# Patient Record
Sex: MALE | Race: BLACK OR AFRICAN AMERICAN | NOT HISPANIC OR LATINO | URBAN - METROPOLITAN AREA
[De-identification: names, ages, dates, MRNs, and addresses within clinical notes are randomized per-mention and may not be internally consistent; named-entity substitution may affect disease eponyms.]

---

## 2017-04-21 ENCOUNTER — INPATIENT (INPATIENT)
Facility: HOSPITAL | Age: 67
LOS: 2 days | Discharge: HOME | DRG: 603 | End: 2017-04-24
Attending: HOSPITALIST | Admitting: HOSPITALIST
Payer: COMMERCIAL

## 2017-04-21 VITALS
DIASTOLIC BLOOD PRESSURE: 74 MMHG | RESPIRATION RATE: 20 BRPM | HEIGHT: 73.75 IN | HEART RATE: 96 BPM | WEIGHT: 270.07 LBS | SYSTOLIC BLOOD PRESSURE: 124 MMHG | OXYGEN SATURATION: 97 % | TEMPERATURE: 98 F

## 2017-04-21 DIAGNOSIS — L97.509 NON-PRESSURE CHRONIC ULCER OF OTHER PART OF UNSPECIFIED FOOT WITH UNSPECIFIED SEVERITY: ICD-10-CM

## 2017-04-21 PROBLEM — Z00.00 ENCOUNTER FOR PREVENTIVE HEALTH EXAMINATION: Status: ACTIVE | Noted: 2017-04-21

## 2017-04-21 LAB
ALBUMIN SERPL ELPH-MCNC: 3.3 G/DL — LOW (ref 3.5–5)
ALP SERPL-CCNC: 87 U/L — SIGNIFICANT CHANGE UP (ref 40–120)
ALT FLD-CCNC: 38 U/L DA — SIGNIFICANT CHANGE UP (ref 10–60)
ANION GAP SERPL CALC-SCNC: 3 MMOL/L — LOW (ref 5–17)
AST SERPL-CCNC: 25 U/L — SIGNIFICANT CHANGE UP (ref 10–40)
BASOPHILS # BLD AUTO: 0.3 K/UL — HIGH (ref 0–0.2)
BASOPHILS NFR BLD AUTO: 2.7 % — HIGH (ref 0–2)
BILIRUB SERPL-MCNC: 0.3 MG/DL — SIGNIFICANT CHANGE UP (ref 0.2–1.2)
BUN SERPL-MCNC: 13 MG/DL — SIGNIFICANT CHANGE UP (ref 7–18)
CALCIUM SERPL-MCNC: 8.8 MG/DL — SIGNIFICANT CHANGE UP (ref 8.4–10.5)
CHLORIDE SERPL-SCNC: 105 MMOL/L — SIGNIFICANT CHANGE UP (ref 96–108)
CO2 SERPL-SCNC: 29 MMOL/L — SIGNIFICANT CHANGE UP (ref 22–31)
CREAT SERPL-MCNC: 1.2 MG/DL — SIGNIFICANT CHANGE UP (ref 0.5–1.3)
EOSINOPHIL # BLD AUTO: 0.1 K/UL — SIGNIFICANT CHANGE UP (ref 0–0.5)
EOSINOPHIL NFR BLD AUTO: 0.7 % — SIGNIFICANT CHANGE UP (ref 0–6)
ERYTHROCYTE [SEDIMENTATION RATE] IN BLOOD: 50 MM/HR — HIGH (ref 0–20)
GLUCOSE SERPL-MCNC: 102 MG/DL — HIGH (ref 70–99)
HCT VFR BLD CALC: 40.3 % — SIGNIFICANT CHANGE UP (ref 39–50)
HGB BLD-MCNC: 13.3 G/DL — SIGNIFICANT CHANGE UP (ref 13–17)
LYMPHOCYTES # BLD AUTO: 2.2 K/UL — SIGNIFICANT CHANGE UP (ref 1–3.3)
LYMPHOCYTES # BLD AUTO: 24.2 % — SIGNIFICANT CHANGE UP (ref 13–44)
MCHC RBC-ENTMCNC: 26.4 PG — LOW (ref 27–34)
MCHC RBC-ENTMCNC: 33 GM/DL — SIGNIFICANT CHANGE UP (ref 32–36)
MCV RBC AUTO: 79.8 FL — LOW (ref 80–100)
MONOCYTES # BLD AUTO: 0.8 K/UL — SIGNIFICANT CHANGE UP (ref 0–0.9)
MONOCYTES NFR BLD AUTO: 8.9 % — SIGNIFICANT CHANGE UP (ref 2–14)
NEUTROPHILS # BLD AUTO: 5.9 K/UL — SIGNIFICANT CHANGE UP (ref 1.8–7.4)
NEUTROPHILS NFR BLD AUTO: 63.5 % — SIGNIFICANT CHANGE UP (ref 43–77)
PLATELET # BLD AUTO: 284 K/UL — SIGNIFICANT CHANGE UP (ref 150–400)
POTASSIUM SERPL-MCNC: 3.8 MMOL/L — SIGNIFICANT CHANGE UP (ref 3.5–5.3)
POTASSIUM SERPL-SCNC: 3.8 MMOL/L — SIGNIFICANT CHANGE UP (ref 3.5–5.3)
PROT SERPL-MCNC: 8 G/DL — SIGNIFICANT CHANGE UP (ref 6–8.3)
RBC # BLD: 5.05 M/UL — SIGNIFICANT CHANGE UP (ref 4.2–5.8)
RBC # FLD: 13.5 % — SIGNIFICANT CHANGE UP (ref 10.3–14.5)
SODIUM SERPL-SCNC: 137 MMOL/L — SIGNIFICANT CHANGE UP (ref 135–145)
WBC # BLD: 9.2 K/UL — SIGNIFICANT CHANGE UP (ref 3.8–10.5)
WBC # FLD AUTO: 9.2 K/UL — SIGNIFICANT CHANGE UP (ref 3.8–10.5)

## 2017-04-21 PROCEDURE — 93925 LOWER EXTREMITY STUDY: CPT | Mod: 26

## 2017-04-21 PROCEDURE — 73630 X-RAY EXAM OF FOOT: CPT | Mod: 26,RT

## 2017-04-21 PROCEDURE — 99285 EMERGENCY DEPT VISIT HI MDM: CPT

## 2017-04-21 RX ORDER — PIPERACILLIN AND TAZOBACTAM 4; .5 G/20ML; G/20ML
3.38 INJECTION, POWDER, LYOPHILIZED, FOR SOLUTION INTRAVENOUS ONCE
Qty: 0 | Refills: 0 | Status: COMPLETED | OUTPATIENT
Start: 2017-04-21 | End: 2017-04-21

## 2017-04-21 RX ADMIN — PIPERACILLIN AND TAZOBACTAM 200 GRAM(S): 4; .5 INJECTION, POWDER, LYOPHILIZED, FOR SOLUTION INTRAVENOUS at 22:18

## 2017-04-21 NOTE — ED PROVIDER NOTE - OBJECTIVE STATEMENT
67 y/o M pt with PMHx of DM and no PSHx presents to ED c/o R foot soreness with associated R leg swelling x1 week and R foot infection noticed today by pt's podiatrist. Pt states his podiatrist examined his R foot at approximately 13:30pm today and notified the pt of the infection at the base of his R foot near the toes; pt was instructed to visit ED. Pt denies fever, chills, cough, weakness, calf pain, or any other complaints. Pt reports his last Hemoglobin A1c level was 7.1. Pt also reports having vascular studies performed in the past (>5 years ago). Allergies: Percocet 5/325 (hallucinations).

## 2017-04-21 NOTE — ED PROVIDER NOTE - MEDICAL DECISION MAKING DETAILS
65 y/o M pt c/o R foot soreness and R leg swelling x1 week, as well as R foot infection discovered by his podiatrist today. Plan for X-Ray of R foot, blood work, IV Abx's, labs, and possible admittance.

## 2017-04-21 NOTE — ED PROVIDER NOTE - CONDUCTED A DETAILED DISCUSSION WITH PATIENT AND/OR GUARDIAN REGARDING, MDM
radiology results/need for outpatient follow-up/lab results/return to ED if symptoms worsen, persist or questions arise/need to admit

## 2017-04-21 NOTE — ED PROVIDER NOTE - LOWER EXTREMITY EXAM, RIGHT
no swelling, no erythema, no purulent discharge, no ulcerations, no vesicles, no masses specific to hernia, no lymphadenopathy; positive cremaster reflex

## 2017-04-21 NOTE — ED PROVIDER NOTE - NS ED MD SCRIBE ATTENDING SCRIBE SECTIONS
VITAL SIGNS( Pullset)/HISTORY OF PRESENT ILLNESS/PAST MEDICAL/SURGICAL/SOCIAL HISTORY/DISPOSITION/REVIEW OF SYSTEMS/PHYSICAL EXAM

## 2017-04-21 NOTE — ED ADULT NURSE NOTE - OBJECTIVE STATEMENT
pt from home c/o of Rt foot diabetic ulcer x1 week needs admission for IV antibiotics per podiatrist pt is alert awake Rt plantar foot diabetic ulcer with small amt of yellow drainage gauze dry and intact

## 2017-04-22 DIAGNOSIS — Z41.8 ENCOUNTER FOR OTHER PROCEDURES FOR PURPOSES OTHER THAN REMEDYING HEALTH STATE: ICD-10-CM

## 2017-04-22 DIAGNOSIS — E11.9 TYPE 2 DIABETES MELLITUS WITHOUT COMPLICATIONS: ICD-10-CM

## 2017-04-22 DIAGNOSIS — L97.509 NON-PRESSURE CHRONIC ULCER OF OTHER PART OF UNSPECIFIED FOOT WITH UNSPECIFIED SEVERITY: ICD-10-CM

## 2017-04-22 LAB
ANION GAP SERPL CALC-SCNC: 4 MMOL/L — LOW (ref 5–17)
BUN SERPL-MCNC: 10 MG/DL — SIGNIFICANT CHANGE UP (ref 7–18)
CALCIUM SERPL-MCNC: 8.2 MG/DL — LOW (ref 8.4–10.5)
CHLORIDE SERPL-SCNC: 107 MMOL/L — SIGNIFICANT CHANGE UP (ref 96–108)
CHOLEST SERPL-MCNC: 149 MG/DL — SIGNIFICANT CHANGE UP (ref 10–199)
CO2 SERPL-SCNC: 29 MMOL/L — SIGNIFICANT CHANGE UP (ref 22–31)
CREAT SERPL-MCNC: 1.16 MG/DL — SIGNIFICANT CHANGE UP (ref 0.5–1.3)
CRP SERPL-MCNC: 3.02 MG/DL — HIGH (ref 0–0.4)
GLUCOSE SERPL-MCNC: 92 MG/DL — SIGNIFICANT CHANGE UP (ref 70–99)
HBA1C BLD-MCNC: 7 % — HIGH (ref 4–5.6)
HCT VFR BLD CALC: 37.8 % — LOW (ref 39–50)
HDLC SERPL-MCNC: 28 MG/DL — LOW (ref 40–125)
HGB BLD-MCNC: 12.3 G/DL — LOW (ref 13–17)
LIPID PNL WITH DIRECT LDL SERPL: 98 MG/DL — SIGNIFICANT CHANGE UP
MAGNESIUM SERPL-MCNC: 2.2 MG/DL — SIGNIFICANT CHANGE UP (ref 1.8–2.4)
MCHC RBC-ENTMCNC: 26.2 PG — LOW (ref 27–34)
MCHC RBC-ENTMCNC: 32.6 GM/DL — SIGNIFICANT CHANGE UP (ref 32–36)
MCV RBC AUTO: 80.5 FL — SIGNIFICANT CHANGE UP (ref 80–100)
PHOSPHATE SERPL-MCNC: 2.8 MG/DL — SIGNIFICANT CHANGE UP (ref 2.5–4.5)
PLATELET # BLD AUTO: 240 K/UL — SIGNIFICANT CHANGE UP (ref 150–400)
POTASSIUM SERPL-MCNC: 4.1 MMOL/L — SIGNIFICANT CHANGE UP (ref 3.5–5.3)
POTASSIUM SERPL-SCNC: 4.1 MMOL/L — SIGNIFICANT CHANGE UP (ref 3.5–5.3)
RBC # BLD: 4.7 M/UL — SIGNIFICANT CHANGE UP (ref 4.2–5.8)
RBC # FLD: 13.4 % — SIGNIFICANT CHANGE UP (ref 10.3–14.5)
SODIUM SERPL-SCNC: 140 MMOL/L — SIGNIFICANT CHANGE UP (ref 135–145)
TOTAL CHOLESTEROL/HDL RATIO MEASUREMENT: 5.3 RATIO — SIGNIFICANT CHANGE UP (ref 3.4–9.6)
TRIGL SERPL-MCNC: 113 MG/DL — SIGNIFICANT CHANGE UP (ref 10–149)
WBC # BLD: 6.9 K/UL — SIGNIFICANT CHANGE UP (ref 3.8–10.5)
WBC # FLD AUTO: 6.9 K/UL — SIGNIFICANT CHANGE UP (ref 3.8–10.5)

## 2017-04-22 RX ORDER — DEXTROSE 50 % IN WATER 50 %
1 SYRINGE (ML) INTRAVENOUS ONCE
Qty: 0 | Refills: 0 | Status: DISCONTINUED | OUTPATIENT
Start: 2017-04-22 | End: 2017-04-24

## 2017-04-22 RX ORDER — VANCOMYCIN HCL 1 G
1250 VIAL (EA) INTRAVENOUS EVERY 12 HOURS
Qty: 0 | Refills: 0 | Status: DISCONTINUED | OUTPATIENT
Start: 2017-04-22 | End: 2017-04-24

## 2017-04-22 RX ORDER — DEXTROSE 50 % IN WATER 50 %
25 SYRINGE (ML) INTRAVENOUS ONCE
Qty: 0 | Refills: 0 | Status: DISCONTINUED | OUTPATIENT
Start: 2017-04-22 | End: 2017-04-24

## 2017-04-22 RX ORDER — VANCOMYCIN HCL 1 G
1000 VIAL (EA) INTRAVENOUS ONCE
Qty: 0 | Refills: 0 | Status: COMPLETED | OUTPATIENT
Start: 2017-04-22 | End: 2017-04-22

## 2017-04-22 RX ORDER — HEPARIN SODIUM 5000 [USP'U]/ML
5000 INJECTION INTRAVENOUS; SUBCUTANEOUS EVERY 8 HOURS
Qty: 0 | Refills: 0 | Status: DISCONTINUED | OUTPATIENT
Start: 2017-04-22 | End: 2017-04-24

## 2017-04-22 RX ORDER — GLUCAGON INJECTION, SOLUTION 0.5 MG/.1ML
1 INJECTION, SOLUTION SUBCUTANEOUS ONCE
Qty: 0 | Refills: 0 | Status: DISCONTINUED | OUTPATIENT
Start: 2017-04-22 | End: 2017-04-24

## 2017-04-22 RX ORDER — ACETAMINOPHEN 500 MG
650 TABLET ORAL EVERY 6 HOURS
Qty: 0 | Refills: 0 | Status: DISCONTINUED | OUTPATIENT
Start: 2017-04-22 | End: 2017-04-24

## 2017-04-22 RX ORDER — DEXTROSE 50 % IN WATER 50 %
12.5 SYRINGE (ML) INTRAVENOUS ONCE
Qty: 0 | Refills: 0 | Status: DISCONTINUED | OUTPATIENT
Start: 2017-04-22 | End: 2017-04-24

## 2017-04-22 RX ORDER — VANCOMYCIN HCL 1 G
1000 VIAL (EA) INTRAVENOUS EVERY 24 HOURS
Qty: 0 | Refills: 0 | Status: DISCONTINUED | OUTPATIENT
Start: 2017-04-22 | End: 2017-04-22

## 2017-04-22 RX ORDER — SODIUM CHLORIDE 9 MG/ML
1000 INJECTION, SOLUTION INTRAVENOUS
Qty: 0 | Refills: 0 | Status: DISCONTINUED | OUTPATIENT
Start: 2017-04-22 | End: 2017-04-24

## 2017-04-22 RX ORDER — INSULIN LISPRO 100/ML
VIAL (ML) SUBCUTANEOUS
Qty: 0 | Refills: 0 | Status: DISCONTINUED | OUTPATIENT
Start: 2017-04-22 | End: 2017-04-24

## 2017-04-22 RX ORDER — GABAPENTIN 400 MG/1
400 CAPSULE ORAL
Qty: 0 | Refills: 0 | Status: DISCONTINUED | OUTPATIENT
Start: 2017-04-22 | End: 2017-04-24

## 2017-04-22 RX ORDER — ENOXAPARIN SODIUM 100 MG/ML
40 INJECTION SUBCUTANEOUS DAILY
Qty: 0 | Refills: 0 | Status: DISCONTINUED | OUTPATIENT
Start: 2017-04-22 | End: 2017-04-22

## 2017-04-22 RX ORDER — SODIUM CHLORIDE 9 MG/ML
1000 INJECTION INTRAMUSCULAR; INTRAVENOUS; SUBCUTANEOUS
Qty: 0 | Refills: 0 | Status: DISCONTINUED | OUTPATIENT
Start: 2017-04-22 | End: 2017-04-24

## 2017-04-22 RX ADMIN — Medication 1: at 11:59

## 2017-04-22 RX ADMIN — Medication 650 MILLIGRAM(S): at 05:41

## 2017-04-22 RX ADMIN — GABAPENTIN 400 MILLIGRAM(S): 400 CAPSULE ORAL at 17:06

## 2017-04-22 RX ADMIN — HEPARIN SODIUM 5000 UNIT(S): 5000 INJECTION INTRAVENOUS; SUBCUTANEOUS at 23:43

## 2017-04-22 RX ADMIN — SODIUM CHLORIDE 75 MILLILITER(S): 9 INJECTION INTRAMUSCULAR; INTRAVENOUS; SUBCUTANEOUS at 23:43

## 2017-04-22 RX ADMIN — Medication 650 MILLIGRAM(S): at 07:10

## 2017-04-22 RX ADMIN — GABAPENTIN 400 MILLIGRAM(S): 400 CAPSULE ORAL at 05:41

## 2017-04-22 RX ADMIN — Medication 166.67 MILLIGRAM(S): at 17:06

## 2017-04-22 RX ADMIN — Medication 250 MILLIGRAM(S): at 01:43

## 2017-04-22 RX ADMIN — HEPARIN SODIUM 5000 UNIT(S): 5000 INJECTION INTRAVENOUS; SUBCUTANEOUS at 13:00

## 2017-04-22 NOTE — H&P ADULT. - HISTORY OF PRESENT ILLNESS
65 y/o M pt with PMHx of DM presents to ER complaining of  right  foot pain, swelling and erythema and blisters. Pt was concern and went to his podiatrist in which bed side debridement was done and pt was instructed to come ER due to concerns of infection and rule out osteomyelitis Pt states that this is the first time this happens to him. Pt denies fever, chills, cough, weakness, calf pain, or any other complaints.

## 2017-04-22 NOTE — H&P ADULT. - PROBLEM SELECTOR PLAN 1
65 yo M PMH DM p/w R plantar foot ulcer with possible infection.  PE: fibroglandular base, hyperkeratotic edges, jasmyne durus, no deep probing, no purulent discharge. sanguinolent drainage.   Admit for IV abx and OM r/o  f/u right foot XR and right LE arterial duplex  c/w IV abx  f/u Bcx and wound culture.   Podiatry following, pt might need OR debridement   ID: 67 yo M PMH DM p/w R plantar foot ulcer with possible infection.  PE: fibroglandular base, hyperkeratotic edges, jasmyne durus, no deep probing, no purulent discharge. sanguinolent drainage.   Admit for OM r/o and IV abx  f/u right foot XR and right LE arterial duplex  c/w IV abx  f/u Bcx and wound culture.   Podiatry following, pt might need OR debridement

## 2017-04-22 NOTE — H&P ADULT. - ASSESSMENT
67 y/o M pt with PMHx of DM presents to ER complaining of  right  foot pain, swelling and erythema and blisters. Pt was concern and went to his podiatrist in which bed side debridement was done and pt was instructed to come ER due to concerns of infection and rule out osteomyelitis

## 2017-04-22 NOTE — H&P ADULT. - ATTENDING COMMENTS
Patient was seen and examined by myself. Case was discussed with house staff in details.  See addendum in chart

## 2017-04-23 LAB
-  AMPICILLIN/SULBACTAM: SIGNIFICANT CHANGE UP
-  CEFAZOLIN: SIGNIFICANT CHANGE UP
-  CIPROFLOXACIN: SIGNIFICANT CHANGE UP
-  CLINDAMYCIN: SIGNIFICANT CHANGE UP
-  ERYTHROMYCIN: SIGNIFICANT CHANGE UP
-  GENTAMICIN: SIGNIFICANT CHANGE UP
-  LEVOFLOXACIN: SIGNIFICANT CHANGE UP
-  MOXIFLOXACIN(AEROBIC): SIGNIFICANT CHANGE UP
-  OXACILLIN: SIGNIFICANT CHANGE UP
-  PENICILLIN: SIGNIFICANT CHANGE UP
-  RIFAMPIN: SIGNIFICANT CHANGE UP
-  TETRACYCLINE: SIGNIFICANT CHANGE UP
-  TRIMETHOPRIM/SULFAMETHOXAZOLE: SIGNIFICANT CHANGE UP
-  VANCOMYCIN: SIGNIFICANT CHANGE UP
ALBUMIN SERPL ELPH-MCNC: 2.6 G/DL — LOW (ref 3.5–5)
ALP SERPL-CCNC: 71 U/L — SIGNIFICANT CHANGE UP (ref 40–120)
ALT FLD-CCNC: 28 U/L DA — SIGNIFICANT CHANGE UP (ref 10–60)
ANION GAP SERPL CALC-SCNC: 5 MMOL/L — SIGNIFICANT CHANGE UP (ref 5–17)
AST SERPL-CCNC: 21 U/L — SIGNIFICANT CHANGE UP (ref 10–40)
BASOPHILS # BLD AUTO: 0.1 K/UL — SIGNIFICANT CHANGE UP (ref 0–0.2)
BASOPHILS NFR BLD AUTO: 1 % — SIGNIFICANT CHANGE UP (ref 0–2)
BILIRUB SERPL-MCNC: 0.4 MG/DL — SIGNIFICANT CHANGE UP (ref 0.2–1.2)
BUN SERPL-MCNC: 9 MG/DL — SIGNIFICANT CHANGE UP (ref 7–18)
CALCIUM SERPL-MCNC: 8.3 MG/DL — LOW (ref 8.4–10.5)
CHLORIDE SERPL-SCNC: 108 MMOL/L — SIGNIFICANT CHANGE UP (ref 96–108)
CO2 SERPL-SCNC: 28 MMOL/L — SIGNIFICANT CHANGE UP (ref 22–31)
CREAT SERPL-MCNC: 1.05 MG/DL — SIGNIFICANT CHANGE UP (ref 0.5–1.3)
EOSINOPHIL # BLD AUTO: 0.1 K/UL — SIGNIFICANT CHANGE UP (ref 0–0.5)
EOSINOPHIL NFR BLD AUTO: 1.7 % — SIGNIFICANT CHANGE UP (ref 0–6)
GLUCOSE SERPL-MCNC: 93 MG/DL — SIGNIFICANT CHANGE UP (ref 70–99)
HCT VFR BLD CALC: 39.8 % — SIGNIFICANT CHANGE UP (ref 39–50)
HGB BLD-MCNC: 12.6 G/DL — LOW (ref 13–17)
LYMPHOCYTES # BLD AUTO: 2.2 K/UL — SIGNIFICANT CHANGE UP (ref 1–3.3)
LYMPHOCYTES # BLD AUTO: 40.5 % — SIGNIFICANT CHANGE UP (ref 13–44)
MAGNESIUM SERPL-MCNC: 2 MG/DL — SIGNIFICANT CHANGE UP (ref 1.8–2.4)
MCHC RBC-ENTMCNC: 26 PG — LOW (ref 27–34)
MCHC RBC-ENTMCNC: 31.8 GM/DL — LOW (ref 32–36)
MCV RBC AUTO: 81.7 FL — SIGNIFICANT CHANGE UP (ref 80–100)
METHOD TYPE: SIGNIFICANT CHANGE UP
MONOCYTES # BLD AUTO: 0.6 K/UL — SIGNIFICANT CHANGE UP (ref 0–0.9)
MONOCYTES NFR BLD AUTO: 10.6 % — SIGNIFICANT CHANGE UP (ref 2–14)
NEUTROPHILS # BLD AUTO: 2.6 K/UL — SIGNIFICANT CHANGE UP (ref 1.8–7.4)
NEUTROPHILS NFR BLD AUTO: 46.2 % — SIGNIFICANT CHANGE UP (ref 43–77)
PHOSPHATE SERPL-MCNC: 2.3 MG/DL — LOW (ref 2.5–4.5)
PLATELET # BLD AUTO: 255 K/UL — SIGNIFICANT CHANGE UP (ref 150–400)
POTASSIUM SERPL-MCNC: 4.2 MMOL/L — SIGNIFICANT CHANGE UP (ref 3.5–5.3)
POTASSIUM SERPL-SCNC: 4.2 MMOL/L — SIGNIFICANT CHANGE UP (ref 3.5–5.3)
PROT SERPL-MCNC: 6.9 G/DL — SIGNIFICANT CHANGE UP (ref 6–8.3)
RBC # BLD: 4.87 M/UL — SIGNIFICANT CHANGE UP (ref 4.2–5.8)
RBC # FLD: 13.4 % — SIGNIFICANT CHANGE UP (ref 10.3–14.5)
SODIUM SERPL-SCNC: 141 MMOL/L — SIGNIFICANT CHANGE UP (ref 135–145)
WBC # BLD: 5.6 K/UL — SIGNIFICANT CHANGE UP (ref 3.8–10.5)
WBC # FLD AUTO: 5.6 K/UL — SIGNIFICANT CHANGE UP (ref 3.8–10.5)

## 2017-04-23 RX ORDER — COLLAGENASE CLOSTRIDIUM HIST. 250 UNIT/G
1 OINTMENT (GRAM) TOPICAL DAILY
Qty: 0 | Refills: 0 | Status: DISCONTINUED | OUTPATIENT
Start: 2017-04-23 | End: 2017-04-24

## 2017-04-23 RX ADMIN — SODIUM CHLORIDE 75 MILLILITER(S): 9 INJECTION INTRAMUSCULAR; INTRAVENOUS; SUBCUTANEOUS at 17:43

## 2017-04-23 RX ADMIN — Medication 166.67 MILLIGRAM(S): at 17:39

## 2017-04-23 RX ADMIN — HEPARIN SODIUM 5000 UNIT(S): 5000 INJECTION INTRAVENOUS; SUBCUTANEOUS at 13:32

## 2017-04-23 RX ADMIN — HEPARIN SODIUM 5000 UNIT(S): 5000 INJECTION INTRAVENOUS; SUBCUTANEOUS at 23:58

## 2017-04-23 RX ADMIN — HEPARIN SODIUM 5000 UNIT(S): 5000 INJECTION INTRAVENOUS; SUBCUTANEOUS at 06:51

## 2017-04-23 RX ADMIN — Medication 1 APPLICATION(S): at 11:47

## 2017-04-23 RX ADMIN — GABAPENTIN 400 MILLIGRAM(S): 400 CAPSULE ORAL at 06:51

## 2017-04-23 RX ADMIN — Medication 166.67 MILLIGRAM(S): at 06:59

## 2017-04-23 RX ADMIN — SODIUM CHLORIDE 75 MILLILITER(S): 9 INJECTION INTRAMUSCULAR; INTRAVENOUS; SUBCUTANEOUS at 06:50

## 2017-04-23 RX ADMIN — Medication 1: at 17:38

## 2017-04-23 RX ADMIN — GABAPENTIN 400 MILLIGRAM(S): 400 CAPSULE ORAL at 17:39

## 2017-04-24 VITALS
HEART RATE: 67 BPM | DIASTOLIC BLOOD PRESSURE: 76 MMHG | SYSTOLIC BLOOD PRESSURE: 135 MMHG | TEMPERATURE: 98 F | RESPIRATION RATE: 17 BRPM | OXYGEN SATURATION: 97 %

## 2017-04-24 LAB
ALBUMIN SERPL ELPH-MCNC: 3.1 G/DL — LOW (ref 3.5–5)
ALP SERPL-CCNC: 76 U/L — SIGNIFICANT CHANGE UP (ref 40–120)
ALT FLD-CCNC: 34 U/L DA — SIGNIFICANT CHANGE UP (ref 10–60)
ANION GAP SERPL CALC-SCNC: 5 MMOL/L — SIGNIFICANT CHANGE UP (ref 5–17)
AST SERPL-CCNC: 25 U/L — SIGNIFICANT CHANGE UP (ref 10–40)
BASOPHILS # BLD AUTO: 0.1 K/UL — SIGNIFICANT CHANGE UP (ref 0–0.2)
BASOPHILS NFR BLD AUTO: 1.9 % — SIGNIFICANT CHANGE UP (ref 0–2)
BILIRUB SERPL-MCNC: 0.3 MG/DL — SIGNIFICANT CHANGE UP (ref 0.2–1.2)
BUN SERPL-MCNC: 11 MG/DL — SIGNIFICANT CHANGE UP (ref 7–18)
CALCIUM SERPL-MCNC: 8.8 MG/DL — SIGNIFICANT CHANGE UP (ref 8.4–10.5)
CHLORIDE SERPL-SCNC: 107 MMOL/L — SIGNIFICANT CHANGE UP (ref 96–108)
CO2 SERPL-SCNC: 27 MMOL/L — SIGNIFICANT CHANGE UP (ref 22–31)
CREAT SERPL-MCNC: 1.08 MG/DL — SIGNIFICANT CHANGE UP (ref 0.5–1.3)
EOSINOPHIL # BLD AUTO: 0.1 K/UL — SIGNIFICANT CHANGE UP (ref 0–0.5)
EOSINOPHIL NFR BLD AUTO: 2.2 % — SIGNIFICANT CHANGE UP (ref 0–6)
GLUCOSE SERPL-MCNC: 113 MG/DL — HIGH (ref 70–99)
HCT VFR BLD CALC: 41.8 % — SIGNIFICANT CHANGE UP (ref 39–50)
HGB BLD-MCNC: 13.5 G/DL — SIGNIFICANT CHANGE UP (ref 13–17)
LYMPHOCYTES # BLD AUTO: 2.8 K/UL — SIGNIFICANT CHANGE UP (ref 1–3.3)
LYMPHOCYTES # BLD AUTO: 46.4 % — HIGH (ref 13–44)
MAGNESIUM SERPL-MCNC: 2 MG/DL — SIGNIFICANT CHANGE UP (ref 1.8–2.4)
MCHC RBC-ENTMCNC: 26.1 PG — LOW (ref 27–34)
MCHC RBC-ENTMCNC: 32.2 GM/DL — SIGNIFICANT CHANGE UP (ref 32–36)
MCV RBC AUTO: 81 FL — SIGNIFICANT CHANGE UP (ref 80–100)
MONOCYTES # BLD AUTO: 0.6 K/UL — SIGNIFICANT CHANGE UP (ref 0–0.9)
MONOCYTES NFR BLD AUTO: 9.1 % — SIGNIFICANT CHANGE UP (ref 2–14)
NEUTROPHILS # BLD AUTO: 2.5 K/UL — SIGNIFICANT CHANGE UP (ref 1.8–7.4)
NEUTROPHILS NFR BLD AUTO: 40.4 % — LOW (ref 43–77)
PHOSPHATE SERPL-MCNC: 3.1 MG/DL — SIGNIFICANT CHANGE UP (ref 2.5–4.5)
PLATELET # BLD AUTO: 286 K/UL — SIGNIFICANT CHANGE UP (ref 150–400)
POTASSIUM SERPL-MCNC: 4.1 MMOL/L — SIGNIFICANT CHANGE UP (ref 3.5–5.3)
POTASSIUM SERPL-SCNC: 4.1 MMOL/L — SIGNIFICANT CHANGE UP (ref 3.5–5.3)
PROT SERPL-MCNC: 7.8 G/DL — SIGNIFICANT CHANGE UP (ref 6–8.3)
RBC # BLD: 5.17 M/UL — SIGNIFICANT CHANGE UP (ref 4.2–5.8)
RBC # FLD: 13.4 % — SIGNIFICANT CHANGE UP (ref 10.3–14.5)
SODIUM SERPL-SCNC: 139 MMOL/L — SIGNIFICANT CHANGE UP (ref 135–145)
VANCOMYCIN TROUGH SERPL-MCNC: 13.7 UG/ML — SIGNIFICANT CHANGE UP (ref 10–20)
WBC # BLD: 6.1 K/UL — SIGNIFICANT CHANGE UP (ref 3.8–10.5)
WBC # FLD AUTO: 6.1 K/UL — SIGNIFICANT CHANGE UP (ref 3.8–10.5)

## 2017-04-24 PROCEDURE — 80061 LIPID PANEL: CPT

## 2017-04-24 PROCEDURE — G0378: CPT

## 2017-04-24 PROCEDURE — 86140 C-REACTIVE PROTEIN: CPT

## 2017-04-24 PROCEDURE — 83735 ASSAY OF MAGNESIUM: CPT

## 2017-04-24 PROCEDURE — 73630 X-RAY EXAM OF FOOT: CPT

## 2017-04-24 PROCEDURE — 83036 HEMOGLOBIN GLYCOSYLATED A1C: CPT

## 2017-04-24 PROCEDURE — 84100 ASSAY OF PHOSPHORUS: CPT

## 2017-04-24 PROCEDURE — 93925 LOWER EXTREMITY STUDY: CPT

## 2017-04-24 PROCEDURE — 80202 ASSAY OF VANCOMYCIN: CPT

## 2017-04-24 PROCEDURE — 85027 COMPLETE CBC AUTOMATED: CPT

## 2017-04-24 PROCEDURE — 80053 COMPREHEN METABOLIC PANEL: CPT

## 2017-04-24 PROCEDURE — 87186 SC STD MICRODIL/AGAR DIL: CPT

## 2017-04-24 PROCEDURE — 99285 EMERGENCY DEPT VISIT HI MDM: CPT | Mod: 25

## 2017-04-24 PROCEDURE — 87070 CULTURE OTHR SPECIMN AEROBIC: CPT

## 2017-04-24 PROCEDURE — 80048 BASIC METABOLIC PNL TOTAL CA: CPT

## 2017-04-24 PROCEDURE — 85652 RBC SED RATE AUTOMATED: CPT

## 2017-04-24 PROCEDURE — 87040 BLOOD CULTURE FOR BACTERIA: CPT

## 2017-04-24 RX ORDER — COLLAGENASE CLOSTRIDIUM HIST. 250 UNIT/G
1 OINTMENT (GRAM) TOPICAL
Qty: 1 | Refills: 0 | OUTPATIENT
Start: 2017-04-24 | End: 2017-05-24

## 2017-04-24 RX ORDER — COLLAGENASE CLOSTRIDIUM HIST. 250 UNIT/G
1 OINTMENT (GRAM) TOPICAL
Qty: 5 | Refills: 0 | OUTPATIENT
Start: 2017-04-24 | End: 2017-05-24

## 2017-04-24 RX ORDER — COLLAGENASE CLOSTRIDIUM HIST. 250 UNIT/G
1 OINTMENT (GRAM) TOPICAL
Qty: 30 | Refills: 0 | OUTPATIENT
Start: 2017-04-24 | End: 2017-05-24

## 2017-04-24 RX ORDER — CEPHALEXIN 500 MG
1 CAPSULE ORAL
Qty: 28 | Refills: 0 | OUTPATIENT
Start: 2017-04-24 | End: 2017-05-01

## 2017-04-24 RX ADMIN — GABAPENTIN 400 MILLIGRAM(S): 400 CAPSULE ORAL at 06:16

## 2017-04-24 RX ADMIN — SODIUM CHLORIDE 75 MILLILITER(S): 9 INJECTION INTRAMUSCULAR; INTRAVENOUS; SUBCUTANEOUS at 06:16

## 2017-04-24 RX ADMIN — HEPARIN SODIUM 5000 UNIT(S): 5000 INJECTION INTRAVENOUS; SUBCUTANEOUS at 06:16

## 2017-04-24 RX ADMIN — Medication 166.67 MILLIGRAM(S): at 07:19

## 2017-04-24 RX ADMIN — SODIUM CHLORIDE 75 MILLILITER(S): 9 INJECTION INTRAMUSCULAR; INTRAVENOUS; SUBCUTANEOUS at 00:01

## 2017-04-24 RX ADMIN — Medication 1 APPLICATION(S): at 10:00

## 2017-04-24 NOTE — DISCHARGE NOTE ADULT - MEDICATION SUMMARY - MEDICATIONS TO TAKE
I will START or STAY ON the medications listed below when I get home from the hospital:    gabapentin 400 mg oral tablet  -- 400 milligram(s) by mouth 2 times a day  -- Indication: For pain    NovoLOG 100 units/mL subcutaneous solution  -- 50 unit(s) subcutaneous once a day at 8 PM  -- Indication: For DM (diabetes mellitus)    NovoLOG 100 units/mL subcutaneous solution  -- 25 unit(s) subcutaneous once a day at 8AM  -- Indication: For DM (diabetes mellitus)    Levemir FlexPen  -- 40 unit(s) subcutaneous once a day at 8 AM  -- Indication: For DM (diabetes mellitus)    Levemir FlexPen  -- 20 unit(s) subcutaneous once a day at 8PM  -- Indication: For DM (diabetes mellitus)    metFORMIN 500 mg oral tablet  -- 1 tab(s) by mouth 2 times a day  -- Indication: For DM (diabetes mellitus)    collagenase 250 units/g topical ointment  -- 1 application on skin once a day  -- Indication: For right foot ulcer    famotidine 40 mg oral tablet  -- 1 tab(s) by mouth once a day (at bedtime)  -- Indication: For GI prophylaxis I will START or STAY ON the medications listed below when I get home from the hospital:    gauze  -- Apply on skin to affected area once a day  -- Indication: For right foot ulcer    ayesha  -- Apply on skin to affected area once a day  -- Indication: For right foot ulcer    ace wrap  -- Apply on skin to affected area once a day  -- Indication: For right foot ulcer    gabapentin 400 mg oral tablet  -- 400 milligram(s) by mouth 2 times a day  -- Indication: For pain    NovoLOG 100 units/mL subcutaneous solution  -- 50 unit(s) subcutaneous once a day at 8 PM  -- Indication: For DM (diabetes mellitus)    NovoLOG 100 units/mL subcutaneous solution  -- 25 unit(s) subcutaneous once a day at 8AM  -- Indication: For DM (diabetes mellitus)    Levemir FlexPen  -- 40 unit(s) subcutaneous once a day at 8 AM  -- Indication: For DM (diabetes mellitus)    Levemir FlexPen  -- 20 unit(s) subcutaneous once a day at 8PM  -- Indication: For DM (diabetes mellitus)    metFORMIN 500 mg oral tablet  -- 1 tab(s) by mouth 2 times a day  -- Indication: For DM (diabetes mellitus)    Keflex 500 mg oral capsule  -- 1 cap(s) by mouth every 6 hours  -- Finish all this medication unless otherwise directed by prescriber.    -- Indication: For right foot ulcer    collagenase 250 units/g topical ointment  -- 1 application on skin once a day  -- Indication: For right foot ulcer    famotidine 40 mg oral tablet  -- 1 tab(s) by mouth once a day (at bedtime)  -- Indication: For GI prophylaxis

## 2017-04-24 NOTE — DISCHARGE NOTE ADULT - PATIENT PORTAL LINK FT
“You can access the [facility] Patient Portal, offered by [facility], by registering with the following website: [email or web address]”

## 2017-04-24 NOTE — DISCHARGE NOTE ADULT - PLAN OF CARE
to control blood sugar levels resolution of infection you were evaluated by podiatry in the hospital  take keflex 500mg every 6 hours for 1 week  apply santyl each day to ulcer wound and change dressings each day  follow-up with your podiatrist Continue to take you home insulin regimen as prescribed. Follow-up with your primary care doctor. you were evaluated by podiatry in the hospital  take keflex 500mg every 6 hours for 1 week  apply santyl each day to ulcer wound and change dressings with gauze, kerlex, and ace wrap each day  follow-up with your podiatrist you were evaluated by podiatry in the hospital  take keflex 500mg every 6 hours for 1 week  apply santyl each day to ulcer wound and change dressings with gauze, kerlex, and ace wrap each day  follow-up with your podiatrist or Dr. Willams in clinic ([address] [place] [address]) [phone]

## 2017-04-24 NOTE — DISCHARGE NOTE ADULT - CARE PLAN
Principal Discharge DX:	Ulcer of foot, unspecified laterality, with unspecified severity  Goal:	resolution of infection  Instructions for follow-up, activity and diet:	you were evaluated by podiatry in the hospital  take keflex 500mg every 6 hours for 1 week  apply santyl each day to ulcer wound and change dressings each day  follow-up with your podiatrist  Secondary Diagnosis:	DM (diabetes mellitus)  Goal:	to control blood sugar levels  Instructions for follow-up, activity and diet:	Continue to take you home insulin regimen as prescribed. Follow-up with your primary care doctor. Principal Discharge DX:	Ulcer of foot, unspecified laterality, with unspecified severity  Goal:	resolution of infection  Instructions for follow-up, activity and diet:	you were evaluated by podiatry in the hospital  take keflex 500mg every 6 hours for 1 week  apply santyl each day to ulcer wound and change dressings with gauze, kerlex, and ace wrap each day  follow-up with your podiatrist  Secondary Diagnosis:	DM (diabetes mellitus)  Goal:	to control blood sugar levels  Instructions for follow-up, activity and diet:	Continue to take you home insulin regimen as prescribed. Follow-up with your primary care doctor. Principal Discharge DX:	Ulcer of foot, unspecified laterality, with unspecified severity  Goal:	resolution of infection  Instructions for follow-up, activity and diet:	you were evaluated by podiatry in the hospital  take keflex 500mg every 6 hours for 1 week  apply santyl each day to ulcer wound and change dressings with gauze, kerlex, and ace wrap each day  follow-up with your podiatrist or Dr. Willams in clinic ([address] [place] [address]) [phone]  Secondary Diagnosis:	DM (diabetes mellitus)  Goal:	to control blood sugar levels  Instructions for follow-up, activity and diet:	Continue to take you home insulin regimen as prescribed. Follow-up with your primary care doctor. Principal Discharge DX:	Ulcer of foot, unspecified laterality, with unspecified severity  Goal:	resolution of infection  Instructions for follow-up, activity and diet:	you were evaluated by podiatry in the hospital  take keflex 500mg every 6 hours for 1 week  apply santyl each day to ulcer wound and change dressings with gauze, kerlex, and ace wrap each day  follow-up with your podiatrist or Dr. Willams in clinic ([address] [place] [address]) [phone]  Secondary Diagnosis:	DM (diabetes mellitus)  Goal:	to control blood sugar levels  Instructions for follow-up, activity and diet:	Continue to take you home insulin regimen as prescribed. Follow-up with your primary care doctor. Principal Discharge DX:	Ulcer of foot, unspecified laterality, with unspecified severity  Goal:	resolution of infection  Instructions for follow-up, activity and diet:	you were evaluated by podiatry in the hospital  take keflex 500mg every 6 hours for 1 week  apply santyl each day to ulcer wound and change dressings with gauze, kerlex, and ace wrap each day  follow-up with your podiatrist or Dr. Willams in clinic ([address] [place] [address]) [phone]  Secondary Diagnosis:	DM (diabetes mellitus)  Goal:	to control blood sugar levels  Instructions for follow-up, activity and diet:	Continue to take you home insulin regimen as prescribed. Follow-up with your primary care doctor. Principal Discharge DX:	Ulcer of foot, unspecified laterality, with unspecified severity  Goal:	resolution of infection  Instructions for follow-up, activity and diet:	you were evaluated by podiatry in the hospital  take keflex 500mg every 6 hours for 1 week  apply santyl each day to ulcer wound and change dressings with gauze, kerlex, and ace wrap each day  follow-up with your podiatrist or Dr. Willams in clinic ([address] [place] [address]) [phone]  Secondary Diagnosis:	DM (diabetes mellitus)  Goal:	to control blood sugar levels  Instructions for follow-up, activity and diet:	Continue to take you home insulin regimen as prescribed. Follow-up with your primary care doctor.

## 2017-04-24 NOTE — DISCHARGE NOTE ADULT - HOSPITAL COURSE
67 y/o M pt with PMHx of DM presents to ER complaining of  right  foot pain, swelling and erythema and blisters. Pt was concern and went to his podiatrist in which bed side debridement was done and pt was instructed to come ER due to concerns of infection and rule out osteomyelitis Pt states that this is the first time this happens to him. Pt denies fever, chills, cough, weakness, calf pain, or any other complaints.     Patient was evaluated by podiatry and Dr. Akua PAIZ was consulted. Patient was started on IV vancomycin and surgical cultures showed MSSA. Patient will need to perform daily dressing changes with santyl and take keflex for 1 week.    Patient is medically stable for discharge. Patient will need to take keflex 500mg q6 for 1 week and will need to follow-up with his podiatrist. 67 y/o M pt with PMHx of DM presents to ER complaining of  right  foot pain, swelling and erythema and blisters. Pt was concern and went to his podiatrist in which bed side debridement was done and pt was instructed to come ER due to concerns of infection and rule out osteomyelitis Pt states that this is the first time this happens to him. Pt denies fever, chills, cough, weakness, calf pain, or any other complaints.     Patient was evaluated by podiatry and Dr. Akua PAIZ was consulted. Patient was started on IV vancomycin and surgical cultures showed MSSA. Blood cultures were negative x 2. Patient will need to perform daily dressing changes with santyl and take keflex for 1 week.    Patient is medically stable for discharge. Patient will need to take keflex 500mg q6 for 1 week and will need to follow-up with his podiatrist. 67 y/o M pt with PMHx of DM presents to ER complaining of  right  foot pain, swelling and erythema and blisters. Pt was concern and went to his podiatrist in which bed side debridement was done and pt was instructed to come ER due to concerns of infection and rule out osteomyelitis Pt states that this is the first time this happens to him. Pt denies fever, chills, cough, weakness, calf pain, or any other complaints.     Patient was evaluated by podiatry and Dr. Akua PAIZ was consulted. Patient was started on IV vancomycin and surgical cultures showed MSSA. Blood cultures were negative x 2. Patient will need to perform daily dressing changes with santyl and take keflex for 1 week.    Patient is medically stable for discharge. Patient will need to take keflex 500mg q6 for 1 week and will need to follow-up with his podiatrist. He can follow-up in [facility] clinic with Dr. Willams as well.

## 2017-04-26 DIAGNOSIS — E11.621 TYPE 2 DIABETES MELLITUS WITH FOOT ULCER: ICD-10-CM

## 2017-04-26 DIAGNOSIS — L03.115 CELLULITIS OF RIGHT LOWER LIMB: ICD-10-CM

## 2017-04-26 DIAGNOSIS — L97.519 NON-PRESSURE CHRONIC ULCER OF OTHER PART OF RIGHT FOOT WITH UNSPECIFIED SEVERITY: ICD-10-CM

## 2017-04-26 DIAGNOSIS — Z88.5 ALLERGY STATUS TO NARCOTIC AGENT: ICD-10-CM

## 2017-04-26 LAB
CULTURE RESULTS: SIGNIFICANT CHANGE UP
ORGANISM # SPEC MICROSCOPIC CNT: SIGNIFICANT CHANGE UP
ORGANISM # SPEC MICROSCOPIC CNT: SIGNIFICANT CHANGE UP
SPECIMEN SOURCE: SIGNIFICANT CHANGE UP

## 2017-08-03 NOTE — H&P ADULT. - BACK
with hearing implant in right ear/Adequate: hears normal conversation without difficulty
detailed exam

## 2017-11-07 PROBLEM — E11.9 TYPE 2 DIABETES MELLITUS WITHOUT COMPLICATIONS: Chronic | Status: ACTIVE | Noted: 2017-04-21

## 2017-11-27 ENCOUNTER — APPOINTMENT (OUTPATIENT)
Dept: VASCULAR SURGERY | Facility: CLINIC | Age: 67
End: 2017-11-27
Payer: COMMERCIAL

## 2017-11-27 VITALS
BODY MASS INDEX: 32.78 KG/M2 | TEMPERATURE: 98 F | WEIGHT: 250 LBS | SYSTOLIC BLOOD PRESSURE: 140 MMHG | DIASTOLIC BLOOD PRESSURE: 95 MMHG | HEART RATE: 106 BPM | HEIGHT: 73.34 IN

## 2017-11-27 DIAGNOSIS — Z86.73 PERSONAL HISTORY OF TRANSIENT ISCHEMIC ATTACK (TIA), AND CEREBRAL INFARCTION W/OUT RESIDUAL DEFICITS: ICD-10-CM

## 2017-11-27 DIAGNOSIS — Z86.19 PERSONAL HISTORY OF OTHER INFECTIOUS AND PARASITIC DISEASES: ICD-10-CM

## 2017-11-27 DIAGNOSIS — Z86.69 PERSONAL HISTORY OF OTHER DISEASES OF THE NERVOUS SYSTEM AND SENSE ORGANS: ICD-10-CM

## 2017-11-27 DIAGNOSIS — Z85.038 PERSONAL HISTORY OF OTHER MALIGNANT NEOPLASM OF LARGE INTESTINE: ICD-10-CM

## 2017-11-27 DIAGNOSIS — L97.519 NON-PRESSURE CHRONIC ULCER OF OTHER PART OF RIGHT FOOT WITH UNSPECIFIED SEVERITY: ICD-10-CM

## 2017-11-27 DIAGNOSIS — E11.9 TYPE 2 DIABETES MELLITUS W/OUT COMPLICATIONS: ICD-10-CM

## 2017-11-27 DIAGNOSIS — Z87.39 PERSONAL HISTORY OF OTHER DISEASES OF THE MUSCULOSKELETAL SYSTEM AND CONNECTIVE TISSUE: ICD-10-CM

## 2017-11-27 PROCEDURE — 99243 OFF/OP CNSLTJ NEW/EST LOW 30: CPT | Mod: 25

## 2017-11-27 PROCEDURE — 93923 UPR/LXTR ART STDY 3+ LVLS: CPT

## 2017-11-27 RX ORDER — FAMOTIDINE 10 MG/1
TABLET, FILM COATED ORAL
Refills: 0 | Status: ACTIVE | COMMUNITY

## 2017-12-14 ENCOUNTER — OUTPATIENT (OUTPATIENT)
Dept: OUTPATIENT SERVICES | Facility: HOSPITAL | Age: 67
LOS: 1 days | Discharge: HOME | End: 2017-12-14

## 2017-12-14 LAB
GLUCOSE BLDC GLUCOMTR-MCNC: 56 MG/DL — LOW (ref 70–99)
GLUCOSE BLDC GLUCOMTR-MCNC: 66 MG/DL — LOW (ref 70–99)

## 2017-12-15 ENCOUNTER — OUTPATIENT (OUTPATIENT)
Dept: OUTPATIENT SERVICES | Facility: HOSPITAL | Age: 67
LOS: 1 days | End: 2017-12-15
Payer: COMMERCIAL

## 2017-12-15 ENCOUNTER — APPOINTMENT (OUTPATIENT)
Dept: MRI IMAGING | Facility: IMAGING CENTER | Age: 67
End: 2017-12-15
Payer: COMMERCIAL

## 2017-12-15 DIAGNOSIS — L97.519 NON-PRESSURE CHRONIC ULCER OF OTHER PART OF RIGHT FOOT WITH UNSPECIFIED SEVERITY: ICD-10-CM

## 2017-12-15 PROCEDURE — 73720 MRI LWR EXTREMITY W/O&W/DYE: CPT

## 2017-12-15 PROCEDURE — 82565 ASSAY OF CREATININE: CPT

## 2017-12-15 PROCEDURE — 73720 MRI LWR EXTREMITY W/O&W/DYE: CPT | Mod: 26,RT

## 2017-12-15 PROCEDURE — A9585: CPT

## 2017-12-18 ENCOUNTER — APPOINTMENT (OUTPATIENT)
Dept: VASCULAR SURGERY | Facility: CLINIC | Age: 67
End: 2017-12-18

## 2017-12-28 ENCOUNTER — OUTPATIENT (OUTPATIENT)
Dept: OUTPATIENT SERVICES | Facility: HOSPITAL | Age: 67
LOS: 1 days | Discharge: HOME | End: 2017-12-28

## 2017-12-28 LAB
GLUCOSE BLDC GLUCOMTR-MCNC: 55 MG/DL — LOW (ref 70–99)
GLUCOSE BLDC GLUCOMTR-MCNC: 73 MG/DL — SIGNIFICANT CHANGE UP (ref 70–99)

## 2018-01-02 ENCOUNTER — APPOINTMENT (OUTPATIENT)
Dept: INFECTIOUS DISEASE | Facility: CLINIC | Age: 68
End: 2018-01-02
Payer: COMMERCIAL

## 2018-01-02 VITALS
TEMPERATURE: 97.7 F | BODY MASS INDEX: 31.44 KG/M2 | WEIGHT: 245 LBS | DIASTOLIC BLOOD PRESSURE: 77 MMHG | HEIGHT: 74 IN | HEART RATE: 111 BPM | OXYGEN SATURATION: 100 % | SYSTOLIC BLOOD PRESSURE: 137 MMHG

## 2018-01-02 DIAGNOSIS — Z87.11 PERSONAL HISTORY OF PEPTIC ULCER DISEASE: ICD-10-CM

## 2018-01-02 PROCEDURE — 99203 OFFICE O/P NEW LOW 30 MIN: CPT

## 2018-01-02 RX ORDER — LEVOFLOXACIN 500 MG/1
500 TABLET, FILM COATED ORAL
Qty: 7 | Refills: 0 | Status: COMPLETED | COMMUNITY
Start: 2017-09-19

## 2018-01-02 RX ORDER — CIPROFLOXACIN 3 MG/ML
0.3 SOLUTION OPHTHALMIC
Qty: 2 | Refills: 0 | Status: ACTIVE | COMMUNITY
Start: 2017-12-29

## 2018-01-02 RX ORDER — PREDNISOLONE ACETATE 10 MG/ML
1 SUSPENSION/ DROPS OPHTHALMIC
Qty: 20 | Refills: 0 | Status: ACTIVE | COMMUNITY
Start: 2017-12-11

## 2018-01-02 RX ORDER — AMOXICILLIN AND CLAVULANATE POTASSIUM 875; 125 MG/1; MG/1
875-125 TABLET, COATED ORAL
Qty: 14 | Refills: 0 | Status: COMPLETED | COMMUNITY
Start: 2017-08-01

## 2018-01-02 RX ORDER — INSULIN ASPART 100 [IU]/ML
(70-30) 100 INJECTION, SUSPENSION SUBCUTANEOUS
Qty: 75 | Refills: 0 | Status: COMPLETED | COMMUNITY
Start: 2017-11-15

## 2018-01-02 RX ORDER — INSULIN DETEMIR 100 [IU]/ML
100 INJECTION, SOLUTION SUBCUTANEOUS
Qty: 60 | Refills: 0 | Status: COMPLETED | COMMUNITY
Start: 2017-06-30

## 2018-01-02 RX ORDER — MOXIFLOXACIN OPHTHALMIC 5 MG/ML
0.5 SOLUTION/ DROPS OPHTHALMIC
Qty: 3 | Refills: 0 | Status: ACTIVE | COMMUNITY
Start: 2017-12-13

## 2018-01-09 LAB
ALBUMIN SERPL ELPH-MCNC: 4.3 G/DL
ALP BLD-CCNC: 87 U/L
ALT SERPL-CCNC: 21 U/L
ANION GAP SERPL CALC-SCNC: 15 MMOL/L
AST SERPL-CCNC: 22 U/L
BACTERIA SPEC CULT: ABNORMAL
BASOPHILS # BLD AUTO: 0.02 K/UL
BASOPHILS NFR BLD AUTO: 0.2 %
BILIRUB SERPL-MCNC: 0.4 MG/DL
BUN SERPL-MCNC: 23 MG/DL
CALCIUM SERPL-MCNC: 10 MG/DL
CHLORIDE SERPL-SCNC: 100 MMOL/L
CO2 SERPL-SCNC: 26 MMOL/L
CREAT SERPL-MCNC: 1.55 MG/DL
CRP SERPL-MCNC: 0.6 MG/DL
EOSINOPHIL # BLD AUTO: 0.04 K/UL
EOSINOPHIL NFR BLD AUTO: 0.5
ERYTHROCYTE [SEDIMENTATION RATE] IN BLOOD BY WESTERGREN METHOD: 10 MM/HR
GLUCOSE SERPL-MCNC: 55 MG/DL
HBA1C MFR BLD HPLC: 6.7 %
HCT VFR BLD CALC: 48 %
HGB BLD-MCNC: 15.2 G/DL
IMM GRANULOCYTES NFR BLD AUTO: 0.2 %
LYMPHOCYTES # BLD AUTO: 2.74 K/UL
LYMPHOCYTES NFR BLD AUTO: 33.7 %
MAN DIFF?: NORMAL
MCHC RBC-ENTMCNC: 25.6 PG
MCHC RBC-ENTMCNC: 31.7 GM/DL
MCV RBC AUTO: 80.8 FL
MONOCYTES # BLD AUTO: 0.86 K/UL
MONOCYTES NFR BLD AUTO: 10.6 %
NEUTROPHILS # BLD AUTO: 4.46 K/UL
NEUTROPHILS NFR BLD AUTO: 54.8 %
PLATELET # BLD AUTO: 296 K/UL
POTASSIUM SERPL-SCNC: 4.4 MMOL/L
PROT SERPL-MCNC: 8.9 G/DL
RBC # BLD: 5.94 M/UL
RBC # FLD: 15.1 %
SODIUM SERPL-SCNC: 141 MMOL/L
WBC # FLD AUTO: 8.14 K/UL

## 2018-03-05 ENCOUNTER — APPOINTMENT (OUTPATIENT)
Dept: INFECTIOUS DISEASE | Facility: CLINIC | Age: 68
End: 2018-03-05
Payer: COMMERCIAL

## 2018-03-05 VITALS
HEIGHT: 74 IN | HEART RATE: 96 BPM | OXYGEN SATURATION: 97 % | BODY MASS INDEX: 32.6 KG/M2 | WEIGHT: 254 LBS | DIASTOLIC BLOOD PRESSURE: 90 MMHG | SYSTOLIC BLOOD PRESSURE: 147 MMHG | TEMPERATURE: 97 F

## 2018-03-05 PROCEDURE — 99213 OFFICE O/P EST LOW 20 MIN: CPT

## 2018-03-28 ENCOUNTER — APPOINTMENT (OUTPATIENT)
Dept: VASCULAR SURGERY | Facility: CLINIC | Age: 68
End: 2018-03-28
Payer: COMMERCIAL

## 2018-03-28 VITALS — DIASTOLIC BLOOD PRESSURE: 113 MMHG | SYSTOLIC BLOOD PRESSURE: 129 MMHG | HEART RATE: 123 BPM

## 2018-03-28 PROCEDURE — 93971 EXTREMITY STUDY: CPT

## 2018-03-28 PROCEDURE — 99212 OFFICE O/P EST SF 10 MIN: CPT

## 2018-04-03 ENCOUNTER — INPATIENT (INPATIENT)
Facility: HOSPITAL | Age: 68
LOS: 2 days | Discharge: HOME | DRG: 464 | End: 2018-04-06
Attending: INTERNAL MEDICINE | Admitting: INTERNAL MEDICINE
Payer: COMMERCIAL

## 2018-04-03 VITALS
SYSTOLIC BLOOD PRESSURE: 150 MMHG | RESPIRATION RATE: 20 BRPM | TEMPERATURE: 99 F | DIASTOLIC BLOOD PRESSURE: 85 MMHG | OXYGEN SATURATION: 100 % | HEART RATE: 107 BPM

## 2018-04-03 DIAGNOSIS — Z29.9 ENCOUNTER FOR PROPHYLACTIC MEASURES, UNSPECIFIED: ICD-10-CM

## 2018-04-03 DIAGNOSIS — Z98.890 OTHER SPECIFIED POSTPROCEDURAL STATES: Chronic | ICD-10-CM

## 2018-04-03 DIAGNOSIS — E11.9 TYPE 2 DIABETES MELLITUS WITHOUT COMPLICATIONS: ICD-10-CM

## 2018-04-03 DIAGNOSIS — K21.9 GASTRO-ESOPHAGEAL REFLUX DISEASE WITHOUT ESOPHAGITIS: ICD-10-CM

## 2018-04-03 DIAGNOSIS — M86.9 OSTEOMYELITIS, UNSPECIFIED: ICD-10-CM

## 2018-04-03 LAB
ACETONE SERPL-MCNC: NEGATIVE — SIGNIFICANT CHANGE UP
ALBUMIN SERPL ELPH-MCNC: 2.9 G/DL — LOW (ref 3.5–5)
ALP SERPL-CCNC: 81 U/L — SIGNIFICANT CHANGE UP (ref 40–120)
ALT FLD-CCNC: 50 U/L DA — SIGNIFICANT CHANGE UP (ref 10–60)
ANION GAP SERPL CALC-SCNC: 7 MMOL/L — SIGNIFICANT CHANGE UP (ref 5–17)
APPEARANCE UR: CLEAR — SIGNIFICANT CHANGE UP
APTT BLD: 30.9 SEC — SIGNIFICANT CHANGE UP (ref 27.5–37.4)
AST SERPL-CCNC: 26 U/L — SIGNIFICANT CHANGE UP (ref 10–40)
BASOPHILS # BLD AUTO: 0.1 K/UL — SIGNIFICANT CHANGE UP (ref 0–0.2)
BASOPHILS NFR BLD AUTO: 1 % — SIGNIFICANT CHANGE UP (ref 0–2)
BILIRUB SERPL-MCNC: 0.1 MG/DL — LOW (ref 0.2–1.2)
BILIRUB UR-MCNC: NEGATIVE — SIGNIFICANT CHANGE UP
BUN SERPL-MCNC: 15 MG/DL — SIGNIFICANT CHANGE UP (ref 7–18)
CALCIUM SERPL-MCNC: 8.7 MG/DL — SIGNIFICANT CHANGE UP (ref 8.4–10.5)
CHLORIDE SERPL-SCNC: 107 MMOL/L — SIGNIFICANT CHANGE UP (ref 96–108)
CO2 SERPL-SCNC: 26 MMOL/L — SIGNIFICANT CHANGE UP (ref 22–31)
COLOR SPEC: YELLOW — SIGNIFICANT CHANGE UP
CREAT SERPL-MCNC: 1.11 MG/DL — SIGNIFICANT CHANGE UP (ref 0.5–1.3)
DIFF PNL FLD: NEGATIVE — SIGNIFICANT CHANGE UP
EOSINOPHIL # BLD AUTO: 0.1 K/UL — SIGNIFICANT CHANGE UP (ref 0–0.5)
EOSINOPHIL NFR BLD AUTO: 1.4 % — SIGNIFICANT CHANGE UP (ref 0–6)
GLUCOSE SERPL-MCNC: 114 MG/DL — HIGH (ref 70–99)
GLUCOSE UR QL: NEGATIVE — SIGNIFICANT CHANGE UP
HCT VFR BLD CALC: 38.9 % — LOW (ref 39–50)
HGB BLD-MCNC: 11.8 G/DL — LOW (ref 13–17)
INR BLD: 1.2 RATIO — HIGH (ref 0.88–1.16)
KETONES UR-MCNC: NEGATIVE — SIGNIFICANT CHANGE UP
LACTATE SERPL-SCNC: 1.1 MMOL/L — SIGNIFICANT CHANGE UP (ref 0.7–2)
LACTATE SERPL-SCNC: 1.1 MMOL/L — SIGNIFICANT CHANGE UP (ref 0.7–2)
LACTATE SERPL-SCNC: 2.6 MMOL/L — HIGH (ref 0.7–2)
LEUKOCYTE ESTERASE UR-ACNC: NEGATIVE — SIGNIFICANT CHANGE UP
LYMPHOCYTES # BLD AUTO: 2.2 K/UL — SIGNIFICANT CHANGE UP (ref 1–3.3)
LYMPHOCYTES # BLD AUTO: 29.6 % — SIGNIFICANT CHANGE UP (ref 13–44)
MAGNESIUM SERPL-MCNC: 2 MG/DL — SIGNIFICANT CHANGE UP (ref 1.6–2.6)
MCHC RBC-ENTMCNC: 24.8 PG — LOW (ref 27–34)
MCHC RBC-ENTMCNC: 30.5 GM/DL — LOW (ref 32–36)
MCV RBC AUTO: 81.4 FL — SIGNIFICANT CHANGE UP (ref 80–100)
MONOCYTES # BLD AUTO: 0.6 K/UL — SIGNIFICANT CHANGE UP (ref 0–0.9)
MONOCYTES NFR BLD AUTO: 7.8 % — SIGNIFICANT CHANGE UP (ref 2–14)
NEUTROPHILS # BLD AUTO: 4.5 K/UL — SIGNIFICANT CHANGE UP (ref 1.8–7.4)
NEUTROPHILS NFR BLD AUTO: 60.2 % — SIGNIFICANT CHANGE UP (ref 43–77)
NITRITE UR-MCNC: NEGATIVE — SIGNIFICANT CHANGE UP
PH UR: 5 — SIGNIFICANT CHANGE UP (ref 5–8)
PLATELET # BLD AUTO: 399 K/UL — SIGNIFICANT CHANGE UP (ref 150–400)
POTASSIUM SERPL-MCNC: 4.3 MMOL/L — SIGNIFICANT CHANGE UP (ref 3.5–5.3)
POTASSIUM SERPL-SCNC: 4.3 MMOL/L — SIGNIFICANT CHANGE UP (ref 3.5–5.3)
PROT SERPL-MCNC: 8.2 G/DL — SIGNIFICANT CHANGE UP (ref 6–8.3)
PROT UR-MCNC: NEGATIVE — SIGNIFICANT CHANGE UP
PROTHROM AB SERPL-ACNC: 13.1 SEC — HIGH (ref 9.8–12.7)
RBC # BLD: 4.78 M/UL — SIGNIFICANT CHANGE UP (ref 4.2–5.8)
RBC # FLD: 13.7 % — SIGNIFICANT CHANGE UP (ref 10.3–14.5)
SODIUM SERPL-SCNC: 140 MMOL/L — SIGNIFICANT CHANGE UP (ref 135–145)
SP GR SPEC: 1.02 — SIGNIFICANT CHANGE UP (ref 1.01–1.02)
UROBILINOGEN FLD QL: NEGATIVE — SIGNIFICANT CHANGE UP
WBC # BLD: 7.6 K/UL — SIGNIFICANT CHANGE UP (ref 3.8–10.5)
WBC # FLD AUTO: 7.6 K/UL — SIGNIFICANT CHANGE UP (ref 3.8–10.5)

## 2018-04-03 PROCEDURE — 73620 X-RAY EXAM OF FOOT: CPT | Mod: 26,RT

## 2018-04-03 PROCEDURE — 99285 EMERGENCY DEPT VISIT HI MDM: CPT

## 2018-04-03 PROCEDURE — 71045 X-RAY EXAM CHEST 1 VIEW: CPT | Mod: 26

## 2018-04-03 RX ORDER — DEXTROSE 50 % IN WATER 50 %
25 SYRINGE (ML) INTRAVENOUS ONCE
Qty: 0 | Refills: 0 | Status: DISCONTINUED | OUTPATIENT
Start: 2018-04-03 | End: 2018-04-06

## 2018-04-03 RX ORDER — INSULIN GLARGINE 100 [IU]/ML
10 INJECTION, SOLUTION SUBCUTANEOUS AT BEDTIME
Qty: 0 | Refills: 0 | Status: DISCONTINUED | OUTPATIENT
Start: 2018-04-03 | End: 2018-04-04

## 2018-04-03 RX ORDER — INSULIN ASPART 100 [IU]/ML
50 INJECTION, SOLUTION SUBCUTANEOUS
Qty: 0 | Refills: 0 | COMMUNITY

## 2018-04-03 RX ORDER — PIPERACILLIN AND TAZOBACTAM 4; .5 G/20ML; G/20ML
3.38 INJECTION, POWDER, LYOPHILIZED, FOR SOLUTION INTRAVENOUS EVERY 8 HOURS
Qty: 0 | Refills: 0 | Status: DISCONTINUED | OUTPATIENT
Start: 2018-04-03 | End: 2018-04-05

## 2018-04-03 RX ORDER — INSULIN LISPRO 100/ML
VIAL (ML) SUBCUTANEOUS
Qty: 0 | Refills: 0 | Status: DISCONTINUED | OUTPATIENT
Start: 2018-04-03 | End: 2018-04-06

## 2018-04-03 RX ORDER — ENOXAPARIN SODIUM 100 MG/ML
40 INJECTION SUBCUTANEOUS DAILY
Qty: 0 | Refills: 0 | Status: DISCONTINUED | OUTPATIENT
Start: 2018-04-03 | End: 2018-04-04

## 2018-04-03 RX ORDER — SODIUM CHLORIDE 9 MG/ML
1000 INJECTION INTRAMUSCULAR; INTRAVENOUS; SUBCUTANEOUS
Qty: 0 | Refills: 0 | Status: DISCONTINUED | OUTPATIENT
Start: 2018-04-03 | End: 2018-04-04

## 2018-04-03 RX ORDER — GABAPENTIN 400 MG/1
400 CAPSULE ORAL
Qty: 0 | Refills: 0 | Status: DISCONTINUED | OUTPATIENT
Start: 2018-04-03 | End: 2018-04-06

## 2018-04-03 RX ORDER — INSULIN ASPART 100 [IU]/ML
25 INJECTION, SOLUTION SUBCUTANEOUS
Qty: 0 | Refills: 0 | COMMUNITY

## 2018-04-03 RX ORDER — DEXTROSE 50 % IN WATER 50 %
1 SYRINGE (ML) INTRAVENOUS ONCE
Qty: 0 | Refills: 0 | Status: DISCONTINUED | OUTPATIENT
Start: 2018-04-03 | End: 2018-04-06

## 2018-04-03 RX ORDER — INSULIN DETEMIR 100/ML (3)
40 INSULIN PEN (ML) SUBCUTANEOUS
Qty: 0 | Refills: 0 | COMMUNITY

## 2018-04-03 RX ORDER — VANCOMYCIN HCL 1 G
1000 VIAL (EA) INTRAVENOUS ONCE
Qty: 0 | Refills: 0 | Status: COMPLETED | OUTPATIENT
Start: 2018-04-03 | End: 2018-04-03

## 2018-04-03 RX ORDER — SODIUM CHLORIDE 9 MG/ML
2000 INJECTION INTRAMUSCULAR; INTRAVENOUS; SUBCUTANEOUS ONCE
Qty: 0 | Refills: 0 | Status: COMPLETED | OUTPATIENT
Start: 2018-04-03 | End: 2018-04-03

## 2018-04-03 RX ORDER — VANCOMYCIN HCL 1 G
1000 VIAL (EA) INTRAVENOUS EVERY 12 HOURS
Qty: 0 | Refills: 0 | Status: DISCONTINUED | OUTPATIENT
Start: 2018-04-03 | End: 2018-04-05

## 2018-04-03 RX ORDER — DEXTROSE 50 % IN WATER 50 %
12.5 SYRINGE (ML) INTRAVENOUS ONCE
Qty: 0 | Refills: 0 | Status: DISCONTINUED | OUTPATIENT
Start: 2018-04-03 | End: 2018-04-06

## 2018-04-03 RX ORDER — INSULIN DETEMIR 100/ML (3)
20 INSULIN PEN (ML) SUBCUTANEOUS
Qty: 0 | Refills: 0 | COMMUNITY

## 2018-04-03 RX ORDER — PIPERACILLIN AND TAZOBACTAM 4; .5 G/20ML; G/20ML
3.38 INJECTION, POWDER, LYOPHILIZED, FOR SOLUTION INTRAVENOUS ONCE
Qty: 0 | Refills: 0 | Status: COMPLETED | OUTPATIENT
Start: 2018-04-03 | End: 2018-04-03

## 2018-04-03 RX ORDER — GLUCAGON INJECTION, SOLUTION 0.5 MG/.1ML
1 INJECTION, SOLUTION SUBCUTANEOUS ONCE
Qty: 0 | Refills: 0 | Status: DISCONTINUED | OUTPATIENT
Start: 2018-04-03 | End: 2018-04-06

## 2018-04-03 RX ORDER — SODIUM CHLORIDE 9 MG/ML
1000 INJECTION, SOLUTION INTRAVENOUS
Qty: 0 | Refills: 0 | Status: DISCONTINUED | OUTPATIENT
Start: 2018-04-03 | End: 2018-04-06

## 2018-04-03 RX ORDER — FAMOTIDINE 10 MG/ML
20 INJECTION INTRAVENOUS DAILY
Qty: 0 | Refills: 0 | Status: DISCONTINUED | OUTPATIENT
Start: 2018-04-03 | End: 2018-04-06

## 2018-04-03 RX ADMIN — Medication 250 MILLIGRAM(S): at 15:25

## 2018-04-03 RX ADMIN — SODIUM CHLORIDE 2000 MILLILITER(S): 9 INJECTION INTRAMUSCULAR; INTRAVENOUS; SUBCUTANEOUS at 17:40

## 2018-04-03 RX ADMIN — PIPERACILLIN AND TAZOBACTAM 25 GRAM(S): 4; .5 INJECTION, POWDER, LYOPHILIZED, FOR SOLUTION INTRAVENOUS at 23:42

## 2018-04-03 RX ADMIN — PIPERACILLIN AND TAZOBACTAM 200 GRAM(S): 4; .5 INJECTION, POWDER, LYOPHILIZED, FOR SOLUTION INTRAVENOUS at 17:23

## 2018-04-03 RX ADMIN — SODIUM CHLORIDE 75 MILLILITER(S): 9 INJECTION INTRAMUSCULAR; INTRAVENOUS; SUBCUTANEOUS at 23:42

## 2018-04-03 NOTE — H&P ADULT - NSHPPHYSICALEXAM_GEN_ALL_CORE
INTERVAL HPI/OVERNIGHT EVENTS:  T(C): 36.9 (04-03-18 @ 15:29), Max: 37 (04-03-18 @ 14:12)  HR: 94 (04-03-18 @ 15:29) (94 - 107)  BP: 128/83 (04-03-18 @ 15:29) (128/83 - 150/85)  RR: 18 (04-03-18 @ 15:29) (18 - 20)  SpO2: 100% (04-03-18 @ 15:29) (100% - 100%)    PHYSICAL EXAM:  GENERAL: NAD, well-groomed, well-developed  HEAD:  Atraumatic, Normocephalic  EYES: EOMI, PERRLA, conjunctiva and sclera clear  ENMT: No tonsillar erythema, exudates, or enlargement; Moist mucous membranes  NECK: Supple, No JVD  NERVOUS SYSTEM:  Alert & Oriented X3, Good concentration; Motor Strength 5/5 B/L upper and lower extremities; DTRs 2+ intact and symmetric  CHEST/LUNG: Clear to percussion bilaterally; No rales, rhonchi, wheezing, or rubs  HEART: Regular rate and rhythm; No murmurs, rubs, or gallops  ABDOMEN: Soft, Nontender, Nondistended; Bowel sounds present  EXTREMITIES:  poor Peripheral Pulses, No clubbing, cyanosis; RLE edema - non pitting- 1+  LYMPH: No lymphadenopathy noted  SKIN: R 2nd toe ulceration without drainage ~1cm

## 2018-04-03 NOTE — ED PROVIDER NOTE - PSH
History of colectomy    History of repair of hiatal hernia    S/P arthroscopic knee surgery    S/P inguinal hernia repair

## 2018-04-03 NOTE — H&P ADULT - PROBLEM SELECTOR PLAN 2
holding home novolog 70/30 and levemir  moderate HSS with FS ac hs  starting 10 lantus at night and adjust  f/u HbA1C

## 2018-04-03 NOTE — ED ADULT NURSE NOTE - OBJECTIVE STATEMENT
Patient presents to Ed with right plantar wound with swelling and discoloration with minimal bleeding. Patient denies any pain at this present time

## 2018-04-03 NOTE — ED PROVIDER NOTE - OBJECTIVE STATEMENT
67 y.o. male with h/o IDDM, last dose this am, pt with Rt plantar sore/ulcer for 1 yr., currently is under wound care, c/o Rt foot swelling for 21/2 weeks, bloody discharge, pt was sent to see Vascular last night, given antibiotics by wound specialist last week, claims feeling better, less numbness.  No fever, pain, pt's able to ambulate. 67 y.o. male with h/o IDDM, last dose this am, pt with Rt plantar sore/ulcer for 1 yr., currently is under wound care, c/o Rt foot swelling for 21/2 weeks, bloody discharge, pt was sent to see Vascular last night, given antibiotics by wound specialist last week, claims feeling better, less numbness.  No fever, pain, pt's able to ambulate.  Pt had venous doppler done on Wed.-no DVT

## 2018-04-03 NOTE — H&P ADULT - ASSESSMENT
Patient is a 67M from home with PMH IDDM, peripheral neuropathy, R plantar foot ulcer x 1 year presenting with 2 week R foot swelling and pain. Patient states that he went to see his wound care doctor Dr. Stuart last Tuesday, was prescribed antibiotics ? keflex (today is last day of medication), was referred to see vascular at which time he went to see Dr. Gandara. Doppler was done on Wednesday (neg for DVT) and assistant referred patient to be admitted to [facility], but patient refused at that time because he has experience with [facility] and so came to be admitted here. Admitted for acute osteomyelitis head of second metatarsal base of the second toe.

## 2018-04-03 NOTE — ED PROVIDER NOTE - NEUROLOGICAL, MLM
Alert and oriented, no focal deficits, no motor or sensory deficits. RLE- venous stasis, no calf tenderness, edema

## 2018-04-03 NOTE — H&P ADULT - NSHPLABSRESULTS_GEN_ALL_CORE
LABS:                        11.8   7.6   )-----------( 399      ( 2018 15:11 )             38.9     04-03    140  |  107  |  15  ----------------------------<  114<H>  4.3   |  26  |  1.11    Ca    8.7      2018 15:11  Mg     2.0     -03    TPro  8.2  /  Alb  2.9<L>  /  TBili  0.1<L>  /  DBili  x   /  AST  26  /  ALT  50  /  AlkPhos  81  04-03    PT/INR - ( 2018 15:11 )   PT: 13.1 sec;   INR: 1.20 ratio         PTT - ( 2018 15:11 )  PTT:30.9 sec  Urinalysis Basic - ( 2018 18:43 )    Color: Yellow / Appearance: Clear / S.020 / pH: x  Gluc: x / Ketone: Negative  / Bili: Negative / Urobili: Negative   Blood: x / Protein: Negative / Nitrite: Negative   Leuk Esterase: Negative / RBC: x / WBC x   Sq Epi: x / Non Sq Epi: x / Bacteria: x    < from: Xray Foot AP + Lateral, Right (18 @ 15:18) >    Impression: Acute osteomyelitis head of second metatarsal base of the   second toe as described.    < end of copied text >    < from: Xray Chest 1 View AP/PA (18 @ 15:19) >    Frontal chest. Normal heart size. No consolidation or effusion. Left   basal retrocardiac scar/atelectasis.Suspect hiatal hernia.    Impression: No active infiltrates.    < end of copied text >

## 2018-04-03 NOTE — H&P ADULT - PROBLEM SELECTOR PLAN 1
acute osteomyelitis head of second metatarsal base of the second toe  recent neg doppler neg for DVT  f/u RIVER  podiatry consult- Dr. Imer yao and zosyn  IV fluids  f/u wound culture  afebrile, no leukocytosis  primary team to consult Dr. Gandara in AM acute osteomyelitis head of second metatarsal base of the second toe  recent neg doppler neg for DVT  f/u RIVER  podiatry consult- Dr. Imer yao and zosyn  IV fluids  f/u wound culture  afebrile, no leukocytosis  primary team to consult Dr. Gandara in AM  Dr. Fatima- ID

## 2018-04-03 NOTE — ED PROVIDER NOTE - MEDICAL DECISION MAKING DETAILS
pt with chronic ulcer, sent in by vascular for evaluation, will get x-ray, labs, give antibiotics, admission

## 2018-04-03 NOTE — H&P ADULT - ATTENDING COMMENTS
Patient seen/evaluated at bedside 4/3/2018. I agree with the resident H&P/outlined plan of care. My independent findings and conclusions are documented.    Briefly, this is a 66 y/o male w/ a pmhx of DM T II, peripheral neuropathy, R plantar foot ulcer x 1 year presenting with 2 week R foot swelling and pain referred to the ED by vascular surgery, found to have osteomyelitis of the right 2nd toe.    pmhx/meds/all/ros/famhx/sochx as per details of the H&P    1.osteomyelitis of right 2nd toe  2. infected right foot ulcer/diabetic ulcer  3. DM T II with polyneuropathy, on long term insulin  4. GERD  + Osteomyelitis on plain film imaging. Patient is s/p bedside debridement by podiatry--> management and input is appreciated. Likely  will require further intervention  -zosyn/vancomycin  -ID consult: Dr. Fatima  -RIVER  -f/u vascular surgery, Dr. Gandara's service  -c/w insulin regimen, f/u HgbA1c  -dvt ppx

## 2018-04-03 NOTE — H&P ADULT - PROBLEM SELECTOR PLAN 4
IMPROVE VTE Individual Risk Assessment          RISK                                                          Points  [  ] Previous VTE                                                3  [  ] Thrombophilia                                             2  [  ] Lower limb paralysis                                   2        (unable to hold up >15 seconds)    [  ] Current Cancer                                             2         (within 6 months)  [  x] Immobilization > 24 hrs                              1  [  ] ICU/CCU stay > 24 hours                             1  [ x ] Age > 60                                                         1    IMPROVE VTE Score: 2, lovenox for dvt ppx

## 2018-04-03 NOTE — CONSULT NOTE ADULT - SUBJECTIVE AND OBJECTIVE BOX
S: This patient is a 67 year old AA male with h/o IDDM, pt with Rt plantar foot ulcer for 1 yr., currently is under wound care with Dr. Spain and Dr. Stuart, c/o Rt foot swelling for 2 1/2 weeks, bloody discharge, pt was sent to see Vascular Dr. Gandara last night, given antibiotics by wound specialist last week, claims feeling better, less numbness. Patient states that he has been putting collagen on his ulcer and that he has noticed a considerable decrease in size. No complaints of fever or pain. Pt had venous doppler done on Wednesday which showed no DVT    Patient admits to  (-) Fevers, (-) Chills, (-) Nausea, (-) Vomiting, (-) Shortness of Breath    PMH: DM (diabetes mellitus)    PSH: History of repair of hiatal hernia  S/P arthroscopic knee surgery  History of colectomy  S/P inguinal hernia repair    Allergies: Percocet 5/325 (Other)    Labs:                          11.8   7.6   )-----------( 399      ( 03 Apr 2018 15:11 )             38.9     WBC Trend  7.6 Date (04-03 @ 15:11)    Chem  04-03    140  |  107  |  15  ----------------------------<  114<H>  4.3   |  26  |  1.11    Ca    8.7      03 Apr 2018 15:11  Mg     2.0     04-03    TPro  8.2  /  Alb  2.9<L>  /  TBili  0.1<L>  /  DBili  x   /  AST  26  /  ALT  50  /  AlkPhos  81  04-03    T(F): 98.5 (04-03-18 @ 15:29), Max: 98.6 (04-03-18 @ 14:12)  HR: 94 (04-03-18 @ 15:29) (94 - 107)  BP: 128/83 (04-03-18 @ 15:29) (128/83 - 150/85)  RR: 18 (04-03-18 @ 15:29) (18 - 20)  SpO2: 100% (04-03-18 @ 15:29) (100% - 100%)    O:   General: Pleasant  male NAD & AOX3.    Integument:  Skin warm, dry and supple bilateral.    Right sub 2nd metatarsal ulceration, + hyperkeratotic border with macerated periwound and a fibrogranular base 75% fibrotic and 25% granular, wound size measuring 0.7 cm x 0.6 cm x 0.2 cm, - jonathan-wound erythema, - purulence, - fluctuance, - tracking, - probe to bone  Vascular: Dorsalis Pedis and Posterior Tibial pulses 0/4.  Capillary re-fill time less then 3 seconds digits 1-5 bilateral.    Neuro: Protective sensation diminished to the level of the digits bilateral.  MSK: Muscle strength 5/5 all plantarflexors, dorsiflexors, everters, and inverters muscle groups bilateral.  Deformity:    < from: Xray Foot AP + Lateral, Right (04.03.18 @ 15:18) >  NTERPRETATION:  History: Right foot infection.  3 views right foot. Prior 4/21/2017. There is new focal bone destruction   second metatarsal base of proximal phalanx of the second toe consistent   with osteomyelitis. MRI may be useful in further characterizing the   extent of disease. Remainder of the foot is unchanged. No acute fracture   or dislocation.    Impression: Acute osteomyelitis head of second metatarsal base of the   second toe as described.    < end of copied text >    A: Right foot ulceration sub 2nd metatarsal head, with acute OM to the head of the 2nd metatarsal    P:   Chart reviewed and Patient evaluated  Discussed diagnosis and treatment with patient  Wound flush with normal saline  Obtained wound culture to be sent to Pathology  Excisional debridement with the use of a #15 blade of nonviable tissue at the ulcer base down to and including the subcutaneous tissue Right foot ulceration  Applied dry sterile dressing  Ordered Santyl  Ordered RIVER  X-rays reviewed : Shows acute osteomyelitis head of second metatarsal and base of the second toe  Continue with IV antibiotics As Per ED recommendations. ID recommendations pending.   Weight bearing as tolerated to the heel of the right foot  Offloading to bilateral Heels in bed.   Podiatry will follow while in house.  Discussed with Attending Dr. Magana

## 2018-04-04 ENCOUNTER — TRANSCRIPTION ENCOUNTER (OUTPATIENT)
Age: 68
End: 2018-04-04

## 2018-04-04 LAB
24R-OH-CALCIDIOL SERPL-MCNC: 26.1 NG/ML — LOW (ref 30–80)
ALBUMIN SERPL ELPH-MCNC: 2.5 G/DL — LOW (ref 3.5–5)
ALP SERPL-CCNC: 67 U/L — SIGNIFICANT CHANGE UP (ref 40–120)
ALT FLD-CCNC: 36 U/L DA — SIGNIFICANT CHANGE UP (ref 10–60)
ANION GAP SERPL CALC-SCNC: 9 MMOL/L — SIGNIFICANT CHANGE UP (ref 5–17)
AST SERPL-CCNC: 22 U/L — SIGNIFICANT CHANGE UP (ref 10–40)
BASOPHILS # BLD AUTO: 0.1 K/UL — SIGNIFICANT CHANGE UP (ref 0–0.2)
BASOPHILS NFR BLD AUTO: 1.2 % — SIGNIFICANT CHANGE UP (ref 0–2)
BILIRUB SERPL-MCNC: 0.3 MG/DL — SIGNIFICANT CHANGE UP (ref 0.2–1.2)
BUN SERPL-MCNC: 10 MG/DL — SIGNIFICANT CHANGE UP (ref 7–18)
CALCIUM SERPL-MCNC: 8.2 MG/DL — LOW (ref 8.4–10.5)
CHLORIDE SERPL-SCNC: 109 MMOL/L — HIGH (ref 96–108)
CHOLEST SERPL-MCNC: 123 MG/DL — SIGNIFICANT CHANGE UP (ref 10–199)
CO2 SERPL-SCNC: 23 MMOL/L — SIGNIFICANT CHANGE UP (ref 22–31)
CREAT SERPL-MCNC: 0.99 MG/DL — SIGNIFICANT CHANGE UP (ref 0.5–1.3)
CRP SERPL-MCNC: 0.7 MG/DL — HIGH (ref 0–0.4)
CULTURE RESULTS: NO GROWTH — SIGNIFICANT CHANGE UP
EOSINOPHIL # BLD AUTO: 0.1 K/UL — SIGNIFICANT CHANGE UP (ref 0–0.5)
EOSINOPHIL NFR BLD AUTO: 1.9 % — SIGNIFICANT CHANGE UP (ref 0–6)
ERYTHROCYTE [SEDIMENTATION RATE] IN BLOOD: 62 MM/HR — HIGH (ref 0–20)
FOLATE SERPL-MCNC: 17.8 NG/ML — SIGNIFICANT CHANGE UP (ref 4.8–24.2)
GLUCOSE SERPL-MCNC: 92 MG/DL — SIGNIFICANT CHANGE UP (ref 70–99)
HBA1C BLD-MCNC: 7.1 % — HIGH (ref 4–5.6)
HCT VFR BLD CALC: 35.9 % — LOW (ref 39–50)
HDLC SERPL-MCNC: 27 MG/DL — LOW (ref 40–125)
HGB BLD-MCNC: 10.8 G/DL — LOW (ref 13–17)
INR BLD: 1.26 RATIO — HIGH (ref 0.88–1.16)
LIPID PNL WITH DIRECT LDL SERPL: 65 MG/DL — SIGNIFICANT CHANGE UP
LYMPHOCYTES # BLD AUTO: 2.1 K/UL — SIGNIFICANT CHANGE UP (ref 1–3.3)
LYMPHOCYTES # BLD AUTO: 34.7 % — SIGNIFICANT CHANGE UP (ref 13–44)
MAGNESIUM SERPL-MCNC: 2.2 MG/DL — SIGNIFICANT CHANGE UP (ref 1.6–2.6)
MCHC RBC-ENTMCNC: 24.6 PG — LOW (ref 27–34)
MCHC RBC-ENTMCNC: 30.2 GM/DL — LOW (ref 32–36)
MCV RBC AUTO: 81.6 FL — SIGNIFICANT CHANGE UP (ref 80–100)
MONOCYTES # BLD AUTO: 0.5 K/UL — SIGNIFICANT CHANGE UP (ref 0–0.9)
MONOCYTES NFR BLD AUTO: 8.8 % — SIGNIFICANT CHANGE UP (ref 2–14)
NEUTROPHILS # BLD AUTO: 3.2 K/UL — SIGNIFICANT CHANGE UP (ref 1.8–7.4)
NEUTROPHILS NFR BLD AUTO: 53.5 % — SIGNIFICANT CHANGE UP (ref 43–77)
PHOSPHATE SERPL-MCNC: 3.2 MG/DL — SIGNIFICANT CHANGE UP (ref 2.5–4.5)
PLATELET # BLD AUTO: 338 K/UL — SIGNIFICANT CHANGE UP (ref 150–400)
POTASSIUM SERPL-MCNC: 3.9 MMOL/L — SIGNIFICANT CHANGE UP (ref 3.5–5.3)
POTASSIUM SERPL-SCNC: 3.9 MMOL/L — SIGNIFICANT CHANGE UP (ref 3.5–5.3)
PROT SERPL-MCNC: 7.2 G/DL — SIGNIFICANT CHANGE UP (ref 6–8.3)
PROTHROM AB SERPL-ACNC: 13.8 SEC — HIGH (ref 9.8–12.7)
RBC # BLD: 4.4 M/UL — SIGNIFICANT CHANGE UP (ref 4.2–5.8)
RBC # FLD: 13.7 % — SIGNIFICANT CHANGE UP (ref 10.3–14.5)
SODIUM SERPL-SCNC: 141 MMOL/L — SIGNIFICANT CHANGE UP (ref 135–145)
SPECIMEN SOURCE: SIGNIFICANT CHANGE UP
TOTAL CHOLESTEROL/HDL RATIO MEASUREMENT: 4.6 RATIO — SIGNIFICANT CHANGE UP (ref 3.4–9.6)
TRIGL SERPL-MCNC: 157 MG/DL — HIGH (ref 10–149)
TSH SERPL-MCNC: 3.1 UU/ML — SIGNIFICANT CHANGE UP (ref 0.34–4.82)
VIT B12 SERPL-MCNC: 595 PG/ML — SIGNIFICANT CHANGE UP (ref 232–1245)
WBC # BLD: 6 K/UL — SIGNIFICANT CHANGE UP (ref 3.8–10.5)
WBC # FLD AUTO: 6 K/UL — SIGNIFICANT CHANGE UP (ref 3.8–10.5)

## 2018-04-04 PROCEDURE — 73719 MRI LOWER EXTREMITY W/DYE: CPT | Mod: 26,RT

## 2018-04-04 PROCEDURE — 99233 SBSQ HOSP IP/OBS HIGH 50: CPT | Mod: GC

## 2018-04-04 PROCEDURE — 93925 LOWER EXTREMITY STUDY: CPT | Mod: 26

## 2018-04-04 PROCEDURE — 93970 EXTREMITY STUDY: CPT | Mod: 26

## 2018-04-04 RX ORDER — INSULIN GLARGINE 100 [IU]/ML
5 INJECTION, SOLUTION SUBCUTANEOUS AT BEDTIME
Qty: 0 | Refills: 0 | Status: DISCONTINUED | OUTPATIENT
Start: 2018-04-04 | End: 2018-04-06

## 2018-04-04 RX ORDER — INFLUENZA VIRUS VACCINE 15; 15; 15; 15 UG/.5ML; UG/.5ML; UG/.5ML; UG/.5ML
0.5 SUSPENSION INTRAMUSCULAR ONCE
Qty: 0 | Refills: 0 | Status: DISCONTINUED | OUTPATIENT
Start: 2018-04-04 | End: 2018-04-06

## 2018-04-04 RX ADMIN — PIPERACILLIN AND TAZOBACTAM 25 GRAM(S): 4; .5 INJECTION, POWDER, LYOPHILIZED, FOR SOLUTION INTRAVENOUS at 22:18

## 2018-04-04 RX ADMIN — FAMOTIDINE 20 MILLIGRAM(S): 10 INJECTION INTRAVENOUS at 12:29

## 2018-04-04 RX ADMIN — SODIUM CHLORIDE 75 MILLILITER(S): 9 INJECTION INTRAMUSCULAR; INTRAVENOUS; SUBCUTANEOUS at 05:41

## 2018-04-04 RX ADMIN — Medication 250 MILLIGRAM(S): at 05:40

## 2018-04-04 RX ADMIN — GABAPENTIN 400 MILLIGRAM(S): 400 CAPSULE ORAL at 05:40

## 2018-04-04 RX ADMIN — PIPERACILLIN AND TAZOBACTAM 25 GRAM(S): 4; .5 INJECTION, POWDER, LYOPHILIZED, FOR SOLUTION INTRAVENOUS at 05:40

## 2018-04-04 RX ADMIN — INSULIN GLARGINE 5 UNIT(S): 100 INJECTION, SOLUTION SUBCUTANEOUS at 22:19

## 2018-04-04 RX ADMIN — Medication 250 MILLIGRAM(S): at 17:42

## 2018-04-04 RX ADMIN — PIPERACILLIN AND TAZOBACTAM 25 GRAM(S): 4; .5 INJECTION, POWDER, LYOPHILIZED, FOR SOLUTION INTRAVENOUS at 13:34

## 2018-04-04 RX ADMIN — GABAPENTIN 400 MILLIGRAM(S): 400 CAPSULE ORAL at 17:42

## 2018-04-04 NOTE — PROGRESS NOTE ADULT - ASSESSMENT
67 M from home with PMH IDDM, Peripheral Neuropathy, R plantar foot ulcer x 1 year p/w R foot swelling and pain x 2 weeks - ?completed outpatient Keflex, negative outpatient US - admitted for acute osteomyelitis head of second metatarsal base of the second toe.

## 2018-04-04 NOTE — PROGRESS NOTE ADULT - SUBJECTIVE AND OBJECTIVE BOX
PGY 1 Note discussed with supervising resident and primary attending    Patient is a 67y old  Male who presents with a chief complaint of R foot pain (2018 20:06)      INTERVAL HPI/OVERNIGHT EVENTS: Patient seen and examined at bedside with no new complaints    MEDICATIONS  (STANDING):  dextrose 5%. 1000 milliLiter(s) (50 mL/Hr) IV Continuous <Continuous>  dextrose 50% Injectable 12.5 Gram(s) IV Push once  dextrose 50% Injectable 25 Gram(s) IV Push once  dextrose 50% Injectable 25 Gram(s) IV Push once  enoxaparin Injectable 40 milliGRAM(s) SubCutaneous daily  famotidine    Tablet 20 milliGRAM(s) Oral daily  gabapentin 400 milliGRAM(s) Oral two times a day  insulin glargine Injectable (LANTUS) 10 Unit(s) SubCutaneous at bedtime  insulin lispro (HumaLOG) corrective regimen sliding scale   SubCutaneous Before meals and at bedtime  piperacillin/tazobactam IVPB. 3.375 Gram(s) IV Intermittent every 8 hours  sodium chloride 0.9%. 1000 milliLiter(s) (75 mL/Hr) IV Continuous <Continuous>  vancomycin  IVPB 1000 milliGRAM(s) IV Intermittent every 12 hours    MEDICATIONS  (PRN):  dextrose Gel 1 Dose(s) Oral once PRN Blood Glucose LESS THAN 70 milliGRAM(s)/deciliter  glucagon  Injectable 1 milliGRAM(s) IntraMuscular once PRN Glucose LESS THAN 70 milligrams/deciliter      __________________________________________________  REVIEW OF SYSTEMS:  RESPIRATORY: No cough; No shortness of breath  CARDIOVASCULAR: No chest pain, no palpitations  GASTROINTESTINAL: No pain. No nausea or vomiting; No diarrhea       Vital Signs Last 24 Hrs  T(C): 36.7 (2018 05:23), Max: 37 (2018 14:12)  T(F): 98 (2018 05:23), Max: 98.6 (2018 14:12)  HR: 59 (2018 05:23) (59 - 107)  BP: 139/80 (2018 05:23) (128/83 - 169/81)  BP(mean): --  RR: 18 (2018 05:23) (18 - 20)  SpO2: 98% (2018 05:23) (97% - 100%)    ________________________________________________  PHYSICAL EXAM:  GENERAL: NAD  HEENT: Normocephalic;  conjunctivae and sclerae clear; moist mucous membranes;   NECK : supple  CHEST/LUNG: Clear to auscultation bilaterally with good air entry   HEART: S1 S2  regular; no murmurs, gallops or rubs  ABDOMEN: Soft, Nontender, Nondistended; Bowel sounds present  EXTREMITIES: No cyanosis; no edema; no calf tenderness, R foot bandage intact w/ tenderness and swelling  NERVOUS SYSTEM:  Awake and alert; Oriented  to place, person and time ; no new deficits    _________________________________________________  LABS:                        11.8   7.6   )-----------( 399      ( 2018 15:11 )             38.9     04-03    140  |  107  |  15  ----------------------------<  114<H>  4.3   |  26  |  1.11    Ca    8.7      2018 15:11  Mg     2.0     04-03    TPro  8.2  /  Alb  2.9<L>  /  TBili  0.1<L>  /  DBili  x   /  AST  26  /  ALT  50  /  AlkPhos  81  04-03    PT/INR - ( 2018 15:11 )   PT: 13.1 sec;   INR: 1.20 ratio         PTT - ( 2018 15:11 )  PTT:30.9 sec  Urinalysis Basic - ( 2018 18:43 )    Color: Yellow / Appearance: Clear / S.020 / pH: x  Gluc: x / Ketone: Negative  / Bili: Negative / Urobili: Negative   Blood: x / Protein: Negative / Nitrite: Negative   Leuk Esterase: Negative / RBC: x / WBC x   Sq Epi: x / Non Sq Epi: x / Bacteria: x      CAPILLARY BLOOD GLUCOSE      POCT Blood Glucose.: 79 mg/dL (2018 23:34)        RADIOLOGY & ADDITIONAL TESTS:    Imaging Personally Reviewed:  YES    Consultant(s) Notes Reviewed:   YES    Care Discussed with Consultants : YES    Plan of care was discussed with patient and /or primary care giver; all questions and concerns were addressed and care was aligned with patient's wishes.    \

## 2018-04-04 NOTE — CONSULT NOTE ADULT - ASSESSMENT
right second toe osteomyelitis of proximal phalanx and metatarsal head with significant bone destruction and some osteopenia of other metatarsal heads    plan - cont vanco 1 gm iv q12 hrs for now  cont zosyn 3.375gms iv q8hrs  get MRI of right foot to look for osteo other than right 2nd toe  he needs amputation of right 2nd toe and partial ray

## 2018-04-04 NOTE — DISCHARGE NOTE ADULT - PATIENT PORTAL LINK FT
You can access the [facility] Portal, offered by [facility], by registering with the following website: [email or web address]

## 2018-04-04 NOTE — PROGRESS NOTE ADULT - SUBJECTIVE AND OBJECTIVE BOX
S: This patient is a 67 year old AA male with h/o IDDM, pt with Rt plantar foot ulcer for 1 yr. Patient is NAD and AAO x3. Patient complains of no overnight events.     Patient admits to  (-) Fevers, (-) Chills, (-) Nausea, (-) Vomiting, (-) Shortness of Breath    PMH: DM (diabetes mellitus)    PSH: History of repair of hiatal hernia  S/P arthroscopic knee surgery  History of colectomy  S/P inguinal hernia repair    Allergies: Percocet 5/325 (Other)    Labs:  ICU Vital Signs Last 24 Hrs  T(C): 36.7 (04 Apr 2018 05:23), Max: 37 (03 Apr 2018 14:12)  T(F): 98 (04 Apr 2018 05:23), Max: 98.6 (03 Apr 2018 14:12)  HR: 59 (04 Apr 2018 05:23) (59 - 107)  BP: 139/80 (04 Apr 2018 05:23) (128/83 - 169/81)  BP(mean): --  ABP: --  ABP(mean): --  RR: 18 (04 Apr 2018 05:23) (18 - 20)  SpO2: 98% (04 Apr 2018 05:23) (97% - 100%)                          10.8   6.0   )-----------( 338      ( 04 Apr 2018 06:57 )             35.9   04-04    141  |  109<H>  |  10  ----------------------------<  92  3.9   |  23  |  0.99    Ca    8.2<L>      04 Apr 2018 06:57  Phos  3.2     04-04  Mg     2.2     04-04    TPro  7.2  /  Alb  2.5<L>  /  TBili  0.3  /  DBili  x   /  AST  22  /  ALT  36  /  AlkPhos  67  04-04          O:   General: Pleasant  male NAD & AOX3.    Integument:  Skin warm, dry and supple bilateral.    Right sub 2nd metatarsal ulceration, + hyperkeratotic border with macerated periwound and a fibrogranular base 75% fibrotic and 25% granular, wound size measuring 0.7 cm x 0.6 cm x 0.2 cm, - jonathan-wound erythema, - purulence, - fluctuance, - tracking, - probe to bone  Vascular: Dorsalis Pedis and Posterior Tibial pulses 0/4.  Capillary re-fill time less then 3 seconds digits 1-5 bilateral.    Neuro: Protective sensation diminished to the level of the digits bilateral.  MSK: Muscle strength 5/5 all plantarflexors, dorsiflexors, everters, and inverters muscle groups bilateral.  Deformity:    < from: Xray Foot AP + Lateral, Right (04.03.18 @ 15:18) >  NTERPRETATION:  History: Right foot infection.  3 views right foot. Prior 4/21/2017. There is new focal bone destruction   second metatarsal base of proximal phalanx of the second toe consistent   with osteomyelitis. MRI may be useful in further characterizing the   extent of disease. Remainder of the foot is unchanged. No acute fracture   or dislocation.    Impression: Acute osteomyelitis head of second metatarsal base of the   second toe as described.    < end of copied text >    A: Right foot ulceration sub 2nd metatarsal head, with acute OM to the head of the 2nd metatarsal    P:   Chart reviewed and Patient evaluated  Discussed diagnosis and treatment with patient  Wound flush with normal saline  Obtained wound culture to be sent to Pathology  Excisional debridement with the use of a #15 blade of nonviable tissue at the ulcer base down to and including the subcutaneous tissue Right foot ulceration  Applied dry sterile dressing  Ordered Santyl  Ordered RIVER  Possible bone biopsy to be done, awaiting ID consult   X-rays reviewed : Shows acute osteomyelitis head of second metatarsal and base of the second toe  Continue with IV antibiotics As Per ED recommendations. ID recommendations pending.   Weight bearing as tolerated to the heel of the right foot  Offloading to bilateral Heels in bed.   Podiatry will follow while in house.  Discussed with Attending Dr. Magana

## 2018-04-04 NOTE — CONSULT NOTE ADULT - NEUROLOGICAL DETAILS
alert and oriented x 3/responds to verbal commands alert and oriented x 3/responds to verbal commands/normal strength

## 2018-04-04 NOTE — PROGRESS NOTE ADULT - PROBLEM SELECTOR PLAN 2
Holding home Novolog 70/30 and Levemir  - C/w moderate HSS with FS  - C/w 10 Lantus at night and adjust PRN  ***F/u HbA1C

## 2018-04-04 NOTE — PROGRESS NOTE ADULT - PROBLEM SELECTOR PLAN 1
XR showing acute osteomyelitis head of second metatarsal base of the second toe  - Afebrile, no WBC elevation  ***F/u RIVER, wound culture, consult Dr Gandara  ID Dr. Fatima; C/w Vancomycin and Zosyn  Podiatry Dr Willams

## 2018-04-04 NOTE — CONSULT NOTE ADULT - SUBJECTIVE AND OBJECTIVE BOX
Patient is a 67M seen bedside today with PMH IDDM, peripheral neuropathy, R plantar foot ulcer x 1 year presenting with 2 week R foot swelling and pain. Patient states that he went to see his wound care doctor Dr. Stuart last Tuesday, was prescribed antibiotics and was referred to see vascular Dr. Gandara. Doppler was done on Wednesday (neg for DVT) and assistant referred patient to be admitted to [facility], but patient came to [facility] instead. This AM patient denies any fever, chills, SOB, palpitations, chest pain, nausea, vomiting, diarrhea and constipation. Able to ambulate, Right foot X-ray showed Acute osteomyelitis head of second metatarsal base of the second toe.    PMH: as per HPI  Surgical Hx: bunionectomy, 2 hernia repairs, b/l arthroscopy, hemicolectomy / hx colon CA, hiatal hernia repair  Family HX: heart disease  Social hx: neg for smoking or EtOH  Allergies: percocet- dizziness    PAST MEDICAL & SURGICAL HISTORY:  Colon cancer  DM (diabetes mellitus)  History of repair of hiatal hernia  S/P arthroscopic knee surgery  History of colectomy  S/P inguinal hernia repair    Percocet 5/325 (Other)    Meds:  enoxaparin Injectable 40 milliGRAM(s) SubCutaneous daily  famotidine    Tablet 20 milliGRAM(s) Oral daily  gabapentin 400 milliGRAM(s) Oral two times a day  glucagon  Injectable 1 milliGRAM(s) IntraMuscular once PRN  influenza   Vaccine 0.5 milliLiter(s) IntraMuscular once  insulin glargine Injectable (LANTUS) 10 Unit(s) SubCutaneous at bedtime  insulin lispro (HumaLOG) corrective regimen sliding scale   SubCutaneous Before meals and at bedtime  piperacillin/tazobactam IVPB. 3.375 Gram(s) IV Intermittent every 8 hours  sodium chloride 0.9%. 1000 milliLiter(s) IV Continuous <Continuous>  vancomycin  IVPB 1000 milliGRAM(s) IV Intermittent every 12 hours      SOCIAL HISTORY:  Smoker:  YES / NO   ETOH use:  YES / NO  Ilicit Drug use:  YES / NO    FAMILY HISTORY:  No pertinent family history in first degree relatives    VITALS:  Vital Signs Last 24 Hrs  T(C): 36.7 (2018 05:23), Max: 37 (2018 14:12)  T(F): 98 (2018 05:23), Max: 98.6 (2018 14:12)  HR: 59 (2018 05:23) (59 - 107)  BP: 139/80 (2018 05:23) (128/83 - 169/81)  BP(mean): --  RR: 18 (2018 05:23) (18 - 20)  SpO2: 98% (2018 05:23) (97% - 100%)    LABS/DIAGNOSTIC TESTS:                          10.8   6.0   )-----------( 338      ( 2018 06:57 )             35.9     WBC Count: 6.0 K/uL ( @ 06:57)  WBC Count: 7.6 K/uL ( @ 15:11)          141  |  109<H>  |  10  ----------------------------<  92  3.9   |  23  |  0.99    Ca    8.2<L>      2018 06:57  Phos  3.2       Mg     2.2         TPro  7.2  /  Alb  2.5<L>  /  TBili  0.3  /  DBili  x   /  AST  22  /  ALT  36  /  AlkPhos  67  04-04      Urinalysis Basic - ( 2018 18:43 )    Color: Yellow / Appearance: Clear / S.020 / pH: x  Gluc: x / Ketone: Negative  / Bili: Negative / Urobili: Negative   Blood: x / Protein: Negative / Nitrite: Negative   Leuk Esterase: Negative / RBC: x / WBC x   Sq Epi: x / Non Sq Epi: x / Bacteria: x    LIVER FUNCTIONS - ( 2018 06:57 )  Alb: 2.5 g/dL / Pro: 7.2 g/dL / ALK PHOS: 67 U/L / ALT: 36 U/L DA / AST: 22 U/L / GGT: x           PT/INR - ( 2018 06:57 )   PT: 13.8 sec;   INR: 1.26 ratio      PTT - ( 2018 15:11 )  PTT:30.9 sec    LACTATE: Lactate, Blood: 1.1 mmol/L ( @ 21:15)  Lactate, Blood: 1.1 mmol/L ( @ 18:40)  Lactate, Blood: 2.6 mmol/L (- @ 15:11)    CULTURES:       RADIOLOGY:  < from: Xray Foot AP + Lateral, Right (18 @ 15:18) >  INTERPRETATION:  History: Right foot infection.  3 views right foot. Prior 2017. There is new focal bone destruction   second metatarsal base of proximal phalanx of the second toe consistent   with osteomyelitis. MRI may be useful in further characterizing the   extent of disease. Remainder of the foot is unchanged. No acute fracture   or dislocation.    Impression: Acute osteomyelitis head of second metatarsal base of the   second toe as described.    < end of copied text > Patient is a 67M seen bedside today with PMH IDDM, peripheral neuropathy, R plantar foot ulcer x 1 year presenting with 2 week R foot swelling and pain. Patient states that he went to see his wound care doctor Dr. Stuart last Tuesday, was prescribed antibiotics and was referred to see vascular Dr. Gandara. Doppler was done on Wednesday (neg for DVT) and assistant referred patient to be admitted to [facility], [facility] This AM patient denies any fever, chills, SOB, palpitations, chest pain, nausea, vomiting, diarrhea and constipation. Able to ambulate, Right foot X-ray showed Acute osteomyelitis head of second metatarsal base of the second toe.    PMH: as per HPI  Surgical Hx: bunionectomy, 2 hernia repairs, b/l arthroscopy, hemicolectomy / hx colon CA, hiatal hernia repair  Family HX: heart disease  Social hx: neg for smoking or EtOH  Allergies: percocet- dizziness    PAST MEDICAL & SURGICAL HISTORY:  Colon cancer  DM (diabetes mellitus)  History of repair of hiatal hernia  S/P arthroscopic knee surgery  History of colectomy  S/P inguinal hernia repair    Percocet 5/325 (Other)    Meds:  enoxaparin Injectable 40 milliGRAM(s) SubCutaneous daily  famotidine    Tablet 20 milliGRAM(s) Oral daily  gabapentin 400 milliGRAM(s) Oral two times a day  glucagon  Injectable 1 milliGRAM(s) IntraMuscular once PRN  influenza   Vaccine 0.5 milliLiter(s) IntraMuscular once  insulin glargine Injectable (LANTUS) 10 Unit(s) SubCutaneous at bedtime  insulin lispro (HumaLOG) corrective regimen sliding scale   SubCutaneous Before meals and at bedtime  piperacillin/tazobactam IVPB. 3.375 Gram(s) IV Intermittent every 8 hours  sodium chloride 0.9%. 1000 milliLiter(s) IV Continuous <Continuous>  vancomycin  IVPB 1000 milliGRAM(s) IV Intermittent every 12 hours      SOCIAL HISTORY:  Smoker:   NO   ETOH use:  NO  Ilicit Drug use:   NO    FAMILY HISTORY:  No pertinent family history in first degree relatives    VITALS:  Vital Signs Last 24 Hrs  T(C): 36.7 (2018 05:23), Max: 37 (2018 14:12)  T(F): 98 (2018 05:23), Max: 98.6 (2018 14:12)  HR: 59 (2018 05:23) (59 - 107)  BP: 139/80 (2018 05:23) (128/83 - 169/81)  BP(mean): --  RR: 18 (2018 05:23) (18 - 20)  SpO2: 98% (2018 05:23) (97% - 100%)    LABS/DIAGNOSTIC TESTS:                          10.8   6.0   )-----------( 338      ( 2018 06:57 )             35.9     WBC Count: 6.0 K/uL ( @ 06:57)  WBC Count: 7.6 K/uL ( @ 15:11)          141  |  109<H>  |  10  ----------------------------<  92  3.9   |  23  |  0.99    Ca    8.2<L>      2018 06:57  Phos  3.2       Mg     2.2         TPro  7.2  /  Alb  2.5<L>  /  TBili  0.3  /  DBili  x   /  AST  22  /  ALT  36  /  AlkPhos  67  04-04      Urinalysis Basic - ( 2018 18:43 )    Color: Yellow / Appearance: Clear / S.020 / pH: x  Gluc: x / Ketone: Negative  / Bili: Negative / Urobili: Negative   Blood: x / Protein: Negative / Nitrite: Negative   Leuk Esterase: Negative / RBC: x / WBC x   Sq Epi: x / Non Sq Epi: x / Bacteria: x    LIVER FUNCTIONS - ( 2018 06:57 )  Alb: 2.5 g/dL / Pro: 7.2 g/dL / ALK PHOS: 67 U/L / ALT: 36 U/L DA / AST: 22 U/L / GGT: x           PT/INR - ( 2018 06:57 )   PT: 13.8 sec;   INR: 1.26 ratio      PTT - ( 2018 15:11 )  PTT:30.9 sec    LACTATE: Lactate, Blood: 1.1 mmol/L ( @ 21:15)  Lactate, Blood: 1.1 mmol/L ( @ 18:40)  Lactate, Blood: 2.6 mmol/L (- @ 15:11)    CULTURES:       RADIOLOGY:  < from: Xray Foot AP + Lateral, Right (18 @ 15:18) >  INTERPRETATION:  History: Right foot infection.  3 views right foot. Prior 2017. There is new focal bone destruction   second metatarsal base of proximal phalanx of the second toe consistent   with osteomyelitis. MRI may be useful in further characterizing the   extent of disease. Remainder of the foot is unchanged. No acute fracture   or dislocation.    Impression: Acute osteomyelitis head of second metatarsal base of the   second toe as described.    < end of copied text >

## 2018-04-04 NOTE — DISCHARGE NOTE ADULT - MEDICATION SUMMARY - MEDICATIONS TO STOP TAKING
I will STOP taking the medications listed below when I get home from the hospital:    Keflex 500 mg oral capsule  -- 1 cap(s) by mouth every 6 hours  -- Finish all this medication unless otherwise directed by prescriber.

## 2018-04-04 NOTE — DISCHARGE NOTE ADULT - ADDITIONAL INSTRUCTIONS
Follow up with PCP in one week  Needs CBC, BMP amd Vanco trough on Monday and once a week thereafter  Please follow up with ID Dr. Denia Fatima whose information has been provided to Home care Agency. Follow up with PCP in one week  Needs CBC, BMP and Vancomycin trough on Monday and once a week there after  Please follow up with ID Dr. Denia Fatima whose information has been provided to Home care Agency.

## 2018-04-04 NOTE — DISCHARGE NOTE ADULT - PLAN OF CARE
Complete your antibiotics X-ray and MRI of the foot confirmed osteomyelitis of the second metatarsal phalangeal and the third phalanx along with a fracture of the second metatarsal head. Podiatry and infectious disease were consulted and recommended _____. Follow up with your primary care physician within a week from discharge You have a history of diabetes and the hemoglobin A1c was 7.1 indicating fair control - you should continue to take your home medication regimen as prescribed. Please follow up with your primary care provider and continue to monitor your blood sugar levels. X-ray and MRI of the foot confirmed osteomyelitis of the second metatarsal phalangeal and the third phalanx along with a fracture of the second metatarsal head. Podiatry and infectious disease were consulted and recommended surgical intervention and you _____. Follow up with your primary care physician within a week from discharge Complete your antibiotics until May 17, 2018 X-ray and MRI of the foot confirmed osteomyelitis of the second metatarsal phalangeal and the third phalanx along with a fracture of the second metatarsal head. Podiatry and infectious disease were consulted and recommended surgical intervention and you refused at this time and opted for intravenous antibiotic for 6 total weeks - Vancomycin and Ertapenem. Follow up with your primary care physician within a week from discharge for repeat complete blood count, basic metabolic panel, and vancomycin trough. You presented with right foot pain. X-ray and MRI of the foot confirmed osteomyelitis of the second metatarsal phalangeal and the third phalanx along with a fracture of the second metatarsal head. Podiatry and infectious disease were consulted and recommended surgical intervention and you refused at this time and opted for intravenous antibiotic for 6 total weeks with Vancomycin and Ertapenem for which a PICC line was placed. Follow up with your primary care physician within a week from discharge for repeat complete blood count, basic metabolic panel, and vancomycin trough.  Home Infusion of Antibiotics via PICC line arranged by .   You was advised to Avoid prolonged standing on the right foot. Target HgbA1c less than 7.0; You presented with right foot pain. X-ray and MRI of the foot confirmed osteomyelitis of the second metatarsal phalangeal and the third phalanx along with a fracture of the second metatarsal head. Podiatry and infectious disease were consulted and recommended surgical intervention and you refused at this time and opted for intravenous antibiotic for 6 total weeks with Vancomycin and Ertapenem for which a PICC line was placed. Follow up with your primary care physician within a week from discharge for repeat complete blood count, basic metabolic panel, and vancomycin trough.  Home Infusion of Antibiotics via PICC line arranged by .   You are advised to avoid prolonged standing on the right foot. Target HgbA1c less than 7.0

## 2018-04-04 NOTE — DISCHARGE NOTE ADULT - HOSPITAL COURSE
67 M from home with PMH IDDM, Peripheral Neuropathy, R plantar foot ulcer x 1 year p/w R foot swelling and pain x 2 weeks - ?completed outpatient Keflex, negative outpatient US - admitted for acute osteomyelitis head of second metatarsal base of the second toe. XR showing acute osteomyelitis head of second metatarsal base of the second toe and MRI showed osteomyelitis second MTP joint, including proximal phalanx of second toe with osteolysis and fracture at the second metatarsal head neck along w/ bone marrow edema involving the proximal third phalanx. Pt was afebrile,  w/ no WBC elevation. W/u included negative DVT and arterial duplex _____. Vascular surgery Dr. Gandara was consulted and recommended _____. Podiatry Dr. Willams took the patient to the OR for _____. Dr Fatima recommended _____. Otherwise other medical conditions managed w/out complication. Patient seen and examined at bedside with no acute complaints. The medical plan and results were discussed with the patient throughout the hospital course. Patient is medically clear for discharge and is advised to follow up with his primary care physician within a week for continued medical care. Please see above and the medical records for additional information. 67 M from home with PMH IDDM, Peripheral Neuropathy, R plantar foot ulcer x 1 year p/w R foot swelling and pain x 2 weeks - ?completed outpatient Keflex, negative outpatient US - admitted for acute osteomyelitis head of second metatarsal base of the second toe. XR showing acute osteomyelitis head of second metatarsal base of the second toe and MRI showed osteomyelitis second MTP joint, including proximal phalanx of second toe with osteolysis and fracture at the second metatarsal head neck along w/ bone marrow edema involving the proximal third phalanx. Pt was afebrile,  w/ no WBC elevation. W/u included negative DVT and arterial duplex unremarkable. Vascular surgery Dr. Gandara was consulted  Podiatry Dr. Willams recommended OR however patient refused at this time. Dr Fatima agreed w/ amputation however considering patient's choice recommended _Vancomycin and Ertapenem x 6 total weeks. Otherwise other medical conditions managed w/out complication. Patient seen and examined at bedside with no acute complaints. The medical plan and results were discussed with the patient throughout the hospital course. Patient is medically clear for discharge and is advised to follow up with his primary care physician within a week for continued medical care. Please see above and the medical records for additional information.

## 2018-04-04 NOTE — DISCHARGE NOTE ADULT - CARE PLAN
Principal Discharge DX:	Osteomyelitis  Goal:	Complete your antibiotics  Assessment and plan of treatment:	X-ray and MRI of the foot confirmed osteomyelitis of the second metatarsal phalangeal and the third phalanx along with a fracture of the second metatarsal head. Podiatry and infectious disease were consulted and recommended _____. Follow up with your primary care physician within a week from discharge  Secondary Diagnosis:	DM (diabetes mellitus)  Assessment and plan of treatment:	You have a history of diabetes and the hemoglobin A1c was 7.1 indicating fair control - you should continue to take your home medication regimen as prescribed. Please follow up with your primary care provider and continue to monitor your blood sugar levels. Principal Discharge DX:	Osteomyelitis  Goal:	Complete your antibiotics  Assessment and plan of treatment:	X-ray and MRI of the foot confirmed osteomyelitis of the second metatarsal phalangeal and the third phalanx along with a fracture of the second metatarsal head. Podiatry and infectious disease were consulted and recommended surgical intervention and you _____. Follow up with your primary care physician within a week from discharge  Secondary Diagnosis:	DM (diabetes mellitus)  Assessment and plan of treatment:	You have a history of diabetes and the hemoglobin A1c was 7.1 indicating fair control - you should continue to take your home medication regimen as prescribed. Please follow up with your primary care provider and continue to monitor your blood sugar levels. Principal Discharge DX:	Osteomyelitis  Goal:	Complete your antibiotics until May 17, 2018  Assessment and plan of treatment:	X-ray and MRI of the foot confirmed osteomyelitis of the second metatarsal phalangeal and the third phalanx along with a fracture of the second metatarsal head. Podiatry and infectious disease were consulted and recommended surgical intervention and you refused at this time and opted for intravenous antibiotic for 6 total weeks - Vancomycin and Ertapenem. Follow up with your primary care physician within a week from discharge for repeat complete blood count, basic metabolic panel, and vancomycin trough.  Secondary Diagnosis:	DM (diabetes mellitus)  Assessment and plan of treatment:	You have a history of diabetes and the hemoglobin A1c was 7.1 indicating fair control - you should continue to take your home medication regimen as prescribed. Please follow up with your primary care provider and continue to monitor your blood sugar levels. Principal Discharge DX:	Osteomyelitis  Goal:	Complete your antibiotics until May 17, 2018  Assessment and plan of treatment:	You presented with right foot pain. X-ray and MRI of the foot confirmed osteomyelitis of the second metatarsal phalangeal and the third phalanx along with a fracture of the second metatarsal head. Podiatry and infectious disease were consulted and recommended surgical intervention and you refused at this time and opted for intravenous antibiotic for 6 total weeks with Vancomycin and Ertapenem for which a PICC line was placed. Follow up with your primary care physician within a week from discharge for repeat complete blood count, basic metabolic panel, and vancomycin trough.  Home Infusion of Antibiotics via PICC line arranged by .   You was advised to Avoid prolonged standing on the right foot.  Secondary Diagnosis:	DM (diabetes mellitus)  Goal:	Target HgbA1c less than 7.0;  Assessment and plan of treatment:	You have a history of diabetes and the hemoglobin A1c was 7.1 indicating fair control - you should continue to take your home medication regimen as prescribed. Please follow up with your primary care provider and continue to monitor your blood sugar levels. Principal Discharge DX:	Osteomyelitis  Goal:	Complete your antibiotics until May 17, 2018  Assessment and plan of treatment:	You presented with right foot pain. X-ray and MRI of the foot confirmed osteomyelitis of the second metatarsal phalangeal and the third phalanx along with a fracture of the second metatarsal head. Podiatry and infectious disease were consulted and recommended surgical intervention and you refused at this time and opted for intravenous antibiotic for 6 total weeks with Vancomycin and Ertapenem for which a PICC line was placed. Follow up with your primary care physician within a week from discharge for repeat complete blood count, basic metabolic panel, and vancomycin trough.  Home Infusion of Antibiotics via PICC line arranged by .   You are advised to avoid prolonged standing on the right foot.  Secondary Diagnosis:	DM (diabetes mellitus)  Goal:	Target HgbA1c less than 7.0  Assessment and plan of treatment:	You have a history of diabetes and the hemoglobin A1c was 7.1 indicating fair control - you should continue to take your home medication regimen as prescribed. Please follow up with your primary care provider and continue to monitor your blood sugar levels.

## 2018-04-04 NOTE — DISCHARGE NOTE ADULT - MEDICATION SUMMARY - MEDICATIONS TO TAKE
I will START or STAY ON the medications listed below when I get home from the hospital:    Please remove PICC line once intravenous antibiotics completed after May 17, 2018.  -- Indication: For Osteomyelitis    Weekly CBC, BMP, and Vancomycin Level Trough to be drawn prior to next dose  -- Indication: For Osteomyelitis    gabapentin 400 mg oral tablet  -- 400 milligram(s) by mouth 2 times a day  -- Indication: For Neuropathic Pain    NovoLOG Mix 70/30 subcutaneous suspension  -- 50 unit(s) subcutaneous once a day  -- Indication: For DM (diabetes mellitus)    NovoLOG Mix 70/30 subcutaneous suspension  -- 25 unit(s) subcutaneous once a day (at bedtime)  -- Indication: For DM (diabetes mellitus)    Levemir 100 units/mL subcutaneous solution  -- 40 unit(s) subcutaneous once a day  -- Indication: For DM (diabetes mellitus)    Levemir 100 units/mL subcutaneous solution  -- 20 unit(s) subcutaneous once a day (at bedtime)  -- Indication: For DM (diabetes mellitus)    metFORMIN 500 mg oral tablet  -- 1 tab(s) by mouth 2 times a day  -- Indication: For DM (diabetes mellitus)    ertapenem 1 g injection  -- 1 gram(s) intravenously once a day until May 17, 2018  -- Indication: For Osteomyelitis    vancomycin 1 g intravenous injection  -- 1.25 gram(s) intravenously 2 times a day  until May 17, 2018   -- Indication: For Osteomyelitis    famotidine 40 mg oral tablet  -- 1 tab(s) by mouth once a day (at bedtime)  -- Indication: For GERD (gastroesophageal reflux disease)

## 2018-04-05 LAB
ANION GAP SERPL CALC-SCNC: 5 MMOL/L — SIGNIFICANT CHANGE UP (ref 5–17)
APTT BLD: 29.6 SEC — SIGNIFICANT CHANGE UP (ref 27.5–37.4)
BASOPHILS # BLD AUTO: 0.1 K/UL — SIGNIFICANT CHANGE UP (ref 0–0.2)
BASOPHILS NFR BLD AUTO: 1.3 % — SIGNIFICANT CHANGE UP (ref 0–2)
BUN SERPL-MCNC: 9 MG/DL — SIGNIFICANT CHANGE UP (ref 7–18)
CALCIUM SERPL-MCNC: 8.5 MG/DL — SIGNIFICANT CHANGE UP (ref 8.4–10.5)
CHLORIDE SERPL-SCNC: 107 MMOL/L — SIGNIFICANT CHANGE UP (ref 96–108)
CO2 SERPL-SCNC: 29 MMOL/L — SIGNIFICANT CHANGE UP (ref 22–31)
CREAT SERPL-MCNC: 1.05 MG/DL — SIGNIFICANT CHANGE UP (ref 0.5–1.3)
EOSINOPHIL # BLD AUTO: 0.1 K/UL — SIGNIFICANT CHANGE UP (ref 0–0.5)
EOSINOPHIL NFR BLD AUTO: 2.2 % — SIGNIFICANT CHANGE UP (ref 0–6)
GLUCOSE SERPL-MCNC: 114 MG/DL — HIGH (ref 70–99)
HCT VFR BLD CALC: 37.9 % — LOW (ref 39–50)
HGB BLD-MCNC: 11.5 G/DL — LOW (ref 13–17)
INR BLD: 1.26 RATIO — HIGH (ref 0.88–1.16)
LYMPHOCYTES # BLD AUTO: 2.1 K/UL — SIGNIFICANT CHANGE UP (ref 1–3.3)
LYMPHOCYTES # BLD AUTO: 37.4 % — SIGNIFICANT CHANGE UP (ref 13–44)
MCHC RBC-ENTMCNC: 24.5 PG — LOW (ref 27–34)
MCHC RBC-ENTMCNC: 30.3 GM/DL — LOW (ref 32–36)
MCV RBC AUTO: 81 FL — SIGNIFICANT CHANGE UP (ref 80–100)
MONOCYTES # BLD AUTO: 0.5 K/UL — SIGNIFICANT CHANGE UP (ref 0–0.9)
MONOCYTES NFR BLD AUTO: 9.1 % — SIGNIFICANT CHANGE UP (ref 2–14)
NEUTROPHILS # BLD AUTO: 2.8 K/UL — SIGNIFICANT CHANGE UP (ref 1.8–7.4)
NEUTROPHILS NFR BLD AUTO: 50 % — SIGNIFICANT CHANGE UP (ref 43–77)
PLATELET # BLD AUTO: 350 K/UL — SIGNIFICANT CHANGE UP (ref 150–400)
POTASSIUM SERPL-MCNC: 4.3 MMOL/L — SIGNIFICANT CHANGE UP (ref 3.5–5.3)
POTASSIUM SERPL-SCNC: 4.3 MMOL/L — SIGNIFICANT CHANGE UP (ref 3.5–5.3)
PROTHROM AB SERPL-ACNC: 13.8 SEC — HIGH (ref 9.8–12.7)
RBC # BLD: 4.68 M/UL — SIGNIFICANT CHANGE UP (ref 4.2–5.8)
RBC # FLD: 13.8 % — SIGNIFICANT CHANGE UP (ref 10.3–14.5)
SODIUM SERPL-SCNC: 141 MMOL/L — SIGNIFICANT CHANGE UP (ref 135–145)
VANCOMYCIN TROUGH SERPL-MCNC: 12.7 UG/ML — SIGNIFICANT CHANGE UP (ref 10–20)
WBC # BLD: 5.7 K/UL — SIGNIFICANT CHANGE UP (ref 3.8–10.5)
WBC # FLD AUTO: 5.7 K/UL — SIGNIFICANT CHANGE UP (ref 3.8–10.5)

## 2018-04-05 PROCEDURE — 99233 SBSQ HOSP IP/OBS HIGH 50: CPT | Mod: GC

## 2018-04-05 PROCEDURE — 36569 INSJ PICC 5 YR+ W/O IMAGING: CPT

## 2018-04-05 PROCEDURE — 76937 US GUIDE VASCULAR ACCESS: CPT | Mod: 26

## 2018-04-05 PROCEDURE — 77001 FLUOROGUIDE FOR VEIN DEVICE: CPT | Mod: 26

## 2018-04-05 RX ORDER — SODIUM CHLORIDE 9 MG/ML
20 INJECTION INTRAMUSCULAR; INTRAVENOUS; SUBCUTANEOUS ONCE
Qty: 0 | Refills: 0 | Status: DISCONTINUED | OUTPATIENT
Start: 2018-04-05 | End: 2018-04-06

## 2018-04-05 RX ORDER — VANCOMYCIN HCL 1 G
1.25 VIAL (EA) INTRAVENOUS
Qty: 1 | Refills: 0 | OUTPATIENT
Start: 2018-04-05 | End: 2018-05-04

## 2018-04-05 RX ORDER — VANCOMYCIN HCL 1 G
1.5 VIAL (EA) INTRAVENOUS
Qty: 1 | Refills: 0 | OUTPATIENT
Start: 2018-04-05 | End: 2018-05-04

## 2018-04-05 RX ORDER — ERTAPENEM SODIUM 1 G/1
1000 INJECTION, POWDER, LYOPHILIZED, FOR SOLUTION INTRAMUSCULAR; INTRAVENOUS EVERY 24 HOURS
Qty: 0 | Refills: 0 | Status: DISCONTINUED | OUTPATIENT
Start: 2018-04-05 | End: 2018-04-06

## 2018-04-05 RX ORDER — ENOXAPARIN SODIUM 100 MG/ML
40 INJECTION SUBCUTANEOUS ONCE
Qty: 0 | Refills: 0 | Status: DISCONTINUED | OUTPATIENT
Start: 2018-04-05 | End: 2018-04-06

## 2018-04-05 RX ORDER — VANCOMYCIN HCL 1 G
1.25 VIAL (EA) INTRAVENOUS
Qty: 0 | Refills: 0 | COMMUNITY

## 2018-04-05 RX ORDER — SODIUM CHLORIDE 9 MG/ML
10 INJECTION INTRAMUSCULAR; INTRAVENOUS; SUBCUTANEOUS
Qty: 0 | Refills: 0 | Status: DISCONTINUED | OUTPATIENT
Start: 2018-04-05 | End: 2018-04-06

## 2018-04-05 RX ORDER — SODIUM CHLORIDE 9 MG/ML
10 INJECTION INTRAMUSCULAR; INTRAVENOUS; SUBCUTANEOUS EVERY 12 HOURS
Qty: 0 | Refills: 0 | Status: DISCONTINUED | OUTPATIENT
Start: 2018-04-05 | End: 2018-04-06

## 2018-04-05 RX ORDER — VANCOMYCIN HCL 1 G
1250 VIAL (EA) INTRAVENOUS EVERY 12 HOURS
Qty: 0 | Refills: 0 | Status: DISCONTINUED | OUTPATIENT
Start: 2018-04-05 | End: 2018-04-06

## 2018-04-05 RX ORDER — ERTAPENEM SODIUM 1 G/1
1 INJECTION, POWDER, LYOPHILIZED, FOR SOLUTION INTRAMUSCULAR; INTRAVENOUS
Qty: 1 | Refills: 0
Start: 2018-04-05 | End: 2018-05-04

## 2018-04-05 RX ADMIN — FAMOTIDINE 20 MILLIGRAM(S): 10 INJECTION INTRAVENOUS at 12:16

## 2018-04-05 RX ADMIN — Medication 2: at 22:51

## 2018-04-05 RX ADMIN — Medication 2: at 12:15

## 2018-04-05 RX ADMIN — GABAPENTIN 400 MILLIGRAM(S): 400 CAPSULE ORAL at 17:26

## 2018-04-05 RX ADMIN — ERTAPENEM SODIUM 120 MILLIGRAM(S): 1 INJECTION, POWDER, LYOPHILIZED, FOR SOLUTION INTRAMUSCULAR; INTRAVENOUS at 22:45

## 2018-04-05 RX ADMIN — Medication 250 MILLIGRAM(S): at 19:33

## 2018-04-05 RX ADMIN — GABAPENTIN 400 MILLIGRAM(S): 400 CAPSULE ORAL at 05:18

## 2018-04-05 RX ADMIN — INSULIN GLARGINE 5 UNIT(S): 100 INJECTION, SOLUTION SUBCUTANEOUS at 22:45

## 2018-04-05 RX ADMIN — Medication 250 MILLIGRAM(S): at 05:19

## 2018-04-05 RX ADMIN — PIPERACILLIN AND TAZOBACTAM 25 GRAM(S): 4; .5 INJECTION, POWDER, LYOPHILIZED, FOR SOLUTION INTRAVENOUS at 05:19

## 2018-04-05 NOTE — PROGRESS NOTE ADULT - ASSESSMENT
osteomyelitis of right 2nd toe and metatarsal with septic arthritis and bone destruction and a pathological fracture  osteomyelitis of 3rd toe without destructive changes    plan - he needs amputation of his right 2nd toe but pt adamantly refuses any amputation and is aware that this may move up toe and require a much more extensive surgery in the future  will give vanco  likely will need 1.5 gms iv q12 hrs depending on trough today and Invanz 1 gm iv q24hrs both for 6 weeks for his 3rd toe( 2nd toe not salvageable)

## 2018-04-05 NOTE — PROGRESS NOTE ADULT - SUBJECTIVE AND OBJECTIVE BOX
PGY 1 Note discussed with supervising resident and primary attending    Patient is a 67y old  Male who presents with a chief complaint of R foot pain (2018 17:28)      INTERVAL HPI/OVERNIGHT EVENTS: Patient seen and examined at bedside with no new complaints - possible OR intervention    MEDICATIONS  (STANDING):  dextrose 5%. 1000 milliLiter(s) (50 mL/Hr) IV Continuous <Continuous>  dextrose 50% Injectable 12.5 Gram(s) IV Push once  dextrose 50% Injectable 25 Gram(s) IV Push once  dextrose 50% Injectable 25 Gram(s) IV Push once  famotidine    Tablet 20 milliGRAM(s) Oral daily  gabapentin 400 milliGRAM(s) Oral two times a day  influenza   Vaccine 0.5 milliLiter(s) IntraMuscular once  insulin glargine Injectable (LANTUS) 5 Unit(s) SubCutaneous at bedtime  insulin lispro (HumaLOG) corrective regimen sliding scale   SubCutaneous Before meals and at bedtime  piperacillin/tazobactam IVPB. 3.375 Gram(s) IV Intermittent every 8 hours  vancomycin  IVPB 1000 milliGRAM(s) IV Intermittent every 12 hours    MEDICATIONS  (PRN):  dextrose Gel 1 Dose(s) Oral once PRN Blood Glucose LESS THAN 70 milliGRAM(s)/deciliter  glucagon  Injectable 1 milliGRAM(s) IntraMuscular once PRN Glucose LESS THAN 70 milligrams/deciliter      __________________________________________________  REVIEW OF SYSTEMS:    CONSTITUTIONAL: No fever,   EYES: No acute visual disturbances  NECK: No pain or stiffness  RESPIRATORY: No cough; No shortness of breath  CARDIOVASCULAR: No chest pain, no palpitations  GASTROINTESTINAL: No pain. No nausea or vomiting; No diarrhea   NEUROLOGICAL: No headache or numbness, no tremors  MUSCULOSKELETAL: No joint pain, no muscle pain  GENITOURINARY: No dysuria, no frequency, no hesitancy  PSYCHIATRY: No depression , no anxiety  ALL OTHER  ROS negative        Vital Signs Last 24 Hrs  T(C): 36.6 (2018 06:04), Max: 36.6 (2018 21:28)  T(F): 97.8 (2018 06:04), Max: 97.8 (2018 21:28)  HR: 50 (2018 06:04) (50 - 79)  BP: 164/79 (2018 06:04) (149/82 - 164/79)  BP(mean): --  RR: 18 (2018 06:04) (16 - 18)  SpO2: 100% (2018 06:04) (100% - 100%)    ________________________________________________  PHYSICAL EXAM:  GENERAL: NAD  HEENT: Normocephalic;  conjunctivae and sclerae clear; moist mucous membranes;   NECK : supple  CHEST/LUNG: Clear to auscultation bilaterally with good air entry   HEART: S1 S2  regular; no murmurs, gallops or rubs  ABDOMEN: Soft, Nontender, Nondistended; Bowel sounds present  EXTREMITIES: RLE increased pigmentation, bandages dry and intact, wound on plantar surface  NERVOUS SYSTEM:  Awake and alert; Oriented  to place, person and time ; no new deficits    _________________________________________________  LABS:                        11.5   5.7   )-----------( 350      ( 2018 06:17 )             37.9     04-05    141  |  107  |  9   ----------------------------<  114<H>  4.3   |  29  |  1.05    Ca    8.5      2018 06:17  Phos  3.2     04-04  Mg     2.2     04-04    TPro  7.2  /  Alb  2.5<L>  /  TBili  0.3  /  DBili  x   /  AST  22  /  ALT  36  /  AlkPhos  67  04-04    PT/INR - ( 2018 06:17 )   PT: 13.8 sec;   INR: 1.26 ratio         PTT - ( 2018 06:17 )  PTT:29.6 sec  Urinalysis Basic - ( 2018 18:43 )    Color: Yellow / Appearance: Clear / S.020 / pH: x  Gluc: x / Ketone: Negative  / Bili: Negative / Urobili: Negative   Blood: x / Protein: Negative / Nitrite: Negative   Leuk Esterase: Negative / RBC: x / WBC x   Sq Epi: x / Non Sq Epi: x / Bacteria: x      CAPILLARY BLOOD GLUCOSE      POCT Blood Glucose.: 132 mg/dL (2018 21:42)  POCT Blood Glucose.: 86 mg/dL (2018 17:40)  POCT Blood Glucose.: 134 mg/dL (2018 11:57)  POCT Blood Glucose.: 98 mg/dL (2018 07:58)        RADIOLOGY & ADDITIONAL TESTS:    Imaging Personally Reviewed:  YES    Consultant(s) Notes Reviewed:   YES    Care Discussed with Consultants : YES    Plan of care was discussed with patient and /or primary care giver; all questions and concerns were addressed and care was aligned with patient's wishes. PGY 1 Note discussed with supervising resident and primary attending    Patient is a 67y old  Male who presents with a chief complaint of R foot pain (2018 17:28)      INTERVAL HPI/OVERNIGHT EVENTS: Patient seen and examined at bedside with no new complaints - possible OR intervention    MEDICATIONS  (STANDING):  dextrose 5%. 1000 milliLiter(s) (50 mL/Hr) IV Continuous <Continuous>  dextrose 50% Injectable 12.5 Gram(s) IV Push once  dextrose 50% Injectable 25 Gram(s) IV Push once  dextrose 50% Injectable 25 Gram(s) IV Push once  famotidine    Tablet 20 milliGRAM(s) Oral daily  gabapentin 400 milliGRAM(s) Oral two times a day  influenza   Vaccine 0.5 milliLiter(s) IntraMuscular once  insulin glargine Injectable (LANTUS) 5 Unit(s) SubCutaneous at bedtime  insulin lispro (HumaLOG) corrective regimen sliding scale   SubCutaneous Before meals and at bedtime  piperacillin/tazobactam IVPB. 3.375 Gram(s) IV Intermittent every 8 hours  vancomycin  IVPB 1000 milliGRAM(s) IV Intermittent every 12 hours    MEDICATIONS  (PRN):  dextrose Gel 1 Dose(s) Oral once PRN Blood Glucose LESS THAN 70 milliGRAM(s)/deciliter  glucagon  Injectable 1 milliGRAM(s) IntraMuscular once PRN Glucose LESS THAN 70 milligrams/deciliter      __________________________________________________  REVIEW OF SYSTEMS:    CONSTITUTIONAL: No fever,   EYES: No acute visual disturbances  NECK: No pain or stiffness  RESPIRATORY: No cough; No shortness of breath  CARDIOVASCULAR: No chest pain, no palpitations  GASTROINTESTINAL: No pain. No nausea or vomiting; No diarrhea   NEUROLOGICAL: No headache or numbness, no tremors  MUSCULOSKELETAL: No joint pain, no muscle pain  GENITOURINARY: No dysuria, no frequency, no hesitancy  PSYCHIATRY: No depression , no anxiety  ALL OTHER  ROS negative        Vital Signs Last 24 Hrs  T(C): 36.6 (2018 06:04), Max: 36.6 (2018 21:28)  T(F): 97.8 (2018 06:04), Max: 97.8 (2018 21:28)  HR: 50 (2018 06:04) (50 - 79)  BP: 164/79 (2018 06:04) (149/82 - 164/79)  RR: 18 (2018 06:04) (16 - 18)  SpO2: 100% (2018 06:04) (100% - 100%)    ________________________________________________  PHYSICAL EXAM:  GENERAL: NAD  HEENT: Normocephalic;  conjunctivae and sclerae clear; moist mucous membranes;   NECK : supple  CHEST/LUNG: Clear to auscultation bilaterally with good air entry   HEART: S1 S2  regular; no murmurs, gallops or rubs  ABDOMEN: Soft, Nontender, Nondistended; Bowel sounds present  EXTREMITIES: RLE increased pigmentation, bandages dry and intact, wound on plantar surface  NERVOUS SYSTEM:  Awake and alert; Oriented  to place, person and time ; no new deficits    _________________________________________________  LABS:                        11.5   5.7   )-----------( 350      ( 2018 06:17 )             37.9     04-05    141  |  107  |  9   ----------------------------<  114<H>  4.3   |  29  |  1.05    Ca    8.5      2018 06:17  Phos  3.2     04-04  Mg     2.2     04-04    TPro  7.2  /  Alb  2.5<L>  /  TBili  0.3  /  DBili  x   /  AST  22  /  ALT  36  /  AlkPhos  67  04-04    PT/INR - ( 2018 06:17 )   PT: 13.8 sec;   INR: 1.26 ratio         PTT - ( 2018 06:17 )  PTT:29.6 sec  Urinalysis Basic - ( 2018 18:43 )    Color: Yellow / Appearance: Clear / S.020 / pH: x  Gluc: x / Ketone: Negative  / Bili: Negative / Urobili: Negative   Blood: x / Protein: Negative / Nitrite: Negative   Leuk Esterase: Negative / RBC: x / WBC x   Sq Epi: x / Non Sq Epi: x / Bacteria: x      CAPILLARY BLOOD GLUCOSE      POCT Blood Glucose.: 132 mg/dL (2018 21:42)  POCT Blood Glucose.: 86 mg/dL (2018 17:40)  POCT Blood Glucose.: 134 mg/dL (2018 11:57)  POCT Blood Glucose.: 98 mg/dL (2018 07:58)        RADIOLOGY & ADDITIONAL TESTS:    Imaging Personally Reviewed:  YES    Consultant(s) Notes Reviewed:   YES    Care Discussed with Consultants : YES    Plan of care was discussed with patient and /or primary care giver; all questions and concerns were addressed and care was aligned with patient's wishes.

## 2018-04-05 NOTE — PROGRESS NOTE ADULT - PROBLEM SELECTOR PLAN 2
Holding home Novolog 70/30 and Levemir  - C/w moderate HSS with FS  - C/w 10 Lantus at night and adjust PRN  ***F/u HbA1C Holding home Novolog 70/30 and Levemir  - C/w moderate HSS with FS  - C/w 10 Lantus at night and adjust PRN  ***HbA1C 7.1

## 2018-04-05 NOTE — PROGRESS NOTE ADULT - ASSESSMENT
67 M from home with PMH IDDM, Peripheral Neuropathy, R plantar foot ulcer x 1 year p/w R foot swelling and pain x 2 weeks - ?completed outpatient Keflex, negative outpatient US - admitted for acute osteomyelitis head of second metatarsal base of the second toe. 67 M from home with PMH IDDM, Peripheral Neuropathy, R plantar foot ulcer x 1 year p/w R foot swelling and pain x 2 weeks - ?completed outpatient Keflex which resolved pain and swelling - admitted for acute osteomyelitis head of second metatarsal base of the second toe.

## 2018-04-05 NOTE — PROGRESS NOTE ADULT - PROBLEM SELECTOR PLAN 1
XR showing acute osteomyelitis head of second metatarsal base of the second toe  - Afebrile, no WBC elevation  ***F/u [facility], wound culture, consult Dr Gandara  ID Dr. Fatima; C/w Vancomycin and Zosyn  Podiatry Dr Willams XR showing acute osteomyelitis head of second metatarsal base of the second toe  - Afebrile, no WBC elevation, ESR 62, CRP 0.70  - MRI confirms OM of 2nd and 3rd digit  - Negative LE US for DVT  ***F/u [facility] and Vascular Dr Gandara  ID Dr. Fatima; C/w Vancomycin and Zosyn Day 2  Podiatry Dr Willams  ***Possible OR today; bone Cx vs resection

## 2018-04-05 NOTE — PROGRESS NOTE ADULT - SUBJECTIVE AND OBJECTIVE BOX
67y Male seen bedside today s/p PICC line placement. Patient states that he received his MRI yesterday. Patient has decided on no surgical management for right foot osteomyelitis and will attempt 6 weeks of IV antibiotics at this time. Patient denies any overnight constitutional symptoms of nausea, vomiting, fever, SOB, and CP.    Meds:  piperacillin/tazobactam IVPB. 3.375 Gram(s) IV Intermittent every 8 hours  vancomycin  IVPB 1000 milliGRAM(s) IV Intermittent every 12 hours    Allergies    Percocet 5/325 (Other)    Intolerances        VITALS:  Vital Signs Last 24 Hrs  T(C): 36.6 (05 Apr 2018 06:04), Max: 36.6 (04 Apr 2018 21:28)  T(F): 97.8 (05 Apr 2018 06:04), Max: 97.8 (04 Apr 2018 21:28)  HR: 50 (05 Apr 2018 06:04) (50 - 79)  BP: 164/79 (05 Apr 2018 06:04) (149/82 - 164/79)  RR: 18 (05 Apr 2018 06:04) (16 - 18)  SpO2: 100% (05 Apr 2018 06:04) (100% - 100%)    LABS/DIAGNOSTIC TESTS:                          11.5   5.7   )-----------( 350      ( 05 Apr 2018 06:17 )             37.9     04-05    141  |  107  |  9   ----------------------------<  114<H>  4.3   |  29  |  1.05    Ca    8.5      05 Apr 2018 06:17  Phos  3.2     04-04  Mg     2.2     04-04    TPro  7.2  /  Alb  2.5<L>  /  TBili  0.3  /  DBili  x   /  AST  22  /  ALT  36  /  AlkPhos  67  04-04      LIVER FUNCTIONS - ( 04 Apr 2018 06:57 )  Alb: 2.5 g/dL / Pro: 7.2 g/dL / ALK PHOS: 67 U/L / ALT: 36 U/L DA / AST: 22 U/L / GGT: x           CULTURES: .Surgical Swab right foot  04-04 @ 00:32   Few Pseudomonas aeruginosa  Few Coag Negative Staphylococcus      .Urine Clean Catch (Midstream)  04-04 @ 00:19   No growth      .Blood Blood-Peripheral  04-04 @ 00:16   No growth to date.         RADIOLOGY:  < from: US Duplex Arterial Lower Ext Compl, Bilateral (04.04.18 @ 17:22) >  INTERPRETATION:  Clinical Information: Right lower extremity nonhealing   wound.    Technique: Bilateral lower extremity ABIs/PVR    Comparison: 04/21/2017    Findings:  Right lower extremity: The ankle brachial index is 1.07. The pulse   waveforms are intact throughout the extremity.    Left lower extremity: The ankle brachial index is 0.97. The pulse   waveforms are mildly diminished at the level of the metatarsals and   digits.    < end of copied text >    < from: MR Foot w/ IV Cont, Right (04.04.18 @ 16:03) >  Comparison: MRI 12/15/2017 and right foot radiograph 4/3/2018.    There is diffuse dorsal subcutaneous soft tissue edema.    There is a plantar skin ulceration with tract at the level of the second   metatarsal phalangeal joint. There is osteolysis with fracture of the   second metatarsal head neck junction with surrounding fluid collection   communicating with the metatarsal phalangeal joint. There is diffuse bone   marrow edema with loss of T1 signal intensity, which extends towards the   metatarsal base.  Bone marrow edema is noted involving the entire proximal phalanx of the   second toe.  Findings are compatible with septic arthritis/osteomyelitis.    There is bone marrow edema involving the proximal phalanx of the third   toe, with loss of T1 signal intensity, findings suggestive of   infection/osteomyelitis.      Postsurgical changes with magnetic susceptibility artifact is again noted   at the head of the first metatarsal.    Patchy bone marrow edema is noted throughout the cuneiforms and   visualized portion of the tarsal navicular bone; pattern is similar to   prior MRI exam.  The alignment at the tarsometatarsal joints remains within normal limits.    Impression:    Septic arthritis/osteomyelitis second MTP joint, including proximal   phalanx of second toe with osteolysis and fracture at the second   metatarsal head neck junction as discussed.    Bone marrow edema involving the proximal third phalanx, suggestive of   osteomyelitis.    Stable appearance of the patchy bone marrow edema of the cuneiforms and   tarsal navicular bone, which may be secondary to ischemia or stress   reaction.    < end of copied text >

## 2018-04-06 VITALS
RESPIRATION RATE: 18 BRPM | DIASTOLIC BLOOD PRESSURE: 71 MMHG | TEMPERATURE: 98 F | SYSTOLIC BLOOD PRESSURE: 118 MMHG | HEART RATE: 80 BPM | OXYGEN SATURATION: 100 %

## 2018-04-06 LAB
ANION GAP SERPL CALC-SCNC: 7 MMOL/L — SIGNIFICANT CHANGE UP (ref 5–17)
BASOPHILS # BLD AUTO: 0.1 K/UL — SIGNIFICANT CHANGE UP (ref 0–0.2)
BASOPHILS NFR BLD AUTO: 1.1 % — SIGNIFICANT CHANGE UP (ref 0–2)
BUN SERPL-MCNC: 8 MG/DL — SIGNIFICANT CHANGE UP (ref 7–18)
CALCIUM SERPL-MCNC: 9.1 MG/DL — SIGNIFICANT CHANGE UP (ref 8.4–10.5)
CHLORIDE SERPL-SCNC: 106 MMOL/L — SIGNIFICANT CHANGE UP (ref 96–108)
CO2 SERPL-SCNC: 26 MMOL/L — SIGNIFICANT CHANGE UP (ref 22–31)
CREAT SERPL-MCNC: 1.05 MG/DL — SIGNIFICANT CHANGE UP (ref 0.5–1.3)
EOSINOPHIL # BLD AUTO: 0.1 K/UL — SIGNIFICANT CHANGE UP (ref 0–0.5)
EOSINOPHIL NFR BLD AUTO: 1.4 % — SIGNIFICANT CHANGE UP (ref 0–6)
GLUCOSE SERPL-MCNC: 122 MG/DL — HIGH (ref 70–99)
HCT VFR BLD CALC: 41.8 % — SIGNIFICANT CHANGE UP (ref 39–50)
HGB BLD-MCNC: 13.2 G/DL — SIGNIFICANT CHANGE UP (ref 13–17)
INR BLD: 1.19 RATIO — HIGH (ref 0.88–1.16)
LYMPHOCYTES # BLD AUTO: 2 K/UL — SIGNIFICANT CHANGE UP (ref 1–3.3)
LYMPHOCYTES # BLD AUTO: 31.2 % — SIGNIFICANT CHANGE UP (ref 13–44)
MCHC RBC-ENTMCNC: 25.6 PG — LOW (ref 27–34)
MCHC RBC-ENTMCNC: 31.7 GM/DL — LOW (ref 32–36)
MCV RBC AUTO: 80.7 FL — SIGNIFICANT CHANGE UP (ref 80–100)
MONOCYTES # BLD AUTO: 0.5 K/UL — SIGNIFICANT CHANGE UP (ref 0–0.9)
MONOCYTES NFR BLD AUTO: 8.2 % — SIGNIFICANT CHANGE UP (ref 2–14)
NEUTROPHILS # BLD AUTO: 3.7 K/UL — SIGNIFICANT CHANGE UP (ref 1.8–7.4)
NEUTROPHILS NFR BLD AUTO: 58.1 % — SIGNIFICANT CHANGE UP (ref 43–77)
PLATELET # BLD AUTO: 361 K/UL — SIGNIFICANT CHANGE UP (ref 150–400)
POTASSIUM SERPL-MCNC: 3.9 MMOL/L — SIGNIFICANT CHANGE UP (ref 3.5–5.3)
POTASSIUM SERPL-SCNC: 3.9 MMOL/L — SIGNIFICANT CHANGE UP (ref 3.5–5.3)
PROTHROM AB SERPL-ACNC: 13 SEC — HIGH (ref 9.8–12.7)
RBC # BLD: 5.18 M/UL — SIGNIFICANT CHANGE UP (ref 4.2–5.8)
RBC # FLD: 13.7 % — SIGNIFICANT CHANGE UP (ref 10.3–14.5)
SODIUM SERPL-SCNC: 139 MMOL/L — SIGNIFICANT CHANGE UP (ref 135–145)
WBC # BLD: 6.4 K/UL — SIGNIFICANT CHANGE UP (ref 3.8–10.5)
WBC # FLD AUTO: 6.4 K/UL — SIGNIFICANT CHANGE UP (ref 3.8–10.5)

## 2018-04-06 PROCEDURE — 83735 ASSAY OF MAGNESIUM: CPT

## 2018-04-06 PROCEDURE — 85027 COMPLETE CBC AUTOMATED: CPT

## 2018-04-06 PROCEDURE — 82962 GLUCOSE BLOOD TEST: CPT

## 2018-04-06 PROCEDURE — C1751: CPT

## 2018-04-06 PROCEDURE — 93005 ELECTROCARDIOGRAM TRACING: CPT

## 2018-04-06 PROCEDURE — 84100 ASSAY OF PHOSPHORUS: CPT

## 2018-04-06 PROCEDURE — 82306 VITAMIN D 25 HYDROXY: CPT

## 2018-04-06 PROCEDURE — 99285 EMERGENCY DEPT VISIT HI MDM: CPT | Mod: 25

## 2018-04-06 PROCEDURE — 85730 THROMBOPLASTIN TIME PARTIAL: CPT

## 2018-04-06 PROCEDURE — 93925 LOWER EXTREMITY STUDY: CPT

## 2018-04-06 PROCEDURE — 76937 US GUIDE VASCULAR ACCESS: CPT

## 2018-04-06 PROCEDURE — 77001 FLUOROGUIDE FOR VEIN DEVICE: CPT

## 2018-04-06 PROCEDURE — 80048 BASIC METABOLIC PNL TOTAL CA: CPT

## 2018-04-06 PROCEDURE — 82009 KETONE BODYS QUAL: CPT

## 2018-04-06 PROCEDURE — 87070 CULTURE OTHR SPECIMN AEROBIC: CPT

## 2018-04-06 PROCEDURE — 71045 X-RAY EXAM CHEST 1 VIEW: CPT

## 2018-04-06 PROCEDURE — 87075 CULTR BACTERIA EXCEPT BLOOD: CPT

## 2018-04-06 PROCEDURE — 83036 HEMOGLOBIN GLYCOSYLATED A1C: CPT

## 2018-04-06 PROCEDURE — 87086 URINE CULTURE/COLONY COUNT: CPT

## 2018-04-06 PROCEDURE — 87186 SC STD MICRODIL/AGAR DIL: CPT

## 2018-04-06 PROCEDURE — 96374 THER/PROPH/DIAG INJ IV PUSH: CPT

## 2018-04-06 PROCEDURE — 99239 HOSP IP/OBS DSCHRG MGMT >30: CPT

## 2018-04-06 PROCEDURE — 96375 TX/PRO/DX INJ NEW DRUG ADDON: CPT

## 2018-04-06 PROCEDURE — 85652 RBC SED RATE AUTOMATED: CPT

## 2018-04-06 PROCEDURE — 73719 MRI LOWER EXTREMITY W/DYE: CPT

## 2018-04-06 PROCEDURE — 80053 COMPREHEN METABOLIC PANEL: CPT

## 2018-04-06 PROCEDURE — 36569 INSJ PICC 5 YR+ W/O IMAGING: CPT

## 2018-04-06 PROCEDURE — 86140 C-REACTIVE PROTEIN: CPT

## 2018-04-06 PROCEDURE — 87040 BLOOD CULTURE FOR BACTERIA: CPT

## 2018-04-06 PROCEDURE — 81003 URINALYSIS AUTO W/O SCOPE: CPT

## 2018-04-06 PROCEDURE — 83605 ASSAY OF LACTIC ACID: CPT

## 2018-04-06 PROCEDURE — 80202 ASSAY OF VANCOMYCIN: CPT

## 2018-04-06 PROCEDURE — 93970 EXTREMITY STUDY: CPT

## 2018-04-06 PROCEDURE — 82607 VITAMIN B-12: CPT

## 2018-04-06 PROCEDURE — 82746 ASSAY OF FOLIC ACID SERUM: CPT

## 2018-04-06 PROCEDURE — 73620 X-RAY EXAM OF FOOT: CPT

## 2018-04-06 PROCEDURE — 80061 LIPID PANEL: CPT

## 2018-04-06 PROCEDURE — 85610 PROTHROMBIN TIME: CPT

## 2018-04-06 PROCEDURE — 84443 ASSAY THYROID STIM HORMONE: CPT

## 2018-04-06 RX ORDER — VANCOMYCIN HCL 1 G
1.25 VIAL (EA) INTRAVENOUS
Qty: 1 | Refills: 0
Start: 2018-04-06 | End: 2018-05-05

## 2018-04-06 RX ORDER — VANCOMYCIN HCL 1 G
1.5 VIAL (EA) INTRAVENOUS
Qty: 1 | Refills: 0 | OUTPATIENT
Start: 2018-04-06 | End: 2018-05-05

## 2018-04-06 RX ADMIN — Medication 2: at 17:21

## 2018-04-06 RX ADMIN — Medication 166.67 MILLIGRAM(S): at 05:23

## 2018-04-06 RX ADMIN — GABAPENTIN 400 MILLIGRAM(S): 400 CAPSULE ORAL at 17:21

## 2018-04-06 RX ADMIN — GABAPENTIN 400 MILLIGRAM(S): 400 CAPSULE ORAL at 05:24

## 2018-04-06 RX ADMIN — Medication 166.67 MILLIGRAM(S): at 17:21

## 2018-04-06 RX ADMIN — FAMOTIDINE 20 MILLIGRAM(S): 10 INJECTION INTRAVENOUS at 13:48

## 2018-04-06 NOTE — PROGRESS NOTE ADULT - PROVIDER SPECIALTY LIST ADULT
Internal Medicine
Podiatry
Podiatry
Infectious Disease
Infectious Disease

## 2018-04-06 NOTE — PROGRESS NOTE ADULT - PROBLEM SELECTOR PLAN 2
Holding home Novolog 70/30 and Levemir  - C/w moderate HSS with FS  - C/w 10 Lantus at night and adjust PRN  ***HbA1C 7.1

## 2018-04-06 NOTE — PROGRESS NOTE ADULT - SUBJECTIVE AND OBJECTIVE BOX
67y Male seen bedside today sitting in a chair, patient relates that he has had one episode of diarrhea early this AM but states that it was loose stool and not watery stool. Patient denies any overnight constitutional symptoms of nausea, vomiting, fever, SOB, CP, or pain to the extremities.    Meds:  ertapenem  IVPB 1000 milliGRAM(s) IV Intermittent every 24 hours  vancomycin  IVPB 1250 milliGRAM(s) IV Intermittent every 12 hours    Allergies:  Percocet 5/325 (Other)    VITALS:  Vital Signs Last 24 Hrs  T(C): 37.1 (06 Apr 2018 05:49), Max: 37.1 (06 Apr 2018 05:49)  T(F): 98.7 (06 Apr 2018 05:49), Max: 98.7 (06 Apr 2018 05:49)  HR: 68 (06 Apr 2018 05:49) (68 - 88)  BP: 143/84 (06 Apr 2018 05:49) (112/65 - 143/84)  BP(mean): --  RR: 18 (06 Apr 2018 05:49) (17 - 19)  SpO2: 100% (06 Apr 2018 05:49) (100% - 100%)    LABS/DIAGNOSTIC TESTS:             13.2   6.4   )-----------( 361      ( 06 Apr 2018 07:57 )             41.8   04-06    139  |  106  |  8   ----------------------------<  122<H>  3.9   |  26  |  1.05    Ca    9.1      06 Apr 2018 07:57    CULTURES: .Surgical Swab right foot  04-04 @ 00:32   Few Pseudomonas aeruginosa  Few Coag Negative Staphylococcus  Few Enterococcus faecalis  --  Pseudomonas aeruginosa  Coag Negative Staphylococcus    .Urine Clean Catch (Midstream)  04-04 @ 00:19   No growth    .Blood Blood-Peripheral  04-04 @ 00:16   No growth to date.    RADIOLOGY:  < from: MR Foot w/ IV Cont, Right (04.04.18 @ 16:03) >  Impression:    Septic arthritis/osteomyelitis second MTP joint, including proximal   phalanx of second toe with osteolysis and fracture at the second   metatarsal head neck junction as discussed.    Bone marrow edema involving the proximal third phalanx, suggestive of   osteomyelitis.    Stable appearance of the patchy bone marrow edema of the cuneiforms and   tarsal navicular bone, which may be secondary to ischemia or stress   reaction.    < end of copied text >

## 2018-04-06 NOTE — PROGRESS NOTE ADULT - SUBJECTIVE AND OBJECTIVE BOX
S: This patient is a 67 year old AA male with h/o IDDM, pt with Rt plantar foot ulcer for 1 yr. Patient is NAD and AAO x3. Patient complains of no overnight events.     Patient admits to  (-) Fevers, (-) Chills, (-) Nausea, (-) Vomiting, (-) Shortness of Breath    PMH: DM (diabetes mellitus)    PSH: History of repair of hiatal hernia  S/P arthroscopic knee surgery  History of colectomy  S/P inguinal hernia repair    Allergies: Percocet 5/325 (Other)    Labs:  Vital Signs Last 24 Hrs  T(C): 37.1 (06 Apr 2018 05:49), Max: 37.1 (06 Apr 2018 05:49)  T(F): 98.7 (06 Apr 2018 05:49), Max: 98.7 (06 Apr 2018 05:49)  HR: 68 (06 Apr 2018 05:49) (68 - 88)  BP: 143/84 (06 Apr 2018 05:49) (112/65 - 143/84)  BP(mean): --  RR: 18 (06 Apr 2018 05:49) (17 - 19)  SpO2: 100% (06 Apr 2018 05:49) (100% - 100%)                          13.2   6.4   )-----------( 361      ( 06 Apr 2018 07:57 )             41.8       04-06    139  |  106  |  8   ----------------------------<  122<H>  3.9   |  26  |  1.05    Ca    9.1      06 Apr 2018 07:57    WBC Count: 6.4 K/uL (04-06 @ 07:57)  WBC Count: 5.7 K/uL (04-05 @ 06:17)  WBC Count: 6.0 K/uL (04-04 @ 06:57)  WBC Count: 7.6 K/uL (04-03 @ 15:11)          O:   General: Pleasant  male NAD & AOX3.    Integument:  Skin warm, dry and supple bilateral.    Right sub 2nd metatarsal ulceration, + hyperkeratotic border with macerated periwound and a fibrogranular base 75% fibrotic and 25% granular, wound size measuring 0.7 cm x 0.6 cm x 0.2 cm, - jonathan-wound erythema, - purulence, - fluctuance, - tracking, - probe to bone  Vascular: Dorsalis Pedis and Posterior Tibial pulses 0/4.  Capillary re-fill time less then 3 seconds digits 1-5 bilateral.    Neuro: Protective sensation diminished to the level of the digits bilateral.  MSK: Muscle strength 5/5 all plantarflexors, dorsiflexors, everters, and inverters muscle groups bilateral.  Deformity:      < from: Xray Foot AP + Lateral, Right (04.03.18 @ 15:18) >  NTERPRETATION:  History: Right foot infection.  3 views right foot. Prior 4/21/2017. There is new focal bone destruction   second metatarsal base of proximal phalanx of the second toe consistent   with osteomyelitis. MRI may be useful in further characterizing the   extent of disease. Remainder of the foot is unchanged. No acute fracture   or dislocation.    Impression: Acute osteomyelitis head of second metatarsal base of the   second toe as described.    < end of copied text >    A: Right foot ulceration sub 2nd metatarsal head, with acute OM to the head of the 2nd metatarsal    P:   Chart reviewed and Patient evaluated  Discussed diagnosis and treatment with patient  Wound culture reviewed  Applied dry sterile dressing  Ordered Santyl  RIVER reviewed: 1.07, 0.96  X-rays reviewed : Shows acute osteomyelitis head of second metatarsal and base of the second toe  Continue with IV antibiotics As Per ID  ID agrees patient would benefit from 2nd digit/ray amputation, however patient refuses and states he does not want to lose a part of his body.  Pt would like to have PICC line and IV abx   Weight bearing as tolerated to the heel of the right foot  Pt is podiatrically stable for discharge and may be f/u at [facility] at [address] [place] [address], on Tuesdays or Thursdays   Podiatry will follow while in house.  Discussed with Attending Dr. Magana

## 2018-04-06 NOTE — PROGRESS NOTE ADULT - NEGATIVE CARDIOVASCULAR SYMPTOMS
no dyspnea on exertion/no palpitations/no chest pain
no dyspnea on exertion/no palpitations/no chest pain

## 2018-04-06 NOTE — PROGRESS NOTE ADULT - ATTENDING COMMENTS
Patient was examined at the bedside and discussed with Dr. Cook.     We will request MRI, RIVER, and Doppler.   Vascular surgery consultation.   Empirical antibiotics, podiatry f/u.
Attending Medicine Covering Dr. Atkins    Patient seen and examined earlier this afternoon; Agree with PGY1 A/P above with editing as needed. d/w PGY1 Dr. Cook  No new complaints.     Vitals: stable as above    P/E: As per PGY1 above    D/D;  Osteomyelitis of toes  DM/GERD    Plan:  Continue IV Antibiotics  Discussed with patient with ID Dr. Fatima at length; Surgical option recommended by Podiatry was encouraged; Alternative of 6 week course of ABX although not preferred was discussed.   Patient is not willing to proceed with Sx intervention at this time; Wants trial of ABX  As d/w ID will continue Vanco and switch Zosyn to invanz once daily.   Follow up Vanco trough; Likely need higher dose 1.5 gm q 12 hrs  Discussed with  Monica, advised above dose and requested Home Care/ Home infusion services arranged for D/C Home tomorrow evening  Will need Weekly CBC, BMP and Vanco trough as outpatient while on ABX once vanco trough is therapeutic prior to discharge.    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 Cook/  and RN
pt seen and  examined with ID resident
pt seen and  examined with ID resident

## 2018-04-06 NOTE — PROGRESS NOTE ADULT - ASSESSMENT
osteomyelitis of right 2nd toe and metatarsal with septic arthritis and bone destruction and a pathological fracture  osteomyelitis of 3rd toe without destructive changes    plan - he needs amputation of his right 2nd toe but pt still refuses any amputation and is aware that this may move up toe and require a much more extensive surgery in the future  will give vanco 1.5 gms iv q12 hrs  and Invanz 1 gm iv q24hrs both for 6 weeks for his 3rd toe( 2nd toe not salvageable)  dc home today  check cbc, bmp and vanco trough q weekly starting monday

## 2018-04-06 NOTE — PROGRESS NOTE ADULT - SUBJECTIVE AND OBJECTIVE BOX
PGY 1 Note discussed with supervising resident and primary attending    Patient is a 67y old  Male who presents with a chief complaint of R foot pain (04 Apr 2018 17:28)      INTERVAL HPI/OVERNIGHT EVENTS: Patient seen and examined at bedside with no new complaints    MEDICATIONS  (STANDING):  dextrose 5%. 1000 milliLiter(s) (50 mL/Hr) IV Continuous <Continuous>  dextrose 50% Injectable 12.5 Gram(s) IV Push once  dextrose 50% Injectable 25 Gram(s) IV Push once  dextrose 50% Injectable 25 Gram(s) IV Push once  enoxaparin Injectable 40 milliGRAM(s) SubCutaneous once  ertapenem  IVPB 1000 milliGRAM(s) IV Intermittent every 24 hours  famotidine    Tablet 20 milliGRAM(s) Oral daily  gabapentin 400 milliGRAM(s) Oral two times a day  influenza   Vaccine 0.5 milliLiter(s) IntraMuscular once  insulin glargine Injectable (LANTUS) 5 Unit(s) SubCutaneous at bedtime  insulin lispro (HumaLOG) corrective regimen sliding scale   SubCutaneous Before meals and at bedtime  sodium chloride 0.9% lock flush 20 milliLiter(s) IV Push once  vancomycin  IVPB 1250 milliGRAM(s) IV Intermittent every 12 hours    MEDICATIONS  (PRN):  dextrose Gel 1 Dose(s) Oral once PRN Blood Glucose LESS THAN 70 milliGRAM(s)/deciliter  glucagon  Injectable 1 milliGRAM(s) IntraMuscular once PRN Glucose LESS THAN 70 milligrams/deciliter  sodium chloride 0.9% lock flush 10 milliLiter(s) IV Push every 1 hour PRN After each medication administration  sodium chloride 0.9% lock flush 10 milliLiter(s) IV Push every 12 hours PRN Lumen of catheter NOT used      __________________________________________________  REVIEW OF SYSTEMS:    CONSTITUTIONAL: No fever,   EYES: No acute visual disturbances  NECK: No pain or stiffness  RESPIRATORY: No cough; No shortness of breath  CARDIOVASCULAR: No chest pain, no palpitations  GASTROINTESTINAL: No pain. No nausea or vomiting; No diarrhea   NEUROLOGICAL: No headache or numbness, no tremors  MUSCULOSKELETAL: No joint pain, no muscle pain  GENITOURINARY: No dysuria, no frequency, no hesitancy  PSYCHIATRY: No depression , no anxiety  ALL OTHER  ROS negative        Vital Signs Last 24 Hrs  T(C): 37.1 (06 Apr 2018 05:49), Max: 37.1 (06 Apr 2018 05:49)  T(F): 98.7 (06 Apr 2018 05:49), Max: 98.7 (06 Apr 2018 05:49)  HR: 68 (06 Apr 2018 05:49) (68 - 88)  BP: 143/84 (06 Apr 2018 05:49) (112/65 - 143/84)  BP(mean): --  RR: 18 (06 Apr 2018 05:49) (17 - 19)  SpO2: 100% (06 Apr 2018 05:49) (100% - 100%)    ________________________________________________  PHYSICAL EXAM:  GENERAL: NAD  HEENT: Normocephalic;  conjunctivae and sclerae clear; moist mucous membranes;   NECK : supple  CHEST/LUNG: Clear to auscultation bilaterally with good air entry   HEART: S1 S2  regular; no murmurs, gallops or rubs  ABDOMEN: Soft, Nontender, Nondistended; Bowel sounds present  EXTREMITIES: RLE swelling and dressings dry and intact  NERVOUS SYSTEM:  Awake and alert; Oriented  to place, person and time ; no new deficits    _________________________________________________  LABS:                        11.5   5.7   )-----------( 350      ( 05 Apr 2018 06:17 )             37.9     04-05    141  |  107  |  9   ----------------------------<  114<H>  4.3   |  29  |  1.05    Ca    8.5      05 Apr 2018 06:17      PT/INR - ( 06 Apr 2018 07:57 )   PT: 13.0 sec;   INR: 1.19 ratio         PTT - ( 05 Apr 2018 06:17 )  PTT:29.6 sec    CAPILLARY BLOOD GLUCOSE      POCT Blood Glucose.: 130 mg/dL (06 Apr 2018 08:07)  POCT Blood Glucose.: 169 mg/dL (05 Apr 2018 21:42)  POCT Blood Glucose.: 116 mg/dL (05 Apr 2018 16:48)  POCT Blood Glucose.: 188 mg/dL (05 Apr 2018 11:35)        RADIOLOGY & ADDITIONAL TESTS:    Imaging Personally Reviewed:  YES    Consultant(s) Notes Reviewed:   YES    Care Discussed with Consultants : YES    Plan of care was discussed with patient and /or primary care giver; all questions and concerns were addressed and care was aligned with patient's wishes.

## 2018-04-06 NOTE — PROGRESS NOTE ADULT - ASSESSMENT
67 M from home with PMH IDDM, Peripheral Neuropathy, R plantar foot ulcer x 1 year p/w R foot swelling and pain x 2 weeks - ?completed outpatient Keflex which resolved pain and swelling - admitted for acute osteomyelitis head of second metatarsal base of the second toe.

## 2018-04-06 NOTE — PROGRESS NOTE ADULT - RS GEN PE MLT RESP DETAILS PC
no wheezes/no rhonchi/normal/good air movement/no rales/breath sounds equal
no rales/breath sounds equal/no rhonchi/no wheezes/normal/good air movement

## 2018-04-06 NOTE — PROGRESS NOTE ADULT - GASTROINTESTINAL DETAILS
normal/no guarding/soft/nontender/no organomegaly
normal/no guarding/no organomegaly/nontender/no rigidity/soft

## 2018-04-06 NOTE — PROGRESS NOTE ADULT - PROBLEM SELECTOR PLAN 1
XR showing acute osteomyelitis head of second metatarsal base of the second toe  - Afebrile, no WBC elevation, ESR 62, CRP 0.70  - MRI confirms OM of 2nd and 3rd digit  - Negative LE US for DVT  - RIVER noted wnls  ID Dr. Fatima; C/w Vancomycin (trough noted, increased to 1250 BID) and Ertapenem w/ plan for 6 weeks total  - PICC line in place  Podiatry Dr Willams  ***No surgical intervention at this time as per patient's wishes

## 2018-04-09 LAB
CULTURE RESULTS: SIGNIFICANT CHANGE UP
CULTURE RESULTS: SIGNIFICANT CHANGE UP
SPECIMEN SOURCE: SIGNIFICANT CHANGE UP
SPECIMEN SOURCE: SIGNIFICANT CHANGE UP

## 2018-07-26 PROBLEM — Z86.73 HISTORY OF STROKE: Status: RESOLVED | Noted: 2017-11-27 | Resolved: 2018-07-26

## 2018-08-08 PROBLEM — C18.9 MALIGNANT NEOPLASM OF COLON, UNSPECIFIED: Chronic | Status: ACTIVE | Noted: 2018-04-03

## 2018-08-16 ENCOUNTER — OUTPATIENT (OUTPATIENT)
Dept: OUTPATIENT SERVICES | Facility: HOSPITAL | Age: 68
LOS: 1 days | Discharge: HOME | End: 2018-08-16

## 2018-08-16 DIAGNOSIS — Z98.890 OTHER SPECIFIED POSTPROCEDURAL STATES: Chronic | ICD-10-CM

## 2018-08-16 LAB
GLUCOSE BLDC GLUCOMTR-MCNC: 50 MG/DL — LOW (ref 70–99)
GLUCOSE BLDC GLUCOMTR-MCNC: 69 MG/DL — LOW (ref 70–99)
GLUCOSE BLDC GLUCOMTR-MCNC: 74 MG/DL — SIGNIFICANT CHANGE UP (ref 70–99)
GLUCOSE BLDC GLUCOMTR-MCNC: 80 MG/DL — SIGNIFICANT CHANGE UP (ref 70–99)

## 2021-11-08 ENCOUNTER — EMERGENCY (EMERGENCY)
Facility: HOSPITAL | Age: 71
LOS: 1 days | Discharge: HOME | End: 2021-11-08
Attending: EMERGENCY MEDICINE
Payer: COMMERCIAL

## 2021-11-08 VITALS
RESPIRATION RATE: 18 BRPM | SYSTOLIC BLOOD PRESSURE: 123 MMHG | DIASTOLIC BLOOD PRESSURE: 72 MMHG | TEMPERATURE: 98 F | HEIGHT: 73.75 IN | OXYGEN SATURATION: 96 % | WEIGHT: 268.96 LBS | HEART RATE: 94 BPM

## 2021-11-08 DIAGNOSIS — Z98.890 OTHER SPECIFIED POSTPROCEDURAL STATES: Chronic | ICD-10-CM

## 2021-11-08 PROCEDURE — 99283 EMERGENCY DEPT VISIT LOW MDM: CPT

## 2021-11-08 RX ORDER — CEPHALEXIN 500 MG
1 CAPSULE ORAL
Qty: 21 | Refills: 0
Start: 2021-11-08 | End: 2021-11-14

## 2021-11-08 RX ORDER — CEPHALEXIN 500 MG
500 CAPSULE ORAL
Refills: 0 | Status: DISCONTINUED | OUTPATIENT
Start: 2021-11-08 | End: 2021-11-11

## 2021-11-08 RX ADMIN — Medication 500 MILLIGRAM(S): at 12:30

## 2021-11-08 NOTE — ED PROVIDER NOTE - ATTENDING CONTRIBUTION TO CARE
71 y.o male presents with right leg wound/bleeding x3 days. Bleeding likely from varicose vein rupture. No signs of infection. Given advanced diabetic neuropathy and leg wound, will cover with Keflex prophylaxis and advised him to follow up with his wound specialist.

## 2021-11-08 NOTE — ED PROVIDER NOTE - PATIENT PORTAL LINK FT
You can access the FollowMyHealth Patient Portal offered by [facility] by registering at the following website: [email or web address]. By joining [employer]’s FollowMyHealth portal, you will also be able to view your health information using other applications (apps) compatible with our system.

## 2021-11-08 NOTE — ED PROVIDER NOTE - OBJECTIVE STATEMENT
71 y.o male with PMHx of diabetes (diabetic neuropathy) presents with bleeding from a wound on his right lower leg 3 days ago. Patient has a wound that was bleeding 3 days ago, but is not currently bleeding in the ED. Patient has varicose veins surrounding the wound. He used to take cream for the wounded area and needs a refill, but he isn't sure what the exact name of the cream is called. No pain, discharge, or any other complaints. 71 y.o male with PMHx of diabetes (diabetic neuropathy) presents with bleeding from a wound on his right lower leg 3 days ago. Patient has a wound that was bleeding 3 days ago, but is not currently bleeding in the ED. Patient has varicose veins surrounding the wound. He used to take Triamcinolone acetonide, which has worked, but needs a new prescription. No pain, discharge, or any other complaints.

## 2021-11-08 NOTE — ED ADULT NURSE NOTE - OBJECTIVE STATEMENT
pt  is a 70 y/o male with c/o rt lower leg bleeding , no active bleeding at this time. pt alert oriented x3  w/ small wound noted.

## 2021-11-08 NOTE — ED PROVIDER NOTE - PHYSICAL EXAMINATION
right lower leg wound 3mm in diameter, no discharge, no fluctuants, no erythema, no induration, dppt intact 2+, skin is not hot or red right lower leg wound 3mm in diameter, no discharge, no fluctuants, no erythema, no induration, dp/pt intact 2+, skin is not hot or red

## 2021-11-08 NOTE — ED PROVIDER NOTE - NSFOLLOWUPINSTRUCTIONS_ED_ALL_ED_FT
Follow up with your wound care doctor this week.  You did not need a Doppler test during today's visit.  If you experience any new or worsening symptoms or if you are concerned you can always come back to the emergency for a re-evaluation.  If there were any prescriptions given to you during the visit today take them as prescribed. If you have any questions you can ask the pharmacist.

## 2021-11-08 NOTE — ED PROVIDER NOTE - NSICDXPASTSURGICALHX_GEN_ALL_CORE_FT
PAST SURGICAL HISTORY:  History of colectomy     History of repair of hiatal hernia     S/P arthroscopic knee surgery     S/P inguinal hernia repair

## 2021-12-17 ENCOUNTER — APPOINTMENT (OUTPATIENT)
Dept: VASCULAR SURGERY | Facility: CLINIC | Age: 71
End: 2021-12-17

## 2022-01-03 ENCOUNTER — APPOINTMENT (OUTPATIENT)
Dept: VASCULAR SURGERY | Facility: CLINIC | Age: 72
End: 2022-01-03
Payer: COMMERCIAL

## 2022-01-03 VITALS
HEART RATE: 74 BPM | WEIGHT: 254 LBS | SYSTOLIC BLOOD PRESSURE: 158 MMHG | HEIGHT: 74 IN | BODY MASS INDEX: 32.6 KG/M2 | DIASTOLIC BLOOD PRESSURE: 88 MMHG

## 2022-01-03 DIAGNOSIS — I10 ESSENTIAL (PRIMARY) HYPERTENSION: ICD-10-CM

## 2022-01-03 DIAGNOSIS — Z82.49 FAMILY HISTORY OF ISCHEMIC HEART DISEASE AND OTHER DISEASES OF THE CIRCULATORY SYSTEM: ICD-10-CM

## 2022-01-03 DIAGNOSIS — Z78.9 OTHER SPECIFIED HEALTH STATUS: ICD-10-CM

## 2022-01-03 DIAGNOSIS — I83.891 VARICOSE VEINS OF RIGHT LOWER EXTREMITY WITH OTHER COMPLICATIONS: ICD-10-CM

## 2022-01-03 PROCEDURE — 99203 OFFICE O/P NEW LOW 30 MIN: CPT

## 2022-01-03 PROCEDURE — 93970 EXTREMITY STUDY: CPT

## 2022-01-03 RX ORDER — METOPROLOL SUCCINATE 50 MG/1
50 TABLET, EXTENDED RELEASE ORAL
Qty: 90 | Refills: 0 | Status: ACTIVE | COMMUNITY
Start: 2021-11-22

## 2022-01-03 RX ORDER — INSULIN GLARGINE 100 [IU]/ML
100 INJECTION, SOLUTION SUBCUTANEOUS
Qty: 45 | Refills: 0 | Status: ACTIVE | COMMUNITY
Start: 2021-08-23

## 2022-01-03 RX ORDER — BRIMONIDINE TARTRATE 1 MG/ML
0.1 SOLUTION/ DROPS OPHTHALMIC
Qty: 15 | Refills: 0 | Status: ACTIVE | COMMUNITY
Start: 2021-06-14

## 2022-01-03 RX ORDER — FLASH GLUCOSE SCANNING READER
EACH MISCELLANEOUS
Qty: 1 | Refills: 0 | Status: ACTIVE | COMMUNITY
Start: 2021-08-23

## 2022-01-03 RX ORDER — LOSARTAN POTASSIUM 100 MG/1
100 TABLET, FILM COATED ORAL
Qty: 90 | Refills: 0 | Status: ACTIVE | COMMUNITY
Start: 2021-11-22

## 2022-01-03 RX ORDER — FLASH GLUCOSE SENSOR
KIT MISCELLANEOUS
Qty: 6 | Refills: 0 | Status: ACTIVE | COMMUNITY
Start: 2021-08-23

## 2022-01-03 RX ORDER — SIMVASTATIN 40 MG/1
40 TABLET, FILM COATED ORAL
Qty: 90 | Refills: 0 | Status: ACTIVE | COMMUNITY
Start: 2021-08-23

## 2022-01-03 RX ORDER — TRIAMCINOLONE ACETONIDE 0.25 MG/G
0.03 CREAM TOPICAL
Qty: 454 | Refills: 0 | Status: ACTIVE | COMMUNITY
Start: 2021-11-12

## 2022-01-03 RX ORDER — INSULIN LISPRO 100 [IU]/ML
100 INJECTION, SOLUTION INTRAVENOUS; SUBCUTANEOUS
Qty: 60 | Refills: 0 | Status: ACTIVE | COMMUNITY
Start: 2021-04-23

## 2022-01-03 RX ORDER — DORZOLAMIDE HYDROCHLORIDE TIMOLOL MALEATE 20; 5 MG/ML; MG/ML
22.3-6.8 SOLUTION/ DROPS OPHTHALMIC
Qty: 30 | Refills: 0 | Status: ACTIVE | COMMUNITY
Start: 2021-06-16

## 2022-01-03 NOTE — ASSESSMENT
[FreeTextEntry1] : 72 yo male with history of dm, cataract, stroke presents for evaluation of right lower extremity bleeding varicose vein.\par \par venous duplex shows severe venous insufficiency of bilateral gsv \par \par at this time given recent bleed with severe venous insufficiency on duplex would recommend ablation or the right gsv \par pt also reports now cramping pain when climbing or descending stairs will schedule for latrice/pvr with exercise prior to ablation\par \par SYDNI REBOLLEDO is a 71 year M with persistent and worsening right Lower extremity venous insufficiency, which is unresponsive to at least 3 months of compression stockings 20-30 mm, leg elevation, exercise and over-the-counter pain medication. Patient has complaints of worsening leg discomfort with swelling, fatigue, heaviness, achiness, and painful varicosities. The patient's symptoms interfere with their activity of daily life such as walking, shopping, housework and their ability to walk and exercise. Patient has intact pulses in both legs without evidence of arterial insufficiency.\par \par Treatment is indicated now for non-cosmetic reasons for symptomatic venous reflux disease with symptoms which is refractory to conservative therapy. Venous duplex study demonstrates a lower extremity venous insufficiency. The need for definite elective treatment is based on severe interfering and worsening reflux symptoms with evidence of venous insufficiency on venous ultrasound.\par \par The risks and benefits of endovenous ablation of saphenous vein versus conservative treatment was discussed with the patient. Patient chooses to proceed with the procedure. Treatment plan to be scheduled.\par

## 2022-01-03 NOTE — PHYSICAL EXAM
[Varicose Veins Of Lower Extremities] : bilaterally [] : of the right leg [Ankle Swelling Bilaterally] : severe [No Rash or Lesion] : No rash or lesion [Alert] : alert [Calm] : calm [JVD] : no jugular venous distention  [Normal Breath Sounds] : Normal breath sounds [Normal Heart Sounds] : normal heart sounds [Ankle Swelling (On Exam)] : not present [Abdomen Masses] : No abdominal masses [Skin Ulcer] : no ulcer [de-identified] : appears well  [de-identified] : no calf tenderness, no palpable cords [de-identified] : severe brawny discoloration of the right medial shin with shinny appearing skin

## 2022-01-03 NOTE — HISTORY OF PRESENT ILLNESS
[FreeTextEntry1] : 70 yo male with history of dm, cataract, stroke presents for evaluation of right lower extremity bleeding varicose vein.  pt was initially seen many years ago for slow healing wound that eventually healed with local wound care at that time latrice/pvr showed no significant arterial disease and venous duplex showed evidence of severe venous insufficiency.  \par pt reports episode of bleeding varicose vein of the right lower extremity about 1-2 months ago.  pt denies any recurrent bleeding.  pt denies any pain or ulcers of the lower extremities \par pt states that he has been wearing compression stockings daily

## 2022-03-02 ENCOUNTER — LABORATORY RESULT (OUTPATIENT)
Age: 72
End: 2022-03-02

## 2022-03-02 ENCOUNTER — APPOINTMENT (OUTPATIENT)
Dept: ENDOVASCULAR SURGERY | Facility: CLINIC | Age: 72
End: 2022-03-02
Payer: COMMERCIAL

## 2022-03-02 ENCOUNTER — NON-APPOINTMENT (OUTPATIENT)
Age: 72
End: 2022-03-02

## 2022-03-02 VITALS
OXYGEN SATURATION: 98 % | RESPIRATION RATE: 20 BRPM | BODY MASS INDEX: 34.52 KG/M2 | DIASTOLIC BLOOD PRESSURE: 84 MMHG | TEMPERATURE: 98 F | HEIGHT: 74 IN | HEART RATE: 107 BPM | SYSTOLIC BLOOD PRESSURE: 111 MMHG | WEIGHT: 269 LBS

## 2022-03-02 PROCEDURE — 36478 ENDOVENOUS LASER 1ST VEIN: CPT | Mod: RT

## 2022-03-02 PROCEDURE — 37765 STAB PHLEB VEINS XTR 10-20: CPT | Mod: RT

## 2022-03-02 NOTE — HISTORY OF PRESENT ILLNESS
[FreeTextEntry1] : alert and orientated x 3\par accompanied by wife britney Hinson [phone]\par covid test: not detected 2-\par fully covid vaccinated \par medications taken today  simvastatin, metropolol losartan\par BS @ 11:00 = 198 [FreeTextEntry5] : yesterday 10:15 [FreeTextEntry6] : Dr. Patel

## 2022-03-02 NOTE — PAST MEDICAL HISTORY
[Increasing age ( >40 years old)] : Increasing age ( >40 years old) [No therapy indicated for cases scheduled for less than one hour] : No therapy indicated for cases scheduled for less than one hour. [FreeTextEntry1] : Malignant Hyperthermia Screening Tool and Risk of Bleeding Assessment\par \par Mr. SYDNI REBOLLEDO denies family history of unexpected death following Anesthesia or Exercise.\par Denies Family history of Malignant Hyperthermia, Muscle or Neuromuscular disorder and High Temperature following exercise.\par \par Mr. SYDNI REBOLLEDO denies history of Muscle Spasm, Dark or Chocolate - Colored urine and Unanticipated fever immediately following anesthesia or serious exercise. \par Mr. REBOLLEDO also denies bleeding tendencies/ Risks of Bleeding.

## 2022-03-02 NOTE — PROCEDURE
[D/C IV on discharge] : D/C IV on discharge [Resume diet] : resume diet [Dressing checked for bleeding] : Dressing checked for bleeding [Vital signs on admission the q 15 mins x2] : Vital signs on admission the q 15 mins x2 [FreeTextEntry1] : Right lower extremity endovenous laser ablation and stab phlebectomy

## 2022-03-02 NOTE — ASSESSMENT
[FreeTextEntry1] : 72 yo male with history of dm, cataract, stroke, presents for right lower extremity bleeding varicose vein. Venous duplex shows severe venous insufficiency of bilateral gsv. Pt reports episode of bleeding varicose vein in right lower extremity about 1-2 months ago. Plan for right lower extremity endovenous laser ablation and stab phlebectomy.

## 2022-03-07 ENCOUNTER — APPOINTMENT (OUTPATIENT)
Dept: VASCULAR SURGERY | Facility: CLINIC | Age: 72
End: 2022-03-07
Payer: COMMERCIAL

## 2022-03-07 PROCEDURE — 93971 EXTREMITY STUDY: CPT

## 2022-03-07 PROCEDURE — 99024 POSTOP FOLLOW-UP VISIT: CPT

## 2022-03-07 NOTE — REASON FOR VISIT
[de-identified] : Patient with severe venous insufficiency status post ablation of right saphenous vein.  Extensive phlebectomy was done.  Duplex shows thrombosis of the saphenous vein flushed with saphenofemoral junction.  No significant extension into the common femoral vein.  Recommend close follow-up with weekly duplex.  We will hold off anticoagulation at this time.

## 2022-03-14 ENCOUNTER — APPOINTMENT (OUTPATIENT)
Dept: VASCULAR SURGERY | Facility: CLINIC | Age: 72
End: 2022-03-14
Payer: COMMERCIAL

## 2022-03-14 PROCEDURE — 99024 POSTOP FOLLOW-UP VISIT: CPT

## 2022-03-14 PROCEDURE — 93971 EXTREMITY STUDY: CPT

## 2022-03-14 NOTE — PHYSICAL EXAM
[Varicose Veins Of Lower Extremities] : present [Varicose Veins Of The Left Leg] : of the left leg [Ankle Swelling On The Left] : moderate [Alert] : alert [Calm] : calm [JVD] : no jugular venous distention  [Ankle Swelling (On Exam)] : not present [] : not present [de-identified] : appears well  [de-identified] : incisions healing well, fading ecchymosis surrounding the left thigh and calf, no active discharge noted

## 2022-03-14 NOTE — ASSESSMENT
[FreeTextEntry1] : 70 yo male with history of dm, cataract, stroke presents for follow up s/p right gsv ablation with slight extension of thrombus to the femoral vein.\par \par duplex shows now retracted thrombus \par \par no intervention at this time pt to follow up in 1 month \par pt denies any discomfort of the left lower extremity \par

## 2022-03-14 NOTE — HISTORY OF PRESENT ILLNESS
[FreeTextEntry1] : 72 yo male with history of dm, cataract, stroke presents for follow up s/p right gsv ablation with slight extension of thrombus to the femoral vein.  pt without any complaints other then tenderness along the right inner thigh.  pt denies any history of chest pain or changes in his chronic respiratory status \par pt states that the cramping pain that he used to experience in the right lower extremity has resolved since the procedure.

## 2022-04-18 ENCOUNTER — APPOINTMENT (OUTPATIENT)
Dept: VASCULAR SURGERY | Facility: CLINIC | Age: 72
End: 2022-04-18
Payer: COMMERCIAL

## 2022-04-18 VITALS
SYSTOLIC BLOOD PRESSURE: 159 MMHG | WEIGHT: 269 LBS | HEART RATE: 110 BPM | BODY MASS INDEX: 34.52 KG/M2 | DIASTOLIC BLOOD PRESSURE: 87 MMHG | HEIGHT: 74 IN

## 2022-04-18 PROCEDURE — 99212 OFFICE O/P EST SF 10 MIN: CPT

## 2022-04-18 NOTE — HISTORY OF PRESENT ILLNESS
[FreeTextEntry1] : 70 yo male with a history of lower extremity varicose veins and venous insufficiency presents for follow up status post ablation and stab phlebectomy\par pt reports significant improvement in the pain that he used to experience and some lightening of the skin around the medial malleolus.  pt states that there is some indurated areas under the incision sites otherwise the incision sites have healed.

## 2022-04-18 NOTE — PHYSICAL EXAM
[JVD] : no jugular venous distention  [Ankle Swelling (On Exam)] : not present [Varicose Veins Of Lower Extremities] : present [Varicose Veins Of The Left Leg] : of the left leg [Ankle Swelling On The Left] : moderate [] : of the right leg [Ankle Swelling Bilaterally] : severe [Alert] : alert [Calm] : calm [de-identified] : appears well  [de-identified] : no calf tenderness [de-identified] : incisions healed small areas of induration of the skin over the medial thigh and proximal calf no tenderness with palpation

## 2022-04-18 NOTE — ASSESSMENT
[FreeTextEntry1] : 72 yo male with a history of lower extremity varicose veins and venous insufficiency presents for follow up status post ablation and stab phlebectomy\par \par pt doing well with significant improvement in his pain s/p ablation and stab phlebectomy \par at this time pt to follow up as needed

## 2022-06-04 ENCOUNTER — EMERGENCY (EMERGENCY)
Facility: HOSPITAL | Age: 72
LOS: 1 days | Discharge: HOME | End: 2022-06-04
Attending: EMERGENCY MEDICINE
Payer: COMMERCIAL

## 2022-06-04 VITALS
WEIGHT: 269.18 LBS | HEART RATE: 81 BPM | DIASTOLIC BLOOD PRESSURE: 90 MMHG | RESPIRATION RATE: 20 BRPM | OXYGEN SATURATION: 99 % | HEIGHT: 73.75 IN | SYSTOLIC BLOOD PRESSURE: 175 MMHG | TEMPERATURE: 98 F

## 2022-06-04 DIAGNOSIS — Z98.890 OTHER SPECIFIED POSTPROCEDURAL STATES: Chronic | ICD-10-CM

## 2022-06-04 LAB
ALBUMIN SERPL ELPH-MCNC: 3.2 G/DL — LOW (ref 3.5–5)
ALP SERPL-CCNC: 78 U/L — SIGNIFICANT CHANGE UP (ref 40–120)
ALT FLD-CCNC: 29 U/L DA — SIGNIFICANT CHANGE UP (ref 10–60)
ANION GAP SERPL CALC-SCNC: 4 MMOL/L — LOW (ref 5–17)
AST SERPL-CCNC: 19 U/L — SIGNIFICANT CHANGE UP (ref 10–40)
BASOPHILS # BLD AUTO: 0.03 K/UL — SIGNIFICANT CHANGE UP (ref 0–0.2)
BASOPHILS NFR BLD AUTO: 0.6 % — SIGNIFICANT CHANGE UP (ref 0–2)
BILIRUB SERPL-MCNC: 0.3 MG/DL — SIGNIFICANT CHANGE UP (ref 0.2–1.2)
BUN SERPL-MCNC: 12 MG/DL — SIGNIFICANT CHANGE UP (ref 7–18)
CALCIUM SERPL-MCNC: 8.9 MG/DL — SIGNIFICANT CHANGE UP (ref 8.4–10.5)
CHLORIDE SERPL-SCNC: 109 MMOL/L — HIGH (ref 96–108)
CO2 SERPL-SCNC: 26 MMOL/L — SIGNIFICANT CHANGE UP (ref 22–31)
CREAT SERPL-MCNC: 1.05 MG/DL — SIGNIFICANT CHANGE UP (ref 0.5–1.3)
EGFR: 76 ML/MIN/1.73M2 — SIGNIFICANT CHANGE UP
EOSINOPHIL # BLD AUTO: 0.1 K/UL — SIGNIFICANT CHANGE UP (ref 0–0.5)
EOSINOPHIL NFR BLD AUTO: 2 % — SIGNIFICANT CHANGE UP (ref 0–6)
GLUCOSE SERPL-MCNC: 130 MG/DL — HIGH (ref 70–99)
HCT VFR BLD CALC: 39.8 % — SIGNIFICANT CHANGE UP (ref 39–50)
HGB BLD-MCNC: 13.2 G/DL — SIGNIFICANT CHANGE UP (ref 13–17)
IMM GRANULOCYTES NFR BLD AUTO: 0.2 % — SIGNIFICANT CHANGE UP (ref 0–1.5)
LACTATE SERPL-SCNC: 1.2 MMOL/L — SIGNIFICANT CHANGE UP (ref 0.7–2)
LIDOCAIN IGE QN: 162 U/L — SIGNIFICANT CHANGE UP (ref 73–393)
LYMPHOCYTES # BLD AUTO: 1.87 K/UL — SIGNIFICANT CHANGE UP (ref 1–3.3)
LYMPHOCYTES # BLD AUTO: 37.2 % — SIGNIFICANT CHANGE UP (ref 13–44)
MCHC RBC-ENTMCNC: 26.6 PG — LOW (ref 27–34)
MCHC RBC-ENTMCNC: 33.2 GM/DL — SIGNIFICANT CHANGE UP (ref 32–36)
MCV RBC AUTO: 80.1 FL — SIGNIFICANT CHANGE UP (ref 80–100)
MONOCYTES # BLD AUTO: 0.52 K/UL — SIGNIFICANT CHANGE UP (ref 0–0.9)
MONOCYTES NFR BLD AUTO: 10.3 % — SIGNIFICANT CHANGE UP (ref 2–14)
NEUTROPHILS # BLD AUTO: 2.5 K/UL — SIGNIFICANT CHANGE UP (ref 1.8–7.4)
NEUTROPHILS NFR BLD AUTO: 49.7 % — SIGNIFICANT CHANGE UP (ref 43–77)
NRBC # BLD: 0 /100 WBCS — SIGNIFICANT CHANGE UP (ref 0–0)
PLATELET # BLD AUTO: 210 K/UL — SIGNIFICANT CHANGE UP (ref 150–400)
POTASSIUM SERPL-MCNC: 4 MMOL/L — SIGNIFICANT CHANGE UP (ref 3.5–5.3)
POTASSIUM SERPL-SCNC: 4 MMOL/L — SIGNIFICANT CHANGE UP (ref 3.5–5.3)
PROT SERPL-MCNC: 7.2 G/DL — SIGNIFICANT CHANGE UP (ref 6–8.3)
RBC # BLD: 4.97 M/UL — SIGNIFICANT CHANGE UP (ref 4.2–5.8)
RBC # FLD: 13.2 % — SIGNIFICANT CHANGE UP (ref 10.3–14.5)
SARS-COV-2 RNA SPEC QL NAA+PROBE: SIGNIFICANT CHANGE UP
SODIUM SERPL-SCNC: 139 MMOL/L — SIGNIFICANT CHANGE UP (ref 135–145)
TROPONIN I, HIGH SENSITIVITY RESULT: 77.2 NG/L — SIGNIFICANT CHANGE UP
WBC # BLD: 5.03 K/UL — SIGNIFICANT CHANGE UP (ref 3.8–10.5)
WBC # FLD AUTO: 5.03 K/UL — SIGNIFICANT CHANGE UP (ref 3.8–10.5)

## 2022-06-04 PROCEDURE — 86901 BLOOD TYPING SEROLOGIC RH(D): CPT

## 2022-06-04 PROCEDURE — 84484 ASSAY OF TROPONIN QUANT: CPT

## 2022-06-04 PROCEDURE — 99285 EMERGENCY DEPT VISIT HI MDM: CPT | Mod: 25

## 2022-06-04 PROCEDURE — 82962 GLUCOSE BLOOD TEST: CPT

## 2022-06-04 PROCEDURE — 87635 SARS-COV-2 COVID-19 AMP PRB: CPT

## 2022-06-04 PROCEDURE — 83690 ASSAY OF LIPASE: CPT

## 2022-06-04 PROCEDURE — 86850 RBC ANTIBODY SCREEN: CPT

## 2022-06-04 PROCEDURE — 96361 HYDRATE IV INFUSION ADD-ON: CPT

## 2022-06-04 PROCEDURE — 93010 ELECTROCARDIOGRAM REPORT: CPT

## 2022-06-04 PROCEDURE — 96374 THER/PROPH/DIAG INJ IV PUSH: CPT

## 2022-06-04 PROCEDURE — 93005 ELECTROCARDIOGRAM TRACING: CPT

## 2022-06-04 PROCEDURE — 83605 ASSAY OF LACTIC ACID: CPT

## 2022-06-04 PROCEDURE — 70450 CT HEAD/BRAIN W/O DYE: CPT | Mod: MA

## 2022-06-04 PROCEDURE — 85025 COMPLETE CBC W/AUTO DIFF WBC: CPT

## 2022-06-04 PROCEDURE — 36415 COLL VENOUS BLD VENIPUNCTURE: CPT

## 2022-06-04 PROCEDURE — 86900 BLOOD TYPING SEROLOGIC ABO: CPT

## 2022-06-04 PROCEDURE — 99284 EMERGENCY DEPT VISIT MOD MDM: CPT

## 2022-06-04 PROCEDURE — 80053 COMPREHEN METABOLIC PANEL: CPT

## 2022-06-04 RX ORDER — ONDANSETRON 8 MG/1
4 TABLET, FILM COATED ORAL ONCE
Refills: 0 | Status: COMPLETED | OUTPATIENT
Start: 2022-06-04 | End: 2022-06-04

## 2022-06-04 RX ORDER — SODIUM CHLORIDE 9 MG/ML
1000 INJECTION INTRAMUSCULAR; INTRAVENOUS; SUBCUTANEOUS ONCE
Refills: 0 | Status: COMPLETED | OUTPATIENT
Start: 2022-06-04 | End: 2022-06-04

## 2022-06-04 RX ADMIN — SODIUM CHLORIDE 1000 MILLILITER(S): 9 INJECTION INTRAMUSCULAR; INTRAVENOUS; SUBCUTANEOUS at 23:07

## 2022-06-04 RX ADMIN — ONDANSETRON 4 MILLIGRAM(S): 8 TABLET, FILM COATED ORAL at 23:07

## 2022-06-04 NOTE — ED PROVIDER NOTE - OBJECTIVE STATEMENT
70 yo M pmh of DM, HTN, remote hx of peptic ulcer and colon cancer s/p resection presents with episode of severe dizziness upon waking a few hours ago. Forced himself to vomit which was dark in color and pt is concerned about GI bleeding. Currently asymptomatic. Denies other acute complaints.

## 2022-06-04 NOTE — ED PROVIDER NOTE - PATIENT PORTAL LINK FT
You can access the FollowMyHealth Patient Portal offered by [facility] by registering at the following website: [email or web address]. By joining [employer]’s FollowMyHealth portal, you will also be able to view your health information using other applications (apps) compatible with our system.

## 2022-06-04 NOTE — ED PROVIDER NOTE - PHYSICAL EXAMINATION
GENERAL: well appearing, no acute distress   HEAD: atraumatic   EYES: EOMI   ENT: moist oral mucosa   CARDIAC: regular rate  RESPIRATORY: no increased work of breathing   ABDO: soft NTND; mild epigastric scar well healed   MUSCULOSKELETAL: no deformity   NEUROLOGICAL: AAOx3, CN's II-XII intact, strength 5/5 bilateral UE and LE, sensation intact to light touch, finger to nose intact, steady gait  SKIN: no visible rash  PSYCHIATRIC: cooperative

## 2022-06-04 NOTE — ED ADULT NURSE NOTE - NSIMPLEMENTINTERV_GEN_ALL_ED
Implemented All Universal Safety Interventions:  [place] to call system. Call bell, personal items and telephone within reach. Instruct patient to call for assistance. Room bathroom lighting operational. Non-slip footwear when patient is off stretcher. Physically safe environment: no spills, clutter or unnecessary equipment. Stretcher in lowest position, wheels locked, appropriate side rails in place.

## 2022-06-04 NOTE — ED PROVIDER NOTE - NS ED ROS FT
CONSTITUTIONAL: no fever  EYES: no eye pain   ENMT: no throat pain  CARDIOVASCULAR: no chest pain   RESPIRATORY: no shortness of breath   GASTROINTESTINAL: no abdominal pain   GENITOURINARY: no dysuria   SKIN: no rashes  NEUROLOGICAL: no headache, +dizzy

## 2022-06-05 PROCEDURE — 70450 CT HEAD/BRAIN W/O DYE: CPT | Mod: 26,MA

## 2023-04-29 ENCOUNTER — INPATIENT (INPATIENT)
Facility: HOSPITAL | Age: 73
LOS: 11 days | Discharge: HOME | DRG: 853 | End: 2023-05-11
Attending: INTERNAL MEDICINE | Admitting: INTERNAL MEDICINE
Payer: MEDICARE

## 2023-04-29 VITALS
DIASTOLIC BLOOD PRESSURE: 81 MMHG | HEART RATE: 90 BPM | WEIGHT: 164.02 LBS | RESPIRATION RATE: 18 BRPM | TEMPERATURE: 97 F | SYSTOLIC BLOOD PRESSURE: 136 MMHG | OXYGEN SATURATION: 98 % | HEIGHT: 74 IN

## 2023-04-29 DIAGNOSIS — I10 ESSENTIAL (PRIMARY) HYPERTENSION: ICD-10-CM
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
## 2023-04-29 DIAGNOSIS — G93.89 OTHER SPECIFIED DISORDERS OF BRAIN: ICD-10-CM

## 2023-04-29 DIAGNOSIS — E11.65 TYPE 2 DIABETES MELLITUS WITH HYPERGLYCEMIA: ICD-10-CM

## 2023-04-29 DIAGNOSIS — Z01.818 ENCOUNTER FOR OTHER PREPROCEDURAL EXAMINATION: ICD-10-CM

## 2023-04-29 DIAGNOSIS — Z98.890 OTHER SPECIFIED POSTPROCEDURAL STATES: Chronic | ICD-10-CM

## 2023-04-29 DIAGNOSIS — Z29.9 ENCOUNTER FOR PROPHYLACTIC MEASURES, UNSPECIFIED: ICD-10-CM

## 2023-04-29 DIAGNOSIS — A48.0 GAS GANGRENE: ICD-10-CM

## 2023-04-29 DIAGNOSIS — R06.02 SHORTNESS OF BREATH: ICD-10-CM

## 2023-04-29 DIAGNOSIS — E78.5 HYPERLIPIDEMIA, UNSPECIFIED: ICD-10-CM

## 2023-04-29 LAB
ALBUMIN SERPL ELPH-MCNC: 2.6 G/DL — LOW (ref 3.5–5)
ALP SERPL-CCNC: 101 U/L — SIGNIFICANT CHANGE UP (ref 40–120)
ALT FLD-CCNC: 46 U/L DA — SIGNIFICANT CHANGE UP (ref 10–60)
ANION GAP SERPL CALC-SCNC: 7 MMOL/L — SIGNIFICANT CHANGE UP (ref 5–17)
APPEARANCE UR: CLEAR — SIGNIFICANT CHANGE UP
APTT BLD: 25.7 SEC — LOW (ref 27.5–35.5)
AST SERPL-CCNC: 38 U/L — SIGNIFICANT CHANGE UP (ref 10–40)
BACTERIA # UR AUTO: ABNORMAL /HPF
BASOPHILS # BLD AUTO: 0.04 K/UL — SIGNIFICANT CHANGE UP (ref 0–0.2)
BASOPHILS NFR BLD AUTO: 0.2 % — SIGNIFICANT CHANGE UP (ref 0–2)
BILIRUB SERPL-MCNC: 0.7 MG/DL — SIGNIFICANT CHANGE UP (ref 0.2–1.2)
BILIRUB UR-MCNC: NEGATIVE — SIGNIFICANT CHANGE UP
BUN SERPL-MCNC: 18 MG/DL — SIGNIFICANT CHANGE UP (ref 7–18)
CALCIUM SERPL-MCNC: 9.2 MG/DL — SIGNIFICANT CHANGE UP (ref 8.4–10.5)
CHLORIDE SERPL-SCNC: 97 MMOL/L — SIGNIFICANT CHANGE UP (ref 96–108)
CK SERPL-CCNC: 473 U/L — HIGH (ref 35–232)
CO2 SERPL-SCNC: 26 MMOL/L — SIGNIFICANT CHANGE UP (ref 22–31)
COLOR SPEC: YELLOW — SIGNIFICANT CHANGE UP
COMMENT - URINE: SIGNIFICANT CHANGE UP
CREAT SERPL-MCNC: 1.39 MG/DL — HIGH (ref 0.5–1.3)
DIFF PNL FLD: ABNORMAL
EGFR: 54 ML/MIN/1.73M2 — LOW
EOSINOPHIL # BLD AUTO: 0 K/UL — SIGNIFICANT CHANGE UP (ref 0–0.5)
EOSINOPHIL NFR BLD AUTO: 0 % — SIGNIFICANT CHANGE UP (ref 0–6)
ERYTHROCYTE [SEDIMENTATION RATE] IN BLOOD: 84 MM/HR — HIGH (ref 0–20)
GLUCOSE BLDC GLUCOMTR-MCNC: 296 MG/DL — HIGH (ref 70–99)
GLUCOSE SERPL-MCNC: 389 MG/DL — HIGH (ref 70–99)
GLUCOSE UR QL: 1000 MG/DL
HCT VFR BLD CALC: 40.9 % — SIGNIFICANT CHANGE UP (ref 39–50)
HGB BLD-MCNC: 13.3 G/DL — SIGNIFICANT CHANGE UP (ref 13–17)
HIV 1 & 2 AB SERPL IA.RAPID: SIGNIFICANT CHANGE UP
HYALINE CASTS # UR AUTO: ABNORMAL /LPF
IMM GRANULOCYTES NFR BLD AUTO: 0.6 % — SIGNIFICANT CHANGE UP (ref 0–0.9)
INR BLD: 1.27 RATIO — HIGH (ref 0.88–1.16)
KETONES UR-MCNC: ABNORMAL
LACTATE SERPL-SCNC: 1.7 MMOL/L — SIGNIFICANT CHANGE UP (ref 0.7–2)
LACTATE SERPL-SCNC: 2.2 MMOL/L — HIGH (ref 0.7–2)
LEUKOCYTE ESTERASE UR-ACNC: NEGATIVE — SIGNIFICANT CHANGE UP
LYMPHOCYTES # BLD AUTO: 0.85 K/UL — LOW (ref 1–3.3)
LYMPHOCYTES # BLD AUTO: 5 % — LOW (ref 13–44)
MAGNESIUM SERPL-MCNC: 2.1 MG/DL — SIGNIFICANT CHANGE UP (ref 1.6–2.6)
MCHC RBC-ENTMCNC: 26.1 PG — LOW (ref 27–34)
MCHC RBC-ENTMCNC: 32.5 GM/DL — SIGNIFICANT CHANGE UP (ref 32–36)
MCV RBC AUTO: 80.2 FL — SIGNIFICANT CHANGE UP (ref 80–100)
MONOCYTES # BLD AUTO: 1.5 K/UL — HIGH (ref 0–0.9)
MONOCYTES NFR BLD AUTO: 8.8 % — SIGNIFICANT CHANGE UP (ref 2–14)
NEUTROPHILS # BLD AUTO: 14.48 K/UL — HIGH (ref 1.8–7.4)
NEUTROPHILS NFR BLD AUTO: 85.4 % — HIGH (ref 43–77)
NITRITE UR-MCNC: NEGATIVE — SIGNIFICANT CHANGE UP
NRBC # BLD: 0 /100 WBCS — SIGNIFICANT CHANGE UP (ref 0–0)
NT-PROBNP SERPL-SCNC: 66 PG/ML — SIGNIFICANT CHANGE UP (ref 0–125)
PH UR: 6 — SIGNIFICANT CHANGE UP (ref 5–8)
PLATELET # BLD AUTO: 271 K/UL — SIGNIFICANT CHANGE UP (ref 150–400)
POTASSIUM SERPL-MCNC: 4.2 MMOL/L — SIGNIFICANT CHANGE UP (ref 3.5–5.3)
POTASSIUM SERPL-SCNC: 4.2 MMOL/L — SIGNIFICANT CHANGE UP (ref 3.5–5.3)
PROT SERPL-MCNC: 8.4 G/DL — HIGH (ref 6–8.3)
PROT UR-MCNC: 30 MG/DL
PROTHROM AB SERPL-ACNC: 15.2 SEC — HIGH (ref 10.5–13.4)
RBC # BLD: 5.1 M/UL — SIGNIFICANT CHANGE UP (ref 4.2–5.8)
RBC # FLD: 13.6 % — SIGNIFICANT CHANGE UP (ref 10.3–14.5)
RBC CASTS # UR COMP ASSIST: ABNORMAL /HPF (ref 0–2)
SARS-COV-2 RNA SPEC QL NAA+PROBE: SIGNIFICANT CHANGE UP
SODIUM SERPL-SCNC: 130 MMOL/L — LOW (ref 135–145)
SP GR SPEC: 1.02 — SIGNIFICANT CHANGE UP (ref 1.01–1.02)
UROBILINOGEN FLD QL: NEGATIVE — SIGNIFICANT CHANGE UP
WBC # BLD: 16.95 K/UL — HIGH (ref 3.8–10.5)
WBC # FLD AUTO: 16.95 K/UL — HIGH (ref 3.8–10.5)
WBC UR QL: SIGNIFICANT CHANGE UP /HPF (ref 0–5)

## 2023-04-29 PROCEDURE — 71045 X-RAY EXAM CHEST 1 VIEW: CPT | Mod: 26

## 2023-04-29 PROCEDURE — 73630 X-RAY EXAM OF FOOT: CPT | Mod: 26,LT

## 2023-04-29 PROCEDURE — 99291 CRITICAL CARE FIRST HOUR: CPT

## 2023-04-29 PROCEDURE — 73610 X-RAY EXAM OF ANKLE: CPT | Mod: 26,LT

## 2023-04-29 PROCEDURE — 99223 1ST HOSP IP/OBS HIGH 75: CPT | Mod: GC

## 2023-04-29 PROCEDURE — 99223 1ST HOSP IP/OBS HIGH 75: CPT

## 2023-04-29 PROCEDURE — 70450 CT HEAD/BRAIN W/O DYE: CPT | Mod: 26,MA

## 2023-04-29 RX ORDER — PIPERACILLIN AND TAZOBACTAM 4; .5 G/20ML; G/20ML
3.38 INJECTION, POWDER, LYOPHILIZED, FOR SOLUTION INTRAVENOUS EVERY 8 HOURS
Refills: 0 | Status: DISCONTINUED | OUTPATIENT
Start: 2023-04-29 | End: 2023-05-01

## 2023-04-29 RX ORDER — DEXTROSE 50 % IN WATER 50 %
12.5 SYRINGE (ML) INTRAVENOUS ONCE
Refills: 0 | Status: DISCONTINUED | OUTPATIENT
Start: 2023-04-29 | End: 2023-04-30

## 2023-04-29 RX ORDER — GLUCAGON INJECTION, SOLUTION 0.5 MG/.1ML
1 INJECTION, SOLUTION SUBCUTANEOUS ONCE
Refills: 0 | Status: DISCONTINUED | OUTPATIENT
Start: 2023-04-29 | End: 2023-04-30

## 2023-04-29 RX ORDER — INSULIN GLARGINE 100 [IU]/ML
26 INJECTION, SOLUTION SUBCUTANEOUS AT BEDTIME
Refills: 0 | Status: DISCONTINUED | OUTPATIENT
Start: 2023-04-30 | End: 2023-04-30

## 2023-04-29 RX ORDER — PIPERACILLIN AND TAZOBACTAM 4; .5 G/20ML; G/20ML
3.38 INJECTION, POWDER, LYOPHILIZED, FOR SOLUTION INTRAVENOUS ONCE
Refills: 0 | Status: COMPLETED | OUTPATIENT
Start: 2023-04-29 | End: 2023-04-29

## 2023-04-29 RX ORDER — DEXTROSE 50 % IN WATER 50 %
25 SYRINGE (ML) INTRAVENOUS ONCE
Refills: 0 | Status: DISCONTINUED | OUTPATIENT
Start: 2023-04-29 | End: 2023-04-30

## 2023-04-29 RX ORDER — SODIUM CHLORIDE 9 MG/ML
1000 INJECTION, SOLUTION INTRAVENOUS
Refills: 0 | Status: DISCONTINUED | OUTPATIENT
Start: 2023-04-29 | End: 2023-04-30

## 2023-04-29 RX ORDER — ACETAMINOPHEN 500 MG
650 TABLET ORAL EVERY 6 HOURS
Refills: 0 | Status: DISCONTINUED | OUTPATIENT
Start: 2023-04-29 | End: 2023-05-11

## 2023-04-29 RX ORDER — SODIUM CHLORIDE 9 MG/ML
2300 INJECTION INTRAMUSCULAR; INTRAVENOUS; SUBCUTANEOUS ONCE
Refills: 0 | Status: COMPLETED | OUTPATIENT
Start: 2023-04-29 | End: 2023-04-29

## 2023-04-29 RX ORDER — ONDANSETRON 8 MG/1
4 TABLET, FILM COATED ORAL EVERY 8 HOURS
Refills: 0 | Status: DISCONTINUED | OUTPATIENT
Start: 2023-04-29 | End: 2023-05-11

## 2023-04-29 RX ORDER — DEXTROSE 50 % IN WATER 50 %
15 SYRINGE (ML) INTRAVENOUS ONCE
Refills: 0 | Status: DISCONTINUED | OUTPATIENT
Start: 2023-04-29 | End: 2023-04-30

## 2023-04-29 RX ORDER — SODIUM CHLORIDE 9 MG/ML
1000 INJECTION, SOLUTION INTRAVENOUS
Refills: 0 | Status: DISCONTINUED | OUTPATIENT
Start: 2023-04-29 | End: 2023-05-11

## 2023-04-29 RX ORDER — INSULIN LISPRO 100/ML
VIAL (ML) SUBCUTANEOUS EVERY 6 HOURS
Refills: 0 | Status: DISCONTINUED | OUTPATIENT
Start: 2023-04-29 | End: 2023-04-30

## 2023-04-29 RX ORDER — HEPARIN SODIUM 5000 [USP'U]/ML
5000 INJECTION INTRAVENOUS; SUBCUTANEOUS ONCE
Refills: 0 | Status: COMPLETED | OUTPATIENT
Start: 2023-04-29 | End: 2023-04-29

## 2023-04-29 RX ORDER — INSULIN LISPRO 100/ML
VIAL (ML) SUBCUTANEOUS
Refills: 0 | Status: DISCONTINUED | OUTPATIENT
Start: 2023-04-29 | End: 2023-04-29

## 2023-04-29 RX ORDER — ACETAMINOPHEN 500 MG
650 TABLET ORAL ONCE
Refills: 0 | Status: COMPLETED | OUTPATIENT
Start: 2023-04-29 | End: 2023-04-29

## 2023-04-29 RX ORDER — TETANUS TOXOID, REDUCED DIPHTHERIA TOXOID AND ACELLULAR PERTUSSIS VACCINE, ADSORBED 5; 2.5; 8; 8; 2.5 [IU]/.5ML; [IU]/.5ML; UG/.5ML; UG/.5ML; UG/.5ML
0.5 SUSPENSION INTRAMUSCULAR ONCE
Refills: 0 | Status: COMPLETED | OUTPATIENT
Start: 2023-04-29 | End: 2023-04-29

## 2023-04-29 RX ORDER — VANCOMYCIN HCL 1 G
1000 VIAL (EA) INTRAVENOUS EVERY 12 HOURS
Refills: 0 | Status: DISCONTINUED | OUTPATIENT
Start: 2023-04-29 | End: 2023-05-01

## 2023-04-29 RX ORDER — LIDOCAINE HCL 20 MG/ML
10 VIAL (ML) INJECTION ONCE
Refills: 0 | Status: COMPLETED | OUTPATIENT
Start: 2023-04-29 | End: 2023-04-29

## 2023-04-29 RX ORDER — INSULIN LISPRO 100/ML
VIAL (ML) SUBCUTANEOUS AT BEDTIME
Refills: 0 | Status: DISCONTINUED | OUTPATIENT
Start: 2023-04-29 | End: 2023-04-29

## 2023-04-29 RX ORDER — VANCOMYCIN HCL 1 G
1000 VIAL (EA) INTRAVENOUS ONCE
Refills: 0 | Status: COMPLETED | OUTPATIENT
Start: 2023-04-29 | End: 2023-04-29

## 2023-04-29 RX ORDER — INSULIN GLARGINE 100 [IU]/ML
26 INJECTION, SOLUTION SUBCUTANEOUS ONCE
Refills: 0 | Status: COMPLETED | OUTPATIENT
Start: 2023-04-29 | End: 2023-04-29

## 2023-04-29 RX ADMIN — PIPERACILLIN AND TAZOBACTAM 200 GRAM(S): 4; .5 INJECTION, POWDER, LYOPHILIZED, FOR SOLUTION INTRAVENOUS at 15:38

## 2023-04-29 RX ADMIN — SODIUM CHLORIDE 2300 MILLILITER(S): 9 INJECTION INTRAMUSCULAR; INTRAVENOUS; SUBCUTANEOUS at 15:21

## 2023-04-29 RX ADMIN — INSULIN GLARGINE 26 UNIT(S): 100 INJECTION, SOLUTION SUBCUTANEOUS at 21:08

## 2023-04-29 RX ADMIN — Medication 650 MILLIGRAM(S): at 23:23

## 2023-04-29 RX ADMIN — SODIUM CHLORIDE 100 MILLILITER(S): 9 INJECTION, SOLUTION INTRAVENOUS at 22:43

## 2023-04-29 RX ADMIN — Medication 250 MILLIGRAM(S): at 16:31

## 2023-04-29 RX ADMIN — Medication 10 MILLILITER(S): at 18:17

## 2023-04-29 RX ADMIN — Medication 650 MILLIGRAM(S): at 15:39

## 2023-04-29 RX ADMIN — Medication 650 MILLIGRAM(S): at 16:09

## 2023-04-29 RX ADMIN — TETANUS TOXOID, REDUCED DIPHTHERIA TOXOID AND ACELLULAR PERTUSSIS VACCINE, ADSORBED 0.5 MILLILITER(S): 5; 2.5; 8; 8; 2.5 SUSPENSION INTRAMUSCULAR at 15:39

## 2023-04-29 NOTE — ED PROVIDER NOTE - PHYSICAL EXAMINATION
Lt foot-dorsal aspect-erythematous, swelling, tenderness to palp., no crepitus             Lat aspect-callulus with dry blood, open wound, no fluctuant, sl tenderness to palp.,              Lt 5th digit-hematoma             sensory, pulse intact, Ortiz-neg Lt foot-dorsal aspect-erythematous, swelling, tenderness to palp., no crepitus             Lat aspect-callulus with dry blood, open wound, mild fluctuant, sl tenderness to palp.,              Lt 5th digit-hematoma             sensory, pulse intact, Ortiz-neg

## 2023-04-29 NOTE — CONSULT NOTE ADULT - ASSESSMENT
Possible mass in the area of the pituitary.      No exam findings corresponding to the suspected mass.      Impaired proprioception, likely due to large fiber diabetic sensory polyneuropathy.      Fall likely related to impaired proprioception (greater risk at night).      R/O other causes of prioceptive impairment/neuropathy.        RECOMMENDATIONS    ESR, CRP, SPEP, B12, folate, methylmalonic acid, homocysteine, Hgb A1c, SPEP, TSH, FSH, FT4, prolactin, AM cortisol, RPR.  .      MR brain, with sella/pituitary cuts, wo/w contrast.    MR orbits wo/w contrast.      Formal visual field testing (out-Pt).                                                              IMPORTANT  -  PLEASE NOTE:                              I am a neurohospitalist. I do not see patients outside of the hospital.        Patients requiring neurological follow-up after discharge may contact one of the following offices.     [facility] [address] [place] [address] [phone]    [facility]  [address]  [place], [place]  [phone]      Elicia Choi M.D.   - Department of Neurology  [employer] [facility]

## 2023-04-29 NOTE — H&P ADULT - PROBLEM SELECTOR PLAN 6
h/o HTN but admits to medication noncompliance, unable to recall BP meds supposed to be on    currently normotensive   primary team to confirm meds in the AM   NPO after midnight for OR tomorrow   monitor BP reports chronic SOB with h/o 9/11 exposure   does not follow pulm and not on home inhalers  not in respiratory distress, saturating well on RA     upon DC refer to o/p pulmonologist

## 2023-04-29 NOTE — ED ADULT NURSE NOTE - NSIMPLEMENTINTERV_GEN_ALL_ED
Implemented All Fall Risk Interventions:  [place] to call system. Call bell, personal items and telephone within reach. Instruct patient to call for assistance. Room bathroom lighting operational. Non-slip footwear when patient is off stretcher. Physically safe environment: no spills, clutter or unnecessary equipment. Stretcher in lowest position, wheels locked, appropriate side rails in place. Provide visual cue, wrist band, yellow gown, etc. Monitor gait and stability. Monitor for mental status changes and reorient to person, place, and time. Review medications for side effects contributing to fall risk. Reinforce activity limits and safety measures with patient and family.

## 2023-04-29 NOTE — ED PROVIDER NOTE - CPE EDP SKIN NORM
Patient has been sick with gastroenteritis. She called because any time she gives insulin for what she does eat, she has a low sugar after breakfast and after lunch. Her sugars are normal after supper. Changes to I:C ratio are as follows:    Breakfast: 1.3--2    Lunch: 1.8--2    Supper 2 keep    Discussed self-titration in 0.5 or 1.0 increments. Recommended she call PCP as she had questions about antiemetics.     Mela Smith MS, RD, LD, CDE     - - -

## 2023-04-29 NOTE — H&P ADULT - NSHPPHYSICALEXAM_GEN_ALL_CORE
Vital Signs Last 24 Hrs  T(C): 38 (29 Apr 2023 22:36), Max: 38.7 (29 Apr 2023 20:48)  T(F): 100.4 (29 Apr 2023 22:36), Max: 101.7 (29 Apr 2023 20:48)  HR: 106 (29 Apr 2023 22:36) (89 - 106)  BP: 150/71 (29 Apr 2023 22:36) (133/76 - 161/91)  BP(mean): --  RR: 18 (29 Apr 2023 22:36) (18 - 18)  SpO2: 97% (29 Apr 2023 22:36) (97% - 98%)    Parameters below as of 29 Apr 2023 22:36  Patient On (Oxygen Delivery Method): room air    GENERAL: NAD, lying in bed comfortably  HEAD:  Atraumatic, Normocephalic  EYES: EOMI, PERRLA, conjunctiva and sclera clear  ENT: Moist mucous membranes  NECK: Supple, No JVD  CHEST/LUNG: Clear to auscultation bilaterally; No rales, rhonchi, wheezing, or rubs. Unlabored respirations  HEART: Regular rate and rhythm; No murmurs, rubs, or gallops  ABDOMEN: Bowel sounds present; Soft, Nontender, Nondistended. No hepatomegally  EXTREMITIES:  (+) diminished pulses b/l but faintly palpable (+) Diffuse edema noted distal to Ankle joint LLE with 2x2 fibronectrotic wound with scant purulence s/p bedside ID, cleaned and dressed.  NERVOUS SYSTEM:  Alert & Oriented X3, speech clear. No deficits   MSK: FROM all 4 extremities, full and equal strength  SKIN: (+) Erythema noted tracking proximally to ankle joint and medially to 1st met lateral border demarcated with skin marker by podiatry

## 2023-04-29 NOTE — H&P ADULT - PROBLEM SELECTOR PLAN 5
CTH: No acute intracranial hemorrhage, hydrocephalus or extra-axial fluid collection. No acute transcortical infarction. Mild parenchymal volume loss and mild chronic microvascular ischemic changes. Abnormal enlargement of the sella with s/o masslike lesion perhaps pituitary in origin with apparent mass effect on the optic chiasm.     Radiology:  -Further evaluation of this finding with dedicated contrast-enhanced MR imaging of the brain, sella/pituitary protocol may be obtained, as clinically warranted.    Neurology consulted: Dr. Restrepo  - mass-like lesion less likely cause for fall, more likely d/t diabetic neuropathy  - f/u TM

## 2023-04-29 NOTE — H&P ADULT - PROBLEM SELECTOR PLAN 3
h/o DM on Metformin 1000mg BID, Lantus 35 qhs and Humolog 14 TID before meals (per sure scripts Lantus 45u and Humalog 20u), also reports sugars are not controlled at home with prior A1C 7.9  will hold oral dm meds while inpatient   f/u A1c  c/w lantus 25 qhs for now to begin + ISS q6 while NPO for pre-op   Adjust insulin as indicated  FS q6 while NPO for pre-op

## 2023-04-29 NOTE — H&P ADULT - PROBLEM SELECTOR PLAN 7
h/o HLD but admits to medication non-compliance, unable to recall statin therapy at home   no statin therapy on sure scripts   primary team to confirm meds in the AM   NPO after midnight h/o HTN but admits to medication noncompliance, unable to recall BP meds supposed to be on    currently normotensive   primary team to confirm meds in the AM   NPO after midnight for OR tomorrow   monitor BP

## 2023-04-29 NOTE — ED ADULT NURSE NOTE - OBJECTIVE STATEMENT
Patient presented to the ED with c/o pain Left leg s/p slipped / fell. Swelling, discoloration and open wound noted to the sole of  left foot. Left 5th toe noted to be discolored. Pt with h/o DM, HTN

## 2023-04-29 NOTE — H&P ADULT - PROBLEM SELECTOR PLAN 8
Given heparin SQ for DVT ppx, one time dose in the ED but will hold further DVT ppx while preop for tomorrow   NPO after midnight for OR tomorrow h/o HLD but admits to medication non-compliance, unable to recall statin therapy at home   no statin therapy on sure scripts   primary team to confirm meds in the AM   NPO after midnight

## 2023-04-29 NOTE — CONSULT NOTE ADULT - ASSESSMENT
Podiatry HPI  72-year-old male with history of NIDDM,  Patient claims he slipped and fell on Tuesday night in the bedroom and hit his head and had LOC for a few minutes. Patient denies any injuries at the time, but complaining of feeling lightheaded after the fall, confused on Wednesday.  Patient works as a  and on Thursday he noticed with left foot pain, swelling, difficulty ambulating after excessively ambulating by his work in tight work shoes.  Patient took nothing for his pain. Presents to ED with progressive pain to Left foot and increased warmth with concern for infection. Follow OP Podiatrist Dr. Spain who he last saw two weeks ago. States in 2017 had a bone infection in his Right foot which was resected. Cannot recall exact procedure,  Denies fever, chills.      PMH:DM (diabetes mellitus)    Colon cancer    HTN (hypertension)    Hyperlipidemia      Allergies: Percocet 5/325 (Other)    Medications:   FH:No pertinent family history in first degree relatives      PSX: No significant past surgical history    S/P inguinal hernia repair    History of colectomy    S/P arthroscopic knee surgery    History of repair of hiatal hernia      SH:     Vital Signs Last 24 Hrs  T(C): 36.8 (29 Apr 2023 17:58), Max: 37 (29 Apr 2023 16:58)  T(F): 98.3 (29 Apr 2023 17:58), Max: 98.6 (29 Apr 2023 16:58)  HR: 100 (29 Apr 2023 17:58) (90 - 104)  BP: 153/76 (29 Apr 2023 17:58) (133/76 - 153/76)  BP(mean): --  RR: 18 (29 Apr 2023 17:58) (18 - 18)  SpO2: 97% (29 Apr 2023 17:58) (97% - 98%)    Parameters below as of 29 Apr 2023 17:58  Patient On (Oxygen Delivery Method): room air        LABS                        13.3   16.95 )-----------( 271      ( 29 Apr 2023 15:00 )             40.9               04-29    130<L>  |  97  |  18  ----------------------------<  389<H>  4.2   |  26  |  1.39<H>    Ca    9.2      29 Apr 2023 15:00  Mg     2.1     04-29    TPro  8.4<H>  /  Alb  2.6<L>  /  TBili  0.7  /  DBili  x   /  AST  38  /  ALT  46  /  AlkPhos  101  04-29    ROS  REVIEW OF SYSTEMS:    CONSTITUTIONAL: No fever, weight loss, or fatigue  EYES: No eye pain, visual disturbances, or discharge  ENMT:  No difficulty hearing, tinnitus, vertigo; No sinus or throat pain  NECK: No pain or stiffness  BREASTS: No pain, masses, or nipple discharge  RESPIRATORY: No cough, wheezing, chills or hemoptysis; No shortness of breath  CARDIOVASCULAR: No chest pain, palpitations, dizziness, or leg swelling  GASTROINTESTINAL: No abdominal or epigastric pain. No nausea, vomiting, or hematemesis; No diarrhea or constipation. No melena or hematochezia.  GENITOURINARY: No dysuria, frequency, hematuria, or incontinence  NEUROLOGICAL: No headaches, memory loss, loss of strength, numbness, or tremors  SKIN: No itching, burning, rashes, or lesions   LYMPH NODES: No enlarged glands  ENDOCRINE: No heat or cold intolerance; No hair loss  MUSCULOSKELETAL: No joint pain or swelling; No muscle, back, or extremity pain  PSYCHIATRIC: No depression, anxiety, mood swings, or difficulty sleeping  HEME/LYMPH: No easy bruising, or bleeding gums  ALLERY AND IMMUNOLOGIC: No hives or eczema      PHYSICAL EXAM  LE Focused:    Vasc:  DP and PT faintly palpable b/l. No pedal hair growth noted. Diffuse edema noted distal to Ankle joint LLE  Derm: Submet 5 2X2 fibronecrotic wound with malodor and scant purulence. Flucutance noted to submet 5 left foot wound plantar proximally and distally to 5th digit. Erythema noted tracking proximally to ankle joint and medially to 1st met lateral border. Wound probes to bone and tracks in all planes including in 4th interspace. Upon i/d in 4th interspace 3 cc of pus was expressed with further tracking into dorsolateral midfoot.  Neuro: Protective sensation grossly diminished  MSK: able to ambulate with pain, TTP to dorsolateral fifth met base left foot      IMAGING: ?xray  Noted gas foci on CXR foot to 4th interspace and lateral 5th left foot    CULTURES:   pending deep wound cx    A:  Left foot gas gangrene    P:   Patient evaluated and Chart reviewed   Discussed diagnosis and treatment with patient  X-rays noted to left foot with suspicion of gas foci to 4th interspace and lateral 5th toe  Obtained consent from patient to perform bedside I/D with 15 blade and curved hemostat-expressed 3 cc of purulent drainage from 4th interspace incision and 1 cc of pus from left submet 5 ulceration  Wound flushed with normal saline and betadine in copious manner (50 cc used)  Obtained wound culture to be sent to Lab  Applied betadine with dry sterile dressing  Recc IV antibiotics As Per ID  Ordered RIVER/PVR  NWB to LLE  Discussed urgency for surgical mgmt of left foot gas gangrene  patient and wife amenable to left foot partial 5th ray amputation and all necessary procedures  Answered all questions fully. Patient acknowledged understanding  add on tomorrow 4/30 at 8 am-confirmed with nursing supervisor and anesthesia on call team  Pt NPO midnight  Request medical optimization   Ordered T/S  Offloading to bilateral Heels.   Discussed importance of daily foot examinations and proper shoe gear and to importance of lower Fasting Blood Glucose levels.   Podiatry to follow while in house.  Discussed with Attending Dr. Grant YAN   Podiatry HPI  72-year-old male with history of NIDDM,  Patient claims he slipped and fell on Tuesday night in the bedroom and hit his head and had LOC for a few minutes. Patient denies any injuries at the time, but complaining of feeling lightheaded after the fall, confused on Wednesday.  Patient works as a  and on Thursday he noticed with left foot pain, swelling, difficulty ambulating after excessively ambulating by his work in tight work shoes.  Patient took nothing for his pain. Presents to ED with progressive pain to Left foot and increased warmth with concern for infection. Follow OP Podiatrist Dr. Spain who he last saw two weeks ago. States in 2017 had a bone infection in his Right foot which was resected. Cannot recall exact procedure,  Denies fever, chills.      PMH:DM (diabetes mellitus)    Colon cancer    HTN (hypertension)    Hyperlipidemia      Allergies: Percocet 5/325 (Other)    Medications:   FH:No pertinent family history in first degree relatives      PSX: No significant past surgical history    S/P inguinal hernia repair    History of colectomy    S/P arthroscopic knee surgery    History of repair of hiatal hernia      SH:     Vital Signs Last 24 Hrs  T(C): 36.8 (29 Apr 2023 17:58), Max: 37 (29 Apr 2023 16:58)  T(F): 98.3 (29 Apr 2023 17:58), Max: 98.6 (29 Apr 2023 16:58)  HR: 100 (29 Apr 2023 17:58) (90 - 104)  BP: 153/76 (29 Apr 2023 17:58) (133/76 - 153/76)  BP(mean): --  RR: 18 (29 Apr 2023 17:58) (18 - 18)  SpO2: 97% (29 Apr 2023 17:58) (97% - 98%)    Parameters below as of 29 Apr 2023 17:58  Patient On (Oxygen Delivery Method): room air        LABS                        13.3   16.95 )-----------( 271      ( 29 Apr 2023 15:00 )             40.9               04-29    130<L>  |  97  |  18  ----------------------------<  389<H>  4.2   |  26  |  1.39<H>    Ca    9.2      29 Apr 2023 15:00  Mg     2.1     04-29    TPro  8.4<H>  /  Alb  2.6<L>  /  TBili  0.7  /  DBili  x   /  AST  38  /  ALT  46  /  AlkPhos  101  04-29    ROS  REVIEW OF SYSTEMS:    CONSTITUTIONAL: No fever, weight loss, or fatigue  EYES: No eye pain, visual disturbances, or discharge  ENMT:  No difficulty hearing, tinnitus, vertigo; No sinus or throat pain  NECK: No pain or stiffness  BREASTS: No pain, masses, or nipple discharge  RESPIRATORY: No cough, wheezing, chills or hemoptysis; No shortness of breath  CARDIOVASCULAR: No chest pain, palpitations, dizziness, or leg swelling  GASTROINTESTINAL: No abdominal or epigastric pain. No nausea, vomiting, or hematemesis; No diarrhea or constipation. No melena or hematochezia.  GENITOURINARY: No dysuria, frequency, hematuria, or incontinence  NEUROLOGICAL: No headaches, memory loss, loss of strength, numbness, or tremors  SKIN: No itching, burning, rashes, or lesions   LYMPH NODES: No enlarged glands  ENDOCRINE: No heat or cold intolerance; No hair loss  MUSCULOSKELETAL: No joint pain or swelling; No muscle, back, or extremity pain  PSYCHIATRIC: No depression, anxiety, mood swings, or difficulty sleeping  HEME/LYMPH: No easy bruising, or bleeding gums  ALLERY AND IMMUNOLOGIC: No hives or eczema      PHYSICAL EXAM  LE Focused:    Vasc:  DP and PT faintly palpable b/l. No pedal hair growth noted. Diffuse edema noted distal to Ankle joint LLE  Derm: Submet 5 2X2 fibronecrotic wound with malodor and scant purulence. Flucutance noted to submet 5 left foot wound plantar proximally and distally to 5th digit. Erythema noted tracking proximally to ankle joint and medially to 1st met lateral border. Wound probes to bone and tracks in all planes including in 4th interspace. Upon i/d in 4th interspace 3 cc of pus was expressed with further tracking into dorsolateral midfoot.  Neuro: Protective sensation grossly diminished  MSK: able to ambulate with pain, TTP to dorsolateral fifth met base left foot      IMAGING: ?xray  Noted gas foci on CXR foot to 4th interspace and lateral 5th left foot    CULTURES:   pending deep wound cx    A:  Left foot gas gangrene    P:   Patient evaluated and Chart reviewed   Discussed diagnosis and treatment with patient  X-rays noted to left foot with suspicion of gas foci to 4th interspace and lateral 5th toe  Obtained consent from patient to perform bedside I/D with 15 blade and curved hemostat-expressed 3 cc of purulent drainage from 4th interspace incision and 1 cc of pus from left submet 5 ulceration  Wound flushed with normal saline and betadine in copious manner (50 cc used)  Obtained wound culture to be sent to Lab  Applied betadine with dry sterile dressing  Recc IV antibiotics As Per ID  Ordered RIVER/PVR  NWB to LLE  Discussed urgency for surgical mgmt of left foot gas gangrene  patient and partner amenable to left foot partial 5th ray amputation and all necessary procedures  Answered all questions fully. Patient acknowledged understanding  add on tomorrow 4/30 at 8 am-confirmed with nursing supervisor and anesthesia on call team  Pt NPO midnight  Request medical optimization   Ordered T/S  Offloading to bilateral Heels.   Discussed importance of daily foot examinations and proper shoe gear and to importance of lower Fasting Blood Glucose levels.   Podiatry to follow while in house.  Discussed with Attending Dr. Grant YAN

## 2023-04-29 NOTE — H&P ADULT - PROBLEM SELECTOR PLAN 2
h/o DM on Metformin 1000mg BID, Lantus 35 qhs and Humolog 14 TID before meals (per sure scripts Lantus 45u and Humalog 20u), also reports sugars are not controlled at home with prior A1C 7.9  will hold oral dm meds while inpatient   f/u A1c  c/w lantus 25 qhs for now to begin + ISS q6 while NPO for pre-op   Adjust insulin as indicated  FS q6 while NPO for pre-op no reported cardiac history  aseptic appearing, VSS but on abx for gas gangrene   EKG: NSR with LVH   no TTE on file  f/u AM labs     RCRI: 1 point, Class 2 with 6% 30-day risk of death, MI, or cardiac arrest   [place]: 0.0% MAUREEN    patient is at low risk for low risk procedure

## 2023-04-29 NOTE — ED PROVIDER NOTE - PROGRESS NOTE DETAILS
Labs, xray explained to wife & pt  Pt with gas gangrene, being seen by Podiatry now.  Pt with elevated lactate, will admit pt  Case d/w Dr. Atkins

## 2023-04-29 NOTE — H&P ADULT - PROBLEM/PLAN-6
07/29/2020  1   07/01/2020  Dextroamp-Amphet Er 20 MG Cap  30 00 30 Da O'n   [id number]   Pen ([id number])   [place]   07/02/2020  1   07/02/2020  Dextroamp-Amphet Er 20 MG Cap  30 00 30 Da O'n   [id number]   Pen ([id number])   [place]   07/01/2020  1   07/01/2020  Alprazolam 0 5 MG Tablet  60 00 20 Da O'n   [id number]   [place] ([id number])   [place]
DISPLAY PLAN FREE TEXT

## 2023-04-29 NOTE — H&P ADULT - PROBLEM SELECTOR PLAN 4
CTH: No acute intracranial hemorrhage, hydrocephalus or extra-axial fluid collection. No acute transcortical infarction. Mild parenchymal volume loss and mild chronic microvascular ischemic changes. Abnormal enlargement of the sella with s/o masslike lesion perhaps pituitary in origin with apparent mass effect on the optic chiasm.     Radiology:  -Further evaluation of this finding with dedicated contrast-enhanced MR imaging of the brain, sella/pituitary protocol may be obtained, as clinically warranted.    Neurology consulted: Dr. Restrepo  - mass-like lesion less likely cause for fall, more likely d/t diabetic neuropathy  - f/u TM s/p mechanical fall with head injury and LOC with downtime on the floor for 4h  CTH: shows sellar lesion with apparent mas effect on the optic chiasm. no acute pathology  reports visual impairment due to left eye injury 1970s and retinal detachment, with change from baseline   CK mildly elevated 473 with FINA 1.79, low suspicion for rhabdo  c/w IVF   f/u repeat CK in the AM  PT to be consulted after OR  Neurology consulted: Dr. Restrepo  - mass-like lesion less likely cause for fall, more likely d/t diabetic neuropathy

## 2023-04-29 NOTE — H&P ADULT - PROBLEM SELECTOR PLAN 9
Given heparin SQ for DVT ppx, one time dose in the ED but will hold further DVT ppx while preop for tomorrow   NPO after midnight for OR tomorrow
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
## 2023-04-29 NOTE — H&P ADULT - PROBLEM SELECTOR PLAN 1
c/o left foot pain, swelling, and erythema   XR for left foot/ ankle: suspicion of gas foci to 4th interspace and lateral 5th toe  (+) diminished pulses b/l but faintly palpable (+) Diffuse edema noted distal to Ankle joint LLE with 2x2 fibronectrotic wound with scant purulence s/p bedside ID, cleaned and dressed. (+) Erythema noted tracking proximally to ankle joint and medially to 1st met lateral border demarcated with skin marker by podiatry  Podiatry consulted:  -s/p bedside ID, wound culture sent   -foot care and dressing per podiatry   -c/w IV Abx  -Ordered RIVER/PVR  planned for OR tomorrow c/o left foot pain, swelling, and erythema   XR for left foot/ ankle: suspicion of gas foci to 4th interspace and lateral 5th toe  (+) diminished pulses b/l but faintly palpable (+) Diffuse edema noted distal to Ankle joint LLE with 2x2 fibronectrotic wound with scant purulence s/p bedside ID, cleaned and dressed. (+) Erythema noted tracking proximally to ankle joint and medially to 1st met lateral border demarcated with skin marker by podiatry  aseptic appearing with leukocytosis WBC 16.95 and elevated lactate 2.2  s/p vanc and zosyn in the ED  c/w with vanc and zosyn while pre-op, consider changing to vanc and cefepime after OR  f/u cultures, sent   Podiatry consulted:  -s/p bedside ID, wound culture sent   -foot care and dressing per podiatry   -Ordered RIVER/PVR  planned for OR tomorrow

## 2023-04-29 NOTE — H&P ADULT - ATTENDING COMMENTS
73 yo man presented with c/o pain and swelling of his left foot after tripping and falling three days ago.  He also fell backward, hit his head, had an episode of confusion the following day.   PMH:  DM, colon cancer  PSH:  hernia repair, colon resection for carcinoma    Alert, cooperative man with proptosis  Vital Signs Last 24 Hrs  T(C): 36.8 (29 Apr 2023 17:58), Max: 37 (29 Apr 2023 16:58)  T(F): 98.3 (29 Apr 2023 17:58), Max: 98.6 (29 Apr 2023 16:58)  HR: 100 (29 Apr 2023 17:58) (90 - 104)  BP: 153/76 (29 Apr 2023 17:58) (133/76 - 153/76)  BP(mean): --  RR: 18 (29 Apr 2023 17:58) (18 - 18)  SpO2: 97% (29 Apr 2023 17:58) (97% - 98%)    Parameters below as of 29 Apr 2023 17:58  Patient On (Oxygen Delivery Method): room air    EOMI  Lungs, clear  Cor, RRR  Abdomen, soft  Neurological, decreased proprioception  Ext. erythema, left dorsal foot, s/p I & D                        13.3   16.95 )-----------( 271      ( 29 Apr 2023 15:00 )             40.9   04-29    130<L>  |  97  |  18  ----------------------------<  389<H>  4.2   |  26  |  1.39<H>    Ca    9.2      29 Apr 2023 15:00  Mg     2.1     04-29    TPro  8.4<H>  /  Alb  2.6<L>  /  TBili  0.7  /  DBili  x   /  AST  38  /  ALT  46  /  AlkPhos  101  04-29  INR: 1.27: Recommended targets/ranges for therapeutic INR:     < from: CT Head No Cont (06.05.22 @ 01:36) >    IMPRESSION:  1. Unremarkable, unenhanced CT head for age    < end of copied text >    < from: CT Head No Cont (04.29.23 @ 14:56) >    IMPRESSION:    No acute intracranial hemorrhage, hydrocephalus or extra-axial fluid   collection.  Mild parenchymal volume loss and mild chronic microvascular ischemic   changes.  Abnormal enlargement of the sella with suggestion of masslike lesion   perhaps pituitary in origin with apparent mass effect on the optic   chiasm. Further evaluation of this finding with dedicated   contrast-enhanced MR imaging of the brain, sella/pituitary protocol may   be obtained, as clinically warranted  No CT evidence for acute transcortical infarction.    If clinicians have any questions/need for clarification regarding this   report, Dr. Jozef Martino can be best reached via the patient secure   hospital email system< from: MR Foot w/ IV Cont, Right (04.04.18 @ 16:03) >      Impression:    Septic arthritis/osteomyelitis second MTP joint, including proximal   phalanx of second toe with osteolysis and fracture at the second   metatarsal head neck junction as discussed.    Bone marrow edema involving the proximal third phalanx, suggestive of   osteomyelitis.    Stable appearance of the patchy bone marrow edema of the cuneiforms and   tarsal navicular bone, which may be secondary to ischemia or stress   reaction.    < end of copied text >    X-ray foot, not yet read    EKG:  sinus tachycardia, LVH    IMP:  Infected left foot with gas seen on x-ray            s/p trip and fall with decrease in proprioception            incidental finding of pituitary tumor, possibly compression on optic chiasm.  Exam shows no field deficits, at present  Plan: NPO after midnight for Podiatric surgery in AM.             Insulin coverage now with glargine to cover next 24 hours             A1C             continue empirical antibiotics             MRI of brain/pituitary protocol and orbits as recommended by Radiology and Neurology.             Endocrine w/u for secreting pituitary tumor:  FSH, LH, prolactin, TSH, Free T4, ACTH, AM cortisol, IgF1, Growth Hormone.             Patient wishes his partner Peri Pérez to make decisions, if he can't.  SW consultation for proxy.

## 2023-04-29 NOTE — ED PROVIDER NOTE - OBJECTIVE STATEMENT
[place] Border Text: The closure involved the vermilion border. 72-year-old male with history of NIDDM, last dose yesterday.  Patient claims he slipped and fell on Tuesday night in the bedroom.  Patient hit the back of his head against the wall with L LOC.  He found himself lying on his back when he woke up, no urinary incontinence.  Patient finally pulled himself up.  Patient denies any injuries at the time, but complaining of feeling lightheaded after the fall, confused on Wednesday.  Thursday he noticed with left foot pain, swelling, difficulty ambulating.  Patient took nothing for his pain.  Denies fever, chills.  Last tetanus unknown 72-year-old male with history of NIDDM, last dose yesterday.  Patient claims he slipped and fell on Tuesday night in the bedroom.  Patient hit the back of his head against the wall with LOC.  He found himself lying on his back when he woke up, no urinary incontinence.  Patient finally pulled himself up.  Patient denies any injuries at the time, but complaining of feeling lightheaded after the fall, confused on Wednesday.  Thursday he noticed with left foot pain, swelling, difficulty ambulating.  Patient took nothing for his pain.  Denies fever, chills.  Last tetanus unknown

## 2023-04-29 NOTE — H&P ADULT - HISTORY OF PRESENT ILLNESS
72M from home, ambulates independently, PMHx DM on insulin (last dose was yesterday), HLD, HTN, and medication non-compliance, PSHx hemicolon resection 2011 for colon cancer (currently in remission), p/w complaints of lightheadedness today after fall with LOC and head injury. He reports he slipped and fell on Tuesday morning in the bedroom while trying to turn on the light, he remembers falling unable to hold on to anything to prevent fall and hitting back of his head against the wall with LOC. Reports being being on the floor for about 4- hours as he was unable to get up on his own at first, until he was able to reach the side of the bed and then eventually pulled himself up. Denies preceding HA, SOB, chest pain, and no dizziness or lightheadedness. Denies urinary incontinence. Patient denies any injuries at the time, but complaining of feeling lightheaded after the fall, confused on Wednesday night. He works as a  on the night shifts, reporting that directly after the fall he felt well enough to work but on Wednesday night the symptoms started preventing him to drive to work. Reports on Thursday he noticed with left foot pain, swelling, difficulty ambulating.  Patient took nothing for his pain.  Denies fever, chills.  No reported falls or syncope in the past. Denies prior foot injury or trauma. Patient denies HA, chest pain, SOB, abdominal pain, n/v/d, and no changes in urination or bm.

## 2023-04-29 NOTE — ED ADULT NURSE NOTE - NSICDXPASTMEDICALHX_GEN_ALL_CORE_FT
PAST MEDICAL HISTORY:  Colon cancer     DM (diabetes mellitus)     HTN (hypertension)     Hyperlipidemia

## 2023-04-29 NOTE — ED PROVIDER NOTE - CLINICAL SUMMARY MEDICAL DECISION MAKING FREE TEXT BOX
Status post fall, hit back of his head with LOC on Tuesday night, but now with left sided foot pain swelling redness concern for cellulitis, fracture.  Given history of diabetes, will get labs, give antibiotics and admission

## 2023-04-30 ENCOUNTER — TRANSCRIPTION ENCOUNTER (OUTPATIENT)
Age: 73
End: 2023-04-30

## 2023-04-30 DIAGNOSIS — D49.7 NEOPLASM OF UNSPECIFIED BEHAVIOR OF ENDOCRINE GLANDS AND OTHER PARTS OF NERVOUS SYSTEM: ICD-10-CM

## 2023-04-30 LAB
ACTH SER-ACNC: 11.9 PG/ML — SIGNIFICANT CHANGE UP (ref 7.2–63.3)
ALBUMIN SERPL ELPH-MCNC: 2.1 G/DL — LOW (ref 3.5–5)
ALP SERPL-CCNC: 93 U/L — SIGNIFICANT CHANGE UP (ref 40–120)
ALT FLD-CCNC: 41 U/L DA — SIGNIFICANT CHANGE UP (ref 10–60)
ANION GAP SERPL CALC-SCNC: 8 MMOL/L — SIGNIFICANT CHANGE UP (ref 5–17)
AST SERPL-CCNC: 31 U/L — SIGNIFICANT CHANGE UP (ref 10–40)
BASOPHILS # BLD AUTO: 0.04 K/UL — SIGNIFICANT CHANGE UP (ref 0–0.2)
BASOPHILS NFR BLD AUTO: 0.3 % — SIGNIFICANT CHANGE UP (ref 0–2)
BILIRUB SERPL-MCNC: 0.5 MG/DL — SIGNIFICANT CHANGE UP (ref 0.2–1.2)
BLD GP AB SCN SERPL QL: SIGNIFICANT CHANGE UP
BUN SERPL-MCNC: 12 MG/DL — SIGNIFICANT CHANGE UP (ref 7–18)
CALCIUM SERPL-MCNC: 8.5 MG/DL — SIGNIFICANT CHANGE UP (ref 8.4–10.5)
CHLORIDE SERPL-SCNC: 103 MMOL/L — SIGNIFICANT CHANGE UP (ref 96–108)
CHOLEST SERPL-MCNC: 147 MG/DL — SIGNIFICANT CHANGE UP
CK SERPL-CCNC: 346 U/L — HIGH (ref 35–232)
CO2 SERPL-SCNC: 24 MMOL/L — SIGNIFICANT CHANGE UP (ref 22–31)
CORTIS AM PEAK SERPL-MCNC: 16.8 UG/DL — SIGNIFICANT CHANGE UP (ref 6–18.4)
CREAT SERPL-MCNC: 1.07 MG/DL — SIGNIFICANT CHANGE UP (ref 0.5–1.3)
CULTURE RESULTS: SIGNIFICANT CHANGE UP
EGFR: 74 ML/MIN/1.73M2 — SIGNIFICANT CHANGE UP
EOSINOPHIL # BLD AUTO: 0.01 K/UL — SIGNIFICANT CHANGE UP (ref 0–0.5)
EOSINOPHIL NFR BLD AUTO: 0.1 % — SIGNIFICANT CHANGE UP (ref 0–6)
FSH SERPL-MCNC: 2.6 IU/L — SIGNIFICANT CHANGE UP (ref 1.5–12.4)
GLUCOSE BLDC GLUCOMTR-MCNC: 229 MG/DL — HIGH (ref 70–99)
GLUCOSE BLDC GLUCOMTR-MCNC: 241 MG/DL — HIGH (ref 70–99)
GLUCOSE BLDC GLUCOMTR-MCNC: 250 MG/DL — HIGH (ref 70–99)
GLUCOSE BLDC GLUCOMTR-MCNC: 263 MG/DL — HIGH (ref 70–99)
GLUCOSE BLDC GLUCOMTR-MCNC: 270 MG/DL — HIGH (ref 70–99)
GLUCOSE BLDC GLUCOMTR-MCNC: 272 MG/DL — HIGH (ref 70–99)
GLUCOSE BLDC GLUCOMTR-MCNC: 276 MG/DL — HIGH (ref 70–99)
GLUCOSE BLDC GLUCOMTR-MCNC: 343 MG/DL — HIGH (ref 70–99)
GLUCOSE SERPL-MCNC: 257 MG/DL — HIGH (ref 70–99)
GRAM STN FLD: SIGNIFICANT CHANGE UP
GRAM STN FLD: SIGNIFICANT CHANGE UP
HCT VFR BLD CALC: 35.7 % — LOW (ref 39–50)
HCV AB S/CO SERPL IA: 0.47 S/CO — SIGNIFICANT CHANGE UP (ref 0–0.99)
HCV AB SERPL-IMP: SIGNIFICANT CHANGE UP
HDLC SERPL-MCNC: 33 MG/DL — LOW
HGB BLD-MCNC: 11.9 G/DL — LOW (ref 13–17)
IMM GRANULOCYTES NFR BLD AUTO: 0.6 % — SIGNIFICANT CHANGE UP (ref 0–0.9)
LH SERPL-ACNC: 3.3 IU/L — SIGNIFICANT CHANGE UP
LIPID PNL WITH DIRECT LDL SERPL: 93 MG/DL — SIGNIFICANT CHANGE UP
LYMPHOCYTES # BLD AUTO: 1.29 K/UL — SIGNIFICANT CHANGE UP (ref 1–3.3)
LYMPHOCYTES # BLD AUTO: 9.3 % — LOW (ref 13–44)
MAGNESIUM SERPL-MCNC: 2.1 MG/DL — SIGNIFICANT CHANGE UP (ref 1.6–2.6)
MCHC RBC-ENTMCNC: 26.4 PG — LOW (ref 27–34)
MCHC RBC-ENTMCNC: 33.3 GM/DL — SIGNIFICANT CHANGE UP (ref 32–36)
MCV RBC AUTO: 79.3 FL — LOW (ref 80–100)
METHOD TYPE: SIGNIFICANT CHANGE UP
MONOCYTES # BLD AUTO: 1.25 K/UL — HIGH (ref 0–0.9)
MONOCYTES NFR BLD AUTO: 9 % — SIGNIFICANT CHANGE UP (ref 2–14)
MSSA DNA SPEC QL NAA+PROBE: SIGNIFICANT CHANGE UP
NEUTROPHILS # BLD AUTO: 11.26 K/UL — HIGH (ref 1.8–7.4)
NEUTROPHILS NFR BLD AUTO: 80.7 % — HIGH (ref 43–77)
NON HDL CHOLESTEROL: 114 MG/DL — SIGNIFICANT CHANGE UP
NRBC # BLD: 0 /100 WBCS — SIGNIFICANT CHANGE UP (ref 0–0)
PHOSPHATE SERPL-MCNC: 1.5 MG/DL — LOW (ref 2.5–4.5)
PLATELET # BLD AUTO: 247 K/UL — SIGNIFICANT CHANGE UP (ref 150–400)
POTASSIUM SERPL-MCNC: 3.5 MMOL/L — SIGNIFICANT CHANGE UP (ref 3.5–5.3)
POTASSIUM SERPL-SCNC: 3.5 MMOL/L — SIGNIFICANT CHANGE UP (ref 3.5–5.3)
PROT SERPL-MCNC: 7.2 G/DL — SIGNIFICANT CHANGE UP (ref 6–8.3)
RBC # BLD: 4.5 M/UL — SIGNIFICANT CHANGE UP (ref 4.2–5.8)
RBC # FLD: 13.6 % — SIGNIFICANT CHANGE UP (ref 10.3–14.5)
SODIUM SERPL-SCNC: 135 MMOL/L — SIGNIFICANT CHANGE UP (ref 135–145)
SPECIMEN SOURCE: SIGNIFICANT CHANGE UP
T3FREE SERPL-MCNC: 1.46 PG/ML — LOW (ref 2–4.4)
TRIGL SERPL-MCNC: 107 MG/DL — SIGNIFICANT CHANGE UP
TSH SERPL-MCNC: 1.13 UU/ML — SIGNIFICANT CHANGE UP (ref 0.34–4.82)
WBC # BLD: 13.94 K/UL — HIGH (ref 3.8–10.5)
WBC # FLD AUTO: 13.94 K/UL — HIGH (ref 3.8–10.5)

## 2023-04-30 PROCEDURE — 99233 SBSQ HOSP IP/OBS HIGH 50: CPT

## 2023-04-30 PROCEDURE — 88305 TISSUE EXAM BY PATHOLOGIST: CPT | Mod: 26

## 2023-04-30 RX ORDER — SODIUM CHLORIDE 9 MG/ML
1000 INJECTION, SOLUTION INTRAVENOUS
Refills: 0 | Status: DISCONTINUED | OUTPATIENT
Start: 2023-04-30 | End: 2023-04-30

## 2023-04-30 RX ORDER — METFORMIN HYDROCHLORIDE 850 MG/1
1 TABLET ORAL
Qty: 0 | Refills: 0 | DISCHARGE

## 2023-04-30 RX ORDER — ACETAMINOPHEN 500 MG
1000 TABLET ORAL ONCE
Refills: 0 | Status: COMPLETED | OUTPATIENT
Start: 2023-04-30 | End: 2023-04-30

## 2023-04-30 RX ORDER — METFORMIN HYDROCHLORIDE 850 MG/1
0 TABLET ORAL
Qty: 0 | Refills: 3 | DISCHARGE

## 2023-04-30 RX ORDER — BRIMONIDINE TARTRATE 2 MG/MG
1 SOLUTION/ DROPS OPHTHALMIC EVERY 12 HOURS
Refills: 0 | Status: DISCONTINUED | OUTPATIENT
Start: 2023-04-30 | End: 2023-05-11

## 2023-04-30 RX ORDER — FAMOTIDINE 10 MG/ML
1 INJECTION INTRAVENOUS
Qty: 0 | Refills: 0 | DISCHARGE

## 2023-04-30 RX ORDER — AMLODIPINE BESYLATE 2.5 MG/1
5 TABLET ORAL ONCE
Refills: 0 | Status: COMPLETED | OUTPATIENT
Start: 2023-04-30 | End: 2023-04-30

## 2023-04-30 RX ORDER — INSULIN ASPART 100 [IU]/ML
50 INJECTION, SUSPENSION SUBCUTANEOUS
Qty: 0 | Refills: 0 | DISCHARGE

## 2023-04-30 RX ORDER — INSULIN DETEMIR 100/ML (3)
20 INSULIN PEN (ML) SUBCUTANEOUS
Qty: 0 | Refills: 0 | DISCHARGE

## 2023-04-30 RX ORDER — FAMOTIDINE 10 MG/ML
40 INJECTION INTRAVENOUS EVERY 12 HOURS
Refills: 0 | Status: DISCONTINUED | OUTPATIENT
Start: 2023-04-30 | End: 2023-05-11

## 2023-04-30 RX ORDER — ONDANSETRON 8 MG/1
4 TABLET, FILM COATED ORAL ONCE
Refills: 0 | Status: DISCONTINUED | OUTPATIENT
Start: 2023-04-30 | End: 2023-04-30

## 2023-04-30 RX ORDER — GABAPENTIN 400 MG/1
300 CAPSULE ORAL EVERY 8 HOURS
Refills: 0 | Status: DISCONTINUED | OUTPATIENT
Start: 2023-04-30 | End: 2023-05-11

## 2023-04-30 RX ORDER — INSULIN LISPRO 100/ML
8 VIAL (ML) SUBCUTANEOUS
Refills: 0 | Status: DISCONTINUED | OUTPATIENT
Start: 2023-04-30 | End: 2023-05-01

## 2023-04-30 RX ORDER — INSULIN HUMAN 100 [IU]/ML
8 INJECTION, SOLUTION SUBCUTANEOUS ONCE
Refills: 0 | Status: COMPLETED | OUTPATIENT
Start: 2023-04-30 | End: 2023-04-30

## 2023-04-30 RX ORDER — INSULIN LISPRO 100/ML
VIAL (ML) SUBCUTANEOUS
Refills: 0 | Status: DISCONTINUED | OUTPATIENT
Start: 2023-04-30 | End: 2023-05-02

## 2023-04-30 RX ORDER — INSULIN GLARGINE 100 [IU]/ML
30 INJECTION, SOLUTION SUBCUTANEOUS AT BEDTIME
Refills: 0 | Status: DISCONTINUED | OUTPATIENT
Start: 2023-04-30 | End: 2023-05-02

## 2023-04-30 RX ORDER — INSULIN ASPART 100 [IU]/ML
25 INJECTION, SUSPENSION SUBCUTANEOUS
Qty: 0 | Refills: 0 | DISCHARGE

## 2023-04-30 RX ORDER — GABAPENTIN 400 MG/1
400 CAPSULE ORAL
Qty: 0 | Refills: 0 | DISCHARGE

## 2023-04-30 RX ORDER — INSULIN DETEMIR 100/ML (3)
40 INSULIN PEN (ML) SUBCUTANEOUS
Qty: 0 | Refills: 0 | DISCHARGE

## 2023-04-30 RX ADMIN — INSULIN HUMAN 8 UNIT(S): 100 INJECTION, SOLUTION SUBCUTANEOUS at 18:32

## 2023-04-30 RX ADMIN — GABAPENTIN 300 MILLIGRAM(S): 400 CAPSULE ORAL at 21:55

## 2023-04-30 RX ADMIN — Medication 400 MILLIGRAM(S): at 21:51

## 2023-04-30 RX ADMIN — PIPERACILLIN AND TAZOBACTAM 25 GRAM(S): 4; .5 INJECTION, POWDER, LYOPHILIZED, FOR SOLUTION INTRAVENOUS at 14:10

## 2023-04-30 RX ADMIN — Medication 1: at 00:28

## 2023-04-30 RX ADMIN — INSULIN GLARGINE 30 UNIT(S): 100 INJECTION, SOLUTION SUBCUTANEOUS at 22:48

## 2023-04-30 RX ADMIN — SODIUM CHLORIDE 100 MILLILITER(S): 9 INJECTION, SOLUTION INTRAVENOUS at 10:57

## 2023-04-30 RX ADMIN — Medication 650 MILLIGRAM(S): at 00:10

## 2023-04-30 RX ADMIN — PIPERACILLIN AND TAZOBACTAM 25 GRAM(S): 4; .5 INJECTION, POWDER, LYOPHILIZED, FOR SOLUTION INTRAVENOUS at 05:04

## 2023-04-30 RX ADMIN — Medication 250 MILLIGRAM(S): at 18:02

## 2023-04-30 RX ADMIN — Medication 1: at 06:38

## 2023-04-30 RX ADMIN — GABAPENTIN 300 MILLIGRAM(S): 400 CAPSULE ORAL at 14:12

## 2023-04-30 RX ADMIN — AMLODIPINE BESYLATE 5 MILLIGRAM(S): 2.5 TABLET ORAL at 05:41

## 2023-04-30 RX ADMIN — BRIMONIDINE TARTRATE 1 DROP(S): 2 SOLUTION/ DROPS OPHTHALMIC at 18:02

## 2023-04-30 RX ADMIN — Medication 250 MILLIGRAM(S): at 05:04

## 2023-04-30 RX ADMIN — PIPERACILLIN AND TAZOBACTAM 25 GRAM(S): 4; .5 INJECTION, POWDER, LYOPHILIZED, FOR SOLUTION INTRAVENOUS at 21:51

## 2023-04-30 RX ADMIN — Medication 1000 MILLIGRAM(S): at 22:52

## 2023-04-30 RX ADMIN — SODIUM CHLORIDE 75 MILLILITER(S): 9 INJECTION, SOLUTION INTRAVENOUS at 10:51

## 2023-04-30 RX ADMIN — Medication 2: at 18:02

## 2023-04-30 RX ADMIN — FAMOTIDINE 40 MILLIGRAM(S): 10 INJECTION INTRAVENOUS at 18:02

## 2023-04-30 RX ADMIN — Medication 8 UNIT(S): at 22:49

## 2023-04-30 RX ADMIN — Medication 1: at 11:44

## 2023-04-30 NOTE — PATIENT PROFILE ADULT - FALL HARM RISK - HARM RISK INTERVENTIONS

## 2023-04-30 NOTE — PROGRESS NOTE ADULT - ATTENDING COMMENTS
71 yo man presented with c/o pain and swelling of his left foot after tripping and falling three days ago.    He also fell backward, hit his head, had an episode of confusion the following day.   PMH:  DM, colon cancer  PSH:  hernia repair, colon resection for carcinoma    b· Operative Findings  Left foot partial 5th ray amputation with the 5th metatarsal resected midshaft  Further purulence found on incision plantarly and dorsally  Wound irrigated with 1L pulse lavage   Clean and dirty culture sent to Lab  Incision left open due to severe infection. Potential grafting procedure to be scheduled week of 5/1    vs, as above, eating dinner  EOMI  Lungs, clear  Cor, RRR  Abdomen, soft  Neurological, decreased proprioception  Ext. resection of 5th metatarsal at midshaft                             11.9   13.94 )-----------( 247      ( 30 Apr 2023 08:00 )             35.7   04-30    135  |  103  |  12  ----------------------------<  257<H>  3.5   |  24  |  1.07    Ca    8.5      30 Apr 2023 08:00  Phos  1.5     04-30  Mg     2.1     04-30    TPro  7.2  /  Alb  2.1<L>  /  TBili  0.5  /  DBili  x   /  AST  31  /  ALT  41  /  AlkPhos  93  04-30    INR: 1.27: Recommended targets/ranges for therapeutic INR:     < from: CT Head No Cont (06.05.22 @ 01:36) >    IMPRESSION:  1. Unremarkable, unenhanced CT head for age    < end of copied text >    < from: CT Head No Cont (04.29.23 @ 14:56) >    IMPRESSION:    No acute intracranial hemorrhage, hydrocephalus or extra-axial fluid   collection.  Mild parenchymal volume loss and mild chronic microvascular ischemic   changes.  Abnormal enlargement of the sella with suggestion of masslike lesion   perhaps pituitary in origin with apparent mass effect on the optic   chiasm. Further evaluation of this finding with dedicated   contrast-enhanced MR imaging of the brain, sella/pituitary protocol may   be obtained, as clinically warranted  No CT evidence for acute transcortical infarction.    If clinicians have any questions/need for clarification regarding this   report, Dr. Jozef Martino can be best reached via the patient secure   hospital email system< from: MR Foot w/ IV Cont, Right (04.04.18 @ 16:03) >    Impression:    Septic arthritis/osteomyelitis second MTP joint, including proximal   phalanx of second toe with osteolysis and fracture at the second   metatarsal head neck junction as discussed.    Bone marrow edema involving the proximal third phalanx, suggestive of   osteomyelitis.    Stable appearance of the patchy bone marrow edema of the cuneiforms and   tarsal navicular bone, which may be secondary to ischemia or stress   reaction.    < end of copied text >    X-ray foot, not yet read  EKG:  sinus tachycardia, LVH  Hemoglobin A1C, Whole Blood: 7.1: Method: Immunoassay   Gram Stain:   Growth in aerobic bottle: Gram Positive Cocci in Clusters (04.29.23 @ 15:10)   IMP:  Infected left foot with gas seen on x-ray, s/p resection, with wound left open because of purulence; blood cultures + GPC's            s/p trip and fall with decrease in proprioception            incidental finding of pituitary tumor, possibly compression on optic chiasm.  Exam shows no field deficits, at present  Plan:    continue empirical antibiotics             MRI of brain/pituitary protocol and orbits as recommended by Radiology and Neurology.             Endocrine w/u for secreting pituitary tumor:  FSH, LH, prolactin, TSH, Free T4, ACTH, AM cortisol, IgF1, Growth Hormone.             Patient wishes his partner Peri Pérez to make decisions, if he can't.   consultation for proxy.             Increase insulin             Endocrine consultation, Dr. Arreola

## 2023-04-30 NOTE — CHART NOTE - NSCHARTNOTEFT_GEN_A_CORE
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 Was transferred to PACU post-op and once stable will be transferred to floors.  Amputation site remains open, purulence findings intra-op. Site irrigated with pulse lavage and is planned for graft placement week of 5/1/23

## 2023-04-30 NOTE — PROGRESS NOTE ADULT - PROBLEM SELECTOR PLAN 8
h/o HLD but admits to medication non-compliance, unable to recall statin therapy at home   no statin therapy on sure scripts   primary team to confirm meds in the AM   NPO after midnight

## 2023-04-30 NOTE — PROGRESS NOTE ADULT - PROBLEM SELECTOR PLAN 2
no reported cardiac history  aseptic appearing, VSS but on abx for gas gangrene   EKG: NSR with LVH   no TTE on file  f/u AM labs     RCRI: 1 point, Class 2 with 6% 30-day risk of death, MI, or cardiac arrest   [place]: 0.0% MAUREEN    patient is at low risk for low risk procedure

## 2023-04-30 NOTE — PROGRESS NOTE ADULT - PROBLEM SELECTOR PLAN 6
reports chronic SOB with h/o 9/11 exposure   does not follow pulm and not on home inhalers  not in respiratory distress, saturating well on RA     upon DC refer to o/p pulmonologist

## 2023-04-30 NOTE — PROGRESS NOTE ADULT - PROBLEM SELECTOR PLAN 1
c/o left foot pain, swelling, and erythema   XR for left foot/ ankle: suspicion of gas foci to 4th interspace and lateral 5th toe  (+) diminished pulses b/l but faintly palpable (+) Diffuse edema noted distal to Ankle joint LLE with 2x2 fibronectrotic wound with scant purulence s/p bedside ID, cleaned and dressed. (+) Erythema noted tracking proximally to ankle joint and medially to 1st met lateral border demarcated with skin marker by podiatry  aseptic appearing with leukocytosis WBC 16.95 and elevated lactate 2.2  s/p vanc and zosyn in the ED  c/w with vanc and zosyn while pre-op, consider changing to vanc and cefepime after OR  f/u cultures, sent   Podiatry consulted:  -s/p bedside ID, wound culture sent   -foot care and dressing per podiatry   -Ordered RIVER/PVR  planned for OR tomorrow

## 2023-04-30 NOTE — PROGRESS NOTE ADULT - PROBLEM SELECTOR PLAN 5
CTH: No acute intracranial hemorrhage, hydrocephalus or extra-axial fluid collection. No acute transcortical infarction. Mild parenchymal volume loss and mild chronic microvascular ischemic changes. Abnormal enlargement of the sella with s/o masslike lesion perhaps pituitary in origin with apparent mass effect on the optic chiasm.     Radiology:  -Further evaluation of this finding with dedicated contrast-enhanced MR imaging of the brain, sella/pituitary protocol may be obtained, as clinically warranted.    Neurology consulted: Dr. Restrepo  - mass-like lesion less likely cause for fall, more likely d/t diabetic neuropathy  - f/u TM  Will w/u for functioning pituitary tumor.  Endocrinology consultation, Dr. Arreola, will see tomorrow.

## 2023-04-30 NOTE — PROGRESS NOTE ADULT - SUBJECTIVE AND OBJECTIVE BOX
Podiatry HPI  72-year-old male with history of NIDDM,  Patient claims he slipped and fell on Tuesday night in the bedroom and hit his head and had LOC for a few minutes. Patient denies any injuries at the time, but complaining of feeling lightheaded after the fall, confused on Wednesday.  Patient works as a  and on Thursday he noticed with left foot pain, swelling, difficulty ambulating after excessively ambulating by his work in tight work shoes.  Patient took nothing for his pain. Presents to ED with progressive pain to Left foot and increased warmth with concern for infection. Follow OP Podiatrist Dr. Spain who he last saw two weeks ago. States in 2017 had a bone infection in his Right foot which was resected. Cannot recall exact procedure,  Denies fever, chills.    Podiatry Interval: patient is seen in bed resting. Aware of his upcoming surgery for removal of left foot 5th digit due to infection. Aware of risks and benefits associated. Admits he maintains NPO status since midnight. No other pedal complaints at this time.    Medications acetaminophen     Tablet .. 650 milliGRAM(s) Oral every 6 hours PRN  brimonidine 0.2% Ophthalmic Solution 1 Drop(s) Left EYE every 12 hours  dextrose 5%. 1000 milliLiter(s) IV Continuous <Continuous>  dextrose 5%. 1000 milliLiter(s) IV Continuous <Continuous>  dextrose 50% Injectable 12.5 Gram(s) IV Push once  dextrose 50% Injectable 25 Gram(s) IV Push once  dextrose 50% Injectable 25 Gram(s) IV Push once  dextrose Oral Gel 15 Gram(s) Oral once PRN  famotidine    Tablet 40 milliGRAM(s) Oral every 12 hours  gabapentin 300 milliGRAM(s) Oral every 8 hours  glucagon  Injectable 1 milliGRAM(s) IntraMuscular once  insulin glargine Injectable (LANTUS) 26 Unit(s) SubCutaneous at bedtime  insulin lispro (ADMELOG) corrective regimen sliding scale   SubCutaneous every 6 hours  lactated ringers. 1000 milliLiter(s) IV Continuous <Continuous>  ondansetron Injectable 4 milliGRAM(s) IV Push every 8 hours PRN  piperacillin/tazobactam IVPB.. 3.375 Gram(s) IV Intermittent every 8 hours  vancomycin  IVPB 1000 milliGRAM(s) IV Intermittent every 12 hours    FHNo pertinent family history in first degree relatives    ,   PMHDM (diabetes mellitus)    Colon cancer    HTN (hypertension)    Hyperlipidemia       PSHNo significant past surgical history    S/P inguinal hernia repair    History of colectomy    S/P arthroscopic knee surgery    History of repair of hiatal hernia        Labs                          13.3   16.95 )-----------( 271      ( 29 Apr 2023 15:00 )             40.9      04-29    130<L>  |  97  |  18  ----------------------------<  389<H>  4.2   |  26  |  1.39<H>    Ca    9.2      29 Apr 2023 15:00  Mg     2.1     04-29    TPro  8.4<H>  /  Alb  2.6<L>  /  TBili  0.7  /  DBili  x   /  AST  38  /  ALT  46  /  AlkPhos  101  04-29     Vital Signs Last 24 Hrs  T(C): 37.6 (30 Apr 2023 05:31), Max: 38.7 (29 Apr 2023 20:48)  T(F): 99.7 (30 Apr 2023 05:31), Max: 101.7 (29 Apr 2023 20:48)  HR: 95 (30 Apr 2023 06:20) (89 - 106)  BP: 165/86 (30 Apr 2023 06:20) (133/76 - 175/78)  BP(mean): --  RR: 18 (30 Apr 2023 05:31) (18 - 18)  SpO2: 98% (30 Apr 2023 05:31) (97% - 98%)    Parameters below as of 30 Apr 2023 05:31  Patient On (Oxygen Delivery Method): room air      Sedimentation Rate, Erythrocyte: 84 mm/Hr (04-29-23 @ 18:45)          WBC Count: 16.95 K/uL *H* (04-29-23 @ 15:00)    PHYSICAL EXAM  LE Focused:    Vasc:  DP and PT faintly palpable b/l. No pedal hair growth noted. Diffuse edema noted distal to Ankle joint LLE  Derm: Submet 5 2X2 fibronecrotic wound with malodor, no purulence noted on today's dressing change pre-op. Wound probes to bone and tracks in all planes including in 4th interspace  Neuro: Protective sensation grossly diminished  MSK: able to ambulate with pain, TTP to dorsolateral fifth met base left foot      IMAGING: ?xray  Noted gas foci on CXR foot to 4th interspace and lateral 5th left foot    CULTURES:   pending deep wound cx    A:  Left foot gas gangrene    P:   Patient evaluated and Chart reviewed   Discussed diagnosis and treatment with patient  X-rays noted to left foot with suspicion of gas foci to 4th interspace and lateral 5th toe  Scheduled for left partial 5th ray amputation today 4/30/23  NPO still in place since    Medical clearance appreciated   Written consent signed and witness present this morning   Applied betadine with dry sterile dressing  Recc IV antibiotics As Per ID  Ordered RIVER/PVR  NWB to LLE  Discussed urgency for surgical mgmt of left foot gas gangrene  patient and partner amenable to left foot partial 5th ray amputation and all necessary procedures  Answered all questions fully. Patient acknowledged understanding  Pt NPO midnight  Offloading to bilateral Heels.   Discussed importance of daily foot examinations and proper shoe gear and to importance of lower Fasting Blood Glucose levels.   Podiatry to follow while in house.  Discussed with Attending Dr. Grant YAN

## 2023-04-30 NOTE — PROGRESS NOTE ADULT - PROBLEM SELECTOR PLAN 3
h/o DM on Metformin 1000mg BID, Lantus 35 qhs and Humolog 14 TID before meals (per sure scripts Lantus 45u and Humalog 20u), also reports sugars are not controlled at home with prior A1C 7.9  will hold oral dm meds while inpatient   f/u A1c  increase lantus 30 qhs for now to begin + ISS q6 while NPO for pre-op.  Will add pre-meal insulin  Adjust insulin as indicated  FS q6 while NPO for pre-op

## 2023-04-30 NOTE — PROGRESS NOTE ADULT - PROBLEM SELECTOR PLAN 4
s/p mechanical fall with head injury and LOC with downtime on the floor for 4h  CTH: shows sellar lesion with apparent mas effect on the optic chiasm. no acute pathology  reports visual impairment due to left eye injury 1970s and retinal detachment, with change from baseline   CK mildly elevated 473 with FINA 1.79, low suspicion for rhabdo  c/w IVF   f/u repeat CK in the AM  PT to be consulted after OR  Neurology consulted: Dr. Restrepo  - mass-like lesion less likely cause for fall, more likely d/t diabetic neuropathy

## 2023-04-30 NOTE — PROGRESS NOTE ADULT - SUBJECTIVE AND OBJECTIVE BOX
MEDICAL ATTENDING NOTE  Patient is a 72y old  Male who presents with a chief complaint of Fall, Gangrenous foot (2023 07:46)      HPI:  72M from home, ambulates independently, PMHx DM on insulin (last dose was yesterday), HLD, HTN, and medication non-compliance, PSHx hemicolon resection  for colon cancer (currently in remission), p/w complaints of lightheadedness today after fall with LOC and head injury. He reports he slipped and fell on Tuesday morning in the bedroom while trying to turn on the light, he remembers falling unable to hold on to anything to prevent fall and hitting back of his head against the wall with LOC. Reports being being on the floor for about 4- hours as he was unable to get up on his own at first, until he was able to reach the side of the bed and then eventually pulled himself up. Denies preceding HA, SOB, chest pain, and no dizziness or lightheadedness. Denies urinary incontinence. Patient denies any injuries at the time, but complaining of feeling lightheaded after the fall, confused on Wednesday night. He works as a  on the night shifts, reporting that directly after the fall he felt well enough to work but on Wednesday night the symptoms started preventing him to drive to work. Reports on Thursday he noticed with left foot pain, swelling, difficulty ambulating.  Patient took nothing for his pain.  Denies fever, chills.  No reported falls or syncope in the past. Denies prior foot injury or trauma. Patient denies HA, chest pain, SOB, abdominal pain, n/v/d, and no changes in urination or bm.  (2023 20:19)      INTERVAL HPI/OVERNIGHT EVENTS: Gangrene of left foot 2023 09:43:15 Left 5th digit and metatarsal gas ganrene infection with gas foci visible on x-ray Veronica Lobo.  ·  PROCEDURES:  Partial amputation of fifth ray of left foot by open approach 2023 09:39:    MEDICATIONS  (STANDING):  brimonidine 0.2% Ophthalmic Solution 1 Drop(s) Left EYE every 12 hours  dextrose 5%. 1000 milliLiter(s) (100 mL/Hr) IV Continuous <Continuous>  dextrose 5%. 1000 milliLiter(s) (50 mL/Hr) IV Continuous <Continuous>  dextrose 50% Injectable 12.5 Gram(s) IV Push once  dextrose 50% Injectable 25 Gram(s) IV Push once  dextrose 50% Injectable 25 Gram(s) IV Push once  famotidine    Tablet 40 milliGRAM(s) Oral every 12 hours  gabapentin 300 milliGRAM(s) Oral every 8 hours  glucagon  Injectable 1 milliGRAM(s) IntraMuscular once  insulin glargine Injectable (LANTUS) 26 Unit(s) SubCutaneous at bedtime  insulin lispro (ADMELOG) corrective regimen sliding scale   SubCutaneous every 6 hours  lactated ringers. 1000 milliLiter(s) (100 mL/Hr) IV Continuous <Continuous>  piperacillin/tazobactam IVPB.. 3.375 Gram(s) IV Intermittent every 8 hours  vancomycin  IVPB 1000 milliGRAM(s) IV Intermittent every 12 hours    MEDICATIONS  (PRN):  acetaminophen     Tablet .. 650 milliGRAM(s) Oral every 6 hours PRN Temp greater or equal to 38C (100.4F), Mild Pain (1 - 3)  dextrose Oral Gel 15 Gram(s) Oral once PRN Blood Glucose LESS THAN 70 milliGRAM(s)/deciliter  ondansetron Injectable 4 milliGRAM(s) IV Push every 8 hours PRN Nausea and/or Vomiting      __________________________________________________  REVIEW OF SYSTEMS:    CONSTITUTIONAL: No fever,   EYES: no acute visual disturbances  NECK: No pain or stiffness  RESPIRATORY: No cough; No shortness of breath  CARDIOVASCULAR: No chest pain, no palpitations  GASTROINTESTINAL: No pain. No nausea or vomiting; No diarrhea   NEUROLOGICAL: No headache or numbness, no tremors  MUSCULOSKELETAL: No joint pain, no muscle pain  GENITOURINARY: no dysuria, no frequency, no hesitancy  PSYCHIATRY: no depression , no anxiety  ALL OTHER  ROS negative        Vital Signs Last 24 Hrs  T(C): 37.1 (2023 13:52), Max: 38.7 (2023 20:48)  T(F): 98.8 (2023 13:52), Max: 101.7 (2023 20:48)  HR: 93 (2023 13:52) (89 - 106)  BP: 126/75 (2023 13:52) (121/67 - 175/78)  BP(mean): 82 (2023 10:32) (78 - 87)  RR: 18 (2023 13:52) (13 - 19)  SpO2: 97% (2023 13:52) (95% - 99%)    Parameters below as of 2023 13:52  Patient On (Oxygen Delivery Method): room air    ________________________________________________  PHYSICAL EXAM:  GENERAL: NAD, alert, cooperative, eating a meal  HEENT: Normocephalic;  conjunctivae and sclerae clear; moist mucous membranes;   NECK : supple  CHEST/LUNG: Clear to auscultation bilaterally with good air entry   HEART: S1 S2  regular; no murmurs, gallops or rubs  ABDOMEN: Soft, Nontender, Nondistended; Bowel sounds present  EXTREMITIES: no cyanosis; no edema; no calf tenderness  SKIN: warm and dry; no rash  NERVOUS SYSTEM:  Awake and alert; Oriented  to place, person and time ; no new deficits    _________________________________________________  LABS:                        11.9   13.94 )-----------( 247      ( 2023 08:00 )             35.7     04-30    135  |  103  |  12  ----------------------------<  257<H>  3.5   |  24  |  1.07    Ca    8.5      2023 08:00  Phos  1.5     04-30  Mg     2.1     04-30    TPro  7.2  /  Alb  2.1<L>  /  TBili  0.5  /  DBili  x   /  AST  31  /  ALT  41  /  AlkPhos  93  04-30    PT/INR - ( 2023 15:00 )   PT: 15.2 sec;   INR: 1.27 ratio         PTT - ( 2023 15:00 )  PTT:25.7 sec  Urinalysis Basic - ( 2023 14:50 )    Color: Yellow / Appearance: Clear / S.020 / pH: x  Gluc: x / Ketone: Large  / Bili: Negative / Urobili: Negative   Blood: x / Protein: 30 mg/dL / Nitrite: Negative   Leuk Esterase: Negative / RBC: 2-5 /HPF / WBC 0-2 /HPF   Sq Epi: x / Non Sq Epi: x / Bacteria: Few /HPF      CAPILLARY BLOOD GLUCOSE      POCT Blood Glucose.: 343 mg/dL (2023 17:55)  POCT Blood Glucose.: 270 mg/dL (2023 11:26)  POCT Blood Glucose.: 241 mg/dL (2023 09:36)  POCT Blood Glucose.: 250 mg/dL (2023 08:38)  POCT Blood Glucose.: 263 mg/dL (2023 07:44)  POCT Blood Glucose.: 272 mg/dL (2023 06:33)  POCT Blood Glucose.: 276 mg/dL (2023 00:13)  POCT Blood Glucose.: 296 mg/dL (2023 20:37)

## 2023-05-01 DIAGNOSIS — R78.81 BACTEREMIA: ICD-10-CM

## 2023-05-01 DIAGNOSIS — D35.2 BENIGN NEOPLASM OF PITUITARY GLAND: ICD-10-CM

## 2023-05-01 LAB
-  AMIKACIN: SIGNIFICANT CHANGE UP
-  AMIKACIN: SIGNIFICANT CHANGE UP
-  AMOXICILLIN/CLAVULANIC ACID: SIGNIFICANT CHANGE UP
-  AMOXICILLIN/CLAVULANIC ACID: SIGNIFICANT CHANGE UP
-  AMPICILLIN/SULBACTAM: SIGNIFICANT CHANGE UP
-  AMPICILLIN: SIGNIFICANT CHANGE UP
-  AMPICILLIN: SIGNIFICANT CHANGE UP
-  AZTREONAM: SIGNIFICANT CHANGE UP
-  AZTREONAM: SIGNIFICANT CHANGE UP
-  CEFAZOLIN: SIGNIFICANT CHANGE UP
-  CEFEPIME: SIGNIFICANT CHANGE UP
-  CEFEPIME: SIGNIFICANT CHANGE UP
-  CEFOXITIN: SIGNIFICANT CHANGE UP
-  CEFOXITIN: SIGNIFICANT CHANGE UP
-  CEFTRIAXONE: SIGNIFICANT CHANGE UP
-  CEFTRIAXONE: SIGNIFICANT CHANGE UP
-  CIPROFLOXACIN: SIGNIFICANT CHANGE UP
-  CIPROFLOXACIN: SIGNIFICANT CHANGE UP
-  CLINDAMYCIN: SIGNIFICANT CHANGE UP
-  ERTAPENEM: SIGNIFICANT CHANGE UP
-  ERTAPENEM: SIGNIFICANT CHANGE UP
-  ERYTHROMYCIN: SIGNIFICANT CHANGE UP
-  GENTAMICIN: SIGNIFICANT CHANGE UP
-  LEVOFLOXACIN: SIGNIFICANT CHANGE UP
-  LEVOFLOXACIN: SIGNIFICANT CHANGE UP
-  MEROPENEM: SIGNIFICANT CHANGE UP
-  MEROPENEM: SIGNIFICANT CHANGE UP
-  OXACILLIN: SIGNIFICANT CHANGE UP
-  PENICILLIN: SIGNIFICANT CHANGE UP
-  PIPERACILLIN/TAZOBACTAM: SIGNIFICANT CHANGE UP
-  PIPERACILLIN/TAZOBACTAM: SIGNIFICANT CHANGE UP
-  RIFAMPIN: SIGNIFICANT CHANGE UP
-  TETRACYCLINE: SIGNIFICANT CHANGE UP
-  TOBRAMYCIN: SIGNIFICANT CHANGE UP
-  TOBRAMYCIN: SIGNIFICANT CHANGE UP
-  TRIMETHOPRIM/SULFAMETHOXAZOLE: SIGNIFICANT CHANGE UP
-  VANCOMYCIN: SIGNIFICANT CHANGE UP
A1C WITH ESTIMATED AVERAGE GLUCOSE RESULT: 12.1 % — HIGH (ref 4–5.6)
A1C WITH ESTIMATED AVERAGE GLUCOSE RESULT: 12.2 % — HIGH (ref 4–5.6)
ALBUMIN SERPL ELPH-MCNC: 2 G/DL — LOW (ref 3.5–5)
ALP SERPL-CCNC: 92 U/L — SIGNIFICANT CHANGE UP (ref 40–120)
ALT FLD-CCNC: 43 U/L DA — SIGNIFICANT CHANGE UP (ref 10–60)
ANION GAP SERPL CALC-SCNC: 8 MMOL/L — SIGNIFICANT CHANGE UP (ref 5–17)
AST SERPL-CCNC: 31 U/L — SIGNIFICANT CHANGE UP (ref 10–40)
BASOPHILS # BLD AUTO: 0.04 K/UL — SIGNIFICANT CHANGE UP (ref 0–0.2)
BASOPHILS NFR BLD AUTO: 0.3 % — SIGNIFICANT CHANGE UP (ref 0–2)
BILIRUB SERPL-MCNC: 0.4 MG/DL — SIGNIFICANT CHANGE UP (ref 0.2–1.2)
BUN SERPL-MCNC: 11 MG/DL — SIGNIFICANT CHANGE UP (ref 7–18)
CALCIUM SERPL-MCNC: 8.7 MG/DL — SIGNIFICANT CHANGE UP (ref 8.4–10.5)
CHLORIDE SERPL-SCNC: 103 MMOL/L — SIGNIFICANT CHANGE UP (ref 96–108)
CO2 SERPL-SCNC: 27 MMOL/L — SIGNIFICANT CHANGE UP (ref 22–31)
CREAT SERPL-MCNC: 1.07 MG/DL — SIGNIFICANT CHANGE UP (ref 0.5–1.3)
CRP SERPL-MCNC: 230 MG/L — HIGH
EGFR: 74 ML/MIN/1.73M2 — SIGNIFICANT CHANGE UP
EOSINOPHIL # BLD AUTO: 0.07 K/UL — SIGNIFICANT CHANGE UP (ref 0–0.5)
EOSINOPHIL NFR BLD AUTO: 0.6 % — SIGNIFICANT CHANGE UP (ref 0–6)
ERYTHROCYTE [SEDIMENTATION RATE] IN BLOOD: 86 MM/HR — HIGH (ref 0–20)
ESTIMATED AVERAGE GLUCOSE: 301 MG/DL — HIGH (ref 68–114)
ESTIMATED AVERAGE GLUCOSE: 303 MG/DL — HIGH (ref 68–114)
GLUCOSE BLDC GLUCOMTR-MCNC: 122 MG/DL — HIGH (ref 70–99)
GLUCOSE BLDC GLUCOMTR-MCNC: 165 MG/DL — HIGH (ref 70–99)
GLUCOSE BLDC GLUCOMTR-MCNC: 173 MG/DL — HIGH (ref 70–99)
GLUCOSE BLDC GLUCOMTR-MCNC: 198 MG/DL — HIGH (ref 70–99)
GLUCOSE SERPL-MCNC: 131 MG/DL — HIGH (ref 70–99)
GRAM STN FLD: SIGNIFICANT CHANGE UP
HCT VFR BLD CALC: 38 % — LOW (ref 39–50)
HGB BLD-MCNC: 12.1 G/DL — LOW (ref 13–17)
IMM GRANULOCYTES NFR BLD AUTO: 0.7 % — SIGNIFICANT CHANGE UP (ref 0–0.9)
LYMPHOCYTES # BLD AUTO: 1.54 K/UL — SIGNIFICANT CHANGE UP (ref 1–3.3)
LYMPHOCYTES # BLD AUTO: 13.5 % — SIGNIFICANT CHANGE UP (ref 13–44)
MAGNESIUM SERPL-MCNC: 2.2 MG/DL — SIGNIFICANT CHANGE UP (ref 1.6–2.6)
MCHC RBC-ENTMCNC: 26.1 PG — LOW (ref 27–34)
MCHC RBC-ENTMCNC: 31.8 GM/DL — LOW (ref 32–36)
MCV RBC AUTO: 81.9 FL — SIGNIFICANT CHANGE UP (ref 80–100)
METHOD TYPE: SIGNIFICANT CHANGE UP
MONOCYTES # BLD AUTO: 1.38 K/UL — HIGH (ref 0–0.9)
MONOCYTES NFR BLD AUTO: 12.1 % — SIGNIFICANT CHANGE UP (ref 2–14)
NEUTROPHILS # BLD AUTO: 8.32 K/UL — HIGH (ref 1.8–7.4)
NEUTROPHILS NFR BLD AUTO: 72.8 % — SIGNIFICANT CHANGE UP (ref 43–77)
NRBC # BLD: 0 /100 WBCS — SIGNIFICANT CHANGE UP (ref 0–0)
PHOSPHATE SERPL-MCNC: 3.6 MG/DL — SIGNIFICANT CHANGE UP (ref 2.5–4.5)
PLATELET # BLD AUTO: 272 K/UL — SIGNIFICANT CHANGE UP (ref 150–400)
POTASSIUM SERPL-MCNC: 3.2 MMOL/L — LOW (ref 3.5–5.3)
POTASSIUM SERPL-SCNC: 3.2 MMOL/L — LOW (ref 3.5–5.3)
PROT SERPL-MCNC: 7.4 G/DL — SIGNIFICANT CHANGE UP (ref 6–8.3)
RBC # BLD: 4.64 M/UL — SIGNIFICANT CHANGE UP (ref 4.2–5.8)
RBC # FLD: 13.9 % — SIGNIFICANT CHANGE UP (ref 10.3–14.5)
SODIUM SERPL-SCNC: 138 MMOL/L — SIGNIFICANT CHANGE UP (ref 135–145)
VANCOMYCIN TROUGH SERPL-MCNC: 9.2 UG/ML — LOW (ref 10–20)
WBC # BLD: 11.43 K/UL — HIGH (ref 3.8–10.5)
WBC # FLD AUTO: 11.43 K/UL — HIGH (ref 3.8–10.5)

## 2023-05-01 PROCEDURE — 99233 SBSQ HOSP IP/OBS HIGH 50: CPT | Mod: GC

## 2023-05-01 PROCEDURE — 99223 1ST HOSP IP/OBS HIGH 75: CPT

## 2023-05-01 PROCEDURE — 70553 MRI BRAIN STEM W/O & W/DYE: CPT | Mod: 26

## 2023-05-01 PROCEDURE — 70543 MRI ORBT/FAC/NCK W/O &W/DYE: CPT | Mod: 26

## 2023-05-01 PROCEDURE — 93923 UPR/LXTR ART STDY 3+ LVLS: CPT | Mod: 26

## 2023-05-01 PROCEDURE — 73630 X-RAY EXAM OF FOOT: CPT | Mod: 26,LT

## 2023-05-01 RX ORDER — INSULIN LISPRO 100/ML
10 VIAL (ML) SUBCUTANEOUS
Refills: 0 | Status: DISCONTINUED | OUTPATIENT
Start: 2023-05-01 | End: 2023-05-04

## 2023-05-01 RX ORDER — CHLORHEXIDINE GLUCONATE 213 G/1000ML
1 SOLUTION TOPICAL
Refills: 0 | Status: DISCONTINUED | OUTPATIENT
Start: 2023-05-01 | End: 2023-05-11

## 2023-05-01 RX ORDER — NAFCILLIN 10 G/100ML
2 INJECTION, POWDER, FOR SOLUTION INTRAVENOUS EVERY 4 HOURS
Refills: 0 | Status: DISCONTINUED | OUTPATIENT
Start: 2023-05-01 | End: 2023-05-03

## 2023-05-01 RX ORDER — POTASSIUM CHLORIDE 20 MEQ
40 PACKET (EA) ORAL EVERY 4 HOURS
Refills: 0 | Status: COMPLETED | OUTPATIENT
Start: 2023-05-01 | End: 2023-05-01

## 2023-05-01 RX ORDER — ENOXAPARIN SODIUM 100 MG/ML
40 INJECTION SUBCUTANEOUS EVERY 24 HOURS
Refills: 0 | Status: DISCONTINUED | OUTPATIENT
Start: 2023-05-01 | End: 2023-05-04

## 2023-05-01 RX ORDER — CEFEPIME 1 G/1
2000 INJECTION, POWDER, FOR SOLUTION INTRAMUSCULAR; INTRAVENOUS EVERY 12 HOURS
Refills: 0 | Status: DISCONTINUED | OUTPATIENT
Start: 2023-05-01 | End: 2023-05-03

## 2023-05-01 RX ADMIN — Medication 8 UNIT(S): at 08:14

## 2023-05-01 RX ADMIN — BRIMONIDINE TARTRATE 1 DROP(S): 2 SOLUTION/ DROPS OPHTHALMIC at 05:52

## 2023-05-01 RX ADMIN — Medication 8 UNIT(S): at 11:56

## 2023-05-01 RX ADMIN — Medication 40 MILLIEQUIVALENT(S): at 17:02

## 2023-05-01 RX ADMIN — Medication 250 MILLIGRAM(S): at 18:43

## 2023-05-01 RX ADMIN — CHLORHEXIDINE GLUCONATE 1 APPLICATION(S): 213 SOLUTION TOPICAL at 22:07

## 2023-05-01 RX ADMIN — CEFEPIME 100 MILLIGRAM(S): 1 INJECTION, POWDER, FOR SOLUTION INTRAMUSCULAR; INTRAVENOUS at 23:14

## 2023-05-01 RX ADMIN — GABAPENTIN 300 MILLIGRAM(S): 400 CAPSULE ORAL at 06:05

## 2023-05-01 RX ADMIN — PIPERACILLIN AND TAZOBACTAM 25 GRAM(S): 4; .5 INJECTION, POWDER, LYOPHILIZED, FOR SOLUTION INTRAVENOUS at 17:03

## 2023-05-01 RX ADMIN — BRIMONIDINE TARTRATE 1 DROP(S): 2 SOLUTION/ DROPS OPHTHALMIC at 18:44

## 2023-05-01 RX ADMIN — Medication 250 MILLIGRAM(S): at 06:05

## 2023-05-01 RX ADMIN — Medication 40 MILLIEQUIVALENT(S): at 08:55

## 2023-05-01 RX ADMIN — FAMOTIDINE 40 MILLIGRAM(S): 10 INJECTION INTRAVENOUS at 18:44

## 2023-05-01 RX ADMIN — INSULIN GLARGINE 30 UNIT(S): 100 INJECTION, SOLUTION SUBCUTANEOUS at 23:14

## 2023-05-01 RX ADMIN — NAFCILLIN 200 GRAM(S): 10 INJECTION, POWDER, FOR SOLUTION INTRAVENOUS at 23:15

## 2023-05-01 RX ADMIN — GABAPENTIN 300 MILLIGRAM(S): 400 CAPSULE ORAL at 17:02

## 2023-05-01 RX ADMIN — PIPERACILLIN AND TAZOBACTAM 25 GRAM(S): 4; .5 INJECTION, POWDER, LYOPHILIZED, FOR SOLUTION INTRAVENOUS at 06:05

## 2023-05-01 RX ADMIN — FAMOTIDINE 40 MILLIGRAM(S): 10 INJECTION INTRAVENOUS at 06:04

## 2023-05-01 RX ADMIN — GABAPENTIN 300 MILLIGRAM(S): 400 CAPSULE ORAL at 23:16

## 2023-05-01 NOTE — CONSULT NOTE ADULT - ASSESSMENT
72M from home, ambulates independently, PMHx DM on insulin, HLD, HTN, and medication non-compliance, PSHx hemicolon resection 2011 for colon cancer (currently in remission), p/w complaints of lightheadedness admitted with severe severe sepsis due to gas gangrene of his left foot. Pt Rx with vancomycin and piperacillin/tazobactam starting 4/29. Underwent partial amputation of 5th ray of L 5th digit 4/30. BC + for methicillin- susceptbile S. aureus (MSSA); surgical Cx growing S. aureus and Morganella morganii. Pt clinically improving. WBC coming down. 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     # Severe sepsis due to gangrene of LLE: s/p partial amputation of 5th digit. Of concern is ??viability of surrounding tissue/bone post-op. BCs growing MSSA. S. aureus in surgical specimen will likely have the same susceptibilities as the S. aureus in the blood. Recommend:  --d/c vancomycin  --start nafcillin 2g q4h  --cont. cefepime pending susceptibilities of M. 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 Will be followed by Endocrinology.    Above discussed in detail with Podiatry service and Dr. Atkins.

## 2023-05-01 NOTE — CONSULT NOTE ADULT - SUBJECTIVE AND OBJECTIVE BOX
HPI:  72M from home, ambulates independently, PMHx DM on insulin, HLD, HTN, and medication non-compliance, PSHx hemicolon resection  for colon cancer (currently in remission), p/w complaints of lightheadedness today after fall with LOC and head injury. He reports he slipped and fell  in the bedroom while trying to turn on the light. He remembers falling unable to hold on to anything to prevent fall and hitting back of his head against the wall with LOC. Reports being being on the floor for about 4- hours as he was unable to get up initially. Denies preceding HA, SOB, chest pain, and no dizziness or lightheadedness. Denies urinary incontinence. Patient denies any injuries at the time, but complained of feeling lightheaded after the fall with confusion . He works as a  on the night shifts, reporting that directly after the fall, he felt well enough to work until . On , On , he noticed with left foot pain, swelling, difficulty ambulating.  Patient took nothing for his pain.  Denies fever, chills.  No reported falls or syncope in the past. Denies prior foot injury or trauma. Patient denies HA, chest pain, SOB, abdominal pain, n/v/d, and no changes in urination or bm.  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 Underwent partial amputation of 5th ray of L 5th digit . BC + for MSSA; surgical Cx growing MSSA and Morganella morganii.       PAST MEDICAL & SURGICAL HISTORY:  DM (diabetes mellitus)      Colon cancer      HTN (hypertension)      Hyperlipidemia      S/P inguinal hernia repair      History of colectomy      S/P arthroscopic knee surgery      History of repair of hiatal hernia          MEDS:  acetaminophen     Tablet .. 650 milliGRAM(s) Oral every 6 hours PRN  brimonidine 0.2% Ophthalmic Solution 1 Drop(s) Left EYE every 12 hours  chlorhexidine 2% Cloths 1 Application(s) Topical <User Schedule>  famotidine    Tablet 40 milliGRAM(s) Oral every 12 hours  gabapentin 300 milliGRAM(s) Oral every 8 hours  insulin glargine Injectable (LANTUS) 30 Unit(s) SubCutaneous at bedtime  insulin lispro (ADMELOG) corrective regimen sliding scale   SubCutaneous Before meals and at bedtime  insulin lispro Injectable (ADMELOG) 8 Unit(s) SubCutaneous three times a day before meals  lactated ringers. 1000 milliLiter(s) IV Continuous <Continuous>  ondansetron Injectable 4 milliGRAM(s) IV Push every 8 hours PRN  piperacillin/tazobactam IVPB.. 3.375 Gram(s) IV Intermittent every 8 hours  potassium chloride    Tablet ER 40 milliEquivalent(s) Oral every 4 hours  vancomycin  IVPB 1000 milliGRAM(s) IV Intermittent every 12 hours      ALLERGIES  Percocet 5/325 (Other)      SOCIAL HISTORY:    FAMILY HISTORY:      ROS:    General:	    Skin:  	  HEENT:    Respiratory and Thorax:  	  Cardiovascular:	    Abdomen:	    Genitourinary:	    Musculoskeletal:	    Neurological:	    Psychiatric:	    Hematology/Lymphatics:	    Endocrine:	    PHYSICAL EXAM:    Vital Signs Last 24 Hrs  T(C): 37.4 (01 May 2023 13:23), Max: 39.2 (2023 21:22)  T(F): 99.3 (01 May 2023 13:23), Max: 102.6 (2023 21:22)  HR: 103 (01 May 2023 13:23) (81 - 103)  BP: 124/73 (01 May 2023 13:23) (124/73 - 170/89)  BP(mean): --  RR: 18 (01 May 2023 13:23) (18 - 19)  SpO2: 97% (01 May 2023 13:23) (95% - 98%)    Parameters below as of 01 May 2023 13:23  Patient On (Oxygen Delivery Method): room air          Gen:    HEENT:    Neck:    Lymph Nodes:    Breasts:    Back:    Chest/Thorax:    Cardiovascular:    Gastrointestinal:    Genitourinary:    Rectal:    Extremities:    Vascular:    Neurological:    Skin:    Psychiatric:    LABS/DIAGNOSTIC TESTS:                          12.1   11.43 )-----------( 272      ( 01 May 2023 05:29 )             38.0     WBC Count: 11.43 K/uL ( @ 05:29)  WBC Count: 13.94 K/uL ( @ 08:00)  WBC Count: 16.95 K/uL ( @ 15:00)          138  |  103  |  11  ----------------------------<  131<H>  3.2<L>   |  27  |  1.07    Ca    8.7      01 May 2023 05:29  Phos  3.6       Mg     2.2         TPro  7.4  /  Alb  2.0<L>  /  TBili  0.4  /  DBili  x   /  AST  31  /  ALT  43  /  AlkPhos  92        Urinalysis Basic - ( 2023 14:50 )    Color: Yellow / Appearance: Clear / S.020 / pH: x  Gluc: x / Ketone: Large  / Bili: Negative / Urobili: Negative   Blood: x / Protein: 30 mg/dL / Nitrite: Negative   Leuk Esterase: Negative / RBC: 2-5 /HPF / WBC 0-2 /HPF   Sq Epi: x / Non Sq Epi: x / Bacteria: Few /HPF        LIVER FUNCTIONS - ( 01 May 2023 05:29 )  Alb: 2.0 g/dL / Pro: 7.4 g/dL / ALK PHOS: 92 U/L / ALT: 43 U/L DA / AST: 31 U/L / GGT: x             PT/INR - ( 2023 15:00 )   PT: 15.2 sec;   INR: 1.27 ratio         PTT - ( 2023 15:00 )  PTT:25.7 sec    LACTATE: Lactate, Blood (04.29.23 @ 18:45)   Lactate, Blood: 1.7 mmol/L      Historical Values  Lactate, Blood (.29.23 @ 18:45)   Lactate, Blood: 1.7 mmol/L  Lactate, Blood (.29.23 @ 15:00)   Lactate, Blood: 2.2       CULTURES:     Culture - Surgical Swab (collected 23 @ 10:13)  Source: .Surgical Swab Left 5th ray clean margin  Preliminary Report (23 @ 10:41):    Rare Morganella morganii    Rare Staphylococcus aureus    Culture - Surgical Swab (collected 23 @ 10:13)  Source: .Surgical Swab Left foot dirty culture  Preliminary Report (23 @ 10:37):    Few Morganella morganii    Few Staphylococcus aureus    Culture - Surgical Swab (collected 23 @ 18:10)  Source: .Surgical Swab left foot wound  Preliminary Report (23 @ 23:29):    Few Staphylococcus aureus    Few Morganella morganii    Culture - Urine (collected 23 @ 15:10)  Source: Clean Catch Clean Catch (Midstream)  Final Report (23 @ 17:29):    <10,000 CFU/mL Normal Urogenital Sia    Culture - Blood (collected 23 @ 15:10)  Source: .Blood Blood-Peripheral  Gram Stain (23 @ 10:06):    Growth in aerobic bottle: Gram Positive Cocci in Clusters  Preliminary Report (23 @ 10:07):    Growth in aerobic bottle: Gram Positive Cocci in Clusters    Culture - Blood (collected 23 @ 15:00)  Source: .Blood Blood-Peripheral  Gram Stain (23 @ 10:06):    Growth in aerobic bottle: Gram Positive Cocci in Clusters  Preliminary Report (23 @ 10:06):    Growth in aerobic bottle: Gram Positive Cocci in Clusters    ***Blood Panel PCR results on this specimen are available    approximately 3 hours after the Gram stain result.***    Gram stain, PCR, and/or culture results may not always    correspond due to difference in methodologies.    ************************************************************    This PCR assay was performed by multiplex PCR. This    Assay tests for 66 bacterial and resistance gene targets.    Please refer to the [facility] Labs test directory    at [email or web address] for details.  Organism: Blood Culture PCR (23 @ 11:21)  Organism: Blood Culture PCR (23 @ 11:21)      -  Methicillin SENSITIVE Staphylococcus aureus (MSSA): Detec Any isolate of Staphylococcus aureus from a blood culture is NOT considered a contaminant.      Method Type: PCR        RADIOLOGY  < from: Xray Foot AP + Lateral + Oblique, Left (23 @ 15:15) >  ACC: [id number] EXAM:  XR FOOT COMP MIN 3 VIEWS LT   ORDERED BY: JED MARLOW     ACC: [id number] EXAM:  XR ANKLE COMP MIN 3 VIEWS LT   ORDERED BY: JED MARLOW     PROCEDURE DATE:  2023          INTERPRETATION:  Left ankle 3 views and leftfoot 3 views    CLINICAL HISTORY: Pain status post fall        IMPRESSION: No evidence of fracture or suspicious lesion. Ankle mortise   is maintained. Calcaneal plantar spurs are noted. Hallux valgus deformity   is seen. The periarticular erosive change of the medial margin of the   first metatarsal head may be consistent with history of gout. Erosive   deformity is seen at the head of the first proximal phalanx at the   articular margin. There is minimal deformity of the medial margin of the   head of the second proximal phalanx and a nondisplaced fracture at this   level cannot be excluded.    Soft tissue infection with swelling and soft tissue air is seen at the   level of the fifth digit. Vascular calcification is present.     If clinically warranted further assessment may include three-phase bone   scan or MRI imaging to assess for early underlying osteomyelitis not yet   apparent on plain film.                         HPI/Course in Hospital:  72M from home, ambulates independently, PMHx DM on insulin, HLD, HTN, and medication non-compliance, PSHx hemicolon resection  for colon cancer (currently in remission), p/w complaints of lightheadedness today after fall with LOC and head injury. He reports he slipped and fell  in the bedroom while trying to turn on the light. He remembers falling unable to hold on to anything to prevent fall and hitting back of his head against the wall with LOC. Reports being on the floor for about 4- hours as he was unable to get up initially. Denies preceding HA, SOB, chest pain, and no dizziness or lightheadedness. Denies urinary incontinence. Patient denies any injuries at the time, but complained of feeling lightheaded after the fall with confusion . He works as a  on the night shifts, reporting that directly after the fall, he felt well enough to work until . On , On , he noticed with left foot pain, swelling, difficulty ambulating.  Patient took nothing for his pain.  Denied fever, chills.  No reported falls or syncope in the past. Denied prior foot injury or trauma. Patient denies HA, chest pain, SOB, abdominal pain, n/v/d, and no changes in urination or bm.  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 Underwent partial amputation of 5th ray of L foot . BC + for MSSA; surgical Cx growing MSSA and Morganella morganii. Pt without complaints at the time of my ID consult.      PAST MEDICAL & SURGICAL HISTORY:  DM (diabetes mellitus)      Colon cancer      HTN (hypertension)      Hyperlipidemia      S/P inguinal hernia repair      History of colectomy      S/P arthroscopic knee surgery      History of repair of hiatal hernia          MEDS:  acetaminophen     Tablet .. 650 milliGRAM(s) Oral every 6 hours PRN  brimonidine 0.2% Ophthalmic Solution 1 Drop(s) Left EYE every 12 hours  chlorhexidine 2% Cloths 1 Application(s) Topical <User Schedule>  famotidine    Tablet 40 milliGRAM(s) Oral every 12 hours  gabapentin 300 milliGRAM(s) Oral every 8 hours  insulin glargine Injectable (LANTUS) 30 Unit(s) SubCutaneous at bedtime  insulin lispro (ADMELOG) corrective regimen sliding scale   SubCutaneous Before meals and at bedtime  insulin lispro Injectable (ADMELOG) 8 Unit(s) SubCutaneous three times a day before meals  lactated ringers. 1000 milliLiter(s) IV Continuous <Continuous>  ondansetron Injectable 4 milliGRAM(s) IV Push every 8 hours PRN  piperacillin/tazobactam IVPB.. 3.375 Gram(s) IV Intermittent every 8 hours  potassium chloride    Tablet ER 40 milliEquivalent(s) Oral every 4 hours  vancomycin  IVPB 1000 milliGRAM(s) IV Intermittent every 12 hours      ALLERGIES  Percocet 5/325 (Other)      SOCIAL HISTORY: born in [place]; no cigs, no ETOH; lives with wife; works in security    FAMILY HISTORY: not pertinent to patient's clinical presentation      ROS:    General: no fever or chills	    Skin: see HPI  	  HEENT: has dentures but doesn't wear them    Respiratory and Thorax: no SOB or cough  	  Cardiovascular:	no CP    GI: no N,V, diarrhea	    Genitourinary:	no urinary tract complaints    Musculoskeletal:	 see HPI    Neurological:	see HPI    Endocrine:	DM      PHYSICAL EXAM:    Vital Signs Last 24 Hrs  T(C): 37.4 (01 May 2023 13:23), Max: 39.2 (2023 21:22)  T(F): 99.3 (01 May 2023 13:23), Max: 102.6 (2023 21:22)  HR: 103 (01 May 2023 13:23) (81 - 103)  BP: 124/73 (01 May 2023 13:23) (124/73 - 170/89)  BP(mean): --  RR: 18 (01 May 2023 13:23) (18 - 19)  SpO2: 97% (01 May 2023 13:23) (95% - 98%)    Parameters below as of 01 May 2023 13:23  Patient On (Oxygen Delivery Method): room air          Gen: WD B male in NAD    HEENT: NC/AT; conj. 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; ?devitalized soft tissue/bone; no tenderness to palpation; receding of dorsal erythema with 1+ swelling of foot/remaining toes    Neurological: A+O x3; Cr n grossly intact    Skin: see above    Psychiatric: affect appropriate        LABS/DIAGNOSTIC TESTS:                        12.1   11.43 )-----------( 272      ( 01 May 2023 05:29 )             38.0     WBC Count: 11.43 K/uL ( @ 05:29)  WBC Count: 13.94 K/uL ( @ 08:00)  WBC Count: 16.95 K/uL ( @ 15:00)          138  |  103  |  11  ----------------------------<  131<H>  3.2<L>   |  27  |  1.07    Ca    8.7      01 May 2023 05:29  Phos  3.6     05-  Mg     2.2         TPro  7.4  /  Alb  2.0<L>  /  TBili  0.4  /  DBili  x   /  AST  31  /  ALT  43  /  AlkPhos  92  -      Urinalysis Basic - ( 2023 14:50 )    Color: Yellow / Appearance: Clear / S.020 / pH: x  Gluc: x / Ketone: Large  / Bili: Negative / Urobili: Negative   Blood: x / Protein: 30 mg/dL / Nitrite: Negative   Leuk Esterase: Negative / RBC: 2-5 /HPF / WBC 0-2 /HPF   Sq Epi: x / Non Sq Epi: x / Bacteria: Few /HPF        LIVER FUNCTIONS - ( 01 May 2023 05:29 )  Alb: 2.0 g/dL / Pro: 7.4 g/dL / ALK PHOS: 92 U/L / ALT: 43 U/L DA / AST: 31 U/L / GGT: x             PT/INR - ( 2023 15:00 )   PT: 15.2 sec;   INR: 1.27 ratio         PTT - ( 2023 15:00 )  PTT:25.7 sec    LACTATE: Lactate, Blood (23 @ 18:45)   Lactate, Blood: 1.7 mmol/L      Historical Values  Lactate, Blood (23 @ 18:45)   Lactate, Blood: 1.7 mmol/L  Lactate, Blood (23 @ 15:00)   Lactate, Blood: 2.2       CULTURES:     Culture - Surgical Swab (collected 23 @ 10:13)  Source: .Surgical Swab Left 5th ray clean margin  Preliminary Report (23 @ 10:41):    Rare Morganella morganii    Rare Staphylococcus aureus    Culture - Surgical Swab (collected 23 @ 10:13)  Source: .Surgical Swab Left foot dirty culture  Preliminary Report (23 @ 10:37):    Few Morganella morganii    Few Staphylococcus aureus    Culture - Surgical Swab (collected 23 @ 18:10)  Source: .Surgical Swab left foot wound  Preliminary Report (23 @ 23:29):    Few Staphylococcus aureus    Few Morganella morganii    Culture - Urine (collected 23 @ 15:10)  Source: Clean Catch Clean Catch (Midstream)  Final Report (23 @ 17:29):    <10,000 CFU/mL Normal Urogenital Sia    Culture - Blood (collected 23 @ 15:10)  Source: .Blood Blood-Peripheral  Gram Stain (23 @ 10:06):    Growth in aerobic bottle: Gram Positive Cocci in Clusters  Preliminary Report (23 @ 10:07):    Growth in aerobic bottle: Gram Positive Cocci in Clusters    Culture - Blood (collected 23 @ 15:00)  Source: .Blood Blood-Peripheral  Gram Stain (23 @ 10:06):    Growth in aerobic bottle: Gram Positive Cocci in Clusters  Preliminary Report (23 @ 10:06):    Growth in aerobic bottle: Gram Positive Cocci in Clusters    ***Blood Panel PCR results on this specimen are available    approximately 3 hours after the Gram stain result.***    Gram stain, PCR, and/or culture results may not always    correspond due to difference in methodologies.    ************************************************************    This PCR assay was performed by multiplex PCR. This    Assay tests for 66 bacterial and resistance gene targets.    Please refer to the [facility] test directory    at [email or web address] for details.  Organism: Blood Culture PCR (23 @ 11:21)  Organism: Blood Culture PCR (23 @ 11:21)      -  Methicillin SENSITIVE Staphylococcus aureus (MSSA): Detec Any isolate of Staphylococcus aureus from a blood culture is NOT considered a contaminant.      Method Type: PCR        RADIOLOGY  < from: Xray Foot AP + Lateral + Oblique, Left (23 @ 15:15) >  ACC: [id number] EXAM:  XR FOOT COMP MIN 3 VIEWS LT   ORDERED BY: JED MARLOW     ACC: [id number] EXAM:  XR ANKLE COMP MIN 3 VIEWS LT   ORDERED BY: JED MARLOW     PROCEDURE DATE:  2023          INTERPRETATION:  Left ankle 3 views and leftfoot 3 views    CLINICAL HISTORY: Pain status post fall        IMPRESSION: No evidence of fracture or suspicious lesion. Ankle mortise   is maintained. Calcaneal plantar spurs are noted. Hallux valgus deformity   is seen. The periarticular erosive change of the medial margin of the   first metatarsal head may be consistent with history of gout. Erosive   deformity is seen at the head of the first proximal phalanx at the   articular margin. There is minimal deformity of the medial margin of the   head of the second proximal phalanx and a nondisplaced fracture at this   level cannot be excluded.    Soft tissue infection with swelling and soft tissue air is seen at the   level of the fifth digit. Vascular calcification is present.     If clinically warranted further assessment may include three-phase bone   scan or MRI imaging to assess for early underlying osteomyelitis not yet   apparent on plain film.    _____________________________________________________________________________    < from: CT Head No Cont (23 @ 14:56) >  ACC: [id number] EXAM:  CT BRAIN   ORDERED BY: JED MARLOW     PROCEDURE DATE:  2023          INTERPRETATION:  EXAM: CT head without contrast    INDICATION: Head trauma    TECHNIQUE: CT examination of the head performed without contrast.   Multiple axial images obtained from skull base to vertex.   Sagittal/coronal reformatted images obtained from axial data set. Images   reviewed in soft tissue and bone windows.      COMPARISON: 2022 CT head.    IMPRESSION:    No acute intracranial hemorrhage, hydrocephalus or extra-axial fluid   collection.  Mild parenchymal volume loss and mild chronic microvascular ischemic   changes.  Abnormal enlargement of the sella with suggestion of masslike lesion   perhaps pituitary in origin with apparent mass effect on the optic   chiasm. Further evaluation of this finding with dedicated   contrast-enhanced MR imaging of the brain, sella/pituitary protocol may   be obtained, as clinically warranted  No CT evidence for acute transcortical infarction.    _____________________________________________________________    < from: MR Head w/wo IV Cont (23 @ 15:37) >  ACC: [id number] EXAM:  MR ORBITS ONLY WAWIC   ORDERED BY: TEODORO EPPERSON     ACC: [id number] EXAM:  MR BRAIN WAW IC   ORDERED BY: TEODORO EPPERSON     PROCEDURE DATE:  2023          INTERPRETATION:  Two examinations were performed on this patient:    1. MR of the orbits with and without gadolinium  2. MR of the brain with and without gadolinium    CLINICAL INFORMATION (both examinations):    Pituitary tumor      1. MR orbits with and without gadolinium    TECHNIQUE:   Coronal T1-weighted, coronal fat-saturated T2-weighted and   axial and coronal postgadolinium fat-saturated T1-weighted images of the   orbits were obtained.    FINDINGS:   No prior similar studies are available for review.    The optic nerves are intact without abnormal enhancement, abnormal signal   intensity or mass lesion.  The orbital apices are intact.  The cisternal   segments of the optic nerves, the optic chiasm and the optic tracks   appear intact.    The orbits appear intact. The lenses are surgically small. The globes are   intact.  No preseptal abnormalities found.  Intraconal and extraconal fat   is preserved.  The extraocular muscles demonstrate symmetric physiologic   enhancement.  The superior ophthalmic veins are patent and symmetric.    The sella is enlarged with a 1.8 cm AP by 1.6 cm CC by 2 cm TRV pituitary   mass consistent with an adenoma. There is mild extension into the LEFT   cavernous sinus. The RIGHT cavernous sinuses and para cavernous regions   appear intact.    The cavernous segments of theinternal carotid arteries   demonstrate expected flow-void indicating patency. There is mild mass   effect on the optic chiasm, LEFT greater than RIGHT. The infundibulum is   slightly deviated to the LEFT. The central skull base is intact.  The   temporal bones appear intact. Moderate mucosal thickening is seen within   the LEFT mastoid air cells.    The paranasal sinuses are clear.  No significant mucosal disease or   abnormal paranasal sinus fluid collection is found.  The nasal cavity   appears intact.  The deep facial spaces are intact.          2.  MR brain with and without gadolinium    TECHNIQUE:   Sagittal and axial T1-weighted images, sagittal axial FLAIR   images, axial  T2-weighted images and axial diffusion weighted images of   the brain were obtained. Following gadolinium administration isotropic   volumetric T1-weighted images were obtained; this data set was   reformatted using imaging post processing software into multiple imaging   planes.    FINDINGS:   No prior similar studies are available for review.    The brain demonstrates minimal white matter ischemia at the bifrontal and   LEFT parietal subcortical white matter.  No acute cerebral cortical   infarct is found.   No intracranial hemorrhage is recognized.  No mass   effect is found in the brain.    The ventricles, sulci and basal cisterns appear unremarkable.    The vertebral and internal carotid arteries demonstrate expected flow   voids indicating their patency.    The central skull base and temporal bonesare intact.  The calvarium   appears unremarkable.    IMPRESSION:    MR orbits/sella:   1.8 cm AP by 1.6 cm CC by 2 cm TRV pituitary mass   consistent with an adenoma. There is mild extension into the LEFT   cavernous sinus. The RIGHT cavernous sinuses and para cavernous regions   appear intact.    The cavernous segments of the internal carotid arteries   demonstrate expected flow-void indicating patency. There is mild mass   effect on the optic chiasm, LEFT greater than RIGHT. The infundibulum is   slightly deviated to the LEFT.      MR brain: Minimal white matter ischemia at the bifrontal and LEFT   parietal subcortical white matter.    Moderate mucosal thickening/fluid   in the LEFT mastoid air cells.                         HPI/Course in Hospital:  72M from home, ambulates independently, PMHx DM on insulin, HLD, HTN, and medication non-compliance, PSHx hemicolon resection  for colon cancer (currently in remission), p/w complaints of lightheadedness today after fall with LOC and head injury. He reports he slipped and fell  in the bedroom while trying to turn on the light. He remembers falling unable to hold on to anything to prevent fall and hitting back of his head against the wall with LOC. Reports being on the floor for about 4- hours as he was unable to get up initially. Denies preceding HA, SOB, chest pain, and no dizziness or lightheadedness. Denies urinary incontinence. Patient denies any injuries at the time, but complained of feeling lightheaded after the fall with confusion . He works as a  on the night shifts, reporting that directly after the fall, he felt well enough to work until . On , On , he noticed with left foot pain, swelling, difficulty ambulating.  Patient took nothing for his pain.  Denied fever, chills.  No reported falls or syncope in the past. Denied prior foot injury or trauma. Patient denies HA, chest pain, SOB, abdominal pain, n/v/d, and no changes in urination or bm.  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 Elevated temp noted . Underwent partial amputation of 5th ray of L foot . BC + for methicillin-susceptible S. aurues (MSSA); surgical Cx growing S. aureus and Morganella morganii. Pt without complaints at the time of my ID consult.      PAST MEDICAL & SURGICAL HISTORY:  DM (diabetes mellitus)      Colon cancer      HTN (hypertension)      Hyperlipidemia      S/P inguinal hernia repair      History of colectomy      S/P arthroscopic knee surgery      History of repair of hiatal hernia          MEDS:  acetaminophen     Tablet .. 650 milliGRAM(s) Oral every 6 hours PRN  brimonidine 0.2% Ophthalmic Solution 1 Drop(s) Left EYE every 12 hours  chlorhexidine 2% Cloths 1 Application(s) Topical <User Schedule>  famotidine    Tablet 40 milliGRAM(s) Oral every 12 hours  gabapentin 300 milliGRAM(s) Oral every 8 hours  insulin glargine Injectable (LANTUS) 30 Unit(s) SubCutaneous at bedtime  insulin lispro (ADMELOG) corrective regimen sliding scale   SubCutaneous Before meals and at bedtime  insulin lispro Injectable (ADMELOG) 8 Unit(s) SubCutaneous three times a day before meals  lactated ringers. 1000 milliLiter(s) IV Continuous <Continuous>  ondansetron Injectable 4 milliGRAM(s) IV Push every 8 hours PRN  piperacillin/tazobactam IVPB.. 3.375 Gram(s) IV Intermittent every 8 hours  potassium chloride    Tablet ER 40 milliEquivalent(s) Oral every 4 hours  vancomycin  IVPB 1000 milliGRAM(s) IV Intermittent every 12 hours      ALLERGIES  Percocet 5/325 (Other)      SOCIAL HISTORY: born in [place]; no cigs, no ETOH; lives with wife; works in security    FAMILY HISTORY: not pertinent to patient's clinical presentation      ROS:    General: no fever or chills	    Skin: see HPI  	  HEENT: has dentures but doesn't wear them    Respiratory and Thorax: no SOB or cough  	  Cardiovascular:	no CP    GI: no N,V, diarrhea	    Genitourinary:	no urinary tract complaints    Musculoskeletal:	 see HPI    Neurological:	see HPI    Endocrine:	DM      PHYSICAL EXAM:    Vital Signs Last 24 Hrs  T(C): 37.4 (01 May 2023 13:23), Max: 39.2 (2023 21:22)  T(F): 99.3 (01 May 2023 13:23), Max: 102.6 (2023 21:22)  HR: 103 (01 May 2023 13:23) (81 - 103)  BP: 124/73 (01 May 2023 13:23) (124/73 - 170/89)  BP(mean): --  RR: 18 (01 May 2023 13:23) (18 - 19)  SpO2: 97% (01 May 2023 13:23) (95% - 98%)    Parameters below as of 01 May 2023 13:23  Patient On (Oxygen Delivery Method): room air          Gen: WD B male in NAD    HEENT: NC/AT; conj. 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; ?devitalized soft tissue/bone; no tenderness to palpation; receding of dorsal erythema with 1+ swelling of foot/remaining toes    Neurological: A+O x3; Cr n grossly intact    Skin: see above    Psychiatric: affect appropriate        LABS/DIAGNOSTIC TESTS:                        12.1   11.43 )-----------( 272      ( 01 May 2023 05:29 )             38.0     WBC Count: 11.43 K/uL ( @ 05:29)  WBC Count: 13.94 K/uL ( @ 08:00)  WBC Count: 16.95 K/uL ( @ 15:00)          138  |  103  |  11  ----------------------------<  131<H>  3.2<L>   |  27  |  1.07    Ca    8.7      01 May 2023 05:29  Phos  3.6       Mg     2.2         TPro  7.4  /  Alb  2.0<L>  /  TBili  0.4  /  DBili  x   /  AST  31  /  ALT  43  /  AlkPhos  92        Urinalysis Basic - ( 2023 14:50 )    Color: Yellow / Appearance: Clear / S.020 / pH: x  Gluc: x / Ketone: Large  / Bili: Negative / Urobili: Negative   Blood: x / Protein: 30 mg/dL / Nitrite: Negative   Leuk Esterase: Negative / RBC: 2-5 /HPF / WBC 0-2 /HPF   Sq Epi: x / Non Sq Epi: x / Bacteria: Few /HPF        LIVER FUNCTIONS - ( 01 May 2023 05:29 )  Alb: 2.0 g/dL / Pro: 7.4 g/dL / ALK PHOS: 92 U/L / ALT: 43 U/L DA / AST: 31 U/L / GGT: x             PT/INR - ( 2023 15:00 )   PT: 15.2 sec;   INR: 1.27 ratio         PTT - ( 2023 15:00 )  PTT:25.7 sec    LACTATE: Lactate, Blood (23 @ 18:45)   Lactate, Blood: 1.7 mmol/L      Historical Values  Lactate, Blood (23 @ 18:45)   Lactate, Blood: 1.7 mmol/L  Lactate, Blood (23 @ 15:00)   Lactate, Blood: 2.2       CULTURES:     Culture - Surgical Swab (collected 23 @ 10:13)  Source: .Surgical Swab Left 5th ray clean margin  Preliminary Report (23 @ 10:41):    Rare Morganella morganii    Rare Staphylococcus aureus    Culture - Surgical Swab (collected 23 @ 10:13)  Source: .Surgical Swab Left foot dirty culture  Preliminary Report (23 @ 10:37):    Few Morganella morganii    Few Staphylococcus aureus    Culture - Surgical Swab (collected 23 @ 18:10)  Source: .Surgical Swab left foot wound  Preliminary Report (23 @ 23:29):    Few Staphylococcus aureus    Few Morganella morganii    Culture - Urine (collected 23 @ 15:10)  Source: Clean Catch Clean Catch (Midstream)  Final Report (23 @ 17:29):    <10,000 CFU/mL Normal Urogenital Sia    Culture - Blood (collected 23 @ 15:10)  Source: .Blood Blood-Peripheral  Gram Stain (23 @ 10:06):    Growth in aerobic bottle: Gram Positive Cocci in Clusters  Preliminary Report (23 @ 10:07):    Growth in aerobic bottle: Gram Positive Cocci in Clusters    Culture - Blood (collected 23 @ 15:00)  Source: .Blood Blood-Peripheral  Gram Stain (23 @ 10:06):    Growth in aerobic bottle: Gram Positive Cocci in Clusters  Preliminary Report (23 @ 10:06):    Growth in aerobic bottle: Gram Positive Cocci in Clusters    ***Blood Panel PCR results on this specimen are available    approximately 3 hours after the Gram stain result.***    Gram stain, PCR, and/or culture results may not always    correspond due to difference in methodologies.    ************************************************************    This PCR assay was performed by multiplex PCR. This    Assay tests for 66 bacterial and resistance gene targets.    Please refer to the [facility] test directory    at [email or web address] for details.  Organism: Blood Culture PCR (23 @ 11:21)  Organism: Blood Culture PCR (23 @ 11:21)      -  Methicillin SENSITIVE Staphylococcus aureus (MSSA): Detec Any isolate of Staphylococcus aureus from a blood culture is NOT considered a contaminant.      Method Type: PCR        RADIOLOGY  < from: Xray Foot AP + Lateral + Oblique, Left (23 @ 15:15) >  ACC: [id number] EXAM:  XR FOOT COMP MIN 3 VIEWS LT   ORDERED BY: JED MARLOW     ACC: [id number] EXAM:  XR ANKLE COMP MIN 3 VIEWS LT   ORDERED BY: JED MARLOW     PROCEDURE DATE:  2023          INTERPRETATION:  Left ankle 3 views and leftfoot 3 views    CLINICAL HISTORY: Pain status post fall        IMPRESSION: No evidence of fracture or suspicious lesion. Ankle mortise   is maintained. Calcaneal plantar spurs are noted. Hallux valgus deformity   is seen. The periarticular erosive change of the medial margin of the   first metatarsal head may be consistent with history of gout. Erosive   deformity is seen at the head of the first proximal phalanx at the   articular margin. There is minimal deformity of the medial margin of the   head of the second proximal phalanx and a nondisplaced fracture at this   level cannot be excluded.    Soft tissue infection with swelling and soft tissue air is seen at the   level of the fifth digit. Vascular calcification is present.     If clinically warranted further assessment may include three-phase bone   scan or MRI imaging to assess for early underlying osteomyelitis not yet   apparent on plain film.    _____________________________________________________________________________    < from: CT Head No Cont (23 @ 14:56) >  ACC: [id number] EXAM:  CT BRAIN   ORDERED BY: JED MARLOW     PROCEDURE DATE:  2023          INTERPRETATION:  EXAM: CT head without contrast    INDICATION: Head trauma    TECHNIQUE: CT examination of the head performed without contrast.   Multiple axial images obtained from skull base to vertex.   Sagittal/coronal reformatted images obtained from axial data set. Images   reviewed in soft tissue and bone windows.      COMPARISON: 2022 CT head.    IMPRESSION:    No acute intracranial hemorrhage, hydrocephalus or extra-axial fluid   collection.  Mild parenchymal volume loss and mild chronic microvascular ischemic   changes.  Abnormal enlargement of the sella with suggestion of masslike lesion   perhaps pituitary in origin with apparent mass effect on the optic   chiasm. Further evaluation of this finding with dedicated   contrast-enhanced MR imaging of the brain, sella/pituitary protocol may   be obtained, as clinically warranted  No CT evidence for acute transcortical infarction.    _____________________________________________________________    < from: MR Head w/wo IV Cont (23 @ 15:37) >  ACC: [id number] EXAM:  MR ORBITS ONLY WAWIC   ORDERED BY: TEODORO EPPERSON     ACC: [id number] EXAM:  MR BRAIN WAW    ORDERED BY: TEODORO EPPERSON     PROCEDURE DATE:  2023          INTERPRETATION:  Two examinations were performed on this patient:    1. MR of the orbits with and without gadolinium  2. MR of the brain with and without gadolinium    CLINICAL INFORMATION (both examinations):    Pituitary tumor      1. MR orbits with and without gadolinium    TECHNIQUE:   Coronal T1-weighted, coronal fat-saturated T2-weighted and   axial and coronal postgadolinium fat-saturated T1-weighted images of the   orbits were obtained.    FINDINGS:   No prior similar studies are available for review.    The optic nerves are intact without abnormal enhancement, abnormal signal   intensity or mass lesion.  The orbital apices are intact.  The cisternal   segments of the optic nerves, the optic chiasm and the optic tracks   appear intact.    The orbits appear intact. The lenses are surgically small. The globes are   intact.  No preseptal abnormalities found.  Intraconal and extraconal fat   is preserved.  The extraocular muscles demonstrate symmetric physiologic   enhancement.  The superior ophthalmic veins are patent and symmetric.    The sella is enlarged with a 1.8 cm AP by 1.6 cm CC by 2 cm TRV pituitary   mass consistent with an adenoma. There is mild extension into the LEFT   cavernous sinus. The RIGHT cavernous sinuses and para cavernous regions   appear intact.    The cavernous segments of theinternal carotid arteries   demonstrate expected flow-void indicating patency. There is mild mass   effect on the optic chiasm, LEFT greater than RIGHT. The infundibulum is   slightly deviated to the LEFT. The central skull base is intact.  The   temporal bones appear intact. Moderate mucosal thickening is seen within   the LEFT mastoid air cells.    The paranasal sinuses are clear.  No significant mucosal disease or   abnormal paranasal sinus fluid collection is found.  The nasal cavity   appears intact.  The deep facial spaces are intact.          2.  MR brain with and without gadolinium    TECHNIQUE:   Sagittal and axial T1-weighted images, sagittal axial FLAIR   images, axial  T2-weighted images and axial diffusion weighted images of   the brain were obtained. Following gadolinium administration isotropic   volumetric T1-weighted images were obtained; this data set was   reformatted using imaging post processing software into multiple imaging   planes.    FINDINGS:   No prior similar studies are available for review.    The brain demonstrates minimal white matter ischemia at the bifrontal and   LEFT parietal subcortical white matter.  No acute cerebral cortical   infarct is found.   No intracranial hemorrhage is recognized.  No mass   effect is found in the brain.    The ventricles, sulci and basal cisterns appear unremarkable.    The vertebral and internal carotid arteries demonstrate expected flow   voids indicating their patency.    The central skull base and temporal bonesare intact.  The calvarium   appears unremarkable.    IMPRESSION:    MR orbits/sella:   1.8 cm AP by 1.6 cm CC by 2 cm TRV pituitary mass   consistent with an adenoma. There is mild extension into the LEFT   cavernous sinus. The RIGHT cavernous sinuses and para cavernous regions   appear intact.    The cavernous segments of the internal carotid arteries   demonstrate expected flow-void indicating patency. There is mild mass   effect on the optic chiasm, LEFT greater than RIGHT. The infundibulum is   slightly deviated to the LEFT.      MR brain: Minimal white matter ischemia at the bifrontal and LEFT   parietal subcortical white matter.    Moderate mucosal thickening/fluid   in the LEFT mastoid air cells.

## 2023-05-01 NOTE — PROGRESS NOTE ADULT - PROBLEM SELECTOR PLAN 9
Given heparin SQ for DVT ppx, one time dose in the ED but will hold further DVT ppx while preop for tomorrow   NPO after midnight for OR tomorrow Given heparin SQ for DVT ppx, one time dose in the ED then held for operation   DVT prophylaxis: Lovenox 40 QD

## 2023-05-01 NOTE — PROGRESS NOTE ADULT - PROBLEM SELECTOR PLAN 3
h/o DM on Metformin 1000mg BID, Lantus 35 qhs and Humolog 14 TID before meals (per sure scripts Lantus 45u and Humalog 20u), also reports sugars are not controlled at home with prior A1C 7.9  will hold oral dm meds while inpatient   f/u A1c  increase lantus 30 qhs for now to begin + ISS q6 while NPO for pre-op.  Will add pre-meal insulin  Adjust insulin as indicated  FS q6 while NPO for pre-op h/o DM on Metformin 1000mg BID, Lantus 35 qhs and Humolog 14 TID before meals (per sure scripts Lantus 45u and Humalog 20u), also reports sugars are not controlled at home with prior A1C 7.9  will hold oral dm meds while inpatient   HbA1c: 12.1%  c/w Lantus 30 , Lispro 10 units  Mod ISS  Endocrine Dr. Arreola consulted

## 2023-05-01 NOTE — CONSULT NOTE ADULT - SUBJECTIVE AND OBJECTIVE BOX
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 defects    Labs:                  12.1   11.43 )-----------( 272      ( 01 May 2023 05:29 )             38.0     05-01  138  |  103  |  11  ----------------------------<  131<H>  3.2<L>   |  27  |  1.07  Ca    8.7      01 May 2023 05:29  Phos  3.6     05-01  Mg     2.2     05-01    CARDIAC MARKERS ( 30 Apr 2023 08:00 )  x     / x     / 346 U/L / x     / x        Capillary Blood Glucose:  POCT Blood Glucose.: 198 mg/dL (01 May 2023 11:45)  POCT Blood Glucose.: 173 mg/dL (01 May 2023 07:50)  POCT Blood Glucose.: 229 mg/dL (30 Apr 2023 22:31)  POCT Blood Glucose.: 343 mg/dL (30 Apr 2023 17:55)    Adrenocorticotropic Hormone, Serum: 11.9 pg/mL (04.30.23 @ 08:00)  Cortisol AM, Serum: 16.8 ug/dL (04.30.23 @ 08:00)    Thyroid Stimulating Hormone, Serum: 1.13 uU/mL (04.30.23 @ 08:00)  Free Triiodothyronine, Serum: 1.46 pg/mL (04.30.23 @ 08:00)  A1C with Estimated Average Glucose Result: 12.2 % (05.01.23 @ 05:29)    Radiology and diagnostic studies:    CT SCAN HEAD: 4/29/23    FINDINGS:  Ventricles and sulci are mildly proportionately prominent likely related   to mild parenchymal volume loss. No hydrocephalus. No extra-axial fluid   collection identified.  No acute intracranial hemorrhage identified. There is mild subcortical   and periventricular white matter attenuation nonspecific but favored to   reflect sequela of mild chronic microvascular ischemia. Gray-white   differentiation is preserved without CT evidence for acute transcortical   infarction. There is no significant midline shift, mass effect or   herniation. Bilateral lens replacement. Prior left scleral banding    There is abnormal enlargement of the sella with suggestion of masslike   lesion displace the optic chiasm.    Visualized paranasal sinuses and mastoid air cells are well aerated. No   significant cerebellar tonsillar herniation.    No displaced calvarial fractures are identified. No suspicious expansile   or destructive calvarial lesion identified. No significant scalp soft   tissue swelling identified.    IMPRESSION:  No acute intracranial hemorrhage, hydrocephalus or extra-axial fluid   collection.  Mild parenchymal volume loss and mild chronic microvascular ischemic   changes.  Abnormal enlargement of the sella with suggestion of masslike lesion   perhaps pituitary in origin with apparent mass effect on the optic   chiasm. Further evaluation of this finding with dedicated   contrast-enhanced MR imaging of the brain, sella/pituitary protocol may   be obtained, as clinically warranted  No CT evidence for acute transcortical infarction.      Assessment and Plan:   72 year old Male with PMHx T2DM, HLD, HTN, colon cancer s/p hemicolon resection in 2011 ( currently in remission) p/w complaints of lightheadedness after fall with LOC and head injury and left foot ulcer. Admitted for left toe gas gangrene. Hospital course complicated by MSSA bacteremia. On admission, patient was found to have serum BS of 389 and elevated A1C.  CT scan head shows pituitary mass. Endocrinology is consulted for glycemic management and evaluation of pituitary mass.     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  Patient is not taking Humalog before the meals regularly.  Inpatient, patient's are better controlled today, yesterday blood sugars were in 200s and 300s.  Fasting blood sugars are at goal therefore we will continue the same dose of Lantus.  Patient is eating well therefore will increase lispro     Inpatient Recommendations:  Basal Insulin:   Continue Glargine ( Lantus) 30 units once daily    Nutritional Insulin:   Increase Lispro (Admelog) to 10 units with meals, Hold if NPO or eating <50% of meals    Correctional Insulin:  Continue low Lispro ( Admelog) correctional scale with meals and bedtime, Please order separate correctional scale for bedtime    Oral Diabetes Medications:  None in the hospital    Check the POC glucose with meals and bedtime  Please consult nutritionist    Diabetes Education:  Extensive education about diabetes, hyperglycemia, hypoglycemia, glucose self-monitoring with glucometer, glucose target ranges, adherence to diabetes medications, diet, physical activity, importance of following up with outpatient endocrinology/ opthalmology and podiatry appointments discussed.   Explained the definition of HBA1C and target range.   Explained the complications of diabetes including diabetic foot infection, bacteremia, stroke, renal failure and blindness  Reviewed hypoglycemic sign/symptoms and necessary precautions.   Discussed the goal fasting and postprandial BS at home  Counseled patient at length that he should take Humalog 15 minutes before the meals otherwise he will develop postprandial hyperglycemia. Ideally he should take Humalog 3 times before meals rather 4 times a day.   Patient verbalized understanding and agrees with the plan     4) Pituitary/Sellar Incidentaloma:  Patient found to have an incidental pituitary/sellar mass compressing the optic chiasm on CT scan .  It seems that pituitary mass is nonfunctional as ACTH, cortisol, TSH, electrolytes and vitals are stable however we still need to test all pituitary hormones once patient is a stable and is discharged from the hospital.   Recommend to do free T4, prolactin, IGF 1, urine osmolarity/urine specific gravity inpatient.  Recommend to do ophthalmology consult inpatient or outpatient to evaluate for peripheral visual fields given patient has optic chiasm compression.  Agree with MRI of brain with pituitary protocol.    Explained at length regarding pituitary tumor and its optic chiasm compression.  Counseled to follow-up with endocrine as outpatient to evaluate whether the tumor is functional or not and then with neurosurgery.  Discussed with patient that as pituitary tumor is compressing the optic chiasm, the gold standard treatment is to do transsphenoidal transnasal pituitary tumor resection to prevent complications such as vision loss, pituitary hemorrhage or pituitary apoplexy.  Patient is agreeable to do the surgical intervention eventually.  Discussed with patient that after the pituitary surgery, there may be a chance that he may develop panhypopituitarism and therefore will be requiring hormone replacement.  Patient verbalized understanding.    Discussed the endocrine plan of care with patient and primary team    Ewa Arreola MD   Endocrinology, Diabetes and Metabolism   Available on MS. teams

## 2023-05-01 NOTE — PROGRESS NOTE ADULT - ATTENDING COMMENTS
71 yo man presented with c/o pain and swelling of his left foot after a fall.    He also fell backward, hit his head, had an episode of confusion the following day.   PMH:  DM, colon cancer  PSH:  hernia repair, colon resection for carcinoma  Today he is alert, less tenderness in LE.    Alert man in NAD    EOMI  Lungs, clear  Cor, RRR  Abdomen, soft  Neurological, non-focal  Ext. resection of 5th metatarsal at midshaft                        12.1   11.43 )-----------( 272      ( 01 May 2023 05:29 )             38.0   05-01    138  |  103  |  11  ----------------------------<  131<H>  3.2<L>   |  27  |  1.07    Ca    8.7      01 May 2023 05:29  Phos  3.6     05-01  Mg     2.2     05-01    TPro  7.4  /  Alb  2.0<L>  /  TBili  0.4  /  DBili  x   /  AST  31  /  ALT  43  /  AlkPhos  92  05-01      IMPRESSION:  < from: MR Orbits w/wo IV Cont (05.01.23 @ 15:37) >    MR orbits/sella:   1.8 cm AP by 1.6 cm CC by 2 cm TRV pituitary mass   consistent with an adenoma. There is mild extension into the LEFT   cavernous sinus. The RIGHT cavernous sinuses and para cavernous regions   appear intact.    The cavernous segments of the internal carotid arteries   demonstrate expected flow-void indicating patency. There is mild mass   effect on the optic chiasm, LEFT greater than RIGHT. The infundibulum is   slightly deviated to the LEFT.  MR brain: Minimal white matter ischemia at the bifrontal and LEFT   parietal subcortical white matter.    Moderate mucosal thickening/fluid   in the LEFT mastoid air cells.    < end of copied text >                   Bone marrow edema involving the proximal third phalanx, suggestive of   osteomyelitis.    Stable appearance of the patchy bone marrow edema of the cuneiforms and   tarsal navicular bone, which may be secondary to ischemia or stress   reaction.  EKG:  sinus tachycardia, LVH  Hemoglobin A1C, Whole Blood: 7.1: Method: Immunoassay   Gram Stain:   Growth in aerobic bottle: Gram Positive Cocci in Clusters (04.29.23 @ 15:10) - Methicillin SENSITIVE Staphylococcus aureus (MSSA): Detec Any isolate of Staphylococcus aureus from a blood culture is NOT considered a contaminant. (04.29.23 @ 15:00)Few Staphylococcus aureus   Few Morganella morganii (04.29.23 @ 18:10)   Discussed with Endocrinology, Dr. Arreola, who recommends increased insulin.  She will see as outpatient for pituitary adenoma and refer to Neurosurgery.   A1C with Estimated Average Glucose Result: 12.2: Method: Immunoassay   Follicle Stimulating Hormone, Serum: 2.6: FSH (IU/L)   ACTH 11.9  IMP:   infected left foot with gas seen on x-ray, s/p resection, with wound left open because of purulence            MSSA bacteremiablood cultures + GPC's            Pituitary adenoma, likely non-secreting, possibly compression on optic chiasm.  MRI, reviewed.  Plan:    Discontinue vancomycin.  Rx nafcillin and cefepime.  Podiatry f/u, may need repeat debridement before closure.              Endocrine w/u for secreting pituitary tumor:   FSH, LH, prolactin, TSH, Free, AM cortisol, IgF1, Growth Hormone, sent             Refer to Neurosurgery for possible transphinoidal resection after completion of six weeks of antibiotics for osteomyelitis with bacteremia.              Patient wishes his partner Peri Pérez to make decisions, if he can't. 73 yo man presented with c/o pain and swelling of his left foot after a fall.    He also fell backward, hit his head, had an episode of confusion the following day.   PMH:  DM, colon cancer  PSH:  hernia repair, colon resection for carcinoma  Today he is alert, less tenderness in LE.    Alert man in NAD    EOMI  Lungs, clear  Cor, RRR  Abdomen, soft  Neurological, non-focal  Ext. resection of 5th metatarsal at midshaft                        12.1   11.43 )-----------( 272      ( 01 May 2023 05:29 )             38.0   05-01    138  |  103  |  11  ----------------------------<  131<H>  3.2<L>   |  27  |  1.07    Ca    8.7      01 May 2023 05:29  Phos  3.6     05-01  Mg     2.2     05-01    TPro  7.4  /  Alb  2.0<L>  /  TBili  0.4  /  DBili  x   /  AST  31  /  ALT  43  /  AlkPhos  92  05-01      IMPRESSION:  < from: MR Orbits w/wo IV Cont (05.01.23 @ 15:37) >    MR orbits/sella:   1.8 cm AP by 1.6 cm CC by 2 cm TRV pituitary mass   consistent with an adenoma. There is mild extension into the LEFT   cavernous sinus. The RIGHT cavernous sinuses and para cavernous regions   appear intact.    The cavernous segments of the internal carotid arteries   demonstrate expected flow-void indicating patency. There is mild mass   effect on the optic chiasm, LEFT greater than RIGHT. The infundibulum is   slightly deviated to the LEFT.  MR brain: Minimal white matter ischemia at the bifrontal and LEFT   parietal subcortical white matter.    Moderate mucosal thickening/fluid   in the LEFT mastoid air cells.    < end of copied text >                   Bone marrow edema involving the proximal third phalanx, suggestive of   osteomyelitis.    Stable appearance of the patchy bone marrow edema of the cuneiforms and   tarsal navicular bone, which may be secondary to ischemia or stress   reaction.  EKG:  sinus tachycardia, LVH  Hemoglobin A1C, Whole Blood: 7.1: Method: Immunoassay   Gram Stain:   Growth in aerobic bottle: Gram Positive Cocci in Clusters (04.29.23 @ 15:10) - Methicillin SENSITIVE Staphylococcus aureus (MSSA): Detec Any isolate of Staphylococcus aureus from a blood culture is NOT considered a contaminant. (04.29.23 @ 15:00)Few Staphylococcus aureus   Few Morganella morganii (04.29.23 @ 18:10)     A1C with Estimated Average Glucose Result: 12.2: Method: Immunoassay   Follicle Stimulating Hormone, Serum: 2.6: FSH (IU/L)   ACTH 11.9  IMP:   infected left foot with gas seen on x-ray, s/p resection, with wound left open because of purulence            MSSA bacteremiablood cultures + GPC's, discussed with ID, Dr. Cabrales.             Pituitary adenoma, likely non-secreting, possibly compression on optic chiasm.  MRI, reviewed.            Poorly controlled DM, better on long-acting and pre-meal insulin.  Discussed with Endocrinology, Dr. Arreola.  Plan:    Discontinue vancomycin.  Rx nafcillin and cefepime.  Podiatry f/u, may need repeat debridement before closure.              Endocrine w/u for secreting pituitary tumor:   FSH, LH, prolactin, TSH, Free, AM cortisol, IgF1, Growth Hormone, sent             Discussed with Endocrinology, Dr. Arreola, who recommends increased insulin.  She will see as outpatient for pituitary adenoma and refer to            Neurosurgery.              Refer to Neurosurgery for possible transphinoidal resection after completion of six weeks of antibiotics for osteomyelitis with bacteremia.              Patient wishes his partner Peri Pérez to make decisions, if he can't.

## 2023-05-01 NOTE — PROGRESS NOTE ADULT - PROBLEM SELECTOR PLAN 8
h/o HLD but admits to medication non-compliance, unable to recall statin therapy at home   no statin therapy on sure scripts   primary team to confirm meds in the AM   NPO after midnight h/o HLD but admits to medication non-compliance, unable to recall statin therapy at home   lipid panel : LDL 93, HDL 33, total cholesterol 147, TAGs 107

## 2023-05-01 NOTE — PROGRESS NOTE ADULT - PROBLEM SELECTOR PLAN 6
reports chronic SOB with h/o 9/11 exposure   does not follow pulm and not on home inhalers  not in respiratory distress, saturating well on RA     upon DC refer to o/p pulmonologist RESOLVED   reports chronic SOB with h/o 9/11 exposure   does not follow pulm and not on home inhalers  not in respiratory distress, saturating well on RA     upon DC refer to o/p pulmonologist

## 2023-05-01 NOTE — PROGRESS NOTE ADULT - PROBLEM SELECTOR PLAN 2
no reported cardiac history  aseptic appearing, VSS but on abx for gas gangrene   EKG: NSR with LVH   no TTE on file  f/u AM labs     RCRI: 1 point, Class 2 with 6% 30-day risk of death, MI, or cardiac arrest   [place]: 0.0% MAUREEN    patient is at low risk for low risk procedure Blood cultures: +ve MSSA  c/w Zosyn and Vanc for now  f/u repeat blood cultures  f/u TTE  Dr. Cabrales ID consulted

## 2023-05-01 NOTE — CONSULT NOTE ADULT - TIME BILLING
80  minutes spent the time noted on the day of this patient encounter preparing for, reviewing records/charts/labs, interview and physical examination, coordination of care with patient and primary team, providing and documenting the above E/M service and 50% of time spent face to face counseling and education provided to patient on disease course, and treatment/management. All questions and concerns were answered and addressed in detail.

## 2023-05-01 NOTE — PROGRESS NOTE ADULT - PROBLEM SELECTOR PLAN 1
c/o left foot pain, swelling, and erythema   XR for left foot/ ankle: suspicion of gas foci to 4th interspace and lateral 5th toe  (+) diminished pulses b/l but faintly palpable (+) Diffuse edema noted distal to Ankle joint LLE with 2x2 fibronectrotic wound with scant purulence s/p bedside ID, cleaned and dressed. (+) Erythema noted tracking proximally to ankle joint and medially to 1st met lateral border demarcated with skin marker by podiatry  aseptic appearing with leukocytosis WBC 16.95 and elevated lactate 2.2  s/p vanc and zosyn in the ED  c/w with vanc and zosyn while pre-op, consider changing to vanc and cefepime after OR  f/u cultures, sent   Podiatry consulted:  -s/p bedside ID, wound culture sent   -foot care and dressing per podiatry   -Ordered RIVER/PVR  planned for OR tomorrow c/o left foot pain, swelling, and erythema   XR for left foot/ ankle: suspicion of gas foci to 4th interspace and lateral 5th toe  aseptic appearing with leukocytosis WBC 16.95 and elevated lactate 2.2  Podiatry consulted-  s/p left partial 5th ray amputation due to gas gangrene.   surgical path prelim result +ve for Morganella morgani, and Staph  Blood cultures: +ve MSSA   c/w Zosyn and Vanc for now  Dr. Cabrales ID consulted

## 2023-05-01 NOTE — PROGRESS NOTE ADULT - SUBJECTIVE AND OBJECTIVE BOX
PGY-1 Progress Note discussed with attending    PAGER #: [[phone]] TILL 5:00 PM  PLEASE CONTACT ON CALL TEAM:  - On Call Team (Please refer to Marlena) FROM 5:00 PM - 8:30PM  - Nightfloat Team FROM 8:30 -7:30 AM    OVERNIGHT EVENTS:   -     REVIEW OF SYSTEMS:  CONSTITUTIONAL: No fever, weight loss, or fatigue  RESPIRATORY: No cough, wheezing, chills or hemoptysis; No shortness of breath  CARDIOVASCULAR: No chest pain, palpitations, dizziness, or leg swelling  GASTROINTESTINAL: No abdominal pain. No nausea, vomiting, or hematemesis; No diarrhea or constipation. No melena or hematochezia.  GENITOURINARY: No dysuria or hematuria, urinary frequency  NEUROLOGICAL: No headaches, memory loss, loss of strength, numbness, or tremors  SKIN: No itching, burning, rashes, or lesions     MEDICATIONS  (STANDING):  brimonidine 0.2% Ophthalmic Solution 1 Drop(s) Left EYE every 12 hours  chlorhexidine 2% Cloths 1 Application(s) Topical <User Schedule>  famotidine    Tablet 40 milliGRAM(s) Oral every 12 hours  gabapentin 300 milliGRAM(s) Oral every 8 hours  insulin glargine Injectable (LANTUS) 30 Unit(s) SubCutaneous at bedtime  insulin lispro (ADMELOG) corrective regimen sliding scale   SubCutaneous Before meals and at bedtime  insulin lispro Injectable (ADMELOG) 8 Unit(s) SubCutaneous three times a day before meals  lactated ringers. 1000 milliLiter(s) (100 mL/Hr) IV Continuous <Continuous>  piperacillin/tazobactam IVPB.. 3.375 Gram(s) IV Intermittent every 8 hours  potassium chloride    Tablet ER 40 milliEquivalent(s) Oral every 4 hours  vancomycin  IVPB 1000 milliGRAM(s) IV Intermittent every 12 hours    MEDICATIONS  (PRN):  acetaminophen     Tablet .. 650 milliGRAM(s) Oral every 6 hours PRN Temp greater or equal to 38C (100.4F), Mild Pain (1 - 3)  ondansetron Injectable 4 milliGRAM(s) IV Push every 8 hours PRN Nausea and/or Vomiting      Vital Signs Last 24 Hrs  T(C): 36.5 (01 May 2023 05:52), Max: 39.2 (30 Apr 2023 21:22)  T(F): 97.7 (01 May 2023 05:52), Max: 102.6 (30 Apr 2023 21:22)  HR: 81 (01 May 2023 05:52) (81 - 95)  BP: 170/89 (01 May 2023 05:52) (126/75 - 170/89)  BP(mean): --  RR: 18 (01 May 2023 05:52) (18 - 19)  SpO2: 98% (01 May 2023 05:52) (95% - 98%)    Parameters below as of 01 May 2023 05:52  Patient On (Oxygen Delivery Method): room air        PHYSICAL EXAMINATION:  GENERAL: NAD, AAOx  HEAD: AT/NC  EYES: conjunctiva and sclera clear  NECK: supple, No JVD noted, Normal thyroid  CHEST/LUNG: CTABL; no rales, rhonchi, wheezing, or rubs  HEART: regular rate and rhythm; no murmurs, rubs, or gallops  ABDOMEN: soft, nontender, nondistended; Bowel sounds present  EXTREMITIES:  2+ Peripheral Pulses, No clubbing, cyanosis, or edema  SKIN: warm dry                          12.1   11.43 )-----------( 272      ( 01 May 2023 05:29 )             38.0     05-01    138  |  103  |  11  ----------------------------<  131<H>  3.2<L>   |  27  |  1.07    Ca    8.7      01 May 2023 05:29  Phos  3.6     05-01  Mg     2.2     05-01    TPro  7.4  /  Alb  2.0<L>  /  TBili  0.4  /  DBili  x   /  AST  31  /  ALT  43  /  AlkPhos  92  05-01    LIVER FUNCTIONS - ( 01 May 2023 05:29 )  Alb: 2.0 g/dL / Pro: 7.4 g/dL / ALK PHOS: 92 U/L / ALT: 43 U/L DA / AST: 31 U/L / GGT: x           CARDIAC MARKERS ( 30 Apr 2023 08:00 )  x     / x     / 346 U/L / x     / x      CARDIAC MARKERS ( 29 Apr 2023 15:00 )  x     / x     / 473 U/L / x     / x          PT/INR - ( 29 Apr 2023 15:00 )   PT: 15.2 sec;   INR: 1.27 ratio         PTT - ( 29 Apr 2023 15:00 )  PTT:25.7 sec  COVID-19 PCR: NotDetec (29 Apr 2023 14:50)      CAPILLARY BLOOD GLUCOSE      POCT Blood Glucose.: 173 mg/dL (01 May 2023 07:50)  POCT Blood Glucose.: 229 mg/dL (30 Apr 2023 22:31)  POCT Blood Glucose.: 343 mg/dL (30 Apr 2023 17:55)      RADIOLOGY & ADDITIONAL TESTS:                   PGY-1 Progress Note discussed with attending    PAGER #: [[phone]] TILL 5:00 PM  PLEASE CONTACT ON CALL TEAM:  - On Call Team (Please refer to [email or web address]) FROM 5:00 PM - 8:30PM  - Nightfloat Team FROM 8:30 -7:30 AM    OVERNIGHT EVENTS/ INTERVAL HPI:  - No acute overnight events. Pt evaluated at bedside. ACE wrap in place on the left foot. Denied any acute symptoms. Reported to pt that he will have MRI for pituitary tumor and Podiatry is following.     REVIEW OF SYSTEMS:  CONSTITUTIONAL: No fever, weight loss, or fatigue  RESPIRATORY: No cough, wheezing, chills or hemoptysis; No shortness of breath  CARDIOVASCULAR: No chest pain, palpitations, dizziness, or leg swelling  GASTROINTESTINAL: No abdominal pain. No nausea, vomiting, or hematemesis; No diarrhea or constipation. No melena or hematochezia.  GENITOURINARY: No dysuria or hematuria, urinary frequency  NEUROLOGICAL: No headaches, memory loss, loss of strength, numbness, or tremors  SKIN: No itching, burning, rashes, or lesions     MEDICATIONS  (STANDING):  brimonidine 0.2% Ophthalmic Solution 1 Drop(s) Left EYE every 12 hours  chlorhexidine 2% Cloths 1 Application(s) Topical <User Schedule>  famotidine    Tablet 40 milliGRAM(s) Oral every 12 hours  gabapentin 300 milliGRAM(s) Oral every 8 hours  insulin glargine Injectable (LANTUS) 30 Unit(s) SubCutaneous at bedtime  insulin lispro (ADMELOG) corrective regimen sliding scale   SubCutaneous Before meals and at bedtime  insulin lispro Injectable (ADMELOG) 8 Unit(s) SubCutaneous three times a day before meals  lactated ringers. 1000 milliLiter(s) (100 mL/Hr) IV Continuous <Continuous>  piperacillin/tazobactam IVPB.. 3.375 Gram(s) IV Intermittent every 8 hours  potassium chloride    Tablet ER 40 milliEquivalent(s) Oral every 4 hours  vancomycin  IVPB 1000 milliGRAM(s) IV Intermittent every 12 hours    MEDICATIONS  (PRN):  acetaminophen     Tablet .. 650 milliGRAM(s) Oral every 6 hours PRN Temp greater or equal to 38C (100.4F), Mild Pain (1 - 3)  ondansetron Injectable 4 milliGRAM(s) IV Push every 8 hours PRN Nausea and/or Vomiting      Vital Signs Last 24 Hrs  T(C): 36.5 (01 May 2023 05:52), Max: 39.2 (30 Apr 2023 21:22)  T(F): 97.7 (01 May 2023 05:52), Max: 102.6 (30 Apr 2023 21:22)  HR: 81 (01 May 2023 05:52) (81 - 95)  BP: 170/89 (01 May 2023 05:52) (126/75 - 170/89)  BP(mean): --  RR: 18 (01 May 2023 05:52) (18 - 19)  SpO2: 98% (01 May 2023 05:52) (95% - 98%)    Parameters below as of 01 May 2023 05:52  Patient On (Oxygen Delivery Method): room air        PHYSICAL EXAMINATION:  GENERAL: NAD, AAOx3  HEAD: AT/NC  EYES: conjunctiva and sclera clear,  range of motion normal in both eyes without any pain/difficulty  NECK: supple, No JVD noted  CHEST/LUNG: CTABL; no rales, rhonchi, wheezing, or rubs  HEART: regular rate and rhythm; no murmurs, rubs, or gallops  ABDOMEN: soft, nontender, nondistended; Bowel sounds present  EXTREMITIES:  2+ Peripheral Pulses, No clubbing, cyanosis, ACE wrap in place by the podiatry on the left foot   SKIN: warm dry                          12.1   11.43 )-----------( 272      ( 01 May 2023 05:29 )             38.0     05-01    138  |  103  |  11  ----------------------------<  131<H>  3.2<L>   |  27  |  1.07    Ca    8.7      01 May 2023 05:29  Phos  3.6     05-01  Mg     2.2     05-01    TPro  7.4  /  Alb  2.0<L>  /  TBili  0.4  /  DBili  x   /  AST  31  /  ALT  43  /  AlkPhos  92  05-01    LIVER FUNCTIONS - ( 01 May 2023 05:29 )  Alb: 2.0 g/dL / Pro: 7.4 g/dL / ALK PHOS: 92 U/L / ALT: 43 U/L DA / AST: 31 U/L / GGT: x           CARDIAC MARKERS ( 30 Apr 2023 08:00 )  x     / x     / 346 U/L / x     / x      CARDIAC MARKERS ( 29 Apr 2023 15:00 )  x     / x     / 473 U/L / x     / x          PT/INR - ( 29 Apr 2023 15:00 )   PT: 15.2 sec;   INR: 1.27 ratio         PTT - ( 29 Apr 2023 15:00 )  PTT:25.7 sec  COVID-19 PCR: NotDetec (29 Apr 2023 14:50)      CAPILLARY BLOOD GLUCOSE      POCT Blood Glucose.: 173 mg/dL (01 May 2023 07:50)  POCT Blood Glucose.: 229 mg/dL (30 Apr 2023 22:31)  POCT Blood Glucose.: 343 mg/dL (30 Apr 2023 17:55)      RADIOLOGY & ADDITIONAL TESTS:

## 2023-05-01 NOTE — PROGRESS NOTE ADULT - PROBLEM SELECTOR PLAN 5
CTH: No acute intracranial hemorrhage, hydrocephalus or extra-axial fluid collection. No acute transcortical infarction. Mild parenchymal volume loss and mild chronic microvascular ischemic changes. Abnormal enlargement of the sella with s/o masslike lesion perhaps pituitary in origin with apparent mass effect on the optic chiasm.     Radiology:  -Further evaluation of this finding with dedicated contrast-enhanced MR imaging of the brain, sella/pituitary protocol may be obtained, as clinically warranted.    Neurology consulted: Dr. Restrepo  - mass-like lesion less likely cause for fall, more likely d/t diabetic neuropathy  - f/u TM  Will w/u for functioning pituitary tumor.  Endocrinology consultation, Dr. Arreola, will see tomorrow. CTH: noted above  f/u MR brain w and w/o IV contrast w/ pituitary sections  f/u MR orbits  f/u GH level  Neurology consulted: Dr. Restrepo  Endocrinology Dr. Arreola consulted

## 2023-05-01 NOTE — PROGRESS NOTE ADULT - SUBJECTIVE AND OBJECTIVE BOX
Podiatry HPI  72-year-old male with history of NIDDM,  Patient claims he slipped and fell on Tuesday night in the bedroom and hit his head and had LOC for a few minutes. Patient denies any injuries at the time, but complaining of feeling lightheaded after the fall, confused on Wednesday.  Patient works as a  and on Thursday he noticed with left foot pain, swelling, difficulty ambulating after excessively ambulating by his work in tight work shoes.  Patient took nothing for his pain. Presents to ED with progressive pain to Left foot and increased warmth with concern for infection. Follow OP Podiatrist Dr. Spain who he last saw two weeks ago. States in 2017 had a bone infection in his Right foot which was resected. Cannot recall exact procedure,  Denies fever, chills.    Podiatry Interval: patient is seen in bed resting after being out of room earlier for RIVER/PVR and xray. States he does not have any pain to foot and inquires about healing time to foot wound. Denies nausea, vomiting, fever, chills, diarrhea, constipation. Denies overnight acute events.     Patient admits to  (-) Fevers, (-) Chills, (-) Nausea, (-) Vomiting, (-) Shortness of Breath (-) calf pain (-) chest pain     Medications acetaminophen     Tablet .. 650 milliGRAM(s) Oral every 6 hours PRN  brimonidine 0.2% Ophthalmic Solution 1 Drop(s) Left EYE every 12 hours  chlorhexidine 2% Cloths 1 Application(s) Topical <User Schedule>  famotidine    Tablet 40 milliGRAM(s) Oral every 12 hours  gabapentin 300 milliGRAM(s) Oral every 8 hours  insulin glargine Injectable (LANTUS) 30 Unit(s) SubCutaneous at bedtime  insulin lispro (ADMELOG) corrective regimen sliding scale   SubCutaneous Before meals and at bedtime  insulin lispro Injectable (ADMELOG) 10 Unit(s) SubCutaneous three times a day before meals  lactated ringers. 1000 milliLiter(s) IV Continuous <Continuous>  ondansetron Injectable 4 milliGRAM(s) IV Push every 8 hours PRN  piperacillin/tazobactam IVPB.. 3.375 Gram(s) IV Intermittent every 8 hours  vancomycin  IVPB 1000 milliGRAM(s) IV Intermittent every 12 hours    FHNo pertinent family history in first degree relatives    ,   PMHDM (diabetes mellitus)    Colon cancer    HTN (hypertension)    Hyperlipidemia       PSHNo significant past surgical history    S/P inguinal hernia repair    History of colectomy    S/P arthroscopic knee surgery    History of repair of hiatal hernia        Labs                          12.1   11.43 )-----------( 272      ( 01 May 2023 05:29 )             38.0      05-01    138  |  103  |  11  ----------------------------<  131<H>  3.2<L>   |  27  |  1.07    Ca    8.7      01 May 2023 05:29  Phos  3.6     05-01  Mg     2.2     05-01    TPro  7.4  /  Alb  2.0<L>  /  TBili  0.4  /  DBili  x   /  AST  31  /  ALT  43  /  AlkPhos  92  05-01     Vital Signs Last 24 Hrs  T(C): 37.4 (01 May 2023 13:23), Max: 39.2 (30 Apr 2023 21:22)  T(F): 99.3 (01 May 2023 13:23), Max: 102.6 (30 Apr 2023 21:22)  HR: 103 (01 May 2023 13:23) (81 - 103)  BP: 124/73 (01 May 2023 13:23) (124/73 - 170/89)  BP(mean): --  RR: 18 (01 May 2023 13:23) (18 - 19)  SpO2: 97% (01 May 2023 13:23) (95% - 98%)    Parameters below as of 01 May 2023 13:23  Patient On (Oxygen Delivery Method): room air      Sedimentation Rate, Erythrocyte: 86 mm/Hr (05-01-23 @ 05:29)  Sedimentation Rate, Erythrocyte: 84 mm/Hr (04-29-23 @ 18:45)         C-Reactive Protein, Serum: 230 mg/L (05-01-23 @ 05:29)   WBC Count: 11.43 K/uL *H* (05-01-23 @ 05:29)      ROS unremarkable outside of HPI    PHYSICAL EXAM  LE Focused:    Vasc:  DP and PT faintly palpable b/l. No pedal hair growth noted. Diffuse edema noted distal to Ankle joint LLE  Derm: Open left lateral foot wound s/p open partial 5th ray resection on Sunday, 4/30/23. No malodor, no purulence, improving erythema and edema.   Neuro: Protective sensation grossly diminished  MSK: able to ambulate with pain, no TTP to dorsolateral surgical site       IMAGING: ?xray  Noted gas foci on CXR foot to 4th interspace and lateral 5th left foot    < from: [facility] Physiol Extremity Lower 3+ Level, BI (05.01.23 @ 16:33) >  FINDINGS: There are biphasic pulse volume recordings bilaterally,   dampened at the level of the metatarsals. Segmental pressures were not   obtained at the thighs.    Right RIVER = 1.15  Left RIVER = 1.11    IMPRESSION: Normal ABIs.    Dampened waveforms at the level of the metatarsals.    < end of copied text >      CULTURES:   pending deep wound cx    A:  Left foot gas gangrene    P:   Patient evaluated and Chart reviewed   Discussed diagnosis and treatment with patient  Previous X-rays noted to left foot with suspicion of gas foci to 4th interspace and lateral 5th toe (pre-operative)  Post-operative xrays taken; pending final read   S/p left partial 5th ray open amputation on 4/30/23  Patient scheduled for graft application and further open left foot wound debridement on Friday, 5/5/23 at 7:45am with Dr. Burns   Due to RIVER/PVR, recommending vascular consult to assess healing potential   Flushed left foot wound with sterile saline   Applied iodoform packing, DSD, ACE  Recc IV antibiotics As Per ID  Reviewed RIVER/PVR  NWB to LLE  Discussed importance of daily foot examinations and proper shoe gear and to importance of lower Fasting Blood Glucose levels.   Podiatry to follow while in house.  Discussed with Attending Dr. Burns

## 2023-05-02 LAB
-  AMIKACIN: SIGNIFICANT CHANGE UP
-  AMIKACIN: SIGNIFICANT CHANGE UP
-  AMOXICILLIN/CLAVULANIC ACID: SIGNIFICANT CHANGE UP
-  AMOXICILLIN/CLAVULANIC ACID: SIGNIFICANT CHANGE UP
-  AMPICILLIN/SULBACTAM: SIGNIFICANT CHANGE UP
-  AMPICILLIN: SIGNIFICANT CHANGE UP
-  AMPICILLIN: SIGNIFICANT CHANGE UP
-  AZTREONAM: SIGNIFICANT CHANGE UP
-  AZTREONAM: SIGNIFICANT CHANGE UP
-  CEFAZOLIN: SIGNIFICANT CHANGE UP
-  CEFEPIME: SIGNIFICANT CHANGE UP
-  CEFEPIME: SIGNIFICANT CHANGE UP
-  CEFOXITIN: SIGNIFICANT CHANGE UP
-  CEFOXITIN: SIGNIFICANT CHANGE UP
-  CEFTRIAXONE: SIGNIFICANT CHANGE UP
-  CEFTRIAXONE: SIGNIFICANT CHANGE UP
-  CIPROFLOXACIN: SIGNIFICANT CHANGE UP
-  CIPROFLOXACIN: SIGNIFICANT CHANGE UP
-  CLINDAMYCIN: SIGNIFICANT CHANGE UP
-  CLINDAMYCIN: SIGNIFICANT CHANGE UP
-  ERTAPENEM: SIGNIFICANT CHANGE UP
-  ERTAPENEM: SIGNIFICANT CHANGE UP
-  ERYTHROMYCIN: SIGNIFICANT CHANGE UP
-  ERYTHROMYCIN: SIGNIFICANT CHANGE UP
-  GENTAMICIN: SIGNIFICANT CHANGE UP
-  LEVOFLOXACIN: SIGNIFICANT CHANGE UP
-  LEVOFLOXACIN: SIGNIFICANT CHANGE UP
-  MEROPENEM: SIGNIFICANT CHANGE UP
-  MEROPENEM: SIGNIFICANT CHANGE UP
-  OXACILLIN: SIGNIFICANT CHANGE UP
-  OXACILLIN: SIGNIFICANT CHANGE UP
-  PENICILLIN: SIGNIFICANT CHANGE UP
-  PENICILLIN: SIGNIFICANT CHANGE UP
-  PIPERACILLIN/TAZOBACTAM: SIGNIFICANT CHANGE UP
-  PIPERACILLIN/TAZOBACTAM: SIGNIFICANT CHANGE UP
-  RIFAMPIN: SIGNIFICANT CHANGE UP
-  RIFAMPIN: SIGNIFICANT CHANGE UP
-  TETRACYCLINE: SIGNIFICANT CHANGE UP
-  TETRACYCLINE: SIGNIFICANT CHANGE UP
-  TOBRAMYCIN: SIGNIFICANT CHANGE UP
-  TOBRAMYCIN: SIGNIFICANT CHANGE UP
-  TRIMETHOPRIM/SULFAMETHOXAZOLE: SIGNIFICANT CHANGE UP
-  VANCOMYCIN: SIGNIFICANT CHANGE UP
-  VANCOMYCIN: SIGNIFICANT CHANGE UP
ALBUMIN SERPL ELPH-MCNC: 2.1 G/DL — LOW (ref 3.5–5)
ALP SERPL-CCNC: 86 U/L — SIGNIFICANT CHANGE UP (ref 40–120)
ALT FLD-CCNC: 42 U/L DA — SIGNIFICANT CHANGE UP (ref 10–60)
ANION GAP SERPL CALC-SCNC: 7 MMOL/L — SIGNIFICANT CHANGE UP (ref 5–17)
APTT BLD: 27.4 SEC — LOW (ref 27.5–35.5)
AST SERPL-CCNC: 23 U/L — SIGNIFICANT CHANGE UP (ref 10–40)
BASOPHILS # BLD AUTO: 0.03 K/UL — SIGNIFICANT CHANGE UP (ref 0–0.2)
BASOPHILS NFR BLD AUTO: 0.4 % — SIGNIFICANT CHANGE UP (ref 0–2)
BILIRUB SERPL-MCNC: 0.5 MG/DL — SIGNIFICANT CHANGE UP (ref 0.2–1.2)
BUN SERPL-MCNC: 14 MG/DL — SIGNIFICANT CHANGE UP (ref 7–18)
CALCIUM SERPL-MCNC: 8.7 MG/DL — SIGNIFICANT CHANGE UP (ref 8.4–10.5)
CHLORIDE SERPL-SCNC: 103 MMOL/L — SIGNIFICANT CHANGE UP (ref 96–108)
CO2 SERPL-SCNC: 26 MMOL/L — SIGNIFICANT CHANGE UP (ref 22–31)
CREAT SERPL-MCNC: 1.18 MG/DL — SIGNIFICANT CHANGE UP (ref 0.5–1.3)
EGFR: 66 ML/MIN/1.73M2 — SIGNIFICANT CHANGE UP
EOSINOPHIL # BLD AUTO: 0.13 K/UL — SIGNIFICANT CHANGE UP (ref 0–0.5)
EOSINOPHIL NFR BLD AUTO: 1.8 % — SIGNIFICANT CHANGE UP (ref 0–6)
GLUCOSE BLDC GLUCOMTR-MCNC: 127 MG/DL — HIGH (ref 70–99)
GLUCOSE BLDC GLUCOMTR-MCNC: 152 MG/DL — HIGH (ref 70–99)
GLUCOSE BLDC GLUCOMTR-MCNC: 183 MG/DL — HIGH (ref 70–99)
GLUCOSE BLDC GLUCOMTR-MCNC: 225 MG/DL — HIGH (ref 70–99)
GLUCOSE SERPL-MCNC: 201 MG/DL — HIGH (ref 70–99)
GRAM STN FLD: SIGNIFICANT CHANGE UP
GRAM STN FLD: SIGNIFICANT CHANGE UP
HCT VFR BLD CALC: 38.5 % — LOW (ref 39–50)
HGB BLD-MCNC: 12.4 G/DL — LOW (ref 13–17)
IMM GRANULOCYTES NFR BLD AUTO: 1 % — HIGH (ref 0–0.9)
INR BLD: 1.31 RATIO — HIGH (ref 0.88–1.16)
LYMPHOCYTES # BLD AUTO: 2.07 K/UL — SIGNIFICANT CHANGE UP (ref 1–3.3)
LYMPHOCYTES # BLD AUTO: 28.2 % — SIGNIFICANT CHANGE UP (ref 13–44)
MAGNESIUM SERPL-MCNC: 2.1 MG/DL — SIGNIFICANT CHANGE UP (ref 1.6–2.6)
MCHC RBC-ENTMCNC: 26.4 PG — LOW (ref 27–34)
MCHC RBC-ENTMCNC: 32.2 GM/DL — SIGNIFICANT CHANGE UP (ref 32–36)
MCV RBC AUTO: 81.9 FL — SIGNIFICANT CHANGE UP (ref 80–100)
METHOD TYPE: SIGNIFICANT CHANGE UP
MONOCYTES # BLD AUTO: 0.95 K/UL — HIGH (ref 0–0.9)
MONOCYTES NFR BLD AUTO: 13 % — SIGNIFICANT CHANGE UP (ref 2–14)
NEUTROPHILS # BLD AUTO: 4.08 K/UL — SIGNIFICANT CHANGE UP (ref 1.8–7.4)
NEUTROPHILS NFR BLD AUTO: 55.6 % — SIGNIFICANT CHANGE UP (ref 43–77)
NRBC # BLD: 0 /100 WBCS — SIGNIFICANT CHANGE UP (ref 0–0)
PHOSPHATE SERPL-MCNC: 3.1 MG/DL — SIGNIFICANT CHANGE UP (ref 2.5–4.5)
PLATELET # BLD AUTO: 282 K/UL — SIGNIFICANT CHANGE UP (ref 150–400)
POTASSIUM SERPL-MCNC: 3.4 MMOL/L — LOW (ref 3.5–5.3)
POTASSIUM SERPL-SCNC: 3.4 MMOL/L — LOW (ref 3.5–5.3)
PROT SERPL-MCNC: 7.5 G/DL — SIGNIFICANT CHANGE UP (ref 6–8.3)
PROTHROM AB SERPL-ACNC: 15.6 SEC — HIGH (ref 10.5–13.4)
RBC # BLD: 4.7 M/UL — SIGNIFICANT CHANGE UP (ref 4.2–5.8)
RBC # FLD: 13.7 % — SIGNIFICANT CHANGE UP (ref 10.3–14.5)
SODIUM SERPL-SCNC: 136 MMOL/L — SIGNIFICANT CHANGE UP (ref 135–145)
WBC # BLD: 7.33 K/UL — SIGNIFICANT CHANGE UP (ref 3.8–10.5)
WBC # FLD AUTO: 7.33 K/UL — SIGNIFICANT CHANGE UP (ref 3.8–10.5)

## 2023-05-02 PROCEDURE — 99233 SBSQ HOSP IP/OBS HIGH 50: CPT | Mod: GC

## 2023-05-02 PROCEDURE — 99233 SBSQ HOSP IP/OBS HIGH 50: CPT

## 2023-05-02 RX ORDER — INSULIN LISPRO 100/ML
VIAL (ML) SUBCUTANEOUS AT BEDTIME
Refills: 0 | Status: DISCONTINUED | OUTPATIENT
Start: 2023-05-02 | End: 2023-05-05

## 2023-05-02 RX ORDER — INSULIN LISPRO 100/ML
VIAL (ML) SUBCUTANEOUS
Refills: 0 | Status: DISCONTINUED | OUTPATIENT
Start: 2023-05-02 | End: 2023-05-05

## 2023-05-02 RX ORDER — POTASSIUM CHLORIDE 20 MEQ
40 PACKET (EA) ORAL ONCE
Refills: 0 | Status: COMPLETED | OUTPATIENT
Start: 2023-05-02 | End: 2023-05-02

## 2023-05-02 RX ORDER — ACETAMINOPHEN 500 MG
1000 TABLET ORAL ONCE
Refills: 0 | Status: COMPLETED | OUTPATIENT
Start: 2023-05-02 | End: 2023-05-02

## 2023-05-02 RX ORDER — INSULIN GLARGINE 100 [IU]/ML
35 INJECTION, SOLUTION SUBCUTANEOUS AT BEDTIME
Refills: 0 | Status: DISCONTINUED | OUTPATIENT
Start: 2023-05-02 | End: 2023-05-04

## 2023-05-02 RX ADMIN — CEFEPIME 100 MILLIGRAM(S): 1 INJECTION, POWDER, FOR SOLUTION INTRAMUSCULAR; INTRAVENOUS at 06:26

## 2023-05-02 RX ADMIN — NAFCILLIN 200 GRAM(S): 10 INJECTION, POWDER, FOR SOLUTION INTRAVENOUS at 02:01

## 2023-05-02 RX ADMIN — Medication 40 MILLIEQUIVALENT(S): at 12:15

## 2023-05-02 RX ADMIN — FAMOTIDINE 40 MILLIGRAM(S): 10 INJECTION INTRAVENOUS at 17:58

## 2023-05-02 RX ADMIN — Medication 650 MILLIGRAM(S): at 08:41

## 2023-05-02 RX ADMIN — NAFCILLIN 200 GRAM(S): 10 INJECTION, POWDER, FOR SOLUTION INTRAVENOUS at 06:29

## 2023-05-02 RX ADMIN — Medication 400 MILLIGRAM(S): at 00:44

## 2023-05-02 RX ADMIN — CEFEPIME 100 MILLIGRAM(S): 1 INJECTION, POWDER, FOR SOLUTION INTRAMUSCULAR; INTRAVENOUS at 17:58

## 2023-05-02 RX ADMIN — GABAPENTIN 300 MILLIGRAM(S): 400 CAPSULE ORAL at 13:31

## 2023-05-02 RX ADMIN — Medication 1000 MILLIGRAM(S): at 01:05

## 2023-05-02 RX ADMIN — BRIMONIDINE TARTRATE 1 DROP(S): 2 SOLUTION/ DROPS OPHTHALMIC at 06:27

## 2023-05-02 RX ADMIN — INSULIN GLARGINE 35 UNIT(S): 100 INJECTION, SOLUTION SUBCUTANEOUS at 22:07

## 2023-05-02 RX ADMIN — FAMOTIDINE 40 MILLIGRAM(S): 10 INJECTION INTRAVENOUS at 06:26

## 2023-05-02 RX ADMIN — BRIMONIDINE TARTRATE 1 DROP(S): 2 SOLUTION/ DROPS OPHTHALMIC at 17:58

## 2023-05-02 RX ADMIN — Medication 650 MILLIGRAM(S): at 09:40

## 2023-05-02 RX ADMIN — Medication 10 UNIT(S): at 12:16

## 2023-05-02 RX ADMIN — NAFCILLIN 200 GRAM(S): 10 INJECTION, POWDER, FOR SOLUTION INTRAVENOUS at 13:31

## 2023-05-02 RX ADMIN — GABAPENTIN 300 MILLIGRAM(S): 400 CAPSULE ORAL at 22:09

## 2023-05-02 RX ADMIN — CHLORHEXIDINE GLUCONATE 1 APPLICATION(S): 213 SOLUTION TOPICAL at 06:23

## 2023-05-02 RX ADMIN — NAFCILLIN 200 GRAM(S): 10 INJECTION, POWDER, FOR SOLUTION INTRAVENOUS at 22:10

## 2023-05-02 RX ADMIN — NAFCILLIN 200 GRAM(S): 10 INJECTION, POWDER, FOR SOLUTION INTRAVENOUS at 10:04

## 2023-05-02 RX ADMIN — GABAPENTIN 300 MILLIGRAM(S): 400 CAPSULE ORAL at 06:26

## 2023-05-02 RX ADMIN — NAFCILLIN 200 GRAM(S): 10 INJECTION, POWDER, FOR SOLUTION INTRAVENOUS at 17:58

## 2023-05-02 RX ADMIN — Medication 10 UNIT(S): at 08:11

## 2023-05-02 NOTE — PROGRESS NOTE ADULT - PROBLEM SELECTOR PLAN 3
h/o DM on Metformin 1000mg BID, Lantus 35 qhs and Humolog 14 TID before meals (per sure scripts Lantus 45u and Humalog 20u), also reports sugars are not controlled at home with prior A1C 7.9  will hold oral dm meds while inpatient   HbA1c: 12.1%  c/w Lantus 30 , Lispro 10 units  Mod ISS  Endocrine Dr. Arreola consulted

## 2023-05-02 NOTE — PROGRESS NOTE ADULT - ATTENDING COMMENTS
Patient seen and examined this afternoon; Endocrinologist also was at bedside.     71 yo man presented with c/o pain and swelling of his left foot after a fall noted to have Gas gangrene s/p Debridement       Vital Signs Last 24 Hrs  T(C): 36.6 (02 May 2023 14:26), Max: 37.2 (01 May 2023 21:56)  T(F): 97.9 (02 May 2023 14:26), Max: 99 (01 May 2023 21:56)  HR: 88 (02 May 2023 14:26) (88 - 92)  BP: 120/73 (02 May 2023 14:26) (110/68 - 135/82)  RR: 16 (02 May 2023 14:26) (16 - 17)  SpO2: 98% (02 May 2023 14:26) (94% - 98%) room air    P/E:  elderly male, comfortable in bed in NAD    EOMI  Lungs, clear  Cor, RRR  Abdomen, soft  Neurological, non-focal  Ext. resection of 5th metatarsal at midshaft        Labs:                        12.4   7.33  )-----------( 282      ( 02 May 2023 06:39 )             38.5   05-02    136  |  103  |  14  ----------------------------<  201<H>  3.4<L>   |  26  |  1.18  Ca    8.7      02 May 2023 06:39  Phos  3.1     05-02  Mg     2.1     05-02  TPro  7.5  /  Alb  2.1<L>  /  TBili  0.5  /  DBili  x   /  AST  23  /  ALT  42  /  AlkPhos  86  05-02    MR Head w/wo IV Cont (05.01.23 @ 15:37)   MR Head w/wo IV Cont (05.01.23 @ 15:37)   PROCEDURE DATE:  05/01/2023     IMPRESSION:  MR orbits/sella:   1.8 cm AP by 1.6 cm CC by 2 cm TRV pituitary mass consistent with an adenoma. There is mild extension into the LEFT   cavernous sinus. The RIGHT cavernous sinuses and para cavernous regions appear intact.    The cavernous segments of the internal carotid arteries   demonstrate expected flow-void indicating patency. There is mild mass effect on the optic chiasm, LEFT greater than RIGHT. The infundibulum is   slightly deviated to the LEFT.    MR brain: Minimal white matter ischemia at the bifrontal and LEFT parietal subcortical white matter.    Moderate mucosal thickening/fluid   in the LEFT mastoid air cells.        IMP:   infected left foot with gas seen on x-ray, s/p resection, with wound left open because of purulence            MSSA bacteremiablood cultures + GPC's, discussed with ID, Dr. Cabrales.             Pituitary adenoma, likely non-secreting, possibly compression on optic chiasm.  MRI, reviewed.            Poorly controlled DM, better on long-acting and pre-meal insulin.  Discussed with Endocrinology, Dr. Arreola.  Plan:    Discontinue vancomycin.  Rx nafcillin and cefepime.  Podiatry f/u, may need repeat debridement before closure.              Endocrine w/u for secreting pituitary tumor:   FSH, LH, prolactin, TSH, Free, AM cortisol, IgF1, Growth Hormone, sent             Discussed with Endocrinology, Dr. Arreola, who recommends increased insulin.  She will see as outpatient for pituitary adenoma and refer to            Neurosurgery.              Refer to Neurosurgery for possible transphinoidal resection after completion of six weeks of antibiotics for osteomyelitis with bacteremia.              Patient wishes his partner Peri Pérez to make decisions, if he can't. Patient seen and examined this afternoon; Endocrinologist also was at bedside.     73 yo man presented with c/o pain and swelling of his left foot after a fall noted to have Gas gangrene s/p Debridement and now awaiting another debridement and graft on Friday. Patient also with incidental Adenoma noted on CT/ MRI; Hx Glaucoma and Retinal detachment    Patient doing well. No new complaints. No fever. denies SOB, palpitations, chest pain, nausea, vomiting, diarrhea, constipation, dizziness.  Wound dressing changes late this evening by Podiatry.    Vital Signs Last 24 Hrs  T(C): 36.6 (02 May 2023 14:26), Max: 37.2 (01 May 2023 21:56)  T(F): 97.9 (02 May 2023 14:26), Max: 99 (01 May 2023 21:56)  HR: 88 (02 May 2023 14:26) (88 - 92)  BP: 120/73 (02 May 2023 14:26) (110/68 - 135/82)  RR: 16 (02 May 2023 14:26) (16 - 17)  SpO2: 98% (02 May 2023 14:26) (94% - 98%) room air    P/E:  elderly male, comfortable in bed in NAD  Neuro: AAO x3, no gross focal deficits  Chest: clear, BLAE+, No wheeze or Rhonchi  CVS: S!S2 present, Regular  Abdomen:  soft, BS+, NT, ND  Ext. resection of 5th metatarsal at midshaft  No significant edema or calf tenderness; chronic dermatitis        Labs:                        12.4   7.33  )-----------( 282      ( 02 May 2023 06:39 )             38.5   05-02    136  |  103  |  14  ----------------------------<  201<H>  3.4<L>   |  26  |  1.18  Ca    8.7      02 May 2023 06:39  Phos  3.1     05-02  Mg     2.1     05-02  TPro  7.5  /  Alb  2.1<L>  /  TBili  0.5  /  DBili  x   /  AST  23  /  ALT  42  /  AlkPhos  86  05-02    MR Head w/wo IV Cont (05.01.23 @ 15:37)   MR Head w/wo IV Cont (05.01.23 @ 15:37)   PROCEDURE DATE:  05/01/2023     IMPRESSION:  MR orbits/sella:   1.8 cm AP by 1.6 cm CC by 2 cm TRV pituitary mass consistent with an adenoma. There is mild extension into the LEFT   cavernous sinus. The RIGHT cavernous sinuses and para cavernous regions appear intact.    The cavernous segments of the internal carotid arteries   demonstrate expected flow-void indicating patency. There is mild mass effect on the optic chiasm, LEFT greater than RIGHT. The infundibulum is   slightly deviated to the LEFT.    MR brain: Minimal white matter ischemia at the bifrontal and LEFT parietal subcortical white matter.    Moderate mucosal thickening/fluid   in the LEFT mastoid air cells.    D/D:  Infected left foot with gas seen on x-ray, s/p resection, with wound left open because of purulence  MSSA bacteremia  Pituitary adenoma, likely non-secreting, possibly compression on optic chiasm.    Poorly controlled DM, better on long-acting and pre-meal insulin.       Plan:      Continue IV nafcillin and cefepime.  discussed with ID Dr. Cabrales;   Podiatry f/u, planned repeat debridement on Friday  Endocrine f/u; appear adenoma is non secretory;    FSH, LH, prolactin, TSH, Free, AM cortisol, IgF1, Growth Hormone, mostly normal so far   Discussed with Endocrinology, Dr. Arreola. still will need NeuroSx intervention once completed antibiotic course as adenoma with mild compression on Optic chiasm  As per Endo, increased insulin.  F/u outpatient for pituitary adenoma and refer to Neurosurgery.  possible transphenoidal resection after completion of six weeks of antibiotics for osteomyelitis with bacteremia.   Patient wishes his partner Peri Pérez to make decisions, if he can't.  Rest as per PGY1 above    Discussed with PGY1 Dr. Reynolds and PGY3 Dr. Leary and RN  Discussed with patient all the above at length including f/u Ophthalmologist for visual field testing as well as outpt NeuroSx once complete antibiotic course

## 2023-05-02 NOTE — PROGRESS NOTE ADULT - PROBLEM SELECTOR PLAN 2
Blood cultures: +ve MSSA  c/w Zosyn and Vanc for now  f/u repeat blood cultures  f/u TTE  Dr. Cabrales ID consulted Blood cultures: +ve MSSA  Vanc D/Leo  Started on Cefepim and Nafcillin for MSSA on blood cultures  f/u TTE  Dr. Cabrales ID consulted

## 2023-05-02 NOTE — PROGRESS NOTE ADULT - PROBLEM SELECTOR PLAN 8
h/o HLD but admits to medication non-compliance, unable to recall statin therapy at home   lipid panel : LDL 93, HDL 33, total cholesterol 147, TAGs 107

## 2023-05-02 NOTE — PROGRESS NOTE ADULT - PROBLEM SELECTOR PLAN 6
RESOLVED   reports chronic SOB with h/o 9/11 exposure   does not follow pulm and not on home inhalers  not in respiratory distress, saturating well on RA     upon DC refer to o/p pulmonologist

## 2023-05-02 NOTE — PROGRESS NOTE ADULT - SUBJECTIVE AND OBJECTIVE BOX
Podiatry HPI  72-year-old male with history of NIDDM,  Patient claims he slipped and fell on Tuesday night in the bedroom and hit his head and had LOC for a few minutes. Patient denies any injuries at the time, but complaining of feeling lightheaded after the fall, confused on Wednesday.  Patient works as a  and on Thursday he noticed with left foot pain, swelling, difficulty ambulating after excessively ambulating by his work in tight work shoes.  Patient took nothing for his pain. Presents to ED with progressive pain to Left foot and increased warmth with concern for infection. Follow OP Podiatrist Dr. Spain who he last saw two weeks ago. States in 2017 had a bone infection in his Right foot which was resected. Cannot recall exact procedure,  Denies fever, chills.    Podiatry Interval: patient is seen in bed resting. States he does not have any pain to foot and states the tylenol he received helped with minimal pain that he was having earlier. Denies nausea, vomiting, fever, chills, diarrhea, constipation. Denies overnight acute events.     Patient admits to  (-) Fevers, (-) Chills, (-) Nausea, (-) Vomiting, (-) Shortness of Breath (-) calf pain (-) chest pain     Medications acetaminophen     Tablet .. 650 milliGRAM(s) Oral every 6 hours PRN  brimonidine 0.2% Ophthalmic Solution 1 Drop(s) Left EYE every 12 hours  cefepime   IVPB 2000 milliGRAM(s) IV Intermittent every 12 hours  chlorhexidine 2% Cloths 1 Application(s) Topical <User Schedule>  enoxaparin Injectable 40 milliGRAM(s) SubCutaneous every 24 hours  famotidine    Tablet 40 milliGRAM(s) Oral every 12 hours  gabapentin 300 milliGRAM(s) Oral every 8 hours  insulin glargine Injectable (LANTUS) 35 Unit(s) SubCutaneous at bedtime  insulin lispro (ADMELOG) corrective regimen sliding scale   SubCutaneous at bedtime  insulin lispro (ADMELOG) corrective regimen sliding scale   SubCutaneous three times a day before meals  insulin lispro Injectable (ADMELOG) 10 Unit(s) SubCutaneous three times a day before meals  lactated ringers. 1000 milliLiter(s) IV Continuous <Continuous>  nafcillin  IVPB 2 Gram(s) IV Intermittent every 4 hours  ondansetron Injectable 4 milliGRAM(s) IV Push every 8 hours PRN    ,   PMH  DM (diabetes mellitus)  Colon cancer  HTN (hypertension)  Hyperlipidemia     PSH  S/P inguinal hernia repair  History of colectomy  S/P arthroscopic knee surgery  History of repair of hiatal hernia      Labs                          12.4   7.33  )-----------( 282      ( 02 May 2023 06:39 )             38.5      05-02    136  |  103  |  14  ----------------------------<  201<H>  3.4<L>   |  26  |  1.18    Ca    8.7      02 May 2023 06:39  Phos  3.1     05-02  Mg     2.1     05-02    TPro  7.5  /  Alb  2.1<L>  /  TBili  0.5  /  DBili  x   /  AST  23  /  ALT  42  /  AlkPhos  86  05-02     Vital Signs Last 24 Hrs  T(C): 36.6 (02 May 2023 14:26), Max: 37.2 (01 May 2023 21:56)  T(F): 97.9 (02 May 2023 14:26), Max: 99 (01 May 2023 21:56)  HR: 88 (02 May 2023 14:26) (88 - 92)  BP: 120/73 (02 May 2023 14:26) (110/68 - 135/82)  RR: 16 (02 May 2023 14:26) (16 - 17)  SpO2: 98% (02 May 2023 14:26) (94% - 98%)    Parameters below as of 02 May 2023 14:26  Patient On (Oxygen Delivery Method): room air      Sedimentation Rate, Erythrocyte: 86 mm/Hr (05-01-23 @ 05:29)  Sedimentation Rate, Erythrocyte: 84 mm/Hr (04-29-23 @ 18:45)         C-Reactive Protein, Serum: 230 mg/L (05-01-23 @ 05:29)   WBC Count: 7.33 K/uL (05-02-23 @ 06:39)      ROS unremarkable outside of HPI    PHYSICAL EXAM  LE Focused:    Vasc:  DP and PT faintly palpable b/l. No pedal hair growth noted. Diffuse edema noted distal to Ankle joint LLE  Derm: Open left lateral foot wound s/p open partial 5th ray resection on Sunday, 4/30/23. No malodor, no purulence upon manual compression, improving erythema and edema.   Neuro: Protective sensation grossly diminished  MSK: able to ambulate with pain, no TTP to dorsolateral surgical site       IMAGING: ?xray  Noted gas foci on CXR foot to 4th interspace and lateral 5th left foot    < from: [facility] Physiol Extremity Lower 3+ Level, BI (05.01.23 @ 16:33) >  FINDINGS: There are biphasic pulse volume recordings bilaterally,   dampened at the level of the metatarsals. Segmental pressures were not   obtained at the thighs.    Right RIVER = 1.15  Left RIVER = 1.11    IMPRESSION: Normal ABIs.    Dampened waveforms at the level of the metatarsals.    < end of copied text >      CULTURES:   pending deep wound cx    A:  Left foot gas gangrene    P:   Patient evaluated and Chart reviewed   Discussed diagnosis and treatment with patient  Previous X-rays noted to left foot with suspicion of gas foci to 4th interspace and lateral 5th toe (pre-operative)  Post-operative xrays taken; pending final read   S/p left partial 5th ray open amputation on 4/30/23  Patient scheduled for graft application and further open left foot wound debridement on Friday, 5/5/23 at 7:45am with Dr. Burns   Due to RIVER/PVR, recommending vascular consult to assess healing potential   Flushed left foot wound with sterile saline   Applied iodoform packing, DSD, ACE  Recc IV antibiotics As Per ID; currently on IV cefepime and nafcillin   Reviewed RIVER/PVR  NWB to LLE  Discussed importance of daily foot examinations and proper shoe gear and to importance of lower Fasting Blood Glucose levels.   Podiatry to follow while in house.  Discussed with Attending Dr. Burns

## 2023-05-02 NOTE — PROGRESS NOTE ADULT - SUBJECTIVE AND OBJECTIVE BOX
Subjective/Objective:      MEDS  acetaminophen     Tablet .. 650 milliGRAM(s) Oral every 6 hours PRN  brimonidine 0.2% Ophthalmic Solution 1 Drop(s) Left EYE every 12 hours  cefepime   IVPB 2000 milliGRAM(s) IV Intermittent every 12 hours (D2)  chlorhexidine 2% Cloths 1 Application(s) Topical <User Schedule>  enoxaparin Injectable 40 milliGRAM(s) SubCutaneous every 24 hours  famotidine    Tablet 40 milliGRAM(s) Oral every 12 hours  gabapentin 300 milliGRAM(s) Oral every 8 hours  insulin glargine Injectable (LANTUS) 30 Unit(s) SubCutaneous at bedtime  insulin lispro (ADMELOG) corrective regimen sliding scale   SubCutaneous Before meals and at bedtime  insulin lispro Injectable (ADMELOG) 10 Unit(s) SubCutaneous three times a day before meals  lactated ringers. 1000 milliLiter(s) IV Continuous <Continuous>  nafcillin  IVPB 2 Gram(s) IV Intermittent every 4 hours (D2)  ondansetron Injectable 4 milliGRAM(s) IV Push every 8 hours PRN      PHYSICAL EXAM:    Vital Signs Last 24 Hrs  T(C): 36.4 (02 May 2023 05:13), Max: 37.4 (01 May 2023 13:23)  T(F): 97.5 (02 May 2023 05:13), Max: 99.3 (01 May 2023 13:23)  HR: 92 (02 May 2023 05:13) (92 - 103)  BP: 110/68 (02 May 2023 05:13) (110/68 - 135/82)  BP(mean): --  RR: 17 (02 May 2023 05:13) (16 - 18)  SpO2: 94% (02 May 2023 05:13) (94% - 98%)    Parameters below as of 02 May 2023 05:13  Patient On (Oxygen Delivery Method): room air        GEN:    HEENT:    CHEST/Respiratory:    Cardiovascular:    Abdomen:    Genitourinary:    Extremities:     Neurological:    Skin:      LABS/DIAGNOSTIC TESTS                            12.4   7.33  )-----------( 282      ( 02 May 2023 06:39 )             38.5       WBC Count: 7.33 K/uL (05-02 @ 06:39)  WBC Count: 11.43 K/uL (05-01 @ 05:29)  WBC Count: 13.94 K/uL (04-30 @ 08:00)  WBC Count: 16.95 K/uL (04-29 @ 15:00)      05-02    136  |  103  |  14  ----------------------------<  201<H>  3.4<L>   |  26  |  1.18    Ca    8.7      02 May 2023 06:39  Phos  3.1     05-02  Mg     2.1     05-02    TPro  7.5  /  Alb  2.1<L>  /  TBili  0.5  /  DBili  x   /  AST  23  /  ALT  42  /  AlkPhos  86  05-02          PT/INR - ( 02 May 2023 06:39 )   PT: 15.6 sec;   INR: 1.31 ratio         PTT - ( 02 May 2023 06:39 )  PTT:27.4 sec    LACTATE:      CULTURES      Culture - Surgical Swab (collected 04-30-23 @ 10:13)  Source: .Surgical Swab Left 5th ray clean margin  Preliminary Report (05-01-23 @ 10:41):    Rare Morganella morganii    Rare Staphylococcus aureus  Organism: Morganella morganii  Staphylococcus aureus (05-02-23 @ 07:52)  Organism: Morganella morganii (05-02-23 @ 07:52)      Method Type: TROY      -  Amikacin: S <=16      -  Amoxicillin/Clavulanic Acid: R >16/8      -  Ampicillin: R >16 These ampicillin results predict results for amoxicillin      -  Ampicillin/Sulbactam: R >16/8 Enterobacter, Klebsiella aerogenes, Citrobacter, and Serratia may develop resistance during prolonged therapy (3-4 days)      -  Aztreonam: S <=4      -  Cefazolin: R >16 Enterobacter, Klebsiella aerogenes, Citrobacter, and Serratia may develop resistance during prolonged therapy (3-4 days)      -  Cefepime: S <=2      -  Cefoxitin: S <=8      -  Ceftriaxone: S <=1 Enterobacter, Klebsiella aerogenes, Citrobacter, and Serratia may develop resistance during prolonged therapy      -  Ciprofloxacin: S <=0.25      -  Ertapenem: S <=0.5      -  Gentamicin: S <=2      -  Levofloxacin: S <=0.5      -  Meropenem: S <=1      -  Piperacillin/Tazobactam: S <=8      -  Tobramycin: S <=2      -  Trimethoprim/Sulfamethoxazole: S <=0.5/9.5  Organism: Staphylococcus aureus (05-02-23 @ 07:44)      Method Type: TROY      -  Ampicillin/Sulbactam: S <=8/4      -  Cefazolin: S <=4      -  Clindamycin: S <=0.25      -  Erythromycin: S <=0.25      -  Gentamicin: S <=1 Should not be used as monotherapy      -  Oxacillin: S <=0.25 Oxacillin predicts susceptibility for dicloxacillin, methicillin, and nafcillin      -  Penicillin: R 2      -  Rifampin: S <=1 Should not be used as monotherapy      -  Tetracycline: S <=1      -  Trimethoprim/Sulfamethoxazole: S <=0.5/9.5      -  Vancomycin: S 1    Culture - Surgical Swab (collected 04-30-23 @ 10:13)  Source: .Surgical Swab Left foot dirty culture  Preliminary Report (05-01-23 @ 10:37):    Few Morganella morganii    Few Staphylococcus aureus  Organism: Morganella morganii  Staphylococcus aureus (05-02-23 @ 07:52)  Organism: Morganella morganii (05-02-23 @ 07:52)      Method Type: TROY      -  Amikacin: S <=16      -  Amoxicillin/Clavulanic Acid: R >16/8      -  Ampicillin: R >16 These ampicillin results predict results for amoxicillin      -  Ampicillin/Sulbactam: R >16/8 Enterobacter, Klebsiella aerogenes, Citrobacter, and Serratia may develop resistance during prolonged therapy (3-4 days)      -  Aztreonam: S <=4      -  Cefazolin: R >16 Enterobacter, Klebsiella aerogenes, Citrobacter, and Serratia may develop resistance during prolonged therapy (3-4 days)      -  Cefepime: S <=2      -  Cefoxitin: S <=8      -  Ceftriaxone: S <=1 Enterobacter, Klebsiella aerogenes, Citrobacter, and Serratia may develop resistance during prolonged therapy      -  Ciprofloxacin: S <=0.25      -  Ertapenem: S <=0.5      -  Gentamicin: S <=2      -  Levofloxacin: S <=0.5      -  Meropenem: S <=1      -  Piperacillin/Tazobactam: S <=8      -  Tobramycin: S <=2      -  Trimethoprim/Sulfamethoxazole: S <=0.5/9.5  Organism: Staphylococcus aureus (05-02-23 @ 07:49)      Method Type: TROY      -  Ampicillin/Sulbactam: S <=8/4      -  Cefazolin: S <=4      -  Clindamycin: S <=0.25      -  Erythromycin: S <=0.25      -  Gentamicin: S <=1 Should not be used as monotherapy      -  Oxacillin: S <=0.25 Oxacillin predicts susceptibility for dicloxacillin, methicillin, and nafcillin      -  Penicillin: R 2      -  Rifampin: S <=1 Should not be used as monotherapy      -  Tetracycline: S <=1      -  Trimethoprim/Sulfamethoxazole: S <=0.5/9.5      -  Vancomycin: S 1    Culture - Surgical Swab (collected 04-29-23 @ 18:10)  Source: .Surgical Swab left foot wound  Preliminary Report (05-01-23 @ 20:36):    Few Staphylococcus aureus    Few Morganella morganii    Rare Staphylococcus epidermidis "Susceptibilities not performed"    Few Proteus mirabilis  Organism: Staphylococcus aureus  Morganella morganii  Proteus mirabilis (05-01-23 @ 20:08)  Organism: Proteus mirabilis (05-01-23 @ 20:08)      Method Type: TROY      -  Amikacin: S <=16      -  Amoxicillin/Clavulanic Acid: S <=8/4      -  Ampicillin: S <=8 These ampicillin results predict results for amoxicillin      -  Ampicillin/Sulbactam: S <=4/2 Enterobacter, Klebsiella aerogenes, Citrobacter, and Serratia may develop resistance during prolonged therapy (3-4 days)      -  Aztreonam: R >16      -  Cefazolin: S <=2 Enterobacter, Klebsiella aerogenes, Citrobacter, and Serratia may develop resistance during prolonged therapy (3-4 days)      -  Cefepime: S <=2      -  Cefoxitin: S <=8      -  Ceftriaxone: S <=1 Enterobacter, Klebsiella aerogenes, Citrobacter, and Serratia may develop resistance during prolonged therapy      -  Ciprofloxacin: S <=0.25      -  Ertapenem: S <=0.5      -  Gentamicin: S <=2      -  Levofloxacin: S <=0.5      -  Meropenem: S <=1      -  Piperacillin/Tazobactam: S <=8      -  Tobramycin: S <=2      -  Trimethoprim/Sulfamethoxazole: S <=0.5/9.5  Organism: Morganella morganii (05-01-23 @ 19:31)      Method Type: TROY      -  Amikacin: S <=16      -  Amoxicillin/Clavulanic Acid: R >16/8      -  Ampicillin: R >16 These ampicillin results predict results for amoxicillin      -  Ampicillin/Sulbactam: R >16/8 Enterobacter, Klebsiella aerogenes, Citrobacter, and Serratia may develop resistance during prolonged therapy (3-4 days)      -  Aztreonam: S <=4      -  Cefazolin: R >16 Enterobacter, Klebsiella aerogenes, Citrobacter, and Serratia may develop resistance during prolonged therapy (3-4 days)      -  Cefepime: S <=2      -  Cefoxitin: S <=8      -  Ceftriaxone: S <=1 Enterobacter, Klebsiella aerogenes, Citrobacter, and Serratia may develop resistance during prolonged therapy      -  Ciprofloxacin: S <=0.25      -  Ertapenem: S <=0.5      -  Gentamicin: S <=2      -  Levofloxacin: S <=0.5      -  Meropenem: S <=1      -  Piperacillin/Tazobactam: S <=8      -  Tobramycin: S <=2      -  Trimethoprim/Sulfamethoxazole: S <=0.5/9.5  Organism: Staphylococcus aureus (05-01-23 @ 19:31)      Method Type: TROY      -  Ampicillin/Sulbactam: S <=8/4      -  Cefazolin: S <=4      -  Clindamycin: S <=0.25      -  Erythromycin: S <=0.25      -  Gentamicin: S 2 Should not be used as monotherapy      -  Oxacillin: S <=0.25 Oxacillin predicts susceptibility for dicloxacillin, methicillin, and nafcillin      -  Penicillin: R 8      -  Rifampin: S <=1 Should not be used as monotherapy      -  Tetracycline: S <=1      -  Trimethoprim/Sulfamethoxazole: S <=0.5/9.5      -  Vancomycin: S 2    Culture - Urine (collected 04-29-23 @ 15:10)  Source: Clean Catch Clean Catch (Midstream)  Final Report (04-30-23 @ 17:29):    <10,000 CFU/mL Normal Urogenital Sia    Culture - Blood (collected 04-29-23 @ 15:10)  Source: .Blood Blood-Peripheral  Gram Stain (05-01-23 @ 16:56):    Growth in aerobic bottle: Gram Positive Cocci in Clusters    Growth in anaerobic bottle: Gram Positive Cocci in Clusters  Preliminary Report (05-01-23 @ 16:56):    Growth in aerobic bottle: Gram Positive Cocci in Clusters    Growth in anaerobic bottle: Gram Positive Cocci in Clusters    Culture - Blood (collected 04-29-23 @ 15:00)  Source: .Blood Blood-Peripheral  Gram Stain (04-30-23 @ 10:06):    Growth in aerobic bottle: Gram Positive Cocci in Clusters  Preliminary Report (04-30-23 @ 10:06):    Growth in aerobic bottle: Gram Positive Cocci in Clusters    ***Blood Panel PCR results on this specimen are available    approximately 3 hours after the Gram stain result.***    Gram stain, PCR, and/or culture results may not always    correspond due to difference in methodologies.    ************************************************************    This PCR assay was performed by multiplex PCR. This    Assay tests for 66 bacterial and resistance gene targets.    Please refer to the [facility] test directory    at [email or web address] for details.  Organism: Blood Culture PCR (04-30-23 @ 11:21)  Organism: Blood Culture PCR (04-30-23 @ 11:21)      Method Type: PCR      -  Methicillin SENSITIVE Staphylococcus aureus (MSSA): Detec Any isolate of Staphylococcus aureus from a blood culture is NOT considered a contaminant.          RADIOLOGY               Subjective/Objective: Pt says he had pain in L foot relieved with pain med this AM. Has no other complaints.       MEDS  acetaminophen     Tablet .. 650 milliGRAM(s) Oral every 6 hours PRN  brimonidine 0.2% Ophthalmic Solution 1 Drop(s) Left EYE every 12 hours  cefepime   IVPB 2000 milliGRAM(s) IV Intermittent every 12 hours (D2)  chlorhexidine 2% Cloths 1 Application(s) Topical <User Schedule>  enoxaparin Injectable 40 milliGRAM(s) SubCutaneous every 24 hours  famotidine    Tablet 40 milliGRAM(s) Oral every 12 hours  gabapentin 300 milliGRAM(s) Oral every 8 hours  insulin glargine Injectable (LANTUS) 30 Unit(s) SubCutaneous at bedtime  insulin lispro (ADMELOG) corrective regimen sliding scale   SubCutaneous Before meals and at bedtime  insulin lispro Injectable (ADMELOG) 10 Unit(s) SubCutaneous three times a day before meals  lactated ringers. 1000 milliLiter(s) IV Continuous <Continuous>  nafcillin  IVPB 2 Gram(s) IV Intermittent every 4 hours (D2)  ondansetron Injectable 4 milliGRAM(s) IV Push every 8 hours PRN      PHYSICAL EXAM:    Vital Signs Last 24 Hrs  T(C): 36.4 (02 May 2023 05:13), Max: 37.4 (01 May 2023 13:23)  T(F): 97.5 (02 May 2023 05:13), Max: 99.3 (01 May 2023 13:23)  HR: 92 (02 May 2023 05:13) (92 - 103)  BP: 110/68 (02 May 2023 05:13) (110/68 - 135/82)  BP(mean): --  RR: 17 (02 May 2023 05:13) (16 - 18)  SpO2: 94% (02 May 2023 05:13) (94% - 98%)    Parameters below as of 02 May 2023 05:13  Patient On (Oxygen Delivery Method): room air      Gen: WD B male in NAD    HEENT: NC/AT    Neck: supple    Chest/Thorax: clear to auscultation    Cardiovascular: S1S2 reg; no murmurs, gallops, rubs    ABD: midline, vert scar above umbilicus; oblique inguinal scars; soft and non-tender with active BS    Genitourinary: no catheter in place    Extremities: onychomycosis bilat of toenails  RLE: hyperpigmented area mid-tibia  LLE: dressing present over foot    Neurological: A+O x3    Psychiatric: affect appropriate          LABS/DIAGNOSTIC TESTS                          12.4   7.33  )-----------( 282      ( 02 May 2023 06:39 )             38.5       WBC Count: 7.33 K/uL (05-02 @ 06:39)  WBC Count: 11.43 K/uL (05-01 @ 05:29)  WBC Count: 13.94 K/uL (04-30 @ 08:00)  WBC Count: 16.95 K/uL (04-29 @ 15:00)      05-02    136  |  103  |  14  ----------------------------<  201<H>  3.4<L>   |  26  |  1.18    Ca    8.7      02 May 2023 06:39  Phos  3.1     05-02  Mg     2.1     05-02    TPro  7.5  /  Alb  2.1<L>  /  TBili  0.5  /  DBili  x   /  AST  23  /  ALT  42  /  AlkPhos  86  05-02          PT/INR - ( 02 May 2023 06:39 )   PT: 15.6 sec;   INR: 1.31 ratio         PTT - ( 02 May 2023 06:39 )  PTT:27.4 sec    LACTATE:      CULTURES      Culture - Surgical Swab (collected 04-30-23 @ 10:13)  Source: .Surgical Swab Left 5th ray clean margin  Preliminary Report (05-01-23 @ 10:41):    Rare Morganella morganii    Rare Staphylococcus aureus  Organism: Morganella morganii  Staphylococcus aureus (05-02-23 @ 07:52)  Organism: Morganella morganii (05-02-23 @ 07:52)      Method Type: TROY      -  Amikacin: S <=16      -  Amoxicillin/Clavulanic Acid: R >16/8      -  Ampicillin: R >16 These ampicillin results predict results for amoxicillin      -  Ampicillin/Sulbactam: R >16/8 Enterobacter, Klebsiella aerogenes, Citrobacter, and Serratia may develop resistance during prolonged therapy (3-4 days)      -  Aztreonam: S <=4      -  Cefazolin: R >16 Enterobacter, Klebsiella aerogenes, Citrobacter, and Serratia may develop resistance during prolonged therapy (3-4 days)      -  Cefepime: S <=2      -  Cefoxitin: S <=8      -  Ceftriaxone: S <=1 Enterobacter, Klebsiella aerogenes, Citrobacter, and Serratia may develop resistance during prolonged therapy      -  Ciprofloxacin: S <=0.25      -  Ertapenem: S <=0.5      -  Gentamicin: S <=2      -  Levofloxacin: S <=0.5      -  Meropenem: S <=1      -  Piperacillin/Tazobactam: S <=8      -  Tobramycin: S <=2      -  Trimethoprim/Sulfamethoxazole: S <=0.5/9.5  Organism: Staphylococcus aureus (05-02-23 @ 07:44)      Method Type: TROY      -  Ampicillin/Sulbactam: S <=8/4      -  Cefazolin: S <=4      -  Clindamycin: S <=0.25      -  Erythromycin: S <=0.25      -  Gentamicin: S <=1 Should not be used as monotherapy      -  Oxacillin: S <=0.25 Oxacillin predicts susceptibility for dicloxacillin, methicillin, and nafcillin      -  Penicillin: R 2      -  Rifampin: S <=1 Should not be used as monotherapy      -  Tetracycline: S <=1      -  Trimethoprim/Sulfamethoxazole: S <=0.5/9.5      -  Vancomycin: S 1    Culture - Surgical Swab (collected 04-30-23 @ 10:13)  Source: .Surgical Swab Left foot dirty culture  Preliminary Report (05-01-23 @ 10:37):    Few Morganella morganii    Few Staphylococcus aureus  Organism: Morganella morganii  Staphylococcus aureus (05-02-23 @ 07:52)  Organism: Morganella morganii (05-02-23 @ 07:52)      Method Type: TROY      -  Amikacin: S <=16      -  Amoxicillin/Clavulanic Acid: R >16/8      -  Ampicillin: R >16 These ampicillin results predict results for amoxicillin      -  Ampicillin/Sulbactam: R >16/8 Enterobacter, Klebsiella aerogenes, Citrobacter, and Serratia may develop resistance during prolonged therapy (3-4 days)      -  Aztreonam: S <=4      -  Cefazolin: R >16 Enterobacter, Klebsiella aerogenes, Citrobacter, and Serratia may develop resistance during prolonged therapy (3-4 days)      -  Cefepime: S <=2      -  Cefoxitin: S <=8      -  Ceftriaxone: S <=1 Enterobacter, Klebsiella aerogenes, Citrobacter, and Serratia may develop resistance during prolonged therapy      -  Ciprofloxacin: S <=0.25      -  Ertapenem: S <=0.5      -  Gentamicin: S <=2      -  Levofloxacin: S <=0.5      -  Meropenem: S <=1      -  Piperacillin/Tazobactam: S <=8      -  Tobramycin: S <=2      -  Trimethoprim/Sulfamethoxazole: S <=0.5/9.5  Organism: Staphylococcus aureus (05-02-23 @ 07:49)      Method Type: TROY      -  Ampicillin/Sulbactam: S <=8/4      -  Cefazolin: S <=4      -  Clindamycin: S <=0.25      -  Erythromycin: S <=0.25      -  Gentamicin: S <=1 Should not be used as monotherapy      -  Oxacillin: S <=0.25 Oxacillin predicts susceptibility for dicloxacillin, methicillin, and nafcillin      -  Penicillin: R 2      -  Rifampin: S <=1 Should not be used as monotherapy      -  Tetracycline: S <=1      -  Trimethoprim/Sulfamethoxazole: S <=0.5/9.5      -  Vancomycin: S 1    Culture - Surgical Swab (collected 04-29-23 @ 18:10)  Source: .Surgical Swab left foot wound  Preliminary Report (05-01-23 @ 20:36):    Few Staphylococcus aureus    Few Morganella morganii    Rare Staphylococcus epidermidis "Susceptibilities not performed"    Few Proteus mirabilis  Organism: Staphylococcus aureus  Morganella morganii  Proteus mirabilis (05-01-23 @ 20:08)  Organism: Proteus mirabilis (05-01-23 @ 20:08)      Method Type: TROY      -  Amikacin: S <=16      -  Amoxicillin/Clavulanic Acid: S <=8/4      -  Ampicillin: S <=8 These ampicillin results predict results for amoxicillin      -  Ampicillin/Sulbactam: S <=4/2 Enterobacter, Klebsiella aerogenes, Citrobacter, and Serratia may develop resistance during prolonged therapy (3-4 days)      -  Aztreonam: R >16      -  Cefazolin: S <=2 Enterobacter, Klebsiella aerogenes, Citrobacter, and Serratia may develop resistance during prolonged therapy (3-4 days)      -  Cefepime: S <=2      -  Cefoxitin: S <=8      -  Ceftriaxone: S <=1 Enterobacter, Klebsiella aerogenes, Citrobacter, and Serratia may develop resistance during prolonged therapy      -  Ciprofloxacin: S <=0.25      -  Ertapenem: S <=0.5      -  Gentamicin: S <=2      -  Levofloxacin: S <=0.5      -  Meropenem: S <=1      -  Piperacillin/Tazobactam: S <=8      -  Tobramycin: S <=2      -  Trimethoprim/Sulfamethoxazole: S <=0.5/9.5  Organism: Morganella morganii (05-01-23 @ 19:31)      Method Type: TROY      -  Amikacin: S <=16      -  Amoxicillin/Clavulanic Acid: R >16/8      -  Ampicillin: R >16 These ampicillin results predict results for amoxicillin      -  Ampicillin/Sulbactam: R >16/8 Enterobacter, Klebsiella aerogenes, Citrobacter, and Serratia may develop resistance during prolonged therapy (3-4 days)      -  Aztreonam: S <=4      -  Cefazolin: R >16 Enterobacter, Klebsiella aerogenes, Citrobacter, and Serratia may develop resistance during prolonged therapy (3-4 days)      -  Cefepime: S <=2      -  Cefoxitin: S <=8      -  Ceftriaxone: S <=1 Enterobacter, Klebsiella aerogenes, Citrobacter, and Serratia may develop resistance during prolonged therapy      -  Ciprofloxacin: S <=0.25      -  Ertapenem: S <=0.5      -  Gentamicin: S <=2      -  Levofloxacin: S <=0.5      -  Meropenem: S <=1      -  Piperacillin/Tazobactam: S <=8      -  Tobramycin: S <=2      -  Trimethoprim/Sulfamethoxazole: S <=0.5/9.5  Organism: Staphylococcus aureus (05-01-23 @ 19:31)      Method Type: TROY      -  Ampicillin/Sulbactam: S <=8/4      -  Cefazolin: S <=4      -  Clindamycin: S <=0.25      -  Erythromycin: S <=0.25      -  Gentamicin: S 2 Should not be used as monotherapy      -  Oxacillin: S <=0.25 Oxacillin predicts susceptibility for dicloxacillin, methicillin, and nafcillin      -  Penicillin: R 8      -  Rifampin: S <=1 Should not be used as monotherapy      -  Tetracycline: S <=1      -  Trimethoprim/Sulfamethoxazole: S <=0.5/9.5      -  Vancomycin: S 2    Culture - Urine (collected 04-29-23 @ 15:10)  Source: Clean Catch Clean Catch (Midstream)  Final Report (04-30-23 @ 17:29):    <10,000 CFU/mL Normal Urogenital Sia    Culture - Blood (collected 04-29-23 @ 15:10)  Source: .Blood Blood-Peripheral  Gram Stain (05-01-23 @ 16:56):    Growth in aerobic bottle: Gram Positive Cocci in Clusters    Growth in anaerobic bottle: Gram Positive Cocci in Clusters  Preliminary Report (05-01-23 @ 16:56):    Growth in aerobic bottle: Gram Positive Cocci in Clusters    Growth in anaerobic bottle: Gram Positive Cocci in Clusters    Culture - Blood (collected 04-29-23 @ 15:00)  Source: .Blood Blood-Peripheral  Gram Stain (04-30-23 @ 10:06):    Growth in aerobic bottle: Gram Positive Cocci in Clusters  Preliminary Report (04-30-23 @ 10:06):    Growth in aerobic bottle: Gram Positive Cocci in Clusters    ***Blood Panel PCR results on this specimen are available    approximately 3 hours after the Gram stain result.***    Gram stain, PCR, and/or culture results may not always    correspond due to difference in methodologies.    ************************************************************    This PCR assay was performed by multiplex PCR. This    Assay tests for 66 bacterial and resistance gene targets.    Please refer to the [facility] test directory    at [email or web address] for details.  Organism: Blood Culture PCR (04-30-23 @ 11:21)  Organism: Blood Culture PCR (04-30-23 @ 11:21)      Method Type: PCR      -  Methicillin SENSITIVE Staphylococcus aureus (MSSA): Detec Any isolate of Staphylococcus aureus from a blood culture is NOT considered a contaminant.          RADIOLOGY

## 2023-05-02 NOTE — PROGRESS NOTE ADULT - SUBJECTIVE AND OBJECTIVE BOX
Subjective:  Chart Notes, Work list Manager, and fingersticks reviewed.    Review of Systems:  Constitutional: No fever  Eyes: No blurry vision  Neuro: No tremors  HEENT: No pain  Cardiovascular: No chest pain, palpitations  Respiratory: No SOB, no cough  GI: No nausea, vomiting, abdominal pain  : No dysuria  Skin: no rash  Psych: no depression  Endocrine: no polyuria, polydipsia  Hem/lymph: no swelling  Osteoporosis: no fractures    ALL OTHER SYSTEMS REVIEWED AND NEGATIVE    UNABLE TO OBTAIN    MEDICATIONS  (STANDING):  brimonidine 0.2% Ophthalmic Solution 1 Drop(s) Left EYE every 12 hours  cefepime   IVPB 2000 milliGRAM(s) IV Intermittent every 12 hours  chlorhexidine 2% Cloths 1 Application(s) Topical <User Schedule>  enoxaparin Injectable 40 milliGRAM(s) SubCutaneous every 24 hours  famotidine    Tablet 40 milliGRAM(s) Oral every 12 hours  gabapentin 300 milliGRAM(s) Oral every 8 hours  insulin glargine Injectable (LANTUS) 30 Unit(s) SubCutaneous at bedtime  insulin lispro (ADMELOG) corrective regimen sliding scale   SubCutaneous Before meals and at bedtime  insulin lispro Injectable (ADMELOG) 10 Unit(s) SubCutaneous three times a day before meals  lactated ringers. 1000 milliLiter(s) (100 mL/Hr) IV Continuous <Continuous>  nafcillin  IVPB 2 Gram(s) IV Intermittent every 4 hours  potassium chloride    Tablet ER 40 milliEquivalent(s) Oral once    MEDICATIONS  (PRN):  acetaminophen     Tablet .. 650 milliGRAM(s) Oral every 6 hours PRN Temp greater or equal to 38C (100.4F), Mild Pain (1 - 3)  ondansetron Injectable 4 milliGRAM(s) IV Push every 8 hours PRN Nausea and/or Vomiting      PHYSICAL EXAM:  VITALS: T(C): 36.4 (05-02-23 @ 05:13)  T(F): 97.5 (05-02-23 @ 05:13), Max: 99.3 (05-01-23 @ 13:23)  HR: 92 (05-02-23 @ 05:13) (92 - 103)  BP: 110/68 (05-02-23 @ 05:13) (110/68 - 135/82)  RR:  (16 - 18)  SpO2:  (94% - 98%)  Wt(kg): --  GENERAL: NAD,   HEENT:  Atraumatic, Normocephalic, drymucous membranes  THYROID: Normal size, no palpable nodules  RESPIRATORY: Clear to auscultation bilaterally; No rales, rhonchi, wheezing  CARDIOVASCULAR: Regular rate and rhythm; No murmurs; no peripheral edema  GI: Soft, nontender, non distended, +ve abdominal obesity  EXTREMITIES: +ve peripheral pulses, -ve pedal edema  SKIN: Dry, intact, No rashes or lesions  PSYCH: Alert and oriented x 3    CAPILLARY BLOOD GLUCOSE      POCT Blood Glucose.: 183 mg/dL (02 May 2023 11:30)  POCT Blood Glucose.: 225 mg/dL (02 May 2023 07:51)  POCT Blood Glucose.: 165 mg/dL (01 May 2023 22:26)  POCT Blood Glucose.: 122 mg/dL (01 May 2023 17:31)  POCT Blood Glucose.: 198 mg/dL (01 May 2023 11:45)    05-02    136  |  103  |  14  ----------------------------<  201<H>  3.4<L>   |  26  |  1.18    eGFR: 66    Ca    8.7      05-02  Mg     2.1     05-02  Phos  3.1     05-02    TPro  7.5  /  Alb  2.1<L>  /  TBili  0.5  /  DBili  x   /  AST  23  /  ALT  42  /  AlkPhos  86  05-02    Thyroid Function Tests:  04-30 @ 08:00 TSH 1.13 FreeT4 -- T3 -- Anti TPO -- Anti Thyroglobulin Ab -- TSI --        Assessment and Plan:                 Subjective:  Chart Notes, Work list Manager, lab results and fingersticks reviewed. Patient reports of eating better and feeling better, still have left foot pain    Review of Systems:  Constitutional: No fever  Eyes: yes chronic blurry vision  Cardiovascular: No chest pain, palpitations  Respiratory: No SOB, no cough  GI: No nausea, vomiting, abdominal pain  Endocrine: yes polyuria, polydipsia    Medications  (Standing):  brimonidine 0.2% Ophthalmic Solution 1 Drop(s) Left EYE every 12 hours  cefepime   IVPB 2000 milliGRAM(s) IV Intermittent every 12 hours  chlorhexidine 2% Cloths 1 Application(s) Topical <User Schedule>  enoxaparin Injectable 40 milliGRAM(s) SubCutaneous every 24 hours  famotidine    Tablet 40 milliGRAM(s) Oral every 12 hours  gabapentin 300 milliGRAM(s) Oral every 8 hours  insulin glargine Injectable (LANTUS) 30 Unit(s) SubCutaneous at bedtime  insulin lispro (ADMELOG) corrective regimen sliding scale   SubCutaneous Before meals and at bedtime  insulin lispro Injectable (ADMELOG) 10 Unit(s) SubCutaneous three times a day before meals  lactated ringers. 1000 milliLiter(s) (100 mL/Hr) IV Continuous <Continuous>  nafcillin  IVPB 2 Gram(s) IV Intermittent every 4 hours  potassium chloride    Tablet ER 40 milliEquivalent(s) Oral once    Medications  (PRN):  acetaminophen     Tablet .. 650 milliGRAM(s) Oral every 6 hours PRN Temp greater or equal to 38C (100.4F), Mild Pain (1 - 3)  ondansetron Injectable 4 milliGRAM(s) IV Push every 8 hours PRN Nausea and/or Vomiting    Physical examination:  VITALS: T(C): 36.4 (05-02-23 @ 05:13)  T(F): 97.5 (05-02-23 @ 05:13), Max: 99.3 (05-01-23 @ 13:23)  HR: 92 (05-02-23 @ 05:13) (92 - 103)  BP: 110/68 (05-02-23 @ 05:13) (110/68 - 135/82)  RR:  (16 - 18)  SpO2:  (94% - 98%)    Constitutional: No acute distress, yes ill- appearing,   HEENT: Moist mucous membranes, Left side decreased peripheral vision, Right side peripheral vision intact, PERRL, EOMI  Neck:  No JVD, bruits or thyromegaly, No thyroid nodules palpable, no LAD  Respiratory:  Respiratory effort normal, lungs clear to ausculation, without rales or rhonchi  Cardiovascular:  Regular heart rate, normal S1 and S2 sounds, without murmur, rub or gallop.  Gastrointestinal: Soft, non tender without hepatosplenomegaly and masses, no abdominal obesity  Extremities: Sensation intact in Right foot, Left foot has wrapped bandage, no cyanosis, clubbing or edema, positive pedal pulses  Neurological:  Oriented to person, place and time, No gross sensory or motor defects      Labs:  Capillary Blood Glucose:  POCT Blood Glucose.: 183 mg/dL (02 May 2023 11:30)  POCT Blood Glucose.: 225 mg/dL (02 May 2023 07:51)  POCT Blood Glucose.: 165 mg/dL (01 May 2023 22:26)  POCT Blood Glucose.: 122 mg/dL (01 May 2023 17:31)  POCT Blood Glucose.: 198 mg/dL (01 May 2023 11:45)    05-02    136  |  103  |  14  ----------------------------<  201<H>  3.4<L>   |  26  |  1.18    eGFR: 66    Ca    8.7      05-02  Mg     2.1     05-02  Phos  3.1     05-02    Adrenocorticotropic Hormone, Serum: 11.9 pg/mL (04.30.23 @ 08:00)  Cortisol AM, Serum: 16.8 ug/dL (04.30.23 @ 08:00)    Thyroid Stimulating Hormone, Serum: 1.13 uU/mL (04.30.23 @ 08:00)  Free Triiodothyronine, Serum: 1.46 pg/mL (04.30.23 @ 08:00)  A1C with Estimated Average Glucose Result: 12.2 % (05.01.23 @ 05:29)      MRI Brain 5/1/2023  IMPRESSION:    MR orbits/sella:   1.8 cm AP by 1.6 cm CC by 2 cm TRV pituitary mass   consistent with an adenoma. There is mild extension into the LEFT   cavernous sinus. The RIGHT cavernous sinuses and para cavernous regions   appear intact.    The cavernous segments of the internal carotid arteries   demonstrate expected flow-void indicating patency. There is mild mass   effect on the optic chiasm, LEFT greater than RIGHT. The infundibulum is   slightly deviated to the LEFT.      MR brain: Minimal white matter ischemia at the bifrontal and LEFT   parietal subcortical white matter.    Moderate mucosal thickening/fluid   in the LEFT mastoid air cells.      Assessment and Plan:                 Subjective:  Chart Notes, Work list Manager, lab results and fingersticks reviewed. Patient reports of eating better and feeling better, still have left foot pain    Review of Systems:  Constitutional: No fever  Eyes: yes chronic blurry vision  Cardiovascular: No chest pain, palpitations  Respiratory: No SOB, no cough  GI: No nausea, vomiting, abdominal pain  Endocrine: yes polyuria, polydipsia    Medications  (Standing):  brimonidine 0.2% Ophthalmic Solution 1 Drop(s) Left EYE every 12 hours  cefepime   IVPB 2000 milliGRAM(s) IV Intermittent every 12 hours  chlorhexidine 2% Cloths 1 Application(s) Topical <User Schedule>  enoxaparin Injectable 40 milliGRAM(s) SubCutaneous every 24 hours  famotidine    Tablet 40 milliGRAM(s) Oral every 12 hours  gabapentin 300 milliGRAM(s) Oral every 8 hours  insulin glargine Injectable (LANTUS) 30 Unit(s) SubCutaneous at bedtime  insulin lispro (ADMELOG) corrective regimen sliding scale   SubCutaneous Before meals and at bedtime  insulin lispro Injectable (ADMELOG) 10 Unit(s) SubCutaneous three times a day before meals  lactated ringers. 1000 milliLiter(s) (100 mL/Hr) IV Continuous <Continuous>  nafcillin  IVPB 2 Gram(s) IV Intermittent every 4 hours  potassium chloride    Tablet ER 40 milliEquivalent(s) Oral once    Medications  (PRN):  acetaminophen     Tablet .. 650 milliGRAM(s) Oral every 6 hours PRN Temp greater or equal to 38C (100.4F), Mild Pain (1 - 3)  ondansetron Injectable 4 milliGRAM(s) IV Push every 8 hours PRN Nausea and/or Vomiting    Physical examination:  VITALS: T(C): 36.4 (05-02-23 @ 05:13)  T(F): 97.5 (05-02-23 @ 05:13), Max: 99.3 (05-01-23 @ 13:23)  HR: 92 (05-02-23 @ 05:13) (92 - 103)  BP: 110/68 (05-02-23 @ 05:13) (110/68 - 135/82)  RR:  (16 - 18)  SpO2:  (94% - 98%)    Constitutional: No acute distress, yes ill- appearing,   HEENT: Moist mucous membranes, Left side decreased peripheral vision, Right side peripheral vision intact, PERRL, EOMI  Neck:  No JVD, bruits or thyromegaly, No thyroid nodules palpable, no LAD  Respiratory:  Respiratory effort normal, lungs clear to ausculation, without rales or rhonchi  Cardiovascular:  Regular heart rate, normal S1 and S2 sounds, without murmur, rub or gallop.  Gastrointestinal: Soft, non tender without hepatosplenomegaly and masses, no abdominal obesity  Extremities: Sensation intact in Right foot, Left foot has wrapped bandage, no cyanosis, clubbing or edema, positive pedal pulses  Neurological:  Oriented to person, place and time, No gross sensory or motor defects      Labs:  Capillary Blood Glucose:  POCT Blood Glucose.: 183 mg/dL (02 May 2023 11:30)  POCT Blood Glucose.: 225 mg/dL (02 May 2023 07:51)  POCT Blood Glucose.: 165 mg/dL (01 May 2023 22:26)  POCT Blood Glucose.: 122 mg/dL (01 May 2023 17:31)  POCT Blood Glucose.: 198 mg/dL (01 May 2023 11:45)    05-02    136  |  103  |  14  ----------------------------<  201<H>  3.4<L>   |  26  |  1.18    eGFR: 66    Ca    8.7      05-02  Mg     2.1     05-02  Phos  3.1     05-02    Adrenocorticotropic Hormone, Serum: 11.9 pg/mL (04.30.23 @ 08:00)  Cortisol AM, Serum: 16.8 ug/dL (04.30.23 @ 08:00)    Thyroid Stimulating Hormone, Serum: 1.13 uU/mL (04.30.23 @ 08:00)  Free Triiodothyronine, Serum: 1.46 pg/mL (04.30.23 @ 08:00)  A1C with Estimated Average Glucose Result: 12.2 % (05.01.23 @ 05:29)      MRI Brain 5/1/2023  IMPRESSION:    MR orbits/sella:   1.8 cm AP by 1.6 cm CC by 2 cm TRV pituitary mass   consistent with an adenoma. There is mild extension into the LEFT   cavernous sinus. The RIGHT cavernous sinuses and para cavernous regions   appear intact.    The cavernous segments of the internal carotid arteries   demonstrate expected flow-void indicating patency. There is mild mass   effect on the optic chiasm, LEFT greater than RIGHT. The infundibulum is   slightly deviated to the LEFT.    MR brain: Minimal white matter ischemia at the bifrontal and LEFT   parietal subcortical white matter.    Moderate mucosal thickening/fluid   in the LEFT mastoid air cells.      Assessment and Plan:     72 year old Male with PMHx T2DM, HLD, HTN, colon cancer s/p hemicolon resection in 2011 ( currently in remission) p/w complaints of lightheadedness after fall with LOC and head injury and left foot ulcer. Admitted for left toe gas gangrene. Hospital course complicated by MSSA bacteremia. On admission, patient was found to have serum BS of 389 and elevated A1C.  CT scan head shows pituitary mass. 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  Patient is not taking Humalog before the meals regularly.  Inpatient, patient's BG are better controlled   Fasting blood sugars are in 200s today, postprandial BS are at goal  Patient is eating well therefore will increase lispro     Inpatient Recommendations:  Basal Insulin:   Increase Glargine ( Lantus) 35 units once daily    Nutritional Insulin:  Continue Lispro (Admelog) 10 units with meals, Hold if NPO or eating <50% of meals    Correctional Insulin:  Continue low Lispro ( Admelog) correctional scale with meals and bedtime,     Oral Diabetes Medications:  None in the hospital    Check the POC glucose with meals and bedtime  Please consult nutritionist    4) Pituitary Macroadenoma:   5) Left cavernous sinus invasion:  MRI brain shows 2 cm pituitary macroadenoma compressing left side of optic chiasm and invading the left side of cavernous sinus mildly.  Most likely pituitary mass is nonfunctional as ACTH, cortisol, TSH, electrolytes and vitals are stable however we still need to test all pituitary hormones once patient is a stable and is discharged from the hospital.   Recommend to do free T4, prolactin, IGF 1, urine osmolarity/urine specific gravity inpatient.  Recommend to do ophthalmology consult inpatient or outpatient to evaluate for peripheral visual fields given patient has optic chiasm compression.    Explained at length regarding pituitary tumor and its optic chiasm compression.  Counseled to follow-up with endocrine as outpatient to evaluate whether the tumor is functional or not and then with neurosurgery.  Discussed with patient that as pituitary tumor is compressing the optic chiasm, the gold standard treatment is to do transsphenoidal transnasal pituitary tumor resection to prevent complications such as vision loss, pituitary hemorrhage or pituitary apoplexy.  Patient is agreeable to do the surgical intervention eventually.  Discussed with patient that after the pituitary surgery, there may be a chance that he may develop panhypopituitarism and therefore will be requiring hormone replacement.  Patient verbalized understanding.    Discussed the endocrine plan of care with patient and primary team    Please provide endocrine office address of [phone] [facility] on discharge paper. Patient needs close outpatient endocrine follow up.     Ewa Arreola MD   Endocrinology, Diabetes and Metabolism   Available on MS. teams

## 2023-05-02 NOTE — PROGRESS NOTE ADULT - ASSESSMENT
72M from home, ambulates independently, PMHx DM on insulin, HLD, HTN, and medication non-compliance, PSHx hemicolon resection 2011 for colon cancer (currently in remission), p/w complaints of lightheadedness admitted with severe severe sepsis due to gas gangrene of his left foot. Pt Rx with vancomycin and piperacillin/tazobactam starting 4/29. Underwent partial amputation of 5th ray of L 5th digit 4/30. BC + for methicillin- susceptbile S. aureus (MSSA); 4/29 surgical swab of wound growing S. aureus, Morganella morganii., Proteus mirabilis, and S. epidermidis (latter a contaminant and wd not specifically Rx this org.). 4/30 surgical swab specimens of  L 5th ray "clean margin" and L foot "dirty culture" both growing S. aureus and M. morganii. Abs changed yesterday to nafcillin and cefepime. Pt clinically improving. WBC normal. 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     # Severe sepsis due to gangrene of LLE: s/p partial amputation of 5th digit. Of concern is ??viability of surrounding tissue/bone post-op. BCs growing MSSA.   --cont. nafcillin and cefepime  --Podiatry re-evaluation for possible additional debridement    # Pituitary mass--likely an adenoma. Will be followed by Endocrinology.      Above discussed in detail with Podiatry service and Dr. Tracey. Dr. ORACIO Gao will provide ID input starting tomorrow.

## 2023-05-02 NOTE — PROGRESS NOTE ADULT - PROBLEM SELECTOR PLAN 1
L/M FOR THE PATIENT TO CALL THE OFFICE FOR TEST RESULTS.      ----- Message from SHARON Holden sent at 10/5/2022  2:03 PM EDT -----  Will you let her know her LDL (bad cholesterol) is high she needs to continue taking crestor and I added zetia (sent to her pharmacy), we need to recheck in 8 weeks. Diet modifications as well.     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   surgical path prelim result +ve for Morganella morgani, and Staph  Blood cultures: +ve MSSA   c/w Zosyn and Vanc for now  Dr. Cabrales ID consulted c/o left foot pain, swelling, and erythema   XR for left foot/ ankle: suspicion of gas foci to 4th interspace and lateral 5th toe  aseptic appearing with leukocytosis WBC 16.95 and elevated lactate 2.2  Podiatry consulted-  s/p left partial 5th ray amputation due to gas gangrene.   surgical path prelim result +ve for Morganella morgani, and Staph  Blood cultures: +ve MSSA   Vanc D/Leo  Started on Cefepim and Nafcillin for MSSA on blood cultures  f/u repeat blood cultures   Dr. Cabrales ID consulted

## 2023-05-02 NOTE — PROGRESS NOTE ADULT - SUBJECTIVE AND OBJECTIVE BOX
PGY-1 Progress Note discussed with attending    PAGER #: [[phone]] TILL 5:00 PM  PLEASE CONTACT ON CALL TEAM:  - On Call Team (Please refer to Marlena) FROM 5:00 PM - 8:30PM  - Nightfloat Team FROM 8:30 -7:30 AM    OVERNIGHT EVENTS:   -     REVIEW OF SYSTEMS:  CONSTITUTIONAL: No fever, weight loss, or fatigue  RESPIRATORY: No cough, wheezing, chills or hemoptysis; No shortness of breath  CARDIOVASCULAR: No chest pain, palpitations, dizziness, or leg swelling  GASTROINTESTINAL: No abdominal pain. No nausea, vomiting, or hematemesis; No diarrhea or constipation. No melena or hematochezia.  GENITOURINARY: No dysuria or hematuria, urinary frequency  NEUROLOGICAL: No headaches, memory loss, loss of strength, numbness, or tremors  SKIN: No itching, burning, rashes, or lesions     MEDICATIONS  (STANDING):  brimonidine 0.2% Ophthalmic Solution 1 Drop(s) Left EYE every 12 hours  cefepime   IVPB 2000 milliGRAM(s) IV Intermittent every 12 hours  chlorhexidine 2% Cloths 1 Application(s) Topical <User Schedule>  enoxaparin Injectable 40 milliGRAM(s) SubCutaneous every 24 hours  famotidine    Tablet 40 milliGRAM(s) Oral every 12 hours  gabapentin 300 milliGRAM(s) Oral every 8 hours  insulin glargine Injectable (LANTUS) 30 Unit(s) SubCutaneous at bedtime  insulin lispro (ADMELOG) corrective regimen sliding scale   SubCutaneous Before meals and at bedtime  insulin lispro Injectable (ADMELOG) 10 Unit(s) SubCutaneous three times a day before meals  lactated ringers. 1000 milliLiter(s) (100 mL/Hr) IV Continuous <Continuous>  nafcillin  IVPB 2 Gram(s) IV Intermittent every 4 hours    MEDICATIONS  (PRN):  acetaminophen     Tablet .. 650 milliGRAM(s) Oral every 6 hours PRN Temp greater or equal to 38C (100.4F), Mild Pain (1 - 3)  ondansetron Injectable 4 milliGRAM(s) IV Push every 8 hours PRN Nausea and/or Vomiting      Vital Signs Last 24 Hrs  T(C): 36.4 (02 May 2023 05:13), Max: 37.4 (01 May 2023 13:23)  T(F): 97.5 (02 May 2023 05:13), Max: 99.3 (01 May 2023 13:23)  HR: 92 (02 May 2023 05:13) (92 - 103)  BP: 110/68 (02 May 2023 05:13) (110/68 - 135/82)  BP(mean): --  RR: 17 (02 May 2023 05:13) (16 - 18)  SpO2: 94% (02 May 2023 05:13) (94% - 98%)    Parameters below as of 02 May 2023 05:13  Patient On (Oxygen Delivery Method): room air        PHYSICAL EXAMINATION:  GENERAL: NAD, AAOx  HEAD: AT/NC  EYES: conjunctiva and sclera clear  NECK: supple, No JVD noted, Normal thyroid  CHEST/LUNG: CTABL; no rales, rhonchi, wheezing, or rubs  HEART: regular rate and rhythm; no murmurs, rubs, or gallops  ABDOMEN: soft, nontender, nondistended; Bowel sounds present  EXTREMITIES:  2+ Peripheral Pulses, No clubbing, cyanosis, or edema  SKIN: warm dry                          12.4   7.33  )-----------( 282      ( 02 May 2023 06:39 )             38.5     05-02    136  |  103  |  14  ----------------------------<  201<H>  3.4<L>   |  26  |  1.18    Ca    8.7      02 May 2023 06:39  Phos  3.1     05-02  Mg     2.1     05-02    TPro  7.5  /  Alb  2.1<L>  /  TBili  0.5  /  DBili  x   /  AST  23  /  ALT  42  /  AlkPhos  86  05-02    LIVER FUNCTIONS - ( 02 May 2023 06:39 )  Alb: 2.1 g/dL / Pro: 7.5 g/dL / ALK PHOS: 86 U/L / ALT: 42 U/L DA / AST: 23 U/L / GGT: x               PT/INR - ( 02 May 2023 06:39 )   PT: 15.6 sec;   INR: 1.31 ratio         PTT - ( 02 May 2023 06:39 )  PTT:27.4 sec  COVID-19 PCR: NotDetec (29 Apr 2023 14:50)      CAPILLARY BLOOD GLUCOSE      POCT Blood Glucose.: 225 mg/dL (02 May 2023 07:51)  POCT Blood Glucose.: 165 mg/dL (01 May 2023 22:26)  POCT Blood Glucose.: 122 mg/dL (01 May 2023 17:31)  POCT Blood Glucose.: 198 mg/dL (01 May 2023 11:45)      RADIOLOGY & ADDITIONAL TESTS:                   PGY-1 Progress Note discussed with attending    PAGER #: [[phone]] TILL 5:00 PM  PLEASE CONTACT ON CALL TEAM:  - On Call Team (Please refer to Marlena) FROM 5:00 PM - 8:30PM  - Nightfloat Team FROM 8:30 -7:30 AM    OVERNIGHT EVENTS/ INTERVAL HPI:   - No acute overnight event. Pt reported of some mild pain upon palpation of of the lateral part of left foot however does not have pain otherwise. Denied any acute symptoms.     REVIEW OF SYSTEMS:  CONSTITUTIONAL: No fever, weight loss, or fatigue  RESPIRATORY: No cough, wheezing, chills or hemoptysis; No shortness of breath  CARDIOVASCULAR: No chest pain, palpitations, dizziness, or leg swelling  GASTROINTESTINAL: No abdominal pain. No nausea, vomiting, or hematemesis; No diarrhea or constipation. No melena or hematochezia.  GENITOURINARY: No dysuria or hematuria, urinary frequency  NEUROLOGICAL: No headaches, memory loss, loss of strength, numbness, or tremors  SKIN: No itching, burning, rashes, or lesions     MEDICATIONS  (STANDING):  brimonidine 0.2% Ophthalmic Solution 1 Drop(s) Left EYE every 12 hours  cefepime   IVPB 2000 milliGRAM(s) IV Intermittent every 12 hours  chlorhexidine 2% Cloths 1 Application(s) Topical <User Schedule>  enoxaparin Injectable 40 milliGRAM(s) SubCutaneous every 24 hours  famotidine    Tablet 40 milliGRAM(s) Oral every 12 hours  gabapentin 300 milliGRAM(s) Oral every 8 hours  insulin glargine Injectable (LANTUS) 30 Unit(s) SubCutaneous at bedtime  insulin lispro (ADMELOG) corrective regimen sliding scale   SubCutaneous Before meals and at bedtime  insulin lispro Injectable (ADMELOG) 10 Unit(s) SubCutaneous three times a day before meals  lactated ringers. 1000 milliLiter(s) (100 mL/Hr) IV Continuous <Continuous>  nafcillin  IVPB 2 Gram(s) IV Intermittent every 4 hours    MEDICATIONS  (PRN):  acetaminophen     Tablet .. 650 milliGRAM(s) Oral every 6 hours PRN Temp greater or equal to 38C (100.4F), Mild Pain (1 - 3)  ondansetron Injectable 4 milliGRAM(s) IV Push every 8 hours PRN Nausea and/or Vomiting      Vital Signs Last 24 Hrs  T(C): 36.4 (02 May 2023 05:13), Max: 37.4 (01 May 2023 13:23)  T(F): 97.5 (02 May 2023 05:13), Max: 99.3 (01 May 2023 13:23)  HR: 92 (02 May 2023 05:13) (92 - 103)  BP: 110/68 (02 May 2023 05:13) (110/68 - 135/82)  BP(mean): --  RR: 17 (02 May 2023 05:13) (16 - 18)  SpO2: 94% (02 May 2023 05:13) (94% - 98%)    Parameters below as of 02 May 2023 05:13  Patient On (Oxygen Delivery Method): room air      PHYSICAL EXAMINATION:  GENERAL: NAD, AAOx3  HEAD: AT/NC  EYES: conjunctiva and sclera clear,  range of motion normal in both eyes without any pain/difficulty  NECK: supple, No JVD noted  CHEST/LUNG: CTABL; no rales, rhonchi, wheezing, or rubs  HEART: regular rate and rhythm; no murmurs, rubs, or gallops  ABDOMEN: soft, nontender, nondistended; Bowel sounds present  EXTREMITIES:  2+ Peripheral Pulses, No clubbing, cyanosis, ACE wrap in place by the podiatry on the left foot   SKIN: warm dry                        12.4   7.33  )-----------( 282      ( 02 May 2023 06:39 )             38.5     05-02    136  |  103  |  14  ----------------------------<  201<H>  3.4<L>   |  26  |  1.18    Ca    8.7      02 May 2023 06:39  Phos  3.1     05-02  Mg     2.1     05-02    TPro  7.5  /  Alb  2.1<L>  /  TBili  0.5  /  DBili  x   /  AST  23  /  ALT  42  /  AlkPhos  86  05-02    LIVER FUNCTIONS - ( 02 May 2023 06:39 )  Alb: 2.1 g/dL / Pro: 7.5 g/dL / ALK PHOS: 86 U/L / ALT: 42 U/L DA / AST: 23 U/L / GGT: x               PT/INR - ( 02 May 2023 06:39 )   PT: 15.6 sec;   INR: 1.31 ratio         PTT - ( 02 May 2023 06:39 )  PTT:27.4 sec  COVID-19 PCR: NotDetec (29 Apr 2023 14:50)      CAPILLARY BLOOD GLUCOSE      POCT Blood Glucose.: 225 mg/dL (02 May 2023 07:51)  POCT Blood Glucose.: 165 mg/dL (01 May 2023 22:26)  POCT Blood Glucose.: 122 mg/dL (01 May 2023 17:31)  POCT Blood Glucose.: 198 mg/dL (01 May 2023 11:45)      RADIOLOGY & ADDITIONAL TESTS:      ACC: [id number] EXAM:  MR ORBITS ONLY WAWIC   ORDERED BY: TEODORO EPPERSON     ACC: [id number] EXAM:  MR BRAIN WAW IC   ORDERED BY: TEODORO EPPERSON     PROCEDURE DATE:  05/01/2023          INTERPRETATION:  Two examinations were performed on this patient:    1. MR of the orbits with and without gadolinium  2. MR of the brain with and without gadolinium    CLINICAL INFORMATION (both examinations):    Pituitary tumor      1. MR orbits with and without gadolinium    TECHNIQUE:   Coronal T1-weighted, coronal fat-saturated T2-weighted and   axial and coronal postgadolinium fat-saturated T1-weighted images of the   orbits were obtained.    FINDINGS:   No prior similar studies are available for review.    The optic nerves are intact without abnormal enhancement, abnormal signal   intensity or mass lesion.  The orbital apices are intact.  The cisternal   segments of the optic nerves, the optic chiasm and the optic tracks   appear intact.    The orbits appear intact. The lenses are surgically small. The globes are   intact.  No preseptal abnormalities found.  Intraconal and extraconal fat   is preserved.  The extraocular muscles demonstrate symmetric physiologic   enhancement.  The superior ophthalmic veins are patent and symmetric.    The sella is enlarged with a 1.8 cm AP by 1.6 cm CC by 2 cm TRV pituitary   mass consistent with an adenoma. There is mild extension into the LEFT   cavernous sinus. The RIGHT cavernous sinuses and para cavernous regions   appear intact.    The cavernous segments of theinternal carotid arteries   demonstrate expected flow-void indicating patency. There is mild mass   effect on the optic chiasm, LEFT greater than RIGHT. The infundibulum is   slightly deviated to the LEFT. The central skull base is intact.  The   temporal bones appear intact. Moderate mucosal thickening is seen within   the LEFT mastoid air cells.    The paranasal sinuses are clear.  No significant mucosal disease or   abnormal paranasal sinus fluid collection is found.  The nasal cavity   appears intact.  The deep facial spaces are intact.          2.  MR brain with and without gadolinium    TECHNIQUE:   Sagittal and axial T1-weighted images, sagittal axial FLAIR   images, axial  T2-weighted images and axial diffusion weighted images of   the brain were obtained. Following gadolinium administration isotropic   volumetric T1-weighted images were obtained; this data set was   reformatted using imaging post processing software into multiple imaging   planes.    FINDINGS:   No prior similar studies are available for review.    The brain demonstrates minimal white matter ischemia at the bifrontal and   LEFT parietal subcortical white matter.  No acute cerebral cortical   infarct is found.   No intracranial hemorrhage is recognized.  No mass   effect is found in the brain.    The ventricles, sulci and basal cisterns appear unremarkable.    The vertebral and internal carotid arteries demonstrate expected flow   voids indicating their patency.    The central skull base and temporal bonesare intact.  The calvarium   appears unremarkable.    IMPRESSION:    MR orbits/sella:   1.8 cm AP by 1.6 cm CC by 2 cm TRV pituitary mass   consistent with an adenoma. There is mild extension into the LEFT   cavernous sinus. The RIGHT cavernous sinuses and para cavernous regions   appear intact.    The cavernous segments of the internal carotid arteries   demonstrate expected flow-void indicating patency. There is mild mass   effect on the optic chiasm, LEFT greater than RIGHT. The infundibulum is   slightly deviated to the LEFT.      MR brain: Minimal white matter ischemia at the bifrontal and LEFT   parietal subcortical white matter.    Moderate mucosal thickening/fluid   in the LEFT mastoid air cells.    --- End of Report ---            NICHOLE JONES MD; Attending Radiologist  This document has been electronically signed. May  1 2023  3:58PM

## 2023-05-02 NOTE — PROGRESS NOTE ADULT - PROBLEM SELECTOR PLAN 5
CTH: noted above  f/u MR brain w and w/o IV contrast w/ pituitary sections  f/u MR orbits  f/u GH level  Neurology consulted: Dr. Restrepo  Endocrinology Dr. Arreola consulted CTH: noted above   MR brain w and w/o IV contrast w/ pituitary sections and MR orbits - noted above  f/u hormone levels   Neurology consulted: Dr. Restrepo  Endocrinology Dr. Arreola consulted

## 2023-05-02 NOTE — PROGRESS NOTE ADULT - PROBLEM SELECTOR PLAN 9
Given heparin SQ for DVT ppx, one time dose in the ED then held for operation   DVT prophylaxis: Lovenox 40 QD

## 2023-05-03 DIAGNOSIS — M86.9 OSTEOMYELITIS, UNSPECIFIED: ICD-10-CM

## 2023-05-03 DIAGNOSIS — E11.628 TYPE 2 DIABETES MELLITUS WITH OTHER SKIN COMPLICATIONS: ICD-10-CM

## 2023-05-03 DIAGNOSIS — A41.01 SEPSIS DUE TO METHICILLIN SUSCEPTIBLE STAPHYLOCOCCUS AUREUS: ICD-10-CM

## 2023-05-03 DIAGNOSIS — R78.81 BACTEREMIA: ICD-10-CM

## 2023-05-03 LAB
-  AMIKACIN: SIGNIFICANT CHANGE UP
-  AMOXICILLIN/CLAVULANIC ACID: SIGNIFICANT CHANGE UP
-  AMPICILLIN/SULBACTAM: SIGNIFICANT CHANGE UP
-  AMPICILLIN/SULBACTAM: SIGNIFICANT CHANGE UP
-  AMPICILLIN: SIGNIFICANT CHANGE UP
-  AZTREONAM: SIGNIFICANT CHANGE UP
-  CEFAZOLIN: SIGNIFICANT CHANGE UP
-  CEFAZOLIN: SIGNIFICANT CHANGE UP
-  CEFEPIME: SIGNIFICANT CHANGE UP
-  CEFOXITIN: SIGNIFICANT CHANGE UP
-  CEFTRIAXONE: SIGNIFICANT CHANGE UP
-  CIPROFLOXACIN: SIGNIFICANT CHANGE UP
-  CLINDAMYCIN: SIGNIFICANT CHANGE UP
-  ERTAPENEM: SIGNIFICANT CHANGE UP
-  ERYTHROMYCIN: SIGNIFICANT CHANGE UP
-  GENTAMICIN: SIGNIFICANT CHANGE UP
-  GENTAMICIN: SIGNIFICANT CHANGE UP
-  LEVOFLOXACIN: SIGNIFICANT CHANGE UP
-  MEROPENEM: SIGNIFICANT CHANGE UP
-  OXACILLIN: SIGNIFICANT CHANGE UP
-  PENICILLIN: SIGNIFICANT CHANGE UP
-  PIPERACILLIN/TAZOBACTAM: SIGNIFICANT CHANGE UP
-  RIFAMPIN: SIGNIFICANT CHANGE UP
-  TETRACYCLINE: SIGNIFICANT CHANGE UP
-  TOBRAMYCIN: SIGNIFICANT CHANGE UP
-  TRIMETHOPRIM/SULFAMETHOXAZOLE: SIGNIFICANT CHANGE UP
-  TRIMETHOPRIM/SULFAMETHOXAZOLE: SIGNIFICANT CHANGE UP
-  VANCOMYCIN: SIGNIFICANT CHANGE UP
ACTH SER-ACNC: 24.3 PG/ML — SIGNIFICANT CHANGE UP (ref 7.2–63.3)
ALBUMIN SERPL ELPH-MCNC: 2 G/DL — LOW (ref 3.5–5)
ALP SERPL-CCNC: 81 U/L — SIGNIFICANT CHANGE UP (ref 40–120)
ALT FLD-CCNC: 36 U/L DA — SIGNIFICANT CHANGE UP (ref 10–60)
ANION GAP SERPL CALC-SCNC: 8 MMOL/L — SIGNIFICANT CHANGE UP (ref 5–17)
APPEARANCE UR: CLEAR — SIGNIFICANT CHANGE UP
AST SERPL-CCNC: 22 U/L — SIGNIFICANT CHANGE UP (ref 10–40)
BACTERIA # UR AUTO: ABNORMAL /HPF
BASOPHILS # BLD AUTO: 0.03 K/UL — SIGNIFICANT CHANGE UP (ref 0–0.2)
BASOPHILS NFR BLD AUTO: 0.3 % — SIGNIFICANT CHANGE UP (ref 0–2)
BILIRUB SERPL-MCNC: 0.5 MG/DL — SIGNIFICANT CHANGE UP (ref 0.2–1.2)
BILIRUB UR-MCNC: NEGATIVE — SIGNIFICANT CHANGE UP
BUN SERPL-MCNC: 13 MG/DL — SIGNIFICANT CHANGE UP (ref 7–18)
CALCIUM SERPL-MCNC: 8.7 MG/DL — SIGNIFICANT CHANGE UP (ref 8.4–10.5)
CHLORIDE SERPL-SCNC: 104 MMOL/L — SIGNIFICANT CHANGE UP (ref 96–108)
CO2 SERPL-SCNC: 25 MMOL/L — SIGNIFICANT CHANGE UP (ref 22–31)
COLOR SPEC: YELLOW — SIGNIFICANT CHANGE UP
CORTIS AM PEAK SERPL-MCNC: 12.8 UG/DL — SIGNIFICANT CHANGE UP (ref 6–18.4)
CREAT SERPL-MCNC: 1.08 MG/DL — SIGNIFICANT CHANGE UP (ref 0.5–1.3)
CULTURE RESULTS: SIGNIFICANT CHANGE UP
DIFF PNL FLD: NEGATIVE — SIGNIFICANT CHANGE UP
EGFR: 73 ML/MIN/1.73M2 — SIGNIFICANT CHANGE UP
EOSINOPHIL # BLD AUTO: 0.18 K/UL — SIGNIFICANT CHANGE UP (ref 0–0.5)
EOSINOPHIL NFR BLD AUTO: 2 % — SIGNIFICANT CHANGE UP (ref 0–6)
EPI CELLS # UR: SIGNIFICANT CHANGE UP /HPF
ERYTHROCYTE [SEDIMENTATION RATE] IN BLOOD: 91 MM/HR — HIGH (ref 0–20)
FSH SERPL-MCNC: 2.7 IU/L — SIGNIFICANT CHANGE UP (ref 1.5–12.4)
GH SERPL-MCNC: 0.69 NG/ML — SIGNIFICANT CHANGE UP (ref 0.03–2.47)
GLUCOSE BLDC GLUCOMTR-MCNC: 106 MG/DL — HIGH (ref 70–99)
GLUCOSE BLDC GLUCOMTR-MCNC: 117 MG/DL — HIGH (ref 70–99)
GLUCOSE BLDC GLUCOMTR-MCNC: 155 MG/DL — HIGH (ref 70–99)
GLUCOSE BLDC GLUCOMTR-MCNC: 163 MG/DL — HIGH (ref 70–99)
GLUCOSE SERPL-MCNC: 141 MG/DL — HIGH (ref 70–99)
GLUCOSE UR QL: NEGATIVE — SIGNIFICANT CHANGE UP
GRAM STN FLD: SIGNIFICANT CHANGE UP
GRAM STN FLD: SIGNIFICANT CHANGE UP
HCT VFR BLD CALC: 36.6 % — LOW (ref 39–50)
HGB BLD-MCNC: 11.8 G/DL — LOW (ref 13–17)
IMM GRANULOCYTES NFR BLD AUTO: 0.8 % — SIGNIFICANT CHANGE UP (ref 0–0.9)
KETONES UR-MCNC: ABNORMAL
LEUKOCYTE ESTERASE UR-ACNC: NEGATIVE — SIGNIFICANT CHANGE UP
LH SERPL-ACNC: 4.5 IU/L — SIGNIFICANT CHANGE UP
LYMPHOCYTES # BLD AUTO: 1.96 K/UL — SIGNIFICANT CHANGE UP (ref 1–3.3)
LYMPHOCYTES # BLD AUTO: 22.1 % — SIGNIFICANT CHANGE UP (ref 13–44)
MAGNESIUM SERPL-MCNC: 2.2 MG/DL — SIGNIFICANT CHANGE UP (ref 1.6–2.6)
MCHC RBC-ENTMCNC: 26 PG — LOW (ref 27–34)
MCHC RBC-ENTMCNC: 32.2 GM/DL — SIGNIFICANT CHANGE UP (ref 32–36)
MCV RBC AUTO: 80.6 FL — SIGNIFICANT CHANGE UP (ref 80–100)
METHOD TYPE: SIGNIFICANT CHANGE UP
METHOD TYPE: SIGNIFICANT CHANGE UP
MONOCYTES # BLD AUTO: 0.85 K/UL — SIGNIFICANT CHANGE UP (ref 0–0.9)
MONOCYTES NFR BLD AUTO: 9.6 % — SIGNIFICANT CHANGE UP (ref 2–14)
NEUTROPHILS # BLD AUTO: 5.79 K/UL — SIGNIFICANT CHANGE UP (ref 1.8–7.4)
NEUTROPHILS NFR BLD AUTO: 65.2 % — SIGNIFICANT CHANGE UP (ref 43–77)
NITRITE UR-MCNC: NEGATIVE — SIGNIFICANT CHANGE UP
NRBC # BLD: 0 /100 WBCS — SIGNIFICANT CHANGE UP (ref 0–0)
ORGANISM # SPEC MICROSCOPIC CNT: SIGNIFICANT CHANGE UP
OSMOLALITY UR: 477 MOS/KG — SIGNIFICANT CHANGE UP (ref 50–1200)
PH UR: 5 — SIGNIFICANT CHANGE UP (ref 5–8)
PHOSPHATE SERPL-MCNC: 2.9 MG/DL — SIGNIFICANT CHANGE UP (ref 2.5–4.5)
PLATELET # BLD AUTO: 300 K/UL — SIGNIFICANT CHANGE UP (ref 150–400)
POTASSIUM SERPL-MCNC: 3.2 MMOL/L — LOW (ref 3.5–5.3)
POTASSIUM SERPL-SCNC: 3.2 MMOL/L — LOW (ref 3.5–5.3)
PROLACTIN SERPL-MCNC: 12.4 NG/ML — SIGNIFICANT CHANGE UP (ref 4.1–18.4)
PROT SERPL-MCNC: 7.6 G/DL — SIGNIFICANT CHANGE UP (ref 6–8.3)
PROT UR-MCNC: 15 MG/DL
RBC # BLD: 4.54 M/UL — SIGNIFICANT CHANGE UP (ref 4.2–5.8)
RBC # FLD: 14.1 % — SIGNIFICANT CHANGE UP (ref 10.3–14.5)
RBC CASTS # UR COMP ASSIST: SIGNIFICANT CHANGE UP /HPF (ref 0–2)
SODIUM SERPL-SCNC: 137 MMOL/L — SIGNIFICANT CHANGE UP (ref 135–145)
SP GR SPEC: 1.01 — SIGNIFICANT CHANGE UP (ref 1.01–1.02)
SPECIMEN SOURCE: SIGNIFICANT CHANGE UP
T3FREE SERPL-MCNC: 1.5 PG/ML — LOW (ref 2–4.4)
T4 FREE SERPL-MCNC: 1.6 NG/DL — SIGNIFICANT CHANGE UP (ref 0.9–1.8)
TSH SERPL-MCNC: 2.19 UU/ML — SIGNIFICANT CHANGE UP (ref 0.34–4.82)
UROBILINOGEN FLD QL: NEGATIVE — SIGNIFICANT CHANGE UP
WBC # BLD: 8.88 K/UL — SIGNIFICANT CHANGE UP (ref 3.8–10.5)
WBC # FLD AUTO: 8.88 K/UL — SIGNIFICANT CHANGE UP (ref 3.8–10.5)
WBC UR QL: SIGNIFICANT CHANGE UP /HPF (ref 0–5)

## 2023-05-03 PROCEDURE — 99233 SBSQ HOSP IP/OBS HIGH 50: CPT | Mod: GC

## 2023-05-03 PROCEDURE — 99232 SBSQ HOSP IP/OBS MODERATE 35: CPT

## 2023-05-03 RX ORDER — CEFAZOLIN SODIUM 1 G
VIAL (EA) INJECTION
Refills: 0 | Status: DISCONTINUED | OUTPATIENT
Start: 2023-05-03 | End: 2023-05-10

## 2023-05-03 RX ORDER — POTASSIUM CHLORIDE 20 MEQ
40 PACKET (EA) ORAL EVERY 4 HOURS
Refills: 0 | Status: COMPLETED | OUTPATIENT
Start: 2023-05-03 | End: 2023-05-03

## 2023-05-03 RX ORDER — CEFAZOLIN SODIUM 1 G
2000 VIAL (EA) INJECTION ONCE
Refills: 0 | Status: COMPLETED | OUTPATIENT
Start: 2023-05-03 | End: 2023-05-03

## 2023-05-03 RX ORDER — CEFAZOLIN SODIUM 1 G
2000 VIAL (EA) INJECTION EVERY 8 HOURS
Refills: 0 | Status: DISCONTINUED | OUTPATIENT
Start: 2023-05-03 | End: 2023-05-10

## 2023-05-03 RX ORDER — ERTAPENEM SODIUM 1 G/1
1000 INJECTION, POWDER, LYOPHILIZED, FOR SOLUTION INTRAMUSCULAR; INTRAVENOUS EVERY 24 HOURS
Refills: 0 | Status: COMPLETED | OUTPATIENT
Start: 2023-05-03 | End: 2023-05-10

## 2023-05-03 RX ADMIN — Medication 10 UNIT(S): at 11:36

## 2023-05-03 RX ADMIN — FAMOTIDINE 40 MILLIGRAM(S): 10 INJECTION INTRAVENOUS at 17:32

## 2023-05-03 RX ADMIN — GABAPENTIN 300 MILLIGRAM(S): 400 CAPSULE ORAL at 05:19

## 2023-05-03 RX ADMIN — Medication 10 UNIT(S): at 17:32

## 2023-05-03 RX ADMIN — INSULIN GLARGINE 35 UNIT(S): 100 INJECTION, SOLUTION SUBCUTANEOUS at 22:37

## 2023-05-03 RX ADMIN — GABAPENTIN 300 MILLIGRAM(S): 400 CAPSULE ORAL at 22:38

## 2023-05-03 RX ADMIN — ERTAPENEM SODIUM 120 MILLIGRAM(S): 1 INJECTION, POWDER, LYOPHILIZED, FOR SOLUTION INTRAMUSCULAR; INTRAVENOUS at 15:25

## 2023-05-03 RX ADMIN — Medication 100 MILLIGRAM(S): at 13:24

## 2023-05-03 RX ADMIN — Medication 100 MILLIGRAM(S): at 22:37

## 2023-05-03 RX ADMIN — CEFEPIME 100 MILLIGRAM(S): 1 INJECTION, POWDER, FOR SOLUTION INTRAMUSCULAR; INTRAVENOUS at 05:21

## 2023-05-03 RX ADMIN — Medication 40 MILLIEQUIVALENT(S): at 15:37

## 2023-05-03 RX ADMIN — CHLORHEXIDINE GLUCONATE 1 APPLICATION(S): 213 SOLUTION TOPICAL at 05:28

## 2023-05-03 RX ADMIN — Medication 1: at 11:37

## 2023-05-03 RX ADMIN — NAFCILLIN 200 GRAM(S): 10 INJECTION, POWDER, FOR SOLUTION INTRAVENOUS at 02:44

## 2023-05-03 RX ADMIN — FAMOTIDINE 40 MILLIGRAM(S): 10 INJECTION INTRAVENOUS at 05:20

## 2023-05-03 RX ADMIN — GABAPENTIN 300 MILLIGRAM(S): 400 CAPSULE ORAL at 15:40

## 2023-05-03 RX ADMIN — Medication 40 MILLIEQUIVALENT(S): at 11:34

## 2023-05-03 RX ADMIN — NAFCILLIN 200 GRAM(S): 10 INJECTION, POWDER, FOR SOLUTION INTRAVENOUS at 06:07

## 2023-05-03 NOTE — PROGRESS NOTE ADULT - SUBJECTIVE AND OBJECTIVE BOX
PGY-1 Progress Note discussed with attending    PAGER #: [[phone]] TILL 5:00 PM  PLEASE CONTACT ON CALL TEAM:  - On Call Team (Please refer to Marlena) FROM 5:00 PM - 8:30PM  - Nightfloat Team FROM 8:30 -7:30 AM    OVERNIGHT EVENTS:   -     REVIEW OF SYSTEMS:  CONSTITUTIONAL: No fever, weight loss, or fatigue  RESPIRATORY: No cough, wheezing, chills or hemoptysis; No shortness of breath  CARDIOVASCULAR: No chest pain, palpitations, dizziness, or leg swelling  GASTROINTESTINAL: No abdominal pain. No nausea, vomiting, or hematemesis; No diarrhea or constipation. No melena or hematochezia.  GENITOURINARY: No dysuria or hematuria, urinary frequency  NEUROLOGICAL: No headaches, memory loss, loss of strength, numbness, or tremors  SKIN: No itching, burning, rashes, or lesions     MEDICATIONS  (STANDING):  brimonidine 0.2% Ophthalmic Solution 1 Drop(s) Left EYE every 12 hours  cefepime   IVPB 2000 milliGRAM(s) IV Intermittent every 12 hours  chlorhexidine 2% Cloths 1 Application(s) Topical <User Schedule>  enoxaparin Injectable 40 milliGRAM(s) SubCutaneous every 24 hours  famotidine    Tablet 40 milliGRAM(s) Oral every 12 hours  gabapentin 300 milliGRAM(s) Oral every 8 hours  insulin glargine Injectable (LANTUS) 35 Unit(s) SubCutaneous at bedtime  insulin lispro (ADMELOG) corrective regimen sliding scale   SubCutaneous at bedtime  insulin lispro (ADMELOG) corrective regimen sliding scale   SubCutaneous three times a day before meals  insulin lispro Injectable (ADMELOG) 10 Unit(s) SubCutaneous three times a day before meals  lactated ringers. 1000 milliLiter(s) (100 mL/Hr) IV Continuous <Continuous>  nafcillin  IVPB 2 Gram(s) IV Intermittent every 4 hours  potassium chloride    Tablet ER 40 milliEquivalent(s) Oral every 4 hours    MEDICATIONS  (PRN):  acetaminophen     Tablet .. 650 milliGRAM(s) Oral every 6 hours PRN Temp greater or equal to 38C (100.4F), Mild Pain (1 - 3)  ondansetron Injectable 4 milliGRAM(s) IV Push every 8 hours PRN Nausea and/or Vomiting      Vital Signs Last 24 Hrs  T(C): 37.1 (03 May 2023 05:00), Max: 37.1 (03 May 2023 05:00)  T(F): 98.8 (03 May 2023 05:00), Max: 98.8 (03 May 2023 05:00)  HR: 87 (03 May 2023 05:00) (87 - 89)  BP: 140/82 (03 May 2023 05:00) (120/73 - 140/82)  BP(mean): --  RR: 17 (03 May 2023 05:00) (16 - 17)  SpO2: 98% (03 May 2023 05:00) (98% - 98%)    Parameters below as of 03 May 2023 05:00  Patient On (Oxygen Delivery Method): room air        PHYSICAL EXAMINATION:  GENERAL: NAD, AAOx  HEAD: AT/NC  EYES: conjunctiva and sclera clear  NECK: supple, No JVD noted, Normal thyroid  CHEST/LUNG: CTABL; no rales, rhonchi, wheezing, or rubs  HEART: regular rate and rhythm; no murmurs, rubs, or gallops  ABDOMEN: soft, nontender, nondistended; Bowel sounds present  EXTREMITIES:  2+ Peripheral Pulses, No clubbing, cyanosis, or edema  SKIN: warm dry                          11.8   8.88  )-----------( 300      ( 03 May 2023 05:51 )             36.6     05-03    137  |  104  |  13  ----------------------------<  141<H>  3.2<L>   |  25  |  1.08    Ca    8.7      03 May 2023 05:51  Phos  2.9     05-03  Mg     2.2     05-03    TPro  7.6  /  Alb  2.0<L>  /  TBili  0.5  /  DBili  x   /  AST  22  /  ALT  36  /  AlkPhos  81  05-03    LIVER FUNCTIONS - ( 03 May 2023 05:51 )  Alb: 2.0 g/dL / Pro: 7.6 g/dL / ALK PHOS: 81 U/L / ALT: 36 U/L DA / AST: 22 U/L / GGT: x               PT/INR - ( 02 May 2023 06:39 )   PT: 15.6 sec;   INR: 1.31 ratio         PTT - ( 02 May 2023 06:39 )  PTT:27.4 sec  COVID-19 PCR: NotDetec (29 Apr 2023 14:50)      CAPILLARY BLOOD GLUCOSE      POCT Blood Glucose.: 155 mg/dL (03 May 2023 07:56)  POCT Blood Glucose.: 152 mg/dL (02 May 2023 21:19)  POCT Blood Glucose.: 127 mg/dL (02 May 2023 16:47)  POCT Blood Glucose.: 183 mg/dL (02 May 2023 11:30)      RADIOLOGY & ADDITIONAL TESTS:                   PGY-1 Progress Note discussed with attending    PAGER #: [[phone]] TILL 5:00 PM  PLEASE CONTACT ON CALL TEAM:  - On Call Team (Please refer to Marlena) FROM 5:00 PM - 8:30PM  - Nightfloat Team FROM 8:30 -7:30 AM    OVERNIGHT EVENTS:   No acute changes overnight. Patient has no complaints currently. He is eating and sleeping well.     REVIEW OF SYSTEMS:  CONSTITUTIONAL: No fever, weight loss, or fatigue  RESPIRATORY: No cough, wheezing, chills or hemoptysis; No shortness of breath  CARDIOVASCULAR: No chest pain, palpitations, dizziness, or leg swelling  GASTROINTESTINAL: No abdominal pain. No nausea, vomiting, or hematemesis; No diarrhea or constipation. No melena or hematochezia.  GENITOURINARY: No dysuria or hematuria, urinary frequency  NEUROLOGICAL: No headaches, memory loss, loss of strength, numbness, or tremors  SKIN: No itching, burning, rashes, or lesions     MEDICATIONS  (STANDING):  brimonidine 0.2% Ophthalmic Solution 1 Drop(s) Left EYE every 12 hours  cefepime   IVPB 2000 milliGRAM(s) IV Intermittent every 12 hours  chlorhexidine 2% Cloths 1 Application(s) Topical <User Schedule>  enoxaparin Injectable 40 milliGRAM(s) SubCutaneous every 24 hours  famotidine    Tablet 40 milliGRAM(s) Oral every 12 hours  gabapentin 300 milliGRAM(s) Oral every 8 hours  insulin glargine Injectable (LANTUS) 35 Unit(s) SubCutaneous at bedtime  insulin lispro (ADMELOG) corrective regimen sliding scale   SubCutaneous at bedtime  insulin lispro (ADMELOG) corrective regimen sliding scale   SubCutaneous three times a day before meals  insulin lispro Injectable (ADMELOG) 10 Unit(s) SubCutaneous three times a day before meals  lactated ringers. 1000 milliLiter(s) (100 mL/Hr) IV Continuous <Continuous>  nafcillin  IVPB 2 Gram(s) IV Intermittent every 4 hours  potassium chloride    Tablet ER 40 milliEquivalent(s) Oral every 4 hours    MEDICATIONS  (PRN):  acetaminophen     Tablet .. 650 milliGRAM(s) Oral every 6 hours PRN Temp greater or equal to 38C (100.4F), Mild Pain (1 - 3)  ondansetron Injectable 4 milliGRAM(s) IV Push every 8 hours PRN Nausea and/or Vomiting      Vital Signs Last 24 Hrs  T(C): 37.1 (03 May 2023 05:00), Max: 37.1 (03 May 2023 05:00)  T(F): 98.8 (03 May 2023 05:00), Max: 98.8 (03 May 2023 05:00)  HR: 87 (03 May 2023 05:00) (87 - 89)  BP: 140/82 (03 May 2023 05:00) (120/73 - 140/82)  BP(mean): --  RR: 17 (03 May 2023 05:00) (16 - 17)  SpO2: 98% (03 May 2023 05:00) (98% - 98%)    Parameters below as of 03 May 2023 05:00  Patient On (Oxygen Delivery Method): room air        PHYSICAL EXAMINATION:  GENERAL: NAD, AAOx3  HEAD: AT/NC  EYES: conjunctiva and sclera clear  NECK: supple, No JVD noted, Normal thyroid  CHEST/LUNG: CTABL; no rales, rhonchi, wheezing, or rubs  HEART: regular rate and rhythm; no murmurs, rubs, or gallops  ABDOMEN: soft, nontender, nondistended; Bowel sounds present  EXTREMITIES:  2+ Peripheral Pulses, No clubbing, cyanosis, or edema. ACE wrap in place by the podiatry on the left foot   SKIN: warm dry                          11.8   8.88  )-----------( 300      ( 03 May 2023 05:51 )             36.6     05-03    137  |  104  |  13  ----------------------------<  141<H>  3.2<L>   |  25  |  1.08    Ca    8.7      03 May 2023 05:51  Phos  2.9     05-03  Mg     2.2     05-03    TPro  7.6  /  Alb  2.0<L>  /  TBili  0.5  /  DBili  x   /  AST  22  /  ALT  36  /  AlkPhos  81  05-03    LIVER FUNCTIONS - ( 03 May 2023 05:51 )  Alb: 2.0 g/dL / Pro: 7.6 g/dL / ALK PHOS: 81 U/L / ALT: 36 U/L DA / AST: 22 U/L / GGT: x               PT/INR - ( 02 May 2023 06:39 )   PT: 15.6 sec;   INR: 1.31 ratio         PTT - ( 02 May 2023 06:39 )  PTT:27.4 sec  COVID-19 PCR: NotDetec (29 Apr 2023 14:50)      CAPILLARY BLOOD GLUCOSE      POCT Blood Glucose.: 155 mg/dL (03 May 2023 07:56)  POCT Blood Glucose.: 152 mg/dL (02 May 2023 21:19)  POCT Blood Glucose.: 127 mg/dL (02 May 2023 16:47)  POCT Blood Glucose.: 183 mg/dL (02 May 2023 11:30)

## 2023-05-03 NOTE — PROGRESS NOTE ADULT - PROBLEM SELECTOR PLAN 4
s/p mechanical fall with head injury and LOC with downtime on the floor for 4h  CTH: shows sellar lesion with apparent mas effect on the optic chiasm. no acute pathology  reports visual impairment due to left eye injury 1970s and retinal detachment, with change from baseline   CK mildly elevated 473 with FINA 1.79, low suspicion for rhabdo  c/w IVF   f/u repeat CK in the AM  PT to be consulted after OR  Neurology consulted: Dr. Restrepo  - mass-like lesion less likely cause for fall, more likely d/t diabetic neuropathy s/p mechanical fall with head injury and LOC with downtime on the floor for 4h  CTH: shows sellar lesion with apparent mas effect on the optic chiasm. no acute pathology  reports visual impairment due to left eye injury 1970s and retinal detachment, with change from baseline   CK mildly elevated 473 with FINA 1.79, low suspicion for rhabdo, trended down   c/w IVF   PT to be consulted after OR  Neurology consulted: Dr. Restrepo  - mass-like lesion less likely cause for fall, more likely d/t diabetic neuropathy

## 2023-05-03 NOTE — PROGRESS NOTE ADULT - ATTENDING COMMENTS
Seen at bedside. Surgical site stabilizing.  Flushed and packed.  Plan for return for staged procedure on friday

## 2023-05-03 NOTE — PROGRESS NOTE ADULT - PROBLEM SELECTOR PLAN 3
h/o DM on Metformin 1000mg BID, Lantus 35 qhs and Humolog 14 TID before meals (per sure scripts Lantus 45u and Humalog 20u), also reports sugars are not controlled at home with prior A1C 7.9  will hold oral dm meds while inpatient   HbA1c: 12.1%  c/w Lantus 30 , Lispro 10 units  Mod ISS  Endocrine Dr. Arreola consulted h/o DM on Metformin 1000mg BID, Lantus 35 qhs and Humolog 14 TID before meals (per sure scripts Lantus 45u and Humalog 20u), also reports sugars are not controlled at home with prior A1C 7.9  will hold oral dm meds while inpatient   HbA1c: 12.1%  c/w Lantus 35, Lispro 10 units  Mod ISS  Endocrine Dr. Arreola consulted

## 2023-05-03 NOTE — PROGRESS NOTE ADULT - PROBLEM SELECTOR PLAN 2
Blood cultures: +ve MSSA  Vanc D/Leo  Started on Cefepim and Nafcillin for MSSA on blood cultures  TTE unremarkable  Dr. Cabrales ID consulted Blood cultures: +ve MSSA  Vanc D/Leo  D/Leo Cefepim and Nafcillin for MSSA on blood cultures  started on Ertapenem 1g qd and Cefazolin 2g q8   TTE unremarkable  f/u repeat blood cultures   pt would need FABIAN   Dr. Gao ID consulted

## 2023-05-03 NOTE — PROGRESS NOTE ADULT - ASSESSMENT
Subjective:    MEDS:  acetaminophen     Tablet .. 650 milliGRAM(s) Oral every 6 hours PRN  brimonidine 0.2% Ophthalmic Solution 1 Drop(s) Left EYE every 12 hours  chlorhexidine 2% Cloths 1 Application(s) Topical <User Schedule>  enoxaparin Injectable 40 milliGRAM(s) SubCutaneous every 24 hours  famotidine    Tablet 40 milliGRAM(s) Oral every 12 hours  gabapentin 300 milliGRAM(s) Oral every 8 hours  insulin glargine Injectable (LANTUS) 35 Unit(s) SubCutaneous at bedtime  insulin lispro (ADMELOG) corrective regimen sliding scale   SubCutaneous at bedtime  insulin lispro (ADMELOG) corrective regimen sliding scale   SubCutaneous three times a day before meals  insulin lispro Injectable (ADMELOG) 10 Unit(s) SubCutaneous three times a day before meals  lactated ringers. 1000 milliLiter(s) IV Continuous <Continuous>  ondansetron Injectable 4 milliGRAM(s) IV Push every 8 hours PRN  potassium chloride    Tablet ER 40 milliEquivalent(s) Oral every 4 hours      REVIEW OF SYSTEMS:  CONSTITUTIONAL:  No fevers or chills  EYES/ENT:  No visual changes;  No vertigo or throat pain   NECK:  No neck pain or stiffness  RESPIRATORY:  No cough, wheezing, hemoptysis; No shortness of breath  CARDIOVASCULAR:  No chest pain or palpitations  GASTROINTESTINAL:  No abdominal or epigastric pain. No nausea, vomiting, or hematemesis; No diarrhea or constipation. No melena or hematochezia.  GENITOURINARY:  No dysuria, frequency or hematuria  NEUROLOGICAL:  No numbness or weakness  MSK: no back pain, no joint pain  SKIN:  No itching, rashes    PE:  Vital Signs Last 24 Hrs  T(C): 37.1 (03 May 2023 05:00), Max: 37.1 (03 May 2023 05:00)  T(F): 98.8 (03 May 2023 05:00), Max: 98.8 (03 May 2023 05:00)  HR: 87 (03 May 2023 05:00) (87 - 89)  BP: 140/82 (03 May 2023 05:00) (120/73 - 140/82)  BP(mean): --  RR: 17 (03 May 2023 05:00) (16 - 17)  SpO2: 98% (03 May 2023 05:00) (98% - 98%)    Parameters below as of 03 May 2023 05:00  Patient On (Oxygen Delivery Method): room air      Gen: AOx3, NAD, non-toxic, pleasant  HEAD:  Atraumatic, Normocephalic  EYES: PERRLA, conjunctiva and sclera clear  ENT: Moist mucous membranes  NECK: Supple, No JVD  CV: S1+S2 normal, nontachycardic  Resp: Clear bilat, no resp distress, no crackles/wheezes  Abd: Soft, nontender, +BS  Ext: No LE edema, no cyanosis, pulses +  : No Courtney  IV/Skin: No thrombophlebitis  Msk: No low back pain, no arthralgias, no joint swelling  Neuro: AAOx3. No sensory deficits, no motor deficits  Psych: no anxiety, no delusional ideas, no suicidal ideation    LABS/DIAGNOSTIC TESTS:                        11.8   8.88  )-----------( 300      ( 03 May 2023 05:51 )             36.6     WBC Count: 8.88 K/uL ( @ 05:51)  WBC Count: 7.33 K/uL ( @ 06:39)  WBC Count: 11.43 K/uL ( @ 05:29)        137  |  104  |  13  ----------------------------<  141<H>  3.2<L>   |  25  |  1.08    Ca    8.7      03 May 2023 05:51  Phos  2.9       Mg     2.2         TPro  7.6  /  Alb  2.0<L>  /  TBili  0.5  /  DBili  x   /  AST  22  /  ALT  36  /  AlkPhos  81      Urinalysis Basic - ( 03 May 2023 04:53 )    Color: Yellow / Appearance: Clear / S.015 / pH: x  Gluc: x / Ketone: Trace  / Bili: Negative / Urobili: Negative   Blood: x / Protein: 15 mg/dL / Nitrite: Negative   Leuk Esterase: Negative / RBC: 0-2 /HPF / WBC 0-2 /HPF   Sq Epi: x / Non Sq Epi: x / Bacteria: Trace /HPF      CULTURES:   Culture - Blood (collected 23 @ 12:25)  Source: .Blood Blood-Peripheral  Gram Stain (23 @ 02:09):    Growth in aerobic bottle: Gram Positive Cocci in Clusters  Preliminary Report (23 @ 02:09):    Growth in aerobic bottle: Gram Positive Cocci in Clusters    Culture - Blood (collected 23 @ 12:15)  Source: .Blood Blood-Peripheral  Gram Stain (23 @ 22:43):    Growth in aerobic bottle: Gram Positive Cocci in Clusters  Preliminary Report (23 @ 22:43):    Growth in aerobic bottle: Gram Positive Cocci in Clusters    Culture - Surgical Swab (collected 23 @ 10:13)  Source: .Surgical Swab Left 5th ray clean margin  Preliminary Report (23 @ 10:09):    Rare Morganella morganii    Rare Staphylococcus aureus    Few Finegoldia magna "Susceptibilities not performed"  Organism: Morganella morganii  Staphylococcus aureus (23 @ 07:52)  Organism: Morganella morganii (23 @ 07:52)      Method Type: TROY      -  Amikacin: S <=16      -  Amoxicillin/Clavulanic Acid: R >16/8      -  Ampicillin: R >16 These ampicillin results predict results for amoxicillin      -  Ampicillin/Sulbactam: R >16/8 Enterobacter, Klebsiella aerogenes, Citrobacter, and Serratia may develop resistance during prolonged therapy (3-4 days)      -  Aztreonam: S <=4      -  Cefazolin: R >16 Enterobacter, Klebsiella aerogenes, Citrobacter, and Serratia may develop resistance during prolonged therapy (3-4 days)      -  Cefepime: S <=2      -  Cefoxitin: S <=8      -  Ceftriaxone: S <=1 Enterobacter, Klebsiella aerogenes, Citrobacter, and Serratia may develop resistance during prolonged therapy      -  Ciprofloxacin: S <=0.25      -  Ertapenem: S <=0.5      -  Gentamicin: S <=2      -  Levofloxacin: S <=0.5      -  Meropenem: S <=1      -  Piperacillin/Tazobactam: S <=8      -  Tobramycin: S <=2      -  Trimethoprim/Sulfamethoxazole: S <=0.5/9.5  Organism: Staphylococcus aureus (23 @ 07:44)      Method Type: TROY      -  Ampicillin/Sulbactam: S <=8/4      -  Cefazolin: S <=4      -  Clindamycin: S <=0.25      -  Erythromycin: S <=0.25      -  Gentamicin: S <=1 Should not be used as monotherapy      -  Oxacillin: S <=0.25 Oxacillin predicts susceptibility for dicloxacillin, methicillin, and nafcillin      -  Penicillin: R 2      -  Rifampin: S <=1 Should not be used as monotherapy      -  Tetracycline: S <=1      -  Trimethoprim/Sulfamethoxazole: S <=0.5/9.5      -  Vancomycin: S 1    Culture - Surgical Swab (collected 23 @ 10:13)  Source: .Surgical Swab Left foot dirty culture  Final Report (23 @ 07:48):    Few Morganella morganii    Few Staphylococcus aureus    Few Finegoldia magna "Susceptibilities not performed"    Rare Proteus mirabilis    Culture in progress  Organism: Morganella morganii  Staphylococcus aureus  Proteus mirabilis (23 @ 07:45)  Organism: Proteus mirabilis (23 @ 07:45)      Method Type: TROY      -  Amikacin: S <=16      -  Amoxicillin/Clavulanic Acid: S <=8/4      -  Ampicillin: S <=8 These ampicillin results predict results for amoxicillin      -  Ampicillin/Sulbactam: S <=4/2 Enterobacter, Klebsiella aerogenes, Citrobacter, and Serratia may develop resistance during prolonged therapy (3-4 days)      -  Aztreonam: S <=4      -  Cefazolin: S <=2 Enterobacter, Klebsiella aerogenes, Citrobacter, and Serratia may develop resistance during prolonged therapy (3-4 days)      -  Cefepime: S <=2      -  Cefoxitin: S <=8      -  Ceftriaxone: S <=1 Enterobacter, Klebsiella aerogenes, Citrobacter, and Serratia may develop resistance during prolonged therapy      -  Ciprofloxacin: S <=0.25      -  Ertapenem: S <=0.5      -  Gentamicin: S <=2      -  Levofloxacin: S <=0.5      -  Meropenem: S <=1      -  Piperacillin/Tazobactam: S <=8      -  Tobramycin: S 4      -  Trimethoprim/Sulfamethoxazole: S <=0.5/9.5  Organism: Staphylococcus aureus (23 @ 07:45)      Method Type: TRYO      -  Ampicillin/Sulbactam: S <=8/4      -  Cefazolin: S <=4      -  Clindamycin: S <=0.25      -  Erythromycin: S <=0.25      -  Gentamicin: S <=1 Should not be used as monotherapy      -  Oxacillin: S <=0.25 Oxacillin predicts susceptibility for dicloxacillin, methicillin, and nafcillin      -  Penicillin: R 2      -  Rifampin: S <=1 Should not be used as monotherapy      -  Tetracycline: S <=1      -  Trimethoprim/Sulfamethoxazole: S <=0.5/9.5      -  Vancomycin: S 1  Organism: Morganella morganii (23 @ 07:45)      Method Type: TROY      -  Amikacin: S <=16      -  Amoxicillin/Clavulanic Acid: R >16/8      -  Ampicillin: R >16 These ampicillin results predict results for amoxicillin      -  Ampicillin/Sulbactam: R >16/8 Enterobacter, Klebsiella aerogenes, Citrobacter, and Serratia may develop resistance during prolonged therapy (3-4 days)      -  Aztreonam: S <=4      -  Cefazolin: R >16 Enterobacter, Klebsiella aerogenes, Citrobacter, and Serratia may develop resistance during prolonged therapy (3-4 days)      -  Cefepime: S <=2      -  Cefoxitin: S <=8      -  Ceftriaxone: S <=1 Enterobacter, Klebsiella aerogenes, Citrobacter, and Serratia may develop resistance during prolonged therapy      -  Ciprofloxacin: S <=0.25      -  Ertapenem: S <=0.5      -  Gentamicin: S <=2      -  Levofloxacin: S <=0.5      -  Meropenem: S <=1      -  Piperacillin/Tazobactam: S <=8      -  Tobramycin: S <=2      -  Trimethoprim/Sulfamethoxazole: S <=0.5/9.5    Culture - Surgical Swab (collected 23 @ 18:10)  Source: .Surgical Swab left foot wound  Preliminary Report (23 @ 20:36):    Few Staphylococcus aureus    Few Morganella morganii    Rare Staphylococcus epidermidis "Susceptibilities not performed"    Few Proteus mirabilis  Organism: Staphylococcus aureus  Morganella morganii  Proteus mirabilis (23 @ 20:08)  Organism: Proteus mirabilis (23 @ 20:08)      Method Type: TROY      -  Amikacin: S <=16      -  Amoxicillin/Clavulanic Acid: S <=8/4      -  Ampicillin: S <=8 These ampicillin results predict results for amoxicillin      -  Ampicillin/Sulbactam: S <=4/2 Enterobacter, Klebsiella aerogenes, Citrobacter, and Serratia may develop resistance during prolonged therapy (3-4 days)      -  Aztreonam: R >16      -  Cefazolin: S <=2 Enterobacter, Klebsiella aerogenes, Citrobacter, and Serratia may develop resistance during prolonged therapy (3-4 days)      -  Cefepime: S <=2      -  Cefoxitin: S <=8      -  Ceftriaxone: S <=1 Enterobacter, Klebsiella aerogenes, Citrobacter, and Serratia may develop resistance during prolonged therapy      -  Ciprofloxacin: S <=0.25      -  Ertapenem: S <=0.5      -  Gentamicin: S <=2      -  Levofloxacin: S <=0.5      -  Meropenem: S <=1      -  Piperacillin/Tazobactam: S <=8      -  Tobramycin: S <=2      -  Trimethoprim/Sulfamethoxazole: S <=0.5/9.5  Organism: Morganella morganii (23 @ 19:31)      Method Type: TROY      -  Amikacin: S <=16      -  Amoxicillin/Clavulanic Acid: R >16/8      -  Ampicillin: R >16 These ampicillin results predict results for amoxicillin      -  Ampicillin/Sulbactam: R >16/8 Enterobacter, Klebsiella aerogenes, Citrobacter, and Serratia may develop resistance during prolonged therapy (3-4 days)      -  Aztreonam: S <=4      -  Cefazolin: R >16 Enterobacter, Klebsiella aerogenes, Citrobacter, and Serratia may develop resistance during prolonged therapy (3-4 days)      -  Cefepime: S <=2      -  Cefoxitin: S <=8      -  Ceftriaxone: S <=1 Enterobacter, Klebsiella aerogenes, Citrobacter, and Serratia may develop resistance during prolonged therapy      -  Ciprofloxacin: S <=0.25      -  Ertapenem: S <=0.5      -  Gentamicin: S <=2      -  Levofloxacin: S <=0.5      -  Meropenem: S <=1      -  Piperacillin/Tazobactam: S <=8      -  Tobramycin: S <=2      -  Trimethoprim/Sulfamethoxazole: S <=0.5/9.5  Organism: Staphylococcus aureus (23 @ 19:31)      Method Type: TROY      -  Ampicillin/Sulbactam: S <=8/4      -  Cefazolin: S <=4      -  Clindamycin: S <=0.25      -  Erythromycin: S <=0.25      -  Gentamicin: S 2 Should not be used as monotherapy      -  Oxacillin: S <=0.25 Oxacillin predicts susceptibility for dicloxacillin, methicillin, and nafcillin      -  Penicillin: R 8      -  Rifampin: S <=1 Should not be used as monotherapy      -  Tetracycline: S <=1      -  Trimethoprim/Sulfamethoxazole: S <=0.5/9.5      -  Vancomycin: S 2    Culture - Urine (collected 23 @ 15:10)  Source: Clean Catch Clean Catch (Midstream)  Final Report (23 @ 17:29):    <10,000 CFU/mL Normal Urogenital Sia    Culture - Blood (collected 23 @ 15:10)  Source: .Blood Blood-Peripheral  Gram Stain (23 @ 16:56):    Growth in aerobic bottle: Gram Positive Cocci in Clusters    Growth in anaerobic bottle: Gram Positive Cocci in Clusters  Preliminary Report (23 @ 13:25):    Growth in aerobic and anaerobic bottles: Staphylococcus aureus    See previous culture [id number]    Culture - Blood (collected 23 @ 15:00)  Source: .Blood Blood-Peripheral  Gram Stain (23 @ 10:52):    Growth in aerobic bottle: Gram Positive Cocci in Clusters    Growth in anaerobic bottle: Gram Positive Cocci in Clusters  Preliminary Report (23 @ 11:29):    Growth in aerobic bottle: Staphylococcus aureus    Growth in anaerobic bottle: Gram Positive Cocci in Clusters  Organism: Blood Culture PCR (23 @ 11:21)  Organism: Blood Culture PCR (23 @ 11:21)      Method Type: PCR      -  Methicillin SENSITIVE Staphylococcus aureus (MSSA): Detec Any isolate of Staphylococcus aureus from a blood culture is NOT considered a contaminant.    RADIOLOGY: < from: Xray Foot AP + Lateral + Oblique, Left (23 @ 15:15) >    IMPRESSION: No evidence of fracture or suspicious lesion. Ankle mortise is maintained. Calcaneal plantar spurs are noted. Hallux valgus deformity is seen. The periarticular erosive change of the medial margin of the first metatarsal head may be consistent with history of gout. Erosive deformity is seen at the head of the first proximal phalanx at the articular margin. There is minimal deformity of the medial margin of the head of the second proximal phalanx and a nondisplaced fracture at this level cannot be excluded.  Soft tissue infection with swelling and soft tissue air is seen at the level of the fifth digit. Vascular calcification is present.  If clinically warranted further assessment may include three-phase bone scan or MRI imaging to assess for early underlying osteomyelitis not yet apparent on plain film.    < from: [facility] Physiol Extremity Lower 3+ Level, BI (23 @ 16:33) >  IMPRESSION: Normal ABIs.    Dampened waveforms at the level of the metatarsals.      < from: MR Head w/wo IV Cont (23 @ 15:37) >  IMPRESSION:    MR orbits/sella:   1.8 cm AP by 1.6 cm CC by 2 cm TRV pituitary mass   consistent with an adenoma. There is mild extension into the LEFT   cavernous sinus. The RIGHT cavernous sinuses and para cavernous regions   appear intact.    The cavernous segments of the internal carotid arteries   demonstrate expected flow-void indicating patency. There is mild mass   effect on the optic chiasm, LEFT greater than RIGHT. The infundibulum is   slightly deviated to the LEFT.      MR brain: Minimal white matter ischemia at the bifrontal and LEFT   parietal subcortical white matter.    Moderate mucosal thickening/fluid   in the LEFT mastoid air cells.      ANTIBIOTICS:  cefepime   IVPB 2000 every 12 hours  nafcillin  IVPB 2 every 4 hours    COVID-19 PCR: NotDetec (23 @ 14:50)  Sedimentation Rate, Erythrocyte: 84 mm/Hr *H* (23 @ 18:45)  C-Reactive Protein, Serum: 230 mg/L *H* (23 @ 05:29)  Sedimentation Rate, Erythrocyte: 86 mm/Hr *H* (23 @ 05:29)  Sedimentation Rate, Erythrocyte: 91 mm/Hr *H* (23 @ 05:51)      IMPRESSION:  Admitted on  with CC of LLE pain s/p mechanical fall while going to the bathroom.  71 yo male with H/O IDDM, HLD, HTN, Colon CA s/p hemicolon resection in (in remission) admitted for severe sepsis due to gas gangrene L foot(vanco + zosyn ) Blood cx  + MSSA high grade bacteremia. 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 Wound cx  + MSSA and M morganii.  On Nafcillin + cefepime .  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     -Severe Sepsis due to L foot gangrene s/p partial amputation 5th digit(), planned for additional I&D on   -DFI/DFO L foot gangrene tissue cx + MSSA and M. Morganii  -BSI- High grade MSSA bacteremia source L foot. Blood cx  bottles + MSSA, BC  + MSSA. ECHO  with no significant valvular abnormalities.  -Pituitary mass- likely adenoma     PLAN:        ***Incomplete        https://www.ncbi.nlm.nih.gov/pmc/articles/ZKV1892196/pdf/[email or web address].pdf    Please reach ID with any questions or concerns  Dr. Laura Payne  Available in Teams               Subjective: Felling better, L foot pain improved, no fever, no chills, no N/V or diarrhea, urinating well, soft BM    REVIEW OF SYSTEMS:  CONSTITUTIONAL:  No fevers or chills  EYES/ENT:  No visual changes;  No vertigo or throat pain   NECK:  No neck pain or stiffness  RESPIRATORY:  No cough, wheezing, hemoptysis; No shortness of breath  CARDIOVASCULAR:  No chest pain or palpitations  GASTROINTESTINAL:  No abdominal or epigastric pain. No nausea, vomiting, or hematemesis; soft BM, no constipation. No melena or hematochezia.  GENITOURINARY:  No dysuria, frequency or hematuria  NEUROLOGICAL:  No numbness or weakness  MSK: no back pain, L foot wound(pain controlled)  SKIN:  No itching, rashes    PE:  Vital Signs Last 24 Hrs  T(C): 37.1 (03 May 2023 05:00), Max: 37.1 (03 May 2023 05:00)  T(F): 98.8 (03 May 2023 05:00), Max: 98.8 (03 May 2023 05:00)  HR: 87 (03 May 2023 05:00) (87 - 89)  BP: 140/82 (03 May 2023 05:00) (120/73 - 140/82)  RR: 17 (03 May 2023 05:00) (16 - 17)  SpO2: 98% (03 May 2023 05:00) (98% - 98%)    Parameters below as of 03 May 2023 05:00 Patient On (Oxygen Delivery Method): room air    Gen: AOx3, NAD, non-toxic, pleasant  HEAD:  Atraumatic, Normocephalic  EYES: PERRLA, conjunctiva and sclera clear  ENT: Moist mucous membranes  NECK: Supple, No JVD  CV: S1+S2 normal, no murmurs  Resp: Clear bilat, no resp distress, no crackles/wheezes  Abd: Soft, nontender, +BS  Ext: No LE edema, no cyanosis, pulses +  : No Courtney  IV/Skin: No thrombophlebitis. L foot open wound with clean base, L foot edema and erythema receding   Msk: No low back pain  Neuro: AAOx3. No sensory deficits, no motor deficits  Psych: no anxiety, no delusional ideas, no suicidal ideation    LABS/DIAGNOSTIC TESTS:                        11.8   8.88  )-----------( 300      ( 03 May 2023 05:51 )             36.6     WBC Count: 8.88 K/uL ( @ 05:51)  WBC Count: 7.33 K/uL ( @ 06:39)  WBC Count: 11.43 K/uL ( @ 05:29)        137  |  104  |  13  ----------------------------<  141<H>  3.2<L>   |  25  |  1.08    Ca    8.7      03 May 2023 05:51  Phos  2.9       Mg     2.2         TPro  7.6  /  Alb  2.0<L>  /  TBili  0.5  /  DBili  x   /  AST  22  /  ALT  36  /  AlkPhos  81  -    Urinalysis Basic - ( 03 May 2023 04:53 )    Color: Yellow / Appearance: Clear / S.015 / pH: x  Gluc: x / Ketone: Trace  / Bili: Negative / Urobili: Negative   Blood: x / Protein: 15 mg/dL / Nitrite: Negative   Leuk Esterase: Negative / RBC: 0-2 /HPF / WBC 0-2 /HPF   Sq Epi: x / Non Sq Epi: x / Bacteria: Trace /HPF      CULTURES:   Culture - Blood (collected 23 @ 12:25)  Source: .Blood Blood-Peripheral  Gram Stain (23 @ 02:09):    Growth in aerobic bottle: Gram Positive Cocci in Clusters  Preliminary Report (23 @ 02:09):    Growth in aerobic bottle: Gram Positive Cocci in Clusters    Culture - Blood (collected 23 @ 12:15)  Source: .Blood Blood-Peripheral  Gram Stain (23 @ 22:43):    Growth in aerobic bottle: Gram Positive Cocci in Clusters  Preliminary Report (23 @ 22:43):    Growth in aerobic bottle: Gram Positive Cocci in Clusters    Culture - Surgical Swab (collected 23 @ 10:13)  Source: .Surgical Swab Left 5th ray clean margin  Preliminary Report (23 @ 10:09):    Rare Morganella morganii    Rare Staphylococcus aureus    Few Finegoldia magna "Susceptibilities not performed"  Organism: Morganella morganii  Staphylococcus aureus (23 @ 07:52)  Organism: Morganella morganii (23 @ 07:52)      Method Type: TROY      -  Amikacin: S <=16      -  Amoxicillin/Clavulanic Acid: R >16/8      -  Ampicillin: R >16 These ampicillin results predict results for amoxicillin      -  Ampicillin/Sulbactam: R >16/8 Enterobacter, Klebsiella aerogenes, Citrobacter, and Serratia may develop resistance during prolonged therapy (3-4 days)      -  Aztreonam: S <=4      -  Cefazolin: R >16 Enterobacter, Klebsiella aerogenes, Citrobacter, and Serratia may develop resistance during prolonged therapy (3-4 days)      -  Cefepime: S <=2      -  Cefoxitin: S <=8      -  Ceftriaxone: S <=1 Enterobacter, Klebsiella aerogenes, Citrobacter, and Serratia may develop resistance during prolonged therapy      -  Ciprofloxacin: S <=0.25      -  Ertapenem: S <=0.5      -  Gentamicin: S <=2      -  Levofloxacin: S <=0.5      -  Meropenem: S <=1      -  Piperacillin/Tazobactam: S <=8      -  Tobramycin: S <=2      -  Trimethoprim/Sulfamethoxazole: S <=0.5/9.5  Organism: Staphylococcus aureus (23 @ 07:44)      Method Type: TROY      -  Ampicillin/Sulbactam: S <=8/4      -  Cefazolin: S <=4      -  Clindamycin: S <=0.25      -  Erythromycin: S <=0.25      -  Gentamicin: S <=1 Should not be used as monotherapy      -  Oxacillin: S <=0.25 Oxacillin predicts susceptibility for dicloxacillin, methicillin, and nafcillin      -  Penicillin: R 2      -  Rifampin: S <=1 Should not be used as monotherapy      -  Tetracycline: S <=1      -  Trimethoprim/Sulfamethoxazole: S <=0.5/9.5      -  Vancomycin: S 1    Culture - Surgical Swab (collected 23 @ 10:13)  Source: .Surgical Swab Left foot dirty culture  Final Report (23 @ 07:48):    Few Morganella morganii    Few Staphylococcus aureus    Few Finegoldia magna "Susceptibilities not performed"    Rare Proteus mirabilis    Culture in progress  Organism: Morganella morganii  Staphylococcus aureus  Proteus mirabilis (23 @ 07:45)  Organism: Proteus mirabilis (23 @ 07:45)      Method Type: TROY      -  Amikacin: S <=16      -  Amoxicillin/Clavulanic Acid: S <=8/4      -  Ampicillin: S <=8 These ampicillin results predict results for amoxicillin      -  Ampicillin/Sulbactam: S <=4/2 Enterobacter, Klebsiella aerogenes, Citrobacter, and Serratia may develop resistance during prolonged therapy (3-4 days)      -  Aztreonam: S <=4      -  Cefazolin: S <=2 Enterobacter, Klebsiella aerogenes, Citrobacter, and Serratia may develop resistance during prolonged therapy (3-4 days)      -  Cefepime: S <=2      -  Cefoxitin: S <=8      -  Ceftriaxone: S <=1 Enterobacter, Klebsiella aerogenes, Citrobacter, and Serratia may develop resistance during prolonged therapy      -  Ciprofloxacin: S <=0.25      -  Ertapenem: S <=0.5      -  Gentamicin: S <=2      -  Levofloxacin: S <=0.5      -  Meropenem: S <=1      -  Piperacillin/Tazobactam: S <=8      -  Tobramycin: S 4      -  Trimethoprim/Sulfamethoxazole: S <=0.5/9.5  Organism: Staphylococcus aureus (23 @ 07:45)      Method Type: TROY      -  Ampicillin/Sulbactam: S <=8/4      -  Cefazolin: S <=4      -  Clindamycin: S <=0.25      -  Erythromycin: S <=0.25      -  Gentamicin: S <=1 Should not be used as monotherapy      -  Oxacillin: S <=0.25 Oxacillin predicts susceptibility for dicloxacillin, methicillin, and nafcillin      -  Penicillin: R 2      -  Rifampin: S <=1 Should not be used as monotherapy      -  Tetracycline: S <=1      -  Trimethoprim/Sulfamethoxazole: S <=0.5/9.5      -  Vancomycin: S 1  Organism: Morganella morganii (23 @ 07:45)      Method Type: TROY      -  Amikacin: S <=16      -  Amoxicillin/Clavulanic Acid: R >16/8      -  Ampicillin: R >16 These ampicillin results predict results for amoxicillin      -  Ampicillin/Sulbactam: R >16/8 Enterobacter, Klebsiella aerogenes, Citrobacter, and Serratia may develop resistance during prolonged therapy (3-4 days)      -  Aztreonam: S <=4      -  Cefazolin: R >16 Enterobacter, Klebsiella aerogenes, Citrobacter, and Serratia may develop resistance during prolonged therapy (3-4 days)      -  Cefepime: S <=2      -  Cefoxitin: S <=8      -  Ceftriaxone: S <=1 Enterobacter, Klebsiella aerogenes, Citrobacter, and Serratia may develop resistance during prolonged therapy      -  Ciprofloxacin: S <=0.25      -  Ertapenem: S <=0.5      -  Gentamicin: S <=2      -  Levofloxacin: S <=0.5      -  Meropenem: S <=1      -  Piperacillin/Tazobactam: S <=8      -  Tobramycin: S <=2      -  Trimethoprim/Sulfamethoxazole: S <=0.5/9.5    Culture - Surgical Swab (collected 23 @ 18:10)  Source: .Surgical Swab left foot wound  Preliminary Report (23 @ 20:36):    Few Staphylococcus aureus    Few Morganella morganii    Rare Staphylococcus epidermidis "Susceptibilities not performed"    Few Proteus mirabilis  Organism: Staphylococcus aureus  Morganella morganii  Proteus mirabilis (23 @ 20:08)  Organism: Proteus mirabilis (23 @ 20:08)      Method Type: TROY      -  Amikacin: S <=16      -  Amoxicillin/Clavulanic Acid: S <=8/4      -  Ampicillin: S <=8 These ampicillin results predict results for amoxicillin      -  Ampicillin/Sulbactam: S <=4/2 Enterobacter, Klebsiella aerogenes, Citrobacter, and Serratia may develop resistance during prolonged therapy (3-4 days)      -  Aztreonam: R >16      -  Cefazolin: S <=2 Enterobacter, Klebsiella aerogenes, Citrobacter, and Serratia may develop resistance during prolonged therapy (3-4 days)      -  Cefepime: S <=2      -  Cefoxitin: S <=8      -  Ceftriaxone: S <=1 Enterobacter, Klebsiella aerogenes, Citrobacter, and Serratia may develop resistance during prolonged therapy      -  Ciprofloxacin: S <=0.25      -  Ertapenem: S <=0.5      -  Gentamicin: S <=2      -  Levofloxacin: S <=0.5      -  Meropenem: S <=1      -  Piperacillin/Tazobactam: S <=8      -  Tobramycin: S <=2      -  Trimethoprim/Sulfamethoxazole: S <=0.5/9.5  Organism: Morganella morganii (23 @ 19:31)      Method Type: TROY      -  Amikacin: S <=16      -  Amoxicillin/Clavulanic Acid: R >16/8      -  Ampicillin: R >16 These ampicillin results predict results for amoxicillin      -  Ampicillin/Sulbactam: R >16/8 Enterobacter, Klebsiella aerogenes, Citrobacter, and Serratia may develop resistance during prolonged therapy (3-4 days)      -  Aztreonam: S <=4      -  Cefazolin: R >16 Enterobacter, Klebsiella aerogenes, Citrobacter, and Serratia may develop resistance during prolonged therapy (3-4 days)      -  Cefepime: S <=2      -  Cefoxitin: S <=8      -  Ceftriaxone: S <=1 Enterobacter, Klebsiella aerogenes, Citrobacter, and Serratia may develop resistance during prolonged therapy      -  Ciprofloxacin: S <=0.25      -  Ertapenem: S <=0.5      -  Gentamicin: S <=2      -  Levofloxacin: S <=0.5      -  Meropenem: S <=1      -  Piperacillin/Tazobactam: S <=8      -  Tobramycin: S <=2      -  Trimethoprim/Sulfamethoxazole: S <=0.5/9.5  Organism: Staphylococcus aureus (23 @ 19:31)      Method Type: TROY      -  Ampicillin/Sulbactam: S <=8/4      -  Cefazolin: S <=4      -  Clindamycin: S <=0.25      -  Erythromycin: S <=0.25      -  Gentamicin: S 2 Should not be used as monotherapy      -  Oxacillin: S <=0.25 Oxacillin predicts susceptibility for dicloxacillin, methicillin, and nafcillin      -  Penicillin: R 8      -  Rifampin: S <=1 Should not be used as monotherapy      -  Tetracycline: S <=1      -  Trimethoprim/Sulfamethoxazole: S <=0.5/9.5      -  Vancomycin: S 2    Culture - Urine (collected 23 @ 15:10)  Source: Clean Catch Clean Catch (Midstream)  Final Report (23 @ 17:29):    <10,000 CFU/mL Normal Urogenital Sia    Culture - Blood (collected 23 @ 15:10)  Source: .Blood Blood-Peripheral  Gram Stain (23 @ 16:56):    Growth in aerobic bottle: Gram Positive Cocci in Clusters    Growth in anaerobic bottle: Gram Positive Cocci in Clusters  Preliminary Report (23 @ 13:25):    Growth in aerobic and anaerobic bottles: Staphylococcus aureus    See previous culture [id number]    Culture - Blood (collected 23 @ 15:00)  Source: .Blood Blood-Peripheral  Gram Stain (23 @ 10:52):    Growth in aerobic bottle: Gram Positive Cocci in Clusters    Growth in anaerobic bottle: Gram Positive Cocci in Clusters  Preliminary Report (23 @ 11:29):    Growth in aerobic bottle: Staphylococcus aureus    Growth in anaerobic bottle: Gram Positive Cocci in Clusters  Organism: Blood Culture PCR (23 @ 11:21)  Organism: Blood Culture PCR (23 @ 11:21)      Method Type: PCR      -  Methicillin SENSITIVE Staphylococcus aureus (MSSA): Detec Any isolate of Staphylococcus aureus from a blood culture is NOT considered a contaminant.    RADIOLOGY: < from: Xray Foot AP + Lateral + Oblique, Left (23 @ 15:15) >    IMPRESSION: No evidence of fracture or suspicious lesion. Ankle mortise is maintained. Calcaneal plantar spurs are noted. Hallux valgus deformity is seen. The periarticular erosive change of the medial margin of the first metatarsal head may be consistent with history of gout. Erosive deformity is seen at the head of the first proximal phalanx at the articular margin. There is minimal deformity of the medial margin of the head of the second proximal phalanx and a nondisplaced fracture at this level cannot be excluded.  Soft tissue infection with swelling and soft tissue air is seen at the level of the fifth digit. Vascular calcification is present.  If clinically warranted further assessment may include three-phase bone scan or MRI imaging to assess for early underlying osteomyelitis not yet apparent on plain film.    < from: [facility] Physiol Extremity Lower 3+ Level, BI (23 @ 16:33) >  IMPRESSION: Normal ABIs.    Dampened waveforms at the level of the metatarsals.      < from: MR Head w/wo IV Cont (23 @ 15:37) >  IMPRESSION:    MR orbits/sella:   1.8 cm AP by 1.6 cm CC by 2 cm TRV pituitary mass   consistent with an adenoma. There is mild extension into the LEFT   cavernous sinus. The RIGHT cavernous sinuses and para cavernous regions   appear intact.    The cavernous segments of the internal carotid arteries   demonstrate expected flow-void indicating patency. There is mild mass   effect on the optic chiasm, LEFT greater than RIGHT. The infundibulum is   slightly deviated to the LEFT.      MR brain: Minimal white matter ischemia at the bifrontal and LEFT   parietal subcortical white matter.    Moderate mucosal thickening/fluid   in the LEFT mastoid air cells.      ANTIBIOTICS:  cefepime   IVPB 2000 every 12 hours  nafcillin  IVPB 2 every 4 hours    COVID-19 PCR: NotDetec (23 @ 14:50)  Sedimentation Rate, Erythrocyte: 84 mm/Hr *H* (23 @ 18:45)  C-Reactive Protein, Serum: 230 mg/L *H* (23 @ 05:29)  Sedimentation Rate, Erythrocyte: 86 mm/Hr *H* (23 @ 05:29)  Sedimentation Rate, Erythrocyte: 91 mm/Hr *H* (23 @ 05:51)      IMPRESSION:  Admitted on  with CC of LLE pain s/p mechanical fall while going to the bathroom.  73 yo male with H/O IDDM, HLD, HTN, Colon CA s/p hemicolon resection in (in remission) admitted for severe sepsis due to gas gangrene L foot(vanco + zosyn ) Blood cx  + MSSA high grade bacteremia. 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 Wound cx  + MSSA and M morganii.  On Nafcillin + cefepime .  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     -Severe Sepsis due to L foot gangrene s/p partial amputation 5th digit(), planned for additional I&D on   -DFI/DFO L foot gangrene tissue cx + MSSA and M. Morganii  -BSI- High grade MSSA bacteremia source L foot. Blood cx  bottles + MSSA, BC  + MSSA. ECHO  with no significant valvular abnormalities.  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 Laura Payne  Available in Teams

## 2023-05-03 NOTE — PROVIDER CONTACT NOTE (CRITICAL VALUE NOTIFICATION) - TEST AND RESULT REPORTED:
two blood cultures collected on 04/29: growth in aerobic bottle gram positive cocci in clusters
Lactate 2.2
aerobic bottle

## 2023-05-03 NOTE — PROGRESS NOTE ADULT - PROBLEM SELECTOR PLAN 1
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   surgical path prelim result +ve for Morganella morgani, and Staph  Blood cultures: +ve MSSA   Vanc D/Leo  Started on Cefepim and Nafcillin for MSSA on blood cultures  f/u repeat blood cultures   Dr. Cabrales ID consulted  Patient to go for debridement and graft on Friday c/o left foot pain, swelling, and erythema   XR for left foot/ ankle: suspicion of gas foci to 4th interspace and lateral 5th toe  aseptic appearing with leukocytosis WBC 16.95 and elevated lactate 2.2  Podiatry consulted-  s/p left partial 5th ray amputation due to gas gangrene.   surgical path prelim result +ve for Morganella morgani, and Staph  Blood cultures: +ve MSSA   Vanc D/Leo  D/Leo Cefepim and Nafcillin for MSSA on blood cultures  started on Ertapenem 1g qd and Cefazolin 2g q8   f/u repeat blood cultures   Dr. Gao ID consulted   Patient to go for debridement and graft on Friday

## 2023-05-03 NOTE — PROGRESS NOTE ADULT - SUBJECTIVE AND OBJECTIVE BOX
Podiatry HPI  72-year-old male with history of NIDDM,  Patient claims he slipped and fell on Tuesday night in the bedroom and hit his head and had LOC for a few minutes. Patient denies any injuries at the time, but complaining of feeling lightheaded after the fall, confused on Wednesday.  Patient works as a  and on Thursday he noticed with left foot pain, swelling, difficulty ambulating after excessively ambulating by his work in tight work shoes.  Patient took nothing for his pain. Presents to ED with progressive pain to Left foot and increased warmth with concern for infection. Follow OP Podiatrist Dr. Spain who he last saw two weeks ago. States in 2017 had a bone infection in his Right foot which was resected. Cannot recall exact procedure,  Denies fever, chills.    Podiatry Interval: patient is seen in bed resting. States he does not have any pain to foot and states the tylenol he received helped with minimal pain that he was having earlier. Denies nausea, vomiting, fever, chills, diarrhea, constipation. Denies overnight acute events.     Patient admits to  (-) Fevers, (-) Chills, (-) Nausea, (-) Vomiting, (-) Shortness of Breath (-) calf pain (-) chest pain     Medications acetaminophen     Tablet .. 650 milliGRAM(s) Oral every 6 hours PRN  brimonidine 0.2% Ophthalmic Solution 1 Drop(s) Left EYE every 12 hours  ceFAZolin   IVPB      ceFAZolin   IVPB 2000 milliGRAM(s) IV Intermittent once  ceFAZolin   IVPB 2000 milliGRAM(s) IV Intermittent every 8 hours  chlorhexidine 2% Cloths 1 Application(s) Topical <User Schedule>  enoxaparin Injectable 40 milliGRAM(s) SubCutaneous every 24 hours  ertapenem  IVPB 1000 milliGRAM(s) IV Intermittent every 24 hours  famotidine    Tablet 40 milliGRAM(s) Oral every 12 hours  gabapentin 300 milliGRAM(s) Oral every 8 hours  insulin glargine Injectable (LANTUS) 35 Unit(s) SubCutaneous at bedtime  insulin lispro (ADMELOG) corrective regimen sliding scale   SubCutaneous at bedtime  insulin lispro (ADMELOG) corrective regimen sliding scale   SubCutaneous three times a day before meals  insulin lispro Injectable (ADMELOG) 10 Unit(s) SubCutaneous three times a day before meals  lactated ringers. 1000 milliLiter(s) IV Continuous <Continuous>  ondansetron Injectable 4 milliGRAM(s) IV Push every 8 hours PRN  potassium chloride    Tablet ER 40 milliEquivalent(s) Oral every 4 hours    PMH  DM (diabetes mellitus)  Colon cancer  HTN (hypertension)  Hyperlipidemia     PSH  S/P inguinal hernia repair  History of colectomy  S/P arthroscopic knee surgery  History of repair of hiatal hernia    Labs                          11.8   8.88  )-----------( 300      ( 03 May 2023 05:51 )             36.6      05-03    137  |  104  |  13  ----------------------------<  141<H>  3.2<L>   |  25  |  1.08    Ca    8.7      03 May 2023 05:51  Phos  2.9     05-03  Mg     2.2     05-03    TPro  7.6  /  Alb  2.0<L>  /  TBili  0.5  /  DBili  x   /  AST  22  /  ALT  36  /  AlkPhos  81  05-03     Vital Signs Last 24 Hrs  T(C): 37.1 (03 May 2023 05:00), Max: 37.1 (03 May 2023 05:00)  T(F): 98.8 (03 May 2023 05:00), Max: 98.8 (03 May 2023 05:00)  HR: 87 (03 May 2023 05:00) (87 - 89)  BP: 140/82 (03 May 2023 05:00) (120/73 - 140/82)  RR: 17 (03 May 2023 05:00) (16 - 17)  SpO2: 98% (03 May 2023 05:00) (98% - 98%)    Parameters below as of 03 May 2023 05:00  Patient On (Oxygen Delivery Method): room air      Sedimentation Rate, Erythrocyte: 91 mm/Hr (05-03-23 @ 05:51)  Sedimentation Rate, Erythrocyte: 86 mm/Hr (05-01-23 @ 05:29)         C-Reactive Protein, Serum: 230 mg/L (05-01-23 @ 05:29)   WBC Count: 8.88 K/uL (05-03-23 @ 05:51)      ROS unremarkable outside of HPI    PHYSICAL EXAM  LE Focused:    Vasc:  DP and PT faintly palpable b/l. No pedal hair growth noted. Diffuse edema noted distal to Ankle joint LLE  Derm: Open left lateral foot wound s/p open partial 5th ray resection on Sunday, 4/30/23. No malodor, no purulence upon manual compression, improving erythema and edema. Mixed necrotic and granular wound base, no fluctuance noted.   Neuro: Protective sensation grossly diminished  MSK: able to ambulate with pain, no TTP to dorsolateral surgical site       IMAGING: ?xray  Noted gas foci on CXR foot to 4th interspace and lateral 5th left foot    < from: VA Physiol Extremity Lower 3+ Level, BI (05.01.23 @ 16:33) >  FINDINGS: There are biphasic pulse volume recordings bilaterally,   dampened at the level of the metatarsals. Segmental pressures were not   obtained at the thighs.    Right RIVER = 1.15  Left RIVER = 1.11    IMPRESSION: Normal ABIs.    Dampened waveforms at the level of the metatarsals.    < end of copied text >    A:  Left foot gas gangrene    P:   Patient evaluated and Chart reviewed   Discussed diagnosis and treatment with patient  Previous X-rays noted to left foot with suspicion of gas foci to 4th interspace and lateral 5th toe (pre-operative)  Post-operative xrays taken and reviewed   S/p left partial 5th ray open amputation on 4/30/23  Patient scheduled for graft application and further open left foot wound debridement on Friday, 5/5/23 at 7:45am with Dr. Burns   Due to RIVER/PVR, recommending vascular consult to assess healing potential   Flushed left foot wound with sterile saline   Applied iodoform packing, DSD, ACE  Recc IV antibiotics As Per ID; currently on ertapenem and ancef   Reviewed RIVER/PVR  NWB to LLE  Discussed importance of daily foot examinations and proper shoe gear and to importance of lower Fasting Blood Glucose levels.   Podiatry to follow while in house  Discussed and seen bedside with attending Dr. Azevedo  Discussed with Attending Dr. Burns

## 2023-05-03 NOTE — PROGRESS NOTE ADULT - ATTENDING COMMENTS
Patient seen and examined this afternoon; Endocrinologist also was at bedside.     73 yo man presented with c/o pain and swelling of his left foot after a fall noted to have Gas gangrene s/p Debridement and now awaiting another debridement and graft on Friday. Patient also with incidental Adenoma noted on CT/ MRI; Hx Glaucoma and Retinal detachment    Patient doing well. No new complaints. No fever. denies SOB, palpitations, chest pain, nausea, vomiting, diarrhea, constipation, dizziness.  Wound dressing changes late this evening by Podiatry.    Vital Signs Last 24 Hrs  T(C): 36.7 (03 May 2023 13:26), Max: 37.1 (03 May 2023 05:00)  T(F): 98.1 (03 May 2023 13:26), Max: 98.8 (03 May 2023 05:00)  HR: 94 (03 May 2023 13:26) (87 - 94)  BP: 126/72 (03 May 2023 13:26) (126/72 - 140/82)  RR: 16 (03 May 2023 13:26) (16 - 17)  SpO2: 99% (03 May 2023 13:26) (98% - 99%) room air        P/E:  elderly male, comfortable in bed in NAD  Neuro: AAO x3, no gross focal deficits  Chest: clear, BLAE+, No wheeze or Rhonchi  CVS: S!S2 present, Regular  Abdomen:  soft, BS+, NT, ND  Ext. resection of 5th metatarsal at midshaft  No significant edema or calf tenderness; chronic dermatitis        Labs:                        11.8   8.88  )-----------( 300      ( 03 May 2023 05:51 )             36.6   05-03    137  |  104  |  13  ----------------------------<  141<H>  3.2<L>   |  25  |  1.08    Ca    8.7      03 May 2023 05:51  Phos  2.9     05-03  Mg     2.2     05-03    TPro  7.6  /  Alb  2.0<L>  /  TBili  0.5  /  DBili  x   /  AST  22  /  ALT  36  /  AlkPhos  81  05-03    MR Head w/wo IV Cont (05.01.23 @ 15:37)   MR Head w/wo IV Cont (05.01.23 @ 15:37)   PROCEDURE DATE:  05/01/2023     IMPRESSION:  MR orbits/sella:   1.8 cm AP by 1.6 cm CC by 2 cm TRV pituitary mass consistent with an adenoma. There is mild extension into the LEFT   cavernous sinus. The RIGHT cavernous sinuses and para cavernous regions appear intact.    The cavernous segments of the internal carotid arteries   demonstrate expected flow-void indicating patency. There is mild mass effect on the optic chiasm, LEFT greater than RIGHT. The infundibulum is   slightly deviated to the LEFT.    MR brain: Minimal white matter ischemia at the bifrontal and LEFT parietal subcortical white matter.    Moderate mucosal thickening/fluid   in the LEFT mastoid air cells.    D/D:  Infected left foot with gas seen on x-ray, s/p resection, with wound left open because of purulence  MSSA bacteremia  Pituitary adenoma, likely non-secreting, possibly compression on optic chiasm.    Poorly controlled DM, better on long-acting and pre-meal insulin.     Plan:      ID follow up appreciated;   Podiatry f/u, planned repeat debridement on Friday  Endocrine f/u; appear adenoma is non secretory;    FSH, LH, prolactin, TSH, Free, AM cortisol, IgF1, Growth Hormone, mostly normal so far   Discussed with Endocrinology, Dr. Arreola. still will need NeuroSx intervention once completed antibiotic course as adenoma with mild compression on Optic chiasm  As per Endo, increased insulin.  F/u outpatient for pituitary adenoma and refer to Neurosurgery.  possible transphenoidal resection after completion of six weeks of antibiotics for osteomyelitis with bacteremia.   Patient wishes his partner Peri Elisa to make decisions, if he can't.  Rest as per PGY1 above    Discussed with PGY1 Dr. Reynolds and PGY3 Dr. Leary and RN  Discussed with patient all the above at length including f/u Ophthalmologist for visual field testing as well as outpt NeuroSx once complete antibiotic course Patient seen and examined    71 yo man presented with c/o pain and swelling of his left foot after a fall noted to have Gas gangrene s/p Debridement and now awaiting another debridement and graft on Friday. Patient also with incidental Adenoma noted on CT/ MRI; Hx Glaucoma and Retinal detachment    Patient doing well. No new complaints. No fever. denies SOB, palpitations, chest pain, nausea, vomiting, diarrhea, constipation, dizziness.  No new complaints    Vital Signs Last 24 Hrs  T(C): 36.7 (03 May 2023 13:26), Max: 37.1 (03 May 2023 05:00)  T(F): 98.1 (03 May 2023 13:26), Max: 98.8 (03 May 2023 05:00)  HR: 94 (03 May 2023 13:26) (87 - 94)  BP: 126/72 (03 May 2023 13:26) (126/72 - 140/82)  RR: 16 (03 May 2023 13:26) (16 - 17)  SpO2: 99% (03 May 2023 13:26) (98% - 99%) room air    P/E:  elderly male, comfortable in bed in [facility]  Neuro: AAO x3, no gross focal deficits  Chest: clear, BLAE+, No wheeze or Rhonchi  CVS: S!S2 present, Regular  Abdomen:  soft, BS+, NT, ND  Ext.: Left foot dressing intact  No significant edema or calf tenderness; chronic dermatitis    Labs:                        11.8   8.88  )-----------( 300      ( 03 May 2023 05:51 )             36.6   05-03    137  |  104  |  13  ----------------------------<  141<H>  3.2<L>   |  25  |  1.08    Ca    8.7      03 May 2023 05:51  Phos  2.9     05-03  Mg     2.2     05-03    TPro  7.6  /  Alb  2.0<L>  /  TBili  0.5  /  DBili  x   /  AST  22  /  ALT  36  /  AlkPhos  81  05-03    MR Head w/wo IV Cont (05.01.23 @ 15:37)   MR Head w/wo IV Cont (05.01.23 @ 15:37)   PROCEDURE DATE:  05/01/2023     IMPRESSION:  MR orbits/sella:   1.8 cm AP by 1.6 cm CC by 2 cm TRV pituitary mass consistent with an adenoma. There is mild extension into the LEFT   cavernous sinus. The RIGHT cavernous sinuses and para cavernous regions appear intact.    The cavernous segments of the internal carotid arteries   demonstrate expected flow-void indicating patency. There is mild mass effect on the optic chiasm, LEFT greater than RIGHT. The infundibulum is   slightly deviated to the LEFT.    MR brain: Minimal white matter ischemia at the bifrontal and LEFT parietal subcortical white matter.    Moderate mucosal thickening/fluid   in the LEFT mastoid air cells.    D/D:  Infected left foot with gas seen on x-ray, s/p resection, with wound left open because of purulence  MSSA bacteremia  Pituitary adenoma, likely non-secreting, possibly compression on optic chiasm.    Poorly controlled DM, better on long-acting and pre-meal insulin.     Plan:      ID follow up appreciated;   Podiatry f/u, planned repeat debridement on Friday  Endocrine f/u; appear adenoma is non secretory;    FSH, LH, prolactin, TSH, Free, AM cortisol, IgF1, Growth Hormone, mostly normal so far   Discussed with Endocrinology, Dr. Arreola. still will need NeuroSx intervention once completed antibiotic course as adenoma with mild compression on Optic chiasm  As per Endo, increased insulin.  F/u outpatient for pituitary adenoma and refer to Neurosurgery.  possible transphenoidal resection after completion of six weeks of antibiotics for osteomyelitis with bacteremia.   Patient wishes his partner Peri Pérez to make decisions, if he can't.  Rest as per PGY1 above    Discussed with PGY1 Dr. Reynolds and PGY3 Dr. Leary and RN  Discussed with patient all the above at length including f/u Ophthalmologist for visual field testing as well as outpt NeuroSx once complete antibiotic course Patient seen and examined    71 yo man presented with c/o pain and swelling of his left foot after a fall noted to have Gas gangrene s/p Debridement and now awaiting another debridement and graft on Friday. Patient also with incidental Adenoma noted on CT/ MRI; Hx Glaucoma and Retinal detachment    Patient doing well. No new complaints. No fever. denies SOB, palpitations, chest pain, nausea, vomiting, diarrhea, constipation, dizziness.  No new complaints    Vital Signs Last 24 Hrs  T(C): 36.7 (03 May 2023 13:26), Max: 37.1 (03 May 2023 05:00)  T(F): 98.1 (03 May 2023 13:26), Max: 98.8 (03 May 2023 05:00)  HR: 94 (03 May 2023 13:26) (87 - 94)  BP: 126/72 (03 May 2023 13:26) (126/72 - 140/82)  RR: 16 (03 May 2023 13:26) (16 - 17)  SpO2: 99% (03 May 2023 13:26) (98% - 99%) room air    P/E:  elderly male, comfortable in bed in [facility]  Neuro: AAO x3, no gross focal deficits  Chest: clear, BLAE+, No wheeze or Rhonchi  CVS: S!S2 present, Regular  Abdomen:  soft, BS+, NT, ND  Ext.: Left foot dressing intact  No significant edema or calf tenderness; chronic dermatitis    Labs:                        11.8   8.88  )-----------( 300      ( 03 May 2023 05:51 )             36.6   05-03    137  |  104  |  13  ----------------------------<  141<H>  3.2<L>   |  25  |  1.08    Ca    8.7      03 May 2023 05:51  Phos  2.9     05-03  Mg     2.2     05-03    TPro  7.6  /  Alb  2.0<L>  /  TBili  0.5  /  DBili  x   /  AST  22  /  ALT  36  /  AlkPhos  81  05-03    MR Head w/wo IV Cont (05.01.23 @ 15:37)   MR Head w/wo IV Cont (05.01.23 @ 15:37)   PROCEDURE DATE:  05/01/2023     IMPRESSION:  MR orbits/sella:   1.8 cm AP by 1.6 cm CC by 2 cm TRV pituitary mass consistent with an adenoma. There is mild extension into the LEFT   cavernous sinus. The RIGHT cavernous sinuses and para cavernous regions appear intact.    The cavernous segments of the internal carotid arteries   demonstrate expected flow-void indicating patency. There is mild mass effect on the optic chiasm, LEFT greater than RIGHT. The infundibulum is   slightly deviated to the LEFT.    MR brain: Minimal white matter ischemia at the bifrontal and LEFT parietal subcortical white matter.    Moderate mucosal thickening/fluid   in the LEFT mastoid air cells.    D/D:  Gas gangrene Left foot  MSSA bacteremia likely source   Pituitary adenoma, likely non-secreting, possibly compression on optic chiasm.    Poorly controlled DM    Plan:      ID follow up appreciated; D  Podiatry f/u, planned repeat debridement on Friday  Endocrine f/u; appear adenoma is non secretory;    FSH, LH, prolactin, TSH, Free, AM cortisol, IgF1, Growth Hormone, mostly normal so far   Discussed with Endocrinology, Dr. Arreola. still will need NeuroSx intervention once completed antibiotic course as adenoma with mild compression on Optic chiasm  As per Endo, increased insulin.  F/u outpatient for pituitary adenoma and refer to Neurosurgery.  possible transphenoidal resection after completion of six weeks of antibiotics for osteomyelitis with bacteremia.   Patient wishes his partner Peri Pérez to make decisions, if he can't.  Rest as per PGY1 above    Discussed with PGY1 Dr. Reynolds and PGY3 Dr. Leary and RN  Discussed with patient all the above at length including f/u Ophthalmologist for visual field testing as well as outpt NeuroSx once complete antibiotic course Patient seen and examined    71 yo man presented with c/o pain and swelling of his left foot after a fall noted to have Gas gangrene s/p Debridement and now awaiting another debridement and graft on Friday. Patient also with incidental Adenoma noted on CT/ MRI; Hx Glaucoma and Retinal detachment    Patient doing well. No new complaints. No fever. denies SOB, palpitations, chest pain, nausea, vomiting, diarrhea, constipation, dizziness.  No new complaints    Vital Signs Last 24 Hrs  T(C): 36.7 (03 May 2023 13:26), Max: 37.1 (03 May 2023 05:00)  T(F): 98.1 (03 May 2023 13:26), Max: 98.8 (03 May 2023 05:00)  HR: 94 (03 May 2023 13:26) (87 - 94)  BP: 126/72 (03 May 2023 13:26) (126/72 - 140/82)  RR: 16 (03 May 2023 13:26) (16 - 17)  SpO2: 99% (03 May 2023 13:26) (98% - 99%) room air    P/E:  elderly male, comfortable in bed in [facility]  Neuro: AAO x3, no gross focal deficits  Chest: clear, BLAE+, No wheeze or Rhonchi  CVS: S!S2 present, Regular  Abdomen:  soft, BS+, NT, ND  Ext.: Left foot dressing intact  No significant edema or calf tenderness; chronic dermatitis    Labs:                        11.8   8.88  )-----------( 300      ( 03 May 2023 05:51 )             36.6   05-03    137  |  104  |  13  ----------------------------<  141<H>  3.2<L>   |  25  |  1.08    Ca    8.7      03 May 2023 05:51  Phos  2.9     05-03  Mg     2.2     05-03    TPro  7.6  /  Alb  2.0<L>  /  TBili  0.5  /  DBili  x   /  AST  22  /  ALT  36  /  AlkPhos  81  05-03    MR Head w/wo IV Cont (05.01.23 @ 15:37)   MR Head w/wo IV Cont (05.01.23 @ 15:37)   PROCEDURE DATE:  05/01/2023     IMPRESSION:  MR orbits/sella:   1.8 cm AP by 1.6 cm CC by 2 cm TRV pituitary mass consistent with an adenoma. There is mild extension into the LEFT   cavernous sinus. The RIGHT cavernous sinuses and para cavernous regions appear intact.    The cavernous segments of the internal carotid arteries   demonstrate expected flow-void indicating patency. There is mild mass effect on the optic chiasm, LEFT greater than RIGHT. The infundibulum is   slightly deviated to the LEFT.    MR brain: Minimal white matter ischemia at the bifrontal and LEFT parietal subcortical white matter.    Moderate mucosal thickening/fluid   in the LEFT mastoid air cells.    D/D:  Gas gangrene Left foot  MSSA bacteremia likely source   Pituitary adenoma, likely non-secreting, possibly compression on optic chiasm.    Poorly controlled DM    Plan:      ID follow up appreciated; Switch antibiotics to Ertapenem daily and Cefazolin q 8 hrs  d/w Dr. Gao; recommend FABIAN  Discussed with Cardiology; scheduled for FABIAN AM  Podiatry f/u, planned repeat debridement on Friday  Endocrine f/u; appear adenoma is non secretory; FSH, LH, prolactin, TSH, Free, AM cortisol, IgF1, Growth Hormone, mostly normal so far  NeuroSx intervention once completed antibiotic course as adenoma with mild compression on Optic chiasm  F/u outpatient for pituitary adenoma and refer to Neurosurgery.  possible transphenoidal resection after completion of six weeks of antibiotics for osteomyelitis with bacteremia.   Adjust Insulin as per Endo recs  Patient lives with wife Peri Pérez to make decisions, if he can't.  Rest as per PGY1 above    Discussed with PGY1 Dr. Reynolds and PGY3 Dr. Leary and RN  Discussed with patient all the above at length including f/u Ophthalmologist for visual field testing as well as outpt NeuroSx once complete antibiotic course

## 2023-05-03 NOTE — PROVIDER CONTACT NOTE (CRITICAL VALUE NOTIFICATION) - NS PROVIDER READ BACK
[address] [id number]  Sedation/Analgesia Post Sedation Record    Pt Name: Efra Delacruz  MRN: [id number]  YOB: 1960  Procedure Performed By: Jania Dobbins DO  Primary Care Physician: Rickey Arana MD    POST-PROCEDURE    Physicians/Assistants: Jania Dobbins DO  Procedure Performed: See Procedure Note   Sedation/Anesthesia: Versed and Fentanyl (See procedure note for amount and duration)  Estimated Blood Loss:     0  ml  Specimens Removed: None        Complications: None           Leandro Alexandre DO  Electronically signed 6/29/2021 at 12:53 PM
yes

## 2023-05-03 NOTE — PROGRESS NOTE ADULT - PROBLEM SELECTOR PLAN 5
CTH: noted above   MR brain w and w/o IV contrast w/ pituitary sections and MR orbits - noted above  f/u hormone levels   Neurology consulted: Dr. Restrepo  Endocrinology Dr. Arreola consulted

## 2023-05-04 ENCOUNTER — TRANSCRIPTION ENCOUNTER (OUTPATIENT)
Age: 73
End: 2023-05-04

## 2023-05-04 LAB
CRP SERPL-MCNC: 95 MG/L — HIGH
CULTURE RESULTS: SIGNIFICANT CHANGE UP
ERYTHROCYTE [SEDIMENTATION RATE] IN BLOOD: 94 MM/HR — HIGH (ref 0–20)
GLUCOSE BLDC GLUCOMTR-MCNC: 115 MG/DL — HIGH (ref 70–99)
GLUCOSE BLDC GLUCOMTR-MCNC: 127 MG/DL — HIGH (ref 70–99)
GLUCOSE BLDC GLUCOMTR-MCNC: 142 MG/DL — HIGH (ref 70–99)
GLUCOSE BLDC GLUCOMTR-MCNC: 157 MG/DL — HIGH (ref 70–99)
GLUCOSE BLDC GLUCOMTR-MCNC: 92 MG/DL — SIGNIFICANT CHANGE UP (ref 70–99)
MRSA PCR RESULT.: SIGNIFICANT CHANGE UP
ORGANISM # SPEC MICROSCOPIC CNT: SIGNIFICANT CHANGE UP
S AUREUS DNA NOSE QL NAA+PROBE: DETECTED
SARS-COV-2 RNA SPEC QL NAA+PROBE: SIGNIFICANT CHANGE UP
SPECIMEN SOURCE: SIGNIFICANT CHANGE UP
SPECIMEN SOURCE: SIGNIFICANT CHANGE UP

## 2023-05-04 PROCEDURE — 99233 SBSQ HOSP IP/OBS HIGH 50: CPT | Mod: GC

## 2023-05-04 PROCEDURE — 99232 SBSQ HOSP IP/OBS MODERATE 35: CPT

## 2023-05-04 RX ORDER — ENOXAPARIN SODIUM 100 MG/ML
40 INJECTION SUBCUTANEOUS ONCE
Refills: 0 | Status: DISCONTINUED | OUTPATIENT
Start: 2023-05-04 | End: 2023-05-04

## 2023-05-04 RX ORDER — INSULIN GLARGINE 100 [IU]/ML
25 INJECTION, SOLUTION SUBCUTANEOUS AT BEDTIME
Refills: 0 | Status: DISCONTINUED | OUTPATIENT
Start: 2023-05-04 | End: 2023-05-06

## 2023-05-04 RX ORDER — INSULIN LISPRO 100/ML
3 VIAL (ML) SUBCUTANEOUS ONCE
Refills: 0 | Status: COMPLETED | OUTPATIENT
Start: 2023-05-04 | End: 2023-05-04

## 2023-05-04 RX ORDER — INSULIN LISPRO 100/ML
10 VIAL (ML) SUBCUTANEOUS
Refills: 0 | Status: DISCONTINUED | OUTPATIENT
Start: 2023-05-04 | End: 2023-05-06

## 2023-05-04 RX ORDER — INSULIN GLARGINE 100 [IU]/ML
20 INJECTION, SOLUTION SUBCUTANEOUS AT BEDTIME
Refills: 0 | Status: DISCONTINUED | OUTPATIENT
Start: 2023-05-04 | End: 2023-05-04

## 2023-05-04 RX ADMIN — Medication 3 UNIT(S): at 13:54

## 2023-05-04 RX ADMIN — Medication 100 MILLIGRAM(S): at 13:54

## 2023-05-04 RX ADMIN — FAMOTIDINE 40 MILLIGRAM(S): 10 INJECTION INTRAVENOUS at 06:33

## 2023-05-04 RX ADMIN — CHLORHEXIDINE GLUCONATE 1 APPLICATION(S): 213 SOLUTION TOPICAL at 06:28

## 2023-05-04 RX ADMIN — INSULIN GLARGINE 25 UNIT(S): 100 INJECTION, SOLUTION SUBCUTANEOUS at 22:24

## 2023-05-04 RX ADMIN — Medication 100 MILLIGRAM(S): at 06:33

## 2023-05-04 RX ADMIN — BRIMONIDINE TARTRATE 1 DROP(S): 2 SOLUTION/ DROPS OPHTHALMIC at 19:34

## 2023-05-04 RX ADMIN — Medication 100 MILLIGRAM(S): at 22:01

## 2023-05-04 RX ADMIN — GABAPENTIN 300 MILLIGRAM(S): 400 CAPSULE ORAL at 22:01

## 2023-05-04 RX ADMIN — Medication 650 MILLIGRAM(S): at 23:17

## 2023-05-04 RX ADMIN — GABAPENTIN 300 MILLIGRAM(S): 400 CAPSULE ORAL at 13:54

## 2023-05-04 RX ADMIN — ERTAPENEM SODIUM 120 MILLIGRAM(S): 1 INJECTION, POWDER, LYOPHILIZED, FOR SOLUTION INTRAMUSCULAR; INTRAVENOUS at 10:44

## 2023-05-04 RX ADMIN — BRIMONIDINE TARTRATE 1 DROP(S): 2 SOLUTION/ DROPS OPHTHALMIC at 06:33

## 2023-05-04 RX ADMIN — FAMOTIDINE 40 MILLIGRAM(S): 10 INJECTION INTRAVENOUS at 19:34

## 2023-05-04 RX ADMIN — GABAPENTIN 300 MILLIGRAM(S): 400 CAPSULE ORAL at 06:33

## 2023-05-04 NOTE — PROGRESS NOTE ADULT - PROBLEM SELECTOR PLAN 1
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   surgical path prelim result +ve for Morganella morgani, and Staph  Blood cultures: +ve MSSA   Vanc D/Leo  D/Leo Cefepim and Nafcillin for MSSA on blood cultures  started on Ertapenem 1g qd and Cefazolin 2g q8   f/u repeat blood cultures   Dr. Gao ID consulted   Patient to go for debridement and graft on Friday c/o left foot pain, swelling, and erythema   XR for left foot/ ankle: suspicion of gas foci to 4th interspace and lateral 5th toe  aseptic appearing with leukocytosis WBC 16.95 and elevated lactate 2.2  Podiatry consulted-  s/p left partial 5th ray amputation due to gas gangrene.   surgical path prelim result +ve for Morganella morgani, and Staph  Blood cultures: +ve MSSA   Vanc D/Elo  D/Leo Cefepim and Nafcillin for MSSA on blood cultures  started on Ertapenem 1g qd and Cefazolin 2g q8   Repeat blood cx positive for Staph Aureus  Dr. Gao ID consulted   Patient to go for debridement and graft on Friday c/o left foot pain, swelling, and erythema   XR for left foot/ ankle: suspicion of gas foci to 4th interspace and lateral 5th toe  aseptic appearing with leukocytosis WBC 16.95 and elevated lactate 2.2  Podiatry consulted-  s/p left partial 5th ray amputation due to gas gangrene.   surgical path prelim result +ve for Morganella morgani, and Staph  Blood cultures: +ve MSSA   Vanc D/Leo  D/Leo Cefepim and Nafcillin for MSSA on blood cultures  started on Ertapenem 1g qd and Cefazolin 2g q8   Repeat blood cx positive for Staph Aureus  Dr. Gao ID consulted   Patient to go for debridement and graft tomorrow c/o left foot pain, swelling, and erythema   XR for left foot/ ankle: suspicion of gas foci to 4th interspace and lateral 5th toe  aseptic appearing with leukocytosis WBC 16.95 and elevated lactate 2.2  Podiatry consulted-  s/p left partial 5th ray amputation due to gas gangrene.   surgical path prelim result +ve for Morganella morgani, and Staph  Blood cultures: +ve MSSA   Vanc D/Leo  D/Leo Cefepim and Nafcillin for MSSA on blood cultures  c/w Ertapenem 1g qd and Cefazolin 2g q8   Repeat blood cx positive for Staph Aureus  Dr. Gao ID consulted   Patient to go for debridement and graft tomorrow

## 2023-05-04 NOTE — PROGRESS NOTE ADULT - SUBJECTIVE AND OBJECTIVE BOX
Subjective:  Chart Notes, Work list Manager, and fingersticks reviewed.    Review of Systems:  Constitutional: No fever  Eyes: No blurry vision  Neuro: No tremors  HEENT: No pain  Cardiovascular: No chest pain, palpitations  Respiratory: No SOB, no cough  GI: No nausea, vomiting, abdominal pain  : No dysuria  Skin: no rash  Psych: no depression  Endocrine: no polyuria, polydipsia  Hem/lymph: no swelling  Osteoporosis: no fractures    ALL OTHER SYSTEMS REVIEWED AND NEGATIVE    UNABLE TO OBTAIN    MEDICATIONS  (STANDING):  brimonidine 0.2% Ophthalmic Solution 1 Drop(s) Left EYE every 12 hours  ceFAZolin   IVPB      ceFAZolin   IVPB 2000 milliGRAM(s) IV Intermittent every 8 hours  chlorhexidine 2% Cloths 1 Application(s) Topical <User Schedule>  enoxaparin Injectable 40 milliGRAM(s) SubCutaneous every 24 hours  ertapenem  IVPB 1000 milliGRAM(s) IV Intermittent every 24 hours  famotidine    Tablet 40 milliGRAM(s) Oral every 12 hours  gabapentin 300 milliGRAM(s) Oral every 8 hours  insulin glargine Injectable (LANTUS) 35 Unit(s) SubCutaneous at bedtime  insulin lispro (ADMELOG) corrective regimen sliding scale   SubCutaneous at bedtime  insulin lispro (ADMELOG) corrective regimen sliding scale   SubCutaneous three times a day before meals  insulin lispro Injectable (ADMELOG) 10 Unit(s) SubCutaneous three times a day before meals  lactated ringers. 1000 milliLiter(s) (100 mL/Hr) IV Continuous <Continuous>    MEDICATIONS  (PRN):  acetaminophen     Tablet .. 650 milliGRAM(s) Oral every 6 hours PRN Temp greater or equal to 38C (100.4F), Mild Pain (1 - 3)  ondansetron Injectable 4 milliGRAM(s) IV Push every 8 hours PRN Nausea and/or Vomiting      PHYSICAL EXAM:  VITALS: T(C): 37.1 (05-04-23 @ 05:17)  T(F): 98.8 (05-04-23 @ 05:17), Max: 99.9 (05-03-23 @ 21:18)  HR: 83 (05-04-23 @ 05:17) (75 - 94)  BP: 130/74 (05-04-23 @ 05:17) (126/72 - 145/77)  RR:  (16 - 18)  SpO2:  (97% - 99%)  Wt(kg): --  GENERAL: NAD,   HEENT:  Atraumatic, Normocephalic, drymucous membranes  THYROID: Normal size, no palpable nodules  RESPIRATORY: Clear to auscultation bilaterally; No rales, rhonchi, wheezing  CARDIOVASCULAR: Regular rate and rhythm; No murmurs; no peripheral edema  GI: Soft, nontender, non distended, +ve abdominal obesity  EXTREMITIES: +ve peripheral pulses, -ve pedal edema  SKIN: Dry, intact, No rashes or lesions  PSYCH: Alert and oriented x 3    CAPILLARY BLOOD GLUCOSE      POCT Blood Glucose.: 92 mg/dL (04 May 2023 08:40)  POCT Blood Glucose.: 106 mg/dL (03 May 2023 21:46)  POCT Blood Glucose.: 117 mg/dL (03 May 2023 16:42)  POCT Blood Glucose.: 163 mg/dL (03 May 2023 11:13)    05-03    137  |  104  |  13  ----------------------------<  141<H>  3.2<L>   |  25  |  1.08    eGFR: 73    Ca    8.7      05-03  Mg     2.2     05-03  Phos  2.9     05-03    TPro  7.6  /  Alb  2.0<L>  /  TBili  0.5  /  DBili  x   /  AST  22  /  ALT  36  /  AlkPhos  81  05-03    Thyroid Function Tests:  05-03 @ 05:51 TSH 2.19 FreeT4 1.6 T3 -- Anti TPO -- Anti Thyroglobulin Ab -- TSI --  04-30 @ 08:00 TSH 1.13 FreeT4 -- T3 -- Anti TPO -- Anti Thyroglobulin Ab -- TSI --        Assessment and Plan:    1) Type 2 diabetes:      Recommendations:  - Basal Insulin:   Glargine  (Lantus) units once daily    - Nutritional Insulin:  Lispro (Admelog) units with breakfast, hold if NPO or eating <50% of meals  Lispro (Admelog) units with lunch, hold if NPO or eating <50% of meals  Lispro (Admelog) units with dinner, hold if NPO or eating <50% of meals    - Correctional (Sliding) Insulin:  Low Lispro (Admelog) sliding scale with meals and bedtime    - Oral Medications:  None in the hospital               Subjective:  Chart Notes, Work list Manager, and fingersticks reviewed.  Patient reports feeling well denies any major complaints.  Patient is eating well, finishing all his meals    Review of Systems:  Constitutional: No fever  Eyes: No blurry vision  Cardiovascular: No chest pain, palpitations  Respiratory: No SOB, no cough  GI: No nausea, vomiting, abdominal pain  : No dysuria  Skin: no rash  Psych: no depression  Endocrine: no polyuria, polydipsia    Medications  (Standing):  brimonidine 0.2% Ophthalmic Solution 1 Drop(s) Left EYE every 12 hours  ceFAZolin   IVPB      ceFAZolin   IVPB 2000 milliGRAM(s) IV Intermittent every 8 hours  chlorhexidine 2% Cloths 1 Application(s) Topical <User Schedule>  enoxaparin Injectable 40 milliGRAM(s) SubCutaneous every 24 hours  ertapenem  IVPB 1000 milliGRAM(s) IV Intermittent every 24 hours  famotidine    Tablet 40 milliGRAM(s) Oral every 12 hours  gabapentin 300 milliGRAM(s) Oral every 8 hours  insulin glargine Injectable (LANTUS) 35 Unit(s) SubCutaneous at bedtime  insulin lispro (ADMELOG) corrective regimen sliding scale   SubCutaneous at bedtime  insulin lispro (ADMELOG) corrective regimen sliding scale   SubCutaneous three times a day before meals  insulin lispro Injectable (ADMELOG) 10 Unit(s) SubCutaneous three times a day before meals  lactated ringers. 1000 milliLiter(s) (100 mL/Hr) IV Continuous <Continuous>    Medications  (PRN):  acetaminophen     Tablet .. 650 milliGRAM(s) Oral every 6 hours PRN Temp greater or equal to 38C (100.4F), Mild Pain (1 - 3)  ondansetron Injectable 4 milliGRAM(s) IV Push every 8 hours PRN Nausea and/or Vomiting    Physical examination:  VITALS: T(C): 37.1 (05-04-23 @ 05:17)  T(F): 98.8 (05-04-23 @ 05:17), Max: 99.9 (05-03-23 @ 21:18)  HR: 83 (05-04-23 @ 05:17) (75 - 94)  BP: 130/74 (05-04-23 @ 05:17) (126/72 - 145/77)  RR:  (16 - 18)  SpO2:  (97% - 99%)    Constitutional: No acute distress, yes ill- appearing,   HEENT: Moist mucous membranes, Left side decreased peripheral vision, Right side peripheral vision intact, PERRL, EOMI  Neck:  No JVD, bruits or thyromegaly, No thyroid nodules palpable, no LAD  Respiratory:  Respiratory effort normal, lungs clear to ausculation, without rales or rhonchi  Cardiovascular:  Regular heart rate, normal S1 and S2 sounds, without murmur, rub or gallop.  Gastrointestinal: Soft, non tender without hepatosplenomegaly and masses, no abdominal obesity  Extremities: Sensation intact in Right foot, Left foot has wrapped bandage, no cyanosis, clubbing or edema, positive pedal pulses  Neurological:  Oriented to person, place and time, No gross sensory or motor defects    Labs:  Capillary Blood Glucose:  POCT Blood Glucose.: 92 mg/dL (04 May 2023 08:40)  POCT Blood Glucose.: 106 mg/dL (03 May 2023 21:46)  POCT Blood Glucose.: 117 mg/dL (03 May 2023 16:42)  POCT Blood Glucose.: 163 mg/dL (03 May 2023 11:13)    05-03  137  |  104  |  13  ----------------------------<  141<H>  3.2<L>   |  25  |  1.08  eGFR: 73  Ca    8.7      05-03  Mg     2.2     05-03  Phos  2.9     05-03    Adrenocorticotropic Hormone, Serum: 11.9 pg/mL (04.30.23 @ 08:00)  Cortisol AM, Serum: 16.8 ug/dL (04.30.23 @ 08:00)  Prolactin, Serum: 12.4 ng/mL (05.03.23 @ 05:51)  Thyroid Stimulating Hormone, Serum: 1.13 uU/mL (04.30.23 @ 08:00)  Free Triiodothyronine, Serum: 1.46 pg/mL (04.30.23 @ 08:00)  A1C with Estimated Average Glucose Result: 12.2 % (05.01.23 @ 05:29)  Free Thyroxine, Serum: 1.6 ng/dL (05.03.23 @ 05:51)      MRI Brain 5/1/2023  IMPRESSION:    MR orbits/sella:   1.8 cm AP by 1.6 cm CC by 2 cm TRV pituitary mass   consistent with an adenoma. There is mild extension into the LEFT   cavernous sinus. The RIGHT cavernous sinuses and para cavernous regions   appear intact.    The cavernous segments of the internal carotid arteries   demonstrate expected flow-void indicating patency. There is mild mass   effect on the optic chiasm, LEFT greater than RIGHT. The infundibulum is   slightly deviated to the LEFT.    MR brain: Minimal white matter ischemia at the bifrontal and LEFT   parietal subcortical white matter.    Moderate mucosal thickening/fluid   in the LEFT mastoid air cells.    Assessment and Plan:    72 year old Male with PMHx T2DM, HLD, HTN, colon cancer s/p hemicolon resection in 2011 ( currently in remission) p/w complaints of lightheadedness after fall with LOC and head injury and left foot ulcer. Admitted for left toe gas gangrene. Hospital course complicated by MSSA bacteremia. On admission, patient was found to have serum BS of 389 and elevated A1C.  CT scan head shows pituitary mass. 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  Patient is not taking Humalog before the meals regularly.  Inpatient, patient's BG are better controlled   Fasting blood sugars tightly controlled as patient was NPO last night and received Lantus 35 units at night  Post prandial BS are at goal    Inpatient Recommendations:  Basal Insulin:   Continue Glargine ( Lantus) 35 units once daily, however as patient will be NPO tonight, will reduce the Lantus tonight to 25 units     Nutritional Insulin:  Continue Lispro (Admelog) 10 units with meals, Hold if NPO or eating <50% of meals    Correctional Insulin:  Continue low Lispro ( Admelog) correctional scale with meals and bedtime,     Oral Diabetes Medications:  None in the hospital    Check the POC glucose with meals and bedtime    4) Pituitary Macroadenoma:   5) Left cavernous sinus invasion:  6) Optic chiasm compression    MRI brain shows 2 cm pituitary macroadenoma compressing left side of optic chiasm and invading the left side of cavernous sinus mildly.  Most likely pituitary mass is nonfunctional as ACTH, cortisol, TSH, electrolytes and vitals are stable however we still need to test all pituitary hormones once patient is a stable and is discharged from the hospital.   Free T4, Prolactin levels are within normal limit .  Recommend to do ophthalmology consult inpatient or outpatient to evaluate for peripheral visual fields given patient has optic chiasm compression.    Explained previously regarding pituitary tumor and its optic chiasm compression.  Counseled to follow-up with endocrine as outpatient to evaluate whether the tumor is functional or not and then with neurosurgery.  Discussed with patient that as pituitary tumor is compressing the optic chiasm, the gold standard treatment is to do transsphenoidal transnasal pituitary tumor resection to prevent complications such as vision loss, pituitary hemorrhage or pituitary apoplexy.  Patient is agreeable to do the surgical intervention eventually.  Discussed with patient that after the pituitary surgery, there may be a chance that he may develop panhypopituitarism and therefore will be requiring hormone replacement.  Patient verbalized understanding.    Discussed the endocrine plan of care with patient and primary team    Please provide endocrine office address of [phone] [facility] on discharge paper. Patient needs close outpatient endocrine follow up.     Ewa Arreola MD   Endocrinology, Diabetes and Metabolism   Available on MS. teams

## 2023-05-04 NOTE — PROGRESS NOTE ADULT - PROBLEM SELECTOR PLAN 2
Blood cultures: +ve MSSA  Vanc D/Leo  D/Leo Cefepim and Nafcillin for MSSA on blood cultures  started on Ertapenem 1g qd and Cefazolin 2g q8   TTE unremarkable  f/u repeat blood cultures   pt would need FABIAN   Dr. Gao ID consulted Blood cultures: +ve MSSA  Vanc D/Leo  D/Leo Cefepim and Nafcillin for MSSA on blood cultures  started on Ertapenem 1g qd and Cefazolin 2g q8   TTE unremarkable  Repeat blood cx positive for Staph Aureus  FABIAN unremarkable  Dr. Gao ID consulted

## 2023-05-04 NOTE — PROGRESS NOTE ADULT - SUBJECTIVE AND OBJECTIVE BOX
PGY-1 Progress Note discussed with attending    PAGER #: [[phone]] TILL 5:00 PM  PLEASE CONTACT ON CALL TEAM:  - On Call Team (Please refer to Marlena) FROM 5:00 PM - 8:30PM  - Nightfloat Team FROM 8:30 -7:30 AM    OVERNIGHT EVENTS:   -     REVIEW OF SYSTEMS:  CONSTITUTIONAL: No fever, weight loss, or fatigue  RESPIRATORY: No cough, wheezing, chills or hemoptysis; No shortness of breath  CARDIOVASCULAR: No chest pain, palpitations, dizziness, or leg swelling  GASTROINTESTINAL: No abdominal pain. No nausea, vomiting, or hematemesis; No diarrhea or constipation. No melena or hematochezia.  GENITOURINARY: No dysuria or hematuria, urinary frequency  NEUROLOGICAL: No headaches, memory loss, loss of strength, numbness, or tremors  SKIN: No itching, burning, rashes, or lesions     MEDICATIONS  (STANDING):  brimonidine 0.2% Ophthalmic Solution 1 Drop(s) Left EYE every 12 hours  ceFAZolin   IVPB      ceFAZolin   IVPB 2000 milliGRAM(s) IV Intermittent every 8 hours  chlorhexidine 2% Cloths 1 Application(s) Topical <User Schedule>  enoxaparin Injectable 40 milliGRAM(s) SubCutaneous every 24 hours  ertapenem  IVPB 1000 milliGRAM(s) IV Intermittent every 24 hours  famotidine    Tablet 40 milliGRAM(s) Oral every 12 hours  gabapentin 300 milliGRAM(s) Oral every 8 hours  insulin glargine Injectable (LANTUS) 35 Unit(s) SubCutaneous at bedtime  insulin lispro (ADMELOG) corrective regimen sliding scale   SubCutaneous at bedtime  insulin lispro (ADMELOG) corrective regimen sliding scale   SubCutaneous three times a day before meals  insulin lispro Injectable (ADMELOG) 10 Unit(s) SubCutaneous three times a day before meals  lactated ringers. 1000 milliLiter(s) (100 mL/Hr) IV Continuous <Continuous>    MEDICATIONS  (PRN):  acetaminophen     Tablet .. 650 milliGRAM(s) Oral every 6 hours PRN Temp greater or equal to 38C (100.4F), Mild Pain (1 - 3)  ondansetron Injectable 4 milliGRAM(s) IV Push every 8 hours PRN Nausea and/or Vomiting      Vital Signs Last 24 Hrs  T(C): 37.1 (04 May 2023 05:17), Max: 37.7 (03 May 2023 21:18)  T(F): 98.8 (04 May 2023 05:17), Max: 99.9 (03 May 2023 21:18)  HR: 83 (04 May 2023 05:17) (75 - 94)  BP: 130/74 (04 May 2023 05:17) (126/72 - 145/77)  BP(mean): --  RR: 17 (04 May 2023 05:17) (16 - 18)  SpO2: 97% (04 May 2023 05:17) (97% - 99%)    Parameters below as of 04 May 2023 05:17  Patient On (Oxygen Delivery Method): room air        PHYSICAL EXAMINATION:  GENERAL: NAD, AAOx  HEAD: AT/NC  EYES: conjunctiva and sclera clear  NECK: supple, No JVD noted, Normal thyroid  CHEST/LUNG: CTABL; no rales, rhonchi, wheezing, or rubs  HEART: regular rate and rhythm; no murmurs, rubs, or gallops  ABDOMEN: soft, nontender, nondistended; Bowel sounds present  EXTREMITIES:  2+ Peripheral Pulses, No clubbing, cyanosis, or edema  SKIN: warm dry                          11.8   8.88  )-----------( 300      ( 03 May 2023 05:51 )             36.6     05-03    137  |  104  |  13  ----------------------------<  141<H>  3.2<L>   |  25  |  1.08    Ca    8.7      03 May 2023 05:51  Phos  2.9     05-03  Mg     2.2     05-03    TPro  7.6  /  Alb  2.0<L>  /  TBili  0.5  /  DBili  x   /  AST  22  /  ALT  36  /  AlkPhos  81  05-03    LIVER FUNCTIONS - ( 03 May 2023 05:51 )  Alb: 2.0 g/dL / Pro: 7.6 g/dL / ALK PHOS: 81 U/L / ALT: 36 U/L DA / AST: 22 U/L / GGT: x                 COVID-19 PCR: NotDetec (29 Apr 2023 14:50)      CAPILLARY BLOOD GLUCOSE      POCT Blood Glucose.: 106 mg/dL (03 May 2023 21:46)  POCT Blood Glucose.: 117 mg/dL (03 May 2023 16:42)  POCT Blood Glucose.: 163 mg/dL (03 May 2023 11:13)      RADIOLOGY & ADDITIONAL TESTS:                   PGY-1 Progress Note discussed with attending    PAGER #: [[phone]] TILL 5:00 PM  PLEASE CONTACT ON CALL TEAM:  - On Call Team (Please refer to Marlena) FROM 5:00 PM - 8:30PM  - Nightfloat Team FROM 8:30 -7:30 AM    OVERNIGHT EVENTS:   No acute changes overnight. Patient had his FABIAN procedure with no issues this morning.     REVIEW OF SYSTEMS:  CONSTITUTIONAL: No fever, weight loss, or fatigue  RESPIRATORY: No cough, wheezing, chills or hemoptysis; No shortness of breath  CARDIOVASCULAR: No chest pain, palpitations, dizziness, or leg swelling  GASTROINTESTINAL: No abdominal pain. No nausea, vomiting, or hematemesis; No diarrhea or constipation. No melena or hematochezia.  GENITOURINARY: No dysuria or hematuria, urinary frequency  NEUROLOGICAL: No headaches, memory loss, loss of strength, numbness, or tremors  SKIN: No itching, burning, rashes, or lesions     MEDICATIONS  (STANDING):  brimonidine 0.2% Ophthalmic Solution 1 Drop(s) Left EYE every 12 hours  ceFAZolin   IVPB      ceFAZolin   IVPB 2000 milliGRAM(s) IV Intermittent every 8 hours  chlorhexidine 2% Cloths 1 Application(s) Topical <User Schedule>  enoxaparin Injectable 40 milliGRAM(s) SubCutaneous every 24 hours  ertapenem  IVPB 1000 milliGRAM(s) IV Intermittent every 24 hours  famotidine    Tablet 40 milliGRAM(s) Oral every 12 hours  gabapentin 300 milliGRAM(s) Oral every 8 hours  insulin glargine Injectable (LANTUS) 35 Unit(s) SubCutaneous at bedtime  insulin lispro (ADMELOG) corrective regimen sliding scale   SubCutaneous at bedtime  insulin lispro (ADMELOG) corrective regimen sliding scale   SubCutaneous three times a day before meals  insulin lispro Injectable (ADMELOG) 10 Unit(s) SubCutaneous three times a day before meals  lactated ringers. 1000 milliLiter(s) (100 mL/Hr) IV Continuous <Continuous>    MEDICATIONS  (PRN):  acetaminophen     Tablet .. 650 milliGRAM(s) Oral every 6 hours PRN Temp greater or equal to 38C (100.4F), Mild Pain (1 - 3)  ondansetron Injectable 4 milliGRAM(s) IV Push every 8 hours PRN Nausea and/or Vomiting      Vital Signs Last 24 Hrs  T(C): 37.1 (04 May 2023 05:17), Max: 37.7 (03 May 2023 21:18)  T(F): 98.8 (04 May 2023 05:17), Max: 99.9 (03 May 2023 21:18)  HR: 83 (04 May 2023 05:17) (75 - 94)  BP: 130/74 (04 May 2023 05:17) (126/72 - 145/77)  BP(mean): --  RR: 17 (04 May 2023 05:17) (16 - 18)  SpO2: 97% (04 May 2023 05:17) (97% - 99%)    Parameters below as of 04 May 2023 05:17  Patient On (Oxygen Delivery Method): room air        PHYSICAL EXAMINATION:  GENERAL: NAD, AAOx3  HEAD: AT/NC  EYES: conjunctiva and sclera clear  NECK: supple, No JVD noted, Normal thyroid  CHEST/LUNG: CTABL; no rales, rhonchi, wheezing, or rubs  HEART: regular rate and rhythm; no murmurs, rubs, or gallops  ABDOMEN: soft, nontender, nondistended; Bowel sounds present  EXTREMITIES:  2+ Peripheral Pulses, No clubbing, cyanosis, or edema. ACE wrap on L foot by podiatry in place  SKIN: warm dry                          11.8   8.88  )-----------( 300      ( 03 May 2023 05:51 )             36.6     05-03    137  |  104  |  13  ----------------------------<  141<H>  3.2<L>   |  25  |  1.08    Ca    8.7      03 May 2023 05:51  Phos  2.9     05-03  Mg     2.2     05-03    TPro  7.6  /  Alb  2.0<L>  /  TBili  0.5  /  DBili  x   /  AST  22  /  ALT  36  /  AlkPhos  81  05-03    LIVER FUNCTIONS - ( 03 May 2023 05:51 )  Alb: 2.0 g/dL / Pro: 7.6 g/dL / ALK PHOS: 81 U/L / ALT: 36 U/L DA / AST: 22 U/L / GGT: x                 COVID-19 PCR: NotDetec (29 Apr 2023 14:50)      CAPILLARY BLOOD GLUCOSE      POCT Blood Glucose.: 106 mg/dL (03 May 2023 21:46)  POCT Blood Glucose.: 117 mg/dL (03 May 2023 16:42)  POCT Blood Glucose.: 163 mg/dL (03 May 2023 11:13)      RADIOLOGY & ADDITIONAL TESTS:  FABIAN PROCEDURE: Transesophageal and transthoracic echocardiogram  was performed in the echocardiography laboratory.  The  patient was sedated with Propofol  140 mg IV by  anesthesiologist.  The procedure was monitored with  automatic blood pressure monitoring,  ECG tracings and  pulse oximetry. The transesophageal probe was placed in the  esophagus posterior to the heartwithout any complications.   The patient tolerated the procedure well.  Given _10_cc agitated saline intravenously.  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 Trace mitral  regurgitation.  Aortic Root: Aortic Root: 3.4 cm.Normal aorticroot, aortic  arch and descending thoracic aorta.    Aortic Valve: Normal trileaflet aortic valve.  Left Atrium: Normal left atrium.  No left atrium  thrombus.  Left Ventricle: Normal Left Ventricular Systolic Function,  (EF = 55 to 60%) Normal left ventricular internal  dimensions and wall thicknesses. Diastolic function not  assessed.  Right Heart: Normal right atrium. Normal right ventricular  size and function. Normal tricuspid valve. Normal pulmonic  valve.  Pericardium/PleuraNormal pericardium with no pericardial  effusion.  Hemodynamic: Unable to estimate RVSP. Agitated saline  injection was negative for intracardiac shunt. Lipomatous  hypertrophy of the interatrial septum. This is a benign  finding.  ------------------------------------------------------------------------  CONCLUSIONS:  1. Normal mitral valve. Trace mitral regurgitation.  2. Normal trileaflet aortic valve.  3. Aortic Root: 3.4 cm.Normal aortic root, aortic arch and  descending thoracic aorta.    4. Normal left atrium.  No left atrium  thrombus.  5. Normal left ventricular internal dimensions and wall  thicknesses.  6. Normal Left Ventricular Systolic Function,  (EF = 55 to  60%)  7. Diastolic function not assessed.  8. Normal right atrium.  9. Normal right ventricular size and function.  10. Normal tricuspid valve.  11. Normal pulmonic valve.  12. Agitated saline injection was negative for intracardiac  shunt. Lipomatous hypertrophy of the interatrial septum.  This is a benign finding.  13. Normal pericardium with no pericardial effusion.  14.No vegetations seen on this study.    ------------------------------------------------------------------------  Confirmed on  5/4/2023 - 08:29:00 by Sugey Walsh MD  ------------------------------------------------------------------------

## 2023-05-04 NOTE — PROGRESS NOTE ADULT - ATTENDING COMMENTS
Patient seen and examined this afternoon    73 yo man presented with c/o pain and swelling of his left foot after a fall noted to have Gas gangrene s/p Debridement and now awaiting another debridement and graft on Friday. Patient also with incidental Adenoma noted on CT/ MRI; Hx Glaucoma and Retinal detachment    Patient doing well. No new complaints. Sugar was low this morning to 92. Has baseline 2 to 4 BMs. No fever. denies SOB, palpitations, chest pain, nausea, vomiting, diarrhea, constipation, dizziness. No new complaints    Vital Signs Last 24 Hrs: reviewed stable    P/E:  elderly male, comfortable in bed in NAD  Neuro: AAO x3, no gross focal deficits  Chest: clear, BLAE+, No wheeze or Rhonchi  CVS: S!S2 present, Regular  Abdomen:  soft, BS+, NT, ND  Ext.: Left foot dressing intact  No significant edema or calf tenderness; chronic dermatitis    Labs: reviewed no new CBC/ CMP; repeat AM    Prolactin, Serum (05.03.23 @ 05:51) Prolactin, Serum: 12.4 ng/mL  Thyroid Stimulating Hormone, Serum in AM (05.03.23 @ 05:51) Thyroid Stimulating Hormone, Serum: 2.19 uU/mL  Luteinizing Hormone, Serum (05.03.23 @ 05:51) Luteinizing Hormone, Serum: 4.5: LH (IU/L)   Follicle Stimulating Hormone, Serum (05.03.23 @ 05:51) Follicle Stimulating Hormone, Serum: 2.7: FSH (IU/L)   Adrenocorticotropic Hormone, Serum (05.03.23 @ 05:51) Adrenocorticotropic Hormone, Serum: 24.3 pg/mL    Culture - Blood in AM (05.03.23 @ 06:30)   Specimen Source: .Blood Blood  Culture Results: No growth to date  Culture - Blood in AM (05.03.23 @ 05:51)   Specimen Source: .Blood Blood  Culture Results: No growth to date.    Culture - Blood (05.01.23 @ 12:25)   Culture Results: Growth in aerobic bottle: Staphylococcus aureus     MR Head w/wo IV Cont (05.01.23 @ 15:37)   MR Head w/wo IV Cont (05.01.23 @ 15:37)   PROCEDURE DATE:  05/01/2023     IMPRESSION:  MR orbits/sella:   1.8 cm AP by 1.6 cm CC by 2 cm TRV pituitary mass consistent with an adenoma. There is mild extension into the LEFT   cavernous sinus. The RIGHT cavernous sinuses and para cavernous regions appear intact.    The cavernous segments of the internal carotid arteries   demonstrate expected flow-void indicating patency. There is mild mass effect on the optic chiasm, LEFT greater than RIGHT. The infundibulum is   slightly deviated to the LEFT.    MR brain: Minimal white matter ischemia at the bifrontal and LEFT parietal subcortical white matter.    Moderate mucosal thickening/fluid   in the LEFT mastoid air cells.    D/D:  Gas gangrene Left foot  MSSA bacteremia likely source   Pituitary adenoma, likely non-secreting, possibly compression on optic chiasm.    Poorly controlled DM    Plan:      ID follow up; Continue antibiotics to Ertapenem daily and Cefazolin q 8 hrs  FABIAN appreciated; no vegetations  Blood Cx from 5/1 was positive; so far Cx from 5/3 negative  Podiatry f/u, planned repeat debridement AM; NPO after MN  Patient is optimized for planned surgical intervention; Routine labs and coags AM;   Basal Insulin reduce to 25 units HS as patient is NPO after MN; Hold short acting Insulin while NPO  Endocrine f/u; appear adenoma is non secretory; FSH, LH, prolactin, TSH,  cortisol, LH, FSH all WNL  NeuroSx intervention once completed antibiotic course as adenoma with mild compression on Optic chiasm  F/u outpatient for pituitary adenoma and refer to Neurosurgery.  possible transphenoidal resection after completion of six weeks of antibiotics for osteomyelitis with bacteremia.   Adjust Insulin as per Endo recs  DVT ppx; Hold after todays dose  PT eval after Sx;   Patient lives with wife Peri Pérez to make decisions, if he can't.  Rest as per PGY1 above    Discussed with PGY1 Dr. Reynolds and PGY3 Dr. Leary and RN  Discussed with patient all the above at length; He was advised he may need MO for wound care and Antibiotics if unable to manage at home

## 2023-05-04 NOTE — PROGRESS NOTE ADULT - PROBLEM SELECTOR PLAN 5
CTH: noted above   MR brain w and w/o IV contrast w/ pituitary sections and MR orbits - noted above  f/u hormone levels   Neurology consulted: Dr. Restrepo  Endocrinology Dr. Arreola consulted CTH: noted above   MR brain w and w/o IV contrast w/ pituitary sections and MR orbits - noted above  - Hormone work-up unremarkable  Neurology consulted: Dr. Restrepo  Endocrinology Dr. Arreola consulted

## 2023-05-04 NOTE — PROGRESS NOTE ADULT - PROBLEM SELECTOR PLAN 3
h/o DM on Metformin 1000mg BID, Lantus 35 qhs and Humolog 14 TID before meals (per sure scripts Lantus 45u and Humalog 20u), also reports sugars are not controlled at home with prior A1C 7.9  will hold oral dm meds while inpatient   HbA1c: 12.1%  c/w Lantus 35, Lispro 10 units  Mod ISS  Endocrine Dr. Arreola consulted

## 2023-05-04 NOTE — PROGRESS NOTE ADULT - ASSESSMENT
Subjective: Afebrile, NAD, no N/V or diarrhea.     REVIEW OF SYSTEMS:  CONSTITUTIONAL:  No fevers or chills  EYES/ENT:  No visual changes;  No vertigo or throat pain   NECK:  No neck pain or stiffness  RESPIRATORY:  No cough, wheezing, hemoptysis; No shortness of breath  CARDIOVASCULAR:  No chest pain or palpitations  GASTROINTESTINAL:  No abdominal or epigastric pain. No nausea, vomiting, or hematemesis; No diarrhea or constipation. No melena or hematochezia.  GENITOURINARY:  No dysuria, frequency or hematuria  NEUROLOGICAL:  No numbness or weakness  MSK: no back pain, L foot pain controlled  SKIN:  No itching, no rashes    PE:  Vital Signs Last 24 Hrs  T(C): 36.5 (04 May 2023 08:50), Max: 37.7 (03 May 2023 21:18)  T(F): 97.7 (04 May 2023 08:50), Max: 99.9 (03 May 2023 21:18)  HR: 84 (04 May 2023 08:50) (75 - 94)  BP: 144/81 (04 May 2023 08:50) (126/72 - 145/77)  RR: 18 (04 May 2023 08:50) (16 - 18)  SpO2: 99% (04 May 2023 08:50) (97% - 99%)    Parameters below as of 04 May 2023 08:50 Patient On (Oxygen Delivery Method): room air    Gen: AOx3, NAD, non-toxic, pleasant  HEAD:  Atraumatic, Normocephalic  EYES: PERRLA, conjunctiva and sclera clear  ENT: Moist mucous membranes  NECK: Supple, No JVD  CV: S1+S2 normal, nontachycardic  Resp: Clear bilat, no resp distress, no crackles/wheezes  Abd: Soft, nontender, +BS  Ext: No LE edema, no cyanosis, pulses +  : No Courtney  IV/Skin: No thrombophlebitis. L foot wound + with clean dressings  Msk: No low back pain  Neuro: AAOx3. No sensory deficits, no motor deficits  Psych: no anxiety, no delusional ideas, no suicidal ideation    LABS/DIAGNOSTIC TESTS:                        11.8   8.88  )-----------( 300      ( 03 May 2023 05:51 )             36.6     WBC Count: 8.88 K/uL ( @ 05:51)  WBC Count: 7.33 K/uL ( @ 06:39)        137  |  104  |  13  ----------------------------<  141<H>  3.2<L>   |  25  |  1.08    Ca    8.7      03 May 2023 05:51  Phos  2.9       Mg     2.2         TPro  7.6  /  Alb  2.0<L>  /  TBili  0.5  /  DBili  x   /  AST  22  /  ALT  36  /  AlkPhos  81  -    Urinalysis Basic - ( 03 May 2023 04:53 )  Color: Yellow / Appearance: Clear / S.015 / pH: x  Gluc: x / Ketone: Trace  / Bili: Negative / Urobili: Negative   Blood: x / Protein: 15 mg/dL / Nitrite: Negative   Leuk Esterase: Negative / RBC: 0-2 /HPF / WBC 0-2 /HPF   Sq Epi: x / Non Sq Epi: x / Bacteria: Trace /HPF    CULTURES:   Culture - Blood (collected 23 @ 06:30)  Source: .Blood Blood  Preliminary Report (23 @ 12:02):    No growth to date.    Culture - Blood (collected 23 @ 05:51)  Source: .Blood Blood  Preliminary Report (23 @ 12:02):    No growth to date.    Culture - Blood (collected 23 @ 12:25)  Source: .Blood Blood-Peripheral  Gram Stain (23 @ 02:09):    Growth in aerobic bottle: Gram Positive Cocci in Clusters  Final Report (23 @ 21:16):    Growth in aerobic bottle: Staphylococcus aureus    See previous culture [id number]    Culture - Blood (collected 23 @ 12:15)  Source: .Blood Blood-Peripheral  Gram Stain (23 @ 23:53):    Growth in aerobic bottle: Gram Positive Cocci in Clusters    Growth in anaerobic bottle: Gram Positive Cocci in Clusters  Final Report (23 @ 23:53):    Growth in aerobic bottle: Staphylococcus aureus    Growth in anaerobic bottle: Gram Positive Cocci in Clusters    See previous culture [id number]    Culture - Surgical Swab (collected 23 @ 10:13)  Source: .Surgical Swab Left 5th ray clean margin  Preliminary Report (23 @ 10:09):    Rare Morganella morganii    Rare Staphylococcus aureus    Few Finesilvino escalantea "Susceptibilities not performed"  Organism: Morganella morganii  Staphylococcus aureus (23 @ 07:52)  Organism: Morganella morganii (23 @ 07:52)      Method Type: TROY      -  Amikacin: S <=16      -  Amoxicillin/Clavulanic Acid: R >16/8      -  Ampicillin: R >16 These ampicillin results predict results for amoxicillin      -  Ampicillin/Sulbactam: R >16/8 Enterobacter, Klebsiella aerogenes, Citrobacter, and Serratia may develop resistance during prolonged therapy (3-4 days)      -  Aztreonam: S <=4      -  Cefazolin: R >16 Enterobacter, Klebsiella aerogenes, Citrobacter, and Serratia may develop resistance during prolonged therapy (3-4 days)      -  Cefepime: S <=2      -  Cefoxitin: S <=8      -  Ceftriaxone: S <=1 Enterobacter, Klebsiella aerogenes, Citrobacter, and Serratia may develop resistance during prolonged therapy      -  Ciprofloxacin: S <=0.25      -  Ertapenem: S <=0.5      -  Gentamicin: S <=2      -  Levofloxacin: S <=0.5      -  Meropenem: S <=1      -  Piperacillin/Tazobactam: S <=8      -  Tobramycin: S <=2      -  Trimethoprim/Sulfamethoxazole: S <=0.5/9.5  Organism: Staphylococcus aureus (23 @ 07:44)      Method Type: TROY      -  Ampicillin/Sulbactam: S <=8/4      -  Cefazolin: S <=4      -  Clindamycin: S <=0.25      -  Erythromycin: S <=0.25      -  Gentamicin: S <=1 Should not be used as monotherapy      -  Oxacillin: S <=0.25 Oxacillin predicts susceptibility for dicloxacillin, methicillin, and nafcillin      -  Penicillin: R 2      -  Rifampin: S <=1 Should not be used as monotherapy      -  Tetracycline: S <=1      -  Trimethoprim/Sulfamethoxazole: S <=0.5/9.5      -  Vancomycin: S 1    Culture - Surgical Swab (collected 23 @ 10:13)  Source: .Surgical Swab Left foot dirty culture  Final Report (23 @ 07:48):    Few Morganella morganii    Few Staphylococcus aureus    Few Finegoldia magna "Susceptibilities not performed"    Rare Proteus mirabilis    Culture in progress  Organism: Morganella morganii  Staphylococcus aureus  Proteus mirabilis (23 @ 07:45)  Organism: Proteus mirabilis (23 @ 07:45)      Method Type: TROY      -  Amikacin: S <=16      -  Amoxicillin/Clavulanic Acid: S <=8/4      -  Ampicillin: S <=8 These ampicillin results predict results for amoxicillin      -  Ampicillin/Sulbactam: S <=4/2 Enterobacter, Klebsiella aerogenes, Citrobacter, and Serratia may develop resistance during prolonged therapy (3-4 days)      -  Aztreonam: S <=4      -  Cefazolin: S <=2 Enterobacter, Klebsiella aerogenes, Citrobacter, and Serratia may develop resistance during prolonged therapy (3-4 days)      -  Cefepime: S <=2      -  Cefoxitin: S <=8      -  Ceftriaxone: S <=1 Enterobacter, Klebsiella aerogenes, Citrobacter, and Serratia may develop resistance during prolonged therapy      -  Ciprofloxacin: S <=0.25      -  Ertapenem: S <=0.5      -  Gentamicin: S <=2      -  Levofloxacin: S <=0.5      -  Meropenem: S <=1      -  Piperacillin/Tazobactam: S <=8      -  Tobramycin: S 4      -  Trimethoprim/Sulfamethoxazole: S <=0.5/9.5  Organism: Staphylococcus aureus (23 @ 07:45)      Method Type: TROY      -  Ampicillin/Sulbactam: S <=8/4      -  Cefazolin: S <=4      -  Clindamycin: S <=0.25      -  Erythromycin: S <=0.25      -  Gentamicin: S <=1 Should not be used as monotherapy      -  Oxacillin: S <=0.25 Oxacillin predicts susceptibility for dicloxacillin, methicillin, and nafcillin      -  Penicillin: R 2      -  Rifampin: S <=1 Should not be used as monotherapy      -  Tetracycline: S <=1      -  Trimethoprim/Sulfamethoxazole: S <=0.5/9.5      -  Vancomycin: S 1  Organism: Morganella morganii (23 @ 07:45)      Method Type: TROY      -  Amikacin: S <=16      -  Amoxicillin/Clavulanic Acid: R >16/8      -  Ampicillin: R >16 These ampicillin results predict results for amoxicillin      -  Ampicillin/Sulbactam: R >16/8 Enterobacter, Klebsiella aerogenes, Citrobacter, and Serratia may develop resistance during prolonged therapy (3-4 days)      -  Aztreonam: S <=4      -  Cefazolin: R >16 Enterobacter, Klebsiella aerogenes, Citrobacter, and Serratia may develop resistance during prolonged therapy (3-4 days)      -  Cefepime: S <=2      -  Cefoxitin: S <=8      -  Ceftriaxone: S <=1 Enterobacter, Klebsiella aerogenes, Citrobacter, and Serratia may develop resistance during prolonged therapy      -  Ciprofloxacin: S <=0.25      -  Ertapenem: S <=0.5      -  Gentamicin: S <=2      -  Levofloxacin: S <=0.5      -  Meropenem: S <=1      -  Piperacillin/Tazobactam: S <=8      -  Tobramycin: S <=2      -  Trimethoprim/Sulfamethoxazole: S <=0.5/9.5    Culture - Surgical Swab (collected 23 @ 18:10)  Source: .Surgical Swab left foot wound  Preliminary Report (23 @ 20:36):    Few Staphylococcus aureus    Few Morganella morganii    Rare Staphylococcus epidermidis "Susceptibilities not performed"    Few Proteus mirabilis  Organism: Staphylococcus aureus  Morganella morganii  Proteus mirabilis (23 @ 20:08)  Organism: Proteus mirabilis (23 @ 20:08)      Method Type: TROY      -  Amikacin: S <=16      -  Amoxicillin/Clavulanic Acid: S <=8/4      -  Ampicillin: S <=8 These ampicillin results predict results for amoxicillin      -  Ampicillin/Sulbactam: S <=4/2 Enterobacter, Klebsiella aerogenes, Citrobacter, and Serratia may develop resistance during prolonged therapy (3-4 days)      -  Aztreonam: R >16      -  Cefazolin: S <=2 Enterobacter, Klebsiella aerogenes, Citrobacter, and Serratia may develop resistance during prolonged therapy (3-4 days)      -  Cefepime: S <=2      -  Cefoxitin: S <=8      -  Ceftriaxone: S <=1 Enterobacter, Klebsiella aerogenes, Citrobacter, and Serratia may develop resistance during prolonged therapy      -  Ciprofloxacin: S <=0.25      -  Ertapenem: S <=0.5      -  Gentamicin: S <=2      -  Levofloxacin: S <=0.5      -  Meropenem: S <=1      -  Piperacillin/Tazobactam: S <=8      -  Tobramycin: S <=2      -  Trimethoprim/Sulfamethoxazole: S <=0.5/9.5  Organism: Morganella morganii (23 @ 19:31)      Method Type: TROY      -  Amikacin: S <=16      -  Amoxicillin/Clavulanic Acid: R >16/8      -  Ampicillin: R >16 These ampicillin results predict results for amoxicillin      -  Ampicillin/Sulbactam: R >16/8 Enterobacter, Klebsiella aerogenes, Citrobacter, and Serratia may develop resistance during prolonged therapy (3-4 days)      -  Aztreonam: S <=4      -  Cefazolin: R >16 Enterobacter, Klebsiella aerogenes, Citrobacter, and Serratia may develop resistance during prolonged therapy (3-4 days)      -  Cefepime: S <=2      -  Cefoxitin: S <=8      -  Ceftriaxone: S <=1 Enterobacter, Klebsiella aerogenes, Citrobacter, and Serratia may develop resistance during prolonged therapy      -  Ciprofloxacin: S <=0.25      -  Ertapenem: S <=0.5      -  Gentamicin: S <=2      -  Levofloxacin: S <=0.5      -  Meropenem: S <=1      -  Piperacillin/Tazobactam: S <=8      -  Tobramycin: S <=2      -  Trimethoprim/Sulfamethoxazole: S <=0.5/9.5  Organism: Staphylococcus aureus (23 @ 19:31)      Method Type: TROY      -  Ampicillin/Sulbactam: S <=8/4      -  Cefazolin: S <=4      -  Clindamycin: S <=0.25      -  Erythromycin: S <=0.25      -  Gentamicin: S 2 Should not be used as monotherapy      -  Oxacillin: S <=0.25 Oxacillin predicts susceptibility for dicloxacillin, methicillin, and nafcillin      -  Penicillin: R 8      -  Rifampin: S <=1 Should not be used as monotherapy      -  Tetracycline: S <=1      -  Trimethoprim/Sulfamethoxazole: S <=0.5/9.5      -  Vancomycin: S 2    Culture - Urine (collected 23 @ 15:10)  Source: Clean Catch Clean Catch (Midstream)  Final Report (23 @ 17:29):    <10,000 CFU/mL Normal Urogenital Sia    Culture - Blood (collected 23 @ 15:10)  Source: .Blood Blood-Peripheral  Gram Stain (23 @ 16:56):    Growth in aerobic bottle: Gram Positive Cocci in Clusters    Growth in anaerobic bottle: Gram Positive Cocci in Clusters  Final Report (23 @ 11:58):    Growth in aerobic and anaerobic bottles: Staphylococcus aureus    See previous culture [id number]    Culture - Blood (collected 23 @ 15:00)  Source: .Blood Blood-Peripheral  Gram Stain (23 @ 10:52):    Growth in aerobic bottle: Gram Positive Cocci in Clusters    Growth in anaerobic bottle: Gram Positive Cocci in Clusters  Final Report (23 @ 13:09):    Growth in aerobic and anaerobic bottles: Staphylococcus aureus  Organism: Blood Culture PCR  Staphylococcus aureus (23 @ 13:09)  Organism: Staphylococcus aureus (23 @ 13:09)      Method Type: TROY      -  Ampicillin/Sulbactam: S <=8/4      -  Cefazolin: S <=4      -  Clindamycin: S <=0.25      -  Erythromycin: S <=0.25      -  Gentamicin: S <=1 Should not be used as monotherapy      -  Oxacillin: S <=0.25 Oxacillin predicts susceptibility for dicloxacillin, methicillin, and nafcillin      -  Penicillin: R 1      -  Rifampin: S <=1 Should not be used as monotherapy      -  Tetracycline: S <=1      -  Trimethoprim/Sulfamethoxazole: S <=0.5/9.5      -  Vancomycin: S 1  Organism: Blood Culture PCR (23 @ 13:09)      Method Type: PCR      -  Methicillin SENSITIVE Staphylococcus aureus (MSSA): Detec Any isolate of Staphylococcus aureus from a blood culture is NOT considered a contaminant.    FABIAN w/Doppler (23 @ 16:37)  CONCLUSIONS:  1. Normal mitral valve. Trace mitral regurgitation.  2. Normal trileaflet aortic valve.  3. Aortic Root: 3.4 cm.Normal aortic root, aortic arch and descending thoracic aorta.    4. Normal left atrium.  No left atrium  thrombus.  5. Normal left ventricular internal dimensions and wall thicknesses.  6. Normal Left Ventricular Systolic Function,  (EF = 55 to 60%)  7. Diastolic function not assessed.  8. Normal right atrium.  9. Normal right ventricular size and function.  10. Normal tricuspid valve.  11. Normal pulmonic valve.  12. Agitated saline injection was negative for intracardiac shunt. Lipomatous hypertrophy of the interatrial septum. This is a benign finding.  13. Normal pericardium with no pericardial effusion.  14.No vegetations seen on this study.    ANTIBIOTICS:  ceFAZolin   IVPB 2000 every 8 hours  ertapenem  IVPB 1000 every 24 hours    COVID-19 PCR: NotDetec (23 @ 14:50)  Sedimentation Rate, Erythrocyte: 84 mm/Hr *H* (23 @ 18:45)  C-Reactive Protein, Serum: 230 mg/L *H* (23 @ 05:29)  Sedimentation Rate, Erythrocyte: 86 mm/Hr *H* (23 @ 05:29)  Sedimentation Rate, Erythrocyte: 91 mm/Hr *H* (23 @ 05:51)  MRSA PCR Result.: NotDetec (23 @ 13:06)  Sedimentation Rate, Erythrocyte: 94 mm/Hr *H* (23 @ 10:25)    IMPRESSION:  Admitted on  with CC of LLE pain s/p mechanical fall while going to the bathroom.  73 yo male with H/O IDDM, HLD, HTN, Colon CA s/p hemicolon resection in (in remission) admitted for severe sepsis due to gas gangrene L foot(vanco + zosyn ) Blood cx  + MSSA high grade bacteremia. 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 Wound cx  + MSSA and M morganii.  On Nafcillin + cefepime .  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     -Severe Sepsis due to L foot gangrene s/p partial amputation 5th digit(), planned for additional I&D on   -DFI/DFO L foot gangrene tissue cx + MSSA and M. Morganii  -BSI- High grade MSSA bacteremia source L foot. Blood cx  bottles + MSSA, BC  + MSSA, BC 5/3 NGTD. ECHO  with no significant valvular abnormalities. FABIAN with no evidence of IE.   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 Laura Payne  Available in Teams               Subjective: Afebrile, NAD, no N/V or diarrhea.     REVIEW OF SYSTEMS:  CONSTITUTIONAL:  No fevers or chills  EYES/ENT:  No visual changes;  No vertigo or throat pain   NECK:  No neck pain or stiffness  RESPIRATORY:  No cough, wheezing, hemoptysis; No shortness of breath  CARDIOVASCULAR:  No chest pain or palpitations  GASTROINTESTINAL:  No abdominal or epigastric pain. No nausea, vomiting, or hematemesis; No diarrhea or constipation. No melena or hematochezia.  GENITOURINARY:  No dysuria, frequency or hematuria  NEUROLOGICAL:  No numbness or weakness  MSK: no back pain, L foot pain controlled  SKIN:  No itching, no rashes    PE:  Vital Signs Last 24 Hrs  T(C): 36.5 (04 May 2023 08:50), Max: 37.7 (03 May 2023 21:18)  T(F): 97.7 (04 May 2023 08:50), Max: 99.9 (03 May 2023 21:18)  HR: 84 (04 May 2023 08:50) (75 - 94)  BP: 144/81 (04 May 2023 08:50) (126/72 - 145/77)  RR: 18 (04 May 2023 08:50) (16 - 18)  SpO2: 99% (04 May 2023 08:50) (97% - 99%)    Parameters below as of 04 May 2023 08:50 Patient On (Oxygen Delivery Method): room air    Gen: AOx3, NAD, non-toxic, pleasant  HEAD:  Atraumatic, Normocephalic  EYES: PERRLA, conjunctiva and sclera clear  ENT: Moist mucous membranes  NECK: Supple, No JVD  CV: S1+S2 normal, nontachycardic  Resp: Clear bilat, no resp distress, no crackles/wheezes  Abd: Soft, nontender, +BS  Ext: No LE edema, no cyanosis, pulses +  : No Courtney  IV/Skin: No thrombophlebitis. L foot wound + with clean dressings  Msk: No low back pain  Neuro: AAOx3. No sensory deficits, no motor deficits  Psych: no anxiety, no delusional ideas, no suicidal ideation    LABS/DIAGNOSTIC TESTS:                        11.8   8.88  )-----------( 300      ( 03 May 2023 05:51 )             36.6     WBC Count: 8.88 K/uL ( @ 05:51)  WBC Count: 7.33 K/uL ( @ 06:39)        137  |  104  |  13  ----------------------------<  141<H>  3.2<L>   |  25  |  1.08    Ca    8.7      03 May 2023 05:51  Phos  2.9       Mg     2.2         TPro  7.6  /  Alb  2.0<L>  /  TBili  0.5  /  DBili  x   /  AST  22  /  ALT  36  /  AlkPhos  81  -    Urinalysis Basic - ( 03 May 2023 04:53 )  Color: Yellow / Appearance: Clear / S.015 / pH: x  Gluc: x / Ketone: Trace  / Bili: Negative / Urobili: Negative   Blood: x / Protein: 15 mg/dL / Nitrite: Negative   Leuk Esterase: Negative / RBC: 0-2 /HPF / WBC 0-2 /HPF   Sq Epi: x / Non Sq Epi: x / Bacteria: Trace /HPF    CULTURES:   Culture - Blood (collected 23 @ 06:30)  Source: .Blood Blood  Preliminary Report (23 @ 12:02):    No growth to date.    Culture - Blood (collected 23 @ 05:51)  Source: .Blood Blood  Preliminary Report (23 @ 12:02):    No growth to date.    Culture - Blood (collected 23 @ 12:25)  Source: .Blood Blood-Peripheral  Gram Stain (23 @ 02:09):    Growth in aerobic bottle: Gram Positive Cocci in Clusters  Final Report (23 @ 21:16):    Growth in aerobic bottle: Staphylococcus aureus    See previous culture [id number]    Culture - Blood (collected 23 @ 12:15)  Source: .Blood Blood-Peripheral  Gram Stain (23 @ 23:53):    Growth in aerobic bottle: Gram Positive Cocci in Clusters    Growth in anaerobic bottle: Gram Positive Cocci in Clusters  Final Report (23 @ 23:53):    Growth in aerobic bottle: Staphylococcus aureus    Growth in anaerobic bottle: Gram Positive Cocci in Clusters    See previous culture [id number]    Culture - Surgical Swab (collected 23 @ 10:13)  Source: .Surgical Swab Left 5th ray clean margin  Preliminary Report (23 @ 10:09):    Rare Morganella morganii    Rare Staphylococcus aureus    Few Finesilvino escalantea "Susceptibilities not performed"  Organism: Morganella morganii  Staphylococcus aureus (23 @ 07:52)  Organism: Morganella morganii (23 @ 07:52)      Method Type: TROY      -  Amikacin: S <=16      -  Amoxicillin/Clavulanic Acid: R >16/8      -  Ampicillin: R >16 These ampicillin results predict results for amoxicillin      -  Ampicillin/Sulbactam: R >16/8 Enterobacter, Klebsiella aerogenes, Citrobacter, and Serratia may develop resistance during prolonged therapy (3-4 days)      -  Aztreonam: S <=4      -  Cefazolin: R >16 Enterobacter, Klebsiella aerogenes, Citrobacter, and Serratia may develop resistance during prolonged therapy (3-4 days)      -  Cefepime: S <=2      -  Cefoxitin: S <=8      -  Ceftriaxone: S <=1 Enterobacter, Klebsiella aerogenes, Citrobacter, and Serratia may develop resistance during prolonged therapy      -  Ciprofloxacin: S <=0.25      -  Ertapenem: S <=0.5      -  Gentamicin: S <=2      -  Levofloxacin: S <=0.5      -  Meropenem: S <=1      -  Piperacillin/Tazobactam: S <=8      -  Tobramycin: S <=2      -  Trimethoprim/Sulfamethoxazole: S <=0.5/9.5  Organism: Staphylococcus aureus (23 @ 07:44)      Method Type: TROY      -  Ampicillin/Sulbactam: S <=8/4      -  Cefazolin: S <=4      -  Clindamycin: S <=0.25      -  Erythromycin: S <=0.25      -  Gentamicin: S <=1 Should not be used as monotherapy      -  Oxacillin: S <=0.25 Oxacillin predicts susceptibility for dicloxacillin, methicillin, and nafcillin      -  Penicillin: R 2      -  Rifampin: S <=1 Should not be used as monotherapy      -  Tetracycline: S <=1      -  Trimethoprim/Sulfamethoxazole: S <=0.5/9.5      -  Vancomycin: S 1    Culture - Surgical Swab (collected 23 @ 10:13)  Source: .Surgical Swab Left foot dirty culture  Final Report (23 @ 07:48):    Few Morganella morganii    Few Staphylococcus aureus    Few Finegoldia magna "Susceptibilities not performed"    Rare Proteus mirabilis    Culture in progress  Organism: Morganella morganii  Staphylococcus aureus  Proteus mirabilis (23 @ 07:45)  Organism: Proteus mirabilis (23 @ 07:45)      Method Type: TROY      -  Amikacin: S <=16      -  Amoxicillin/Clavulanic Acid: S <=8/4      -  Ampicillin: S <=8 These ampicillin results predict results for amoxicillin      -  Ampicillin/Sulbactam: S <=4/2 Enterobacter, Klebsiella aerogenes, Citrobacter, and Serratia may develop resistance during prolonged therapy (3-4 days)      -  Aztreonam: S <=4      -  Cefazolin: S <=2 Enterobacter, Klebsiella aerogenes, Citrobacter, and Serratia may develop resistance during prolonged therapy (3-4 days)      -  Cefepime: S <=2      -  Cefoxitin: S <=8      -  Ceftriaxone: S <=1 Enterobacter, Klebsiella aerogenes, Citrobacter, and Serratia may develop resistance during prolonged therapy      -  Ciprofloxacin: S <=0.25      -  Ertapenem: S <=0.5      -  Gentamicin: S <=2      -  Levofloxacin: S <=0.5      -  Meropenem: S <=1      -  Piperacillin/Tazobactam: S <=8      -  Tobramycin: S 4      -  Trimethoprim/Sulfamethoxazole: S <=0.5/9.5  Organism: Staphylococcus aureus (23 @ 07:45)      Method Type: TROY      -  Ampicillin/Sulbactam: S <=8/4      -  Cefazolin: S <=4      -  Clindamycin: S <=0.25      -  Erythromycin: S <=0.25      -  Gentamicin: S <=1 Should not be used as monotherapy      -  Oxacillin: S <=0.25 Oxacillin predicts susceptibility for dicloxacillin, methicillin, and nafcillin      -  Penicillin: R 2      -  Rifampin: S <=1 Should not be used as monotherapy      -  Tetracycline: S <=1      -  Trimethoprim/Sulfamethoxazole: S <=0.5/9.5      -  Vancomycin: S 1  Organism: Morganella morganii (23 @ 07:45)      Method Type: TROY      -  Amikacin: S <=16      -  Amoxicillin/Clavulanic Acid: R >16/8      -  Ampicillin: R >16 These ampicillin results predict results for amoxicillin      -  Ampicillin/Sulbactam: R >16/8 Enterobacter, Klebsiella aerogenes, Citrobacter, and Serratia may develop resistance during prolonged therapy (3-4 days)      -  Aztreonam: S <=4      -  Cefazolin: R >16 Enterobacter, Klebsiella aerogenes, Citrobacter, and Serratia may develop resistance during prolonged therapy (3-4 days)      -  Cefepime: S <=2      -  Cefoxitin: S <=8      -  Ceftriaxone: S <=1 Enterobacter, Klebsiella aerogenes, Citrobacter, and Serratia may develop resistance during prolonged therapy      -  Ciprofloxacin: S <=0.25      -  Ertapenem: S <=0.5      -  Gentamicin: S <=2      -  Levofloxacin: S <=0.5      -  Meropenem: S <=1      -  Piperacillin/Tazobactam: S <=8      -  Tobramycin: S <=2      -  Trimethoprim/Sulfamethoxazole: S <=0.5/9.5    Culture - Surgical Swab (collected 23 @ 18:10)  Source: .Surgical Swab left foot wound  Preliminary Report (23 @ 20:36):    Few Staphylococcus aureus    Few Morganella morganii    Rare Staphylococcus epidermidis "Susceptibilities not performed"    Few Proteus mirabilis  Organism: Staphylococcus aureus  Morganella morganii  Proteus mirabilis (23 @ 20:08)  Organism: Proteus mirabilis (23 @ 20:08)      Method Type: TROY      -  Amikacin: S <=16      -  Amoxicillin/Clavulanic Acid: S <=8/4      -  Ampicillin: S <=8 These ampicillin results predict results for amoxicillin      -  Ampicillin/Sulbactam: S <=4/2 Enterobacter, Klebsiella aerogenes, Citrobacter, and Serratia may develop resistance during prolonged therapy (3-4 days)      -  Aztreonam: R >16      -  Cefazolin: S <=2 Enterobacter, Klebsiella aerogenes, Citrobacter, and Serratia may develop resistance during prolonged therapy (3-4 days)      -  Cefepime: S <=2      -  Cefoxitin: S <=8      -  Ceftriaxone: S <=1 Enterobacter, Klebsiella aerogenes, Citrobacter, and Serratia may develop resistance during prolonged therapy      -  Ciprofloxacin: S <=0.25      -  Ertapenem: S <=0.5      -  Gentamicin: S <=2      -  Levofloxacin: S <=0.5      -  Meropenem: S <=1      -  Piperacillin/Tazobactam: S <=8      -  Tobramycin: S <=2      -  Trimethoprim/Sulfamethoxazole: S <=0.5/9.5  Organism: Morganella morganii (23 @ 19:31)      Method Type: TROY      -  Amikacin: S <=16      -  Amoxicillin/Clavulanic Acid: R >16/8      -  Ampicillin: R >16 These ampicillin results predict results for amoxicillin      -  Ampicillin/Sulbactam: R >16/8 Enterobacter, Klebsiella aerogenes, Citrobacter, and Serratia may develop resistance during prolonged therapy (3-4 days)      -  Aztreonam: S <=4      -  Cefazolin: R >16 Enterobacter, Klebsiella aerogenes, Citrobacter, and Serratia may develop resistance during prolonged therapy (3-4 days)      -  Cefepime: S <=2      -  Cefoxitin: S <=8      -  Ceftriaxone: S <=1 Enterobacter, Klebsiella aerogenes, Citrobacter, and Serratia may develop resistance during prolonged therapy      -  Ciprofloxacin: S <=0.25      -  Ertapenem: S <=0.5      -  Gentamicin: S <=2      -  Levofloxacin: S <=0.5      -  Meropenem: S <=1      -  Piperacillin/Tazobactam: S <=8      -  Tobramycin: S <=2      -  Trimethoprim/Sulfamethoxazole: S <=0.5/9.5  Organism: Staphylococcus aureus (23 @ 19:31)      Method Type: TROY      -  Ampicillin/Sulbactam: S <=8/4      -  Cefazolin: S <=4      -  Clindamycin: S <=0.25      -  Erythromycin: S <=0.25      -  Gentamicin: S 2 Should not be used as monotherapy      -  Oxacillin: S <=0.25 Oxacillin predicts susceptibility for dicloxacillin, methicillin, and nafcillin      -  Penicillin: R 8      -  Rifampin: S <=1 Should not be used as monotherapy      -  Tetracycline: S <=1      -  Trimethoprim/Sulfamethoxazole: S <=0.5/9.5      -  Vancomycin: S 2    Culture - Urine (collected 23 @ 15:10)  Source: Clean Catch Clean Catch (Midstream)  Final Report (23 @ 17:29):    <10,000 CFU/mL Normal Urogenital Sia    Culture - Blood (collected 23 @ 15:10)  Source: .Blood Blood-Peripheral  Gram Stain (23 @ 16:56):    Growth in aerobic bottle: Gram Positive Cocci in Clusters    Growth in anaerobic bottle: Gram Positive Cocci in Clusters  Final Report (23 @ 11:58):    Growth in aerobic and anaerobic bottles: Staphylococcus aureus    See previous culture [id number]    Culture - Blood (collected 23 @ 15:00)  Source: .Blood Blood-Peripheral  Gram Stain (23 @ 10:52):    Growth in aerobic bottle: Gram Positive Cocci in Clusters    Growth in anaerobic bottle: Gram Positive Cocci in Clusters  Final Report (23 @ 13:09):    Growth in aerobic and anaerobic bottles: Staphylococcus aureus  Organism: Blood Culture PCR  Staphylococcus aureus (23 @ 13:09)  Organism: Staphylococcus aureus (23 @ 13:09)      Method Type: TROY      -  Ampicillin/Sulbactam: S <=8/4      -  Cefazolin: S <=4      -  Clindamycin: S <=0.25      -  Erythromycin: S <=0.25      -  Gentamicin: S <=1 Should not be used as monotherapy      -  Oxacillin: S <=0.25 Oxacillin predicts susceptibility for dicloxacillin, methicillin, and nafcillin      -  Penicillin: R 1      -  Rifampin: S <=1 Should not be used as monotherapy      -  Tetracycline: S <=1      -  Trimethoprim/Sulfamethoxazole: S <=0.5/9.5      -  Vancomycin: S 1  Organism: Blood Culture PCR (23 @ 13:09)      Method Type: PCR      -  Methicillin SENSITIVE Staphylococcus aureus (MSSA): Detec Any isolate of Staphylococcus aureus from a blood culture is NOT considered a contaminant.    FABIAN w/Doppler (23 @ 16:37)  CONCLUSIONS:  1. Normal mitral valve. Trace mitral regurgitation.  2. Normal trileaflet aortic valve.  3. Aortic Root: 3.4 cm.Normal aortic root, aortic arch and descending thoracic aorta.    4. Normal left atrium.  No left atrium  thrombus.  5. Normal left ventricular internal dimensions and wall thicknesses.  6. Normal Left Ventricular Systolic Function,  (EF = 55 to 60%)  7. Diastolic function not assessed.  8. Normal right atrium.  9. Normal right ventricular size and function.  10. Normal tricuspid valve.  11. Normal pulmonic valve.  12. Agitated saline injection was negative for intracardiac shunt. Lipomatous hypertrophy of the interatrial septum. This is a benign finding.  13. Normal pericardium with no pericardial effusion.  14.No vegetations seen on this study.    ANTIBIOTICS:  ceFAZolin   IVPB 2000 every 8 hours  ertapenem  IVPB 1000 every 24 hours    COVID-19 PCR: NotDetec (23 @ 14:50)  Sedimentation Rate, Erythrocyte: 84 mm/Hr *H* (23 @ 18:45)  C-Reactive Protein, Serum: 230 mg/L *H* (23 @ 05:29)  Sedimentation Rate, Erythrocyte: 86 mm/Hr *H* (23 @ 05:29)  Sedimentation Rate, Erythrocyte: 91 mm/Hr *H* (23 @ 05:51)  MRSA PCR Result.: NotDetec (23 @ 13:06)  Sedimentation Rate, Erythrocyte: 94 mm/Hr *H* (23 @ 10:25)    IMPRESSION:  Admitted on  with CC of LLE pain s/p mechanical fall while going to the bathroom.  73 yo male with H/O IDDM, HLD, HTN, Colon CA s/p hemicolon resection in (in remission) admitted for severe sepsis due to gas gangrene L foot(vanco + zosyn ) Blood cx  + MSSA high grade bacteremia. 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 Wound cx  + MSSA and M morganii.  On Nafcillin + cefepime -5/3, currently on Cefazolin + ertapenem tolerating abx well.   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     -Severe Sepsis due to L foot gangrene s/p partial amputation 5th digit(), planned for additional I&D on   -DFI/DFO L foot gangrene tissue cx + MSSA and M. Morganii  -BSI- High grade MSSA bacteremia source L foot. Blood cx  bottles + MSSA, BC  + MSSA, BC 5/3 NGTD. ECHO  with no significant valvular abnormalities. FABIAN with no evidence of IE.   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 Laura Payne  Available in Teams

## 2023-05-04 NOTE — CHART NOTE - NSCHARTNOTEFT_GEN_A_CORE
SYDNI KESHA  [id number]    After risks, benefits and alternatives of the procedure were explained, consent was signed and placed in the medical record.  Procedural timeout was taken.  Sedation was administered by anesthesia.  FABIAN probe inserted without complication and FABIAN performed.   Patient tolerated the procedure well without complication.  See full report for findings.

## 2023-05-04 NOTE — CHART NOTE - NSCHARTNOTEFT_GEN_A_CORE
Notified by the RN that patient has blood glucose of 142 and he only had a salad and not a full meal. Pt has 10 units Lispro pre-meal. The Lispro 10 units was held, informed the RN to kindly provide 3 units and recheck the blood sugar. Will review the insulin regimen for further adjustments.

## 2023-05-04 NOTE — PROGRESS NOTE ADULT - PROBLEM SELECTOR PLAN 4
s/p mechanical fall with head injury and LOC with downtime on the floor for 4h  CTH: shows sellar lesion with apparent mas effect on the optic chiasm. no acute pathology  reports visual impairment due to left eye injury 1970s and retinal detachment, with change from baseline   CK mildly elevated 473 with FINA 1.79, low suspicion for rhabdo, trended down   c/w IVF   PT to be consulted after OR  Neurology consulted: Dr. Restrepo  - mass-like lesion less likely cause for fall, more likely d/t diabetic neuropathy s/p mechanical fall with head injury and LOC with downtime on the floor for 4h  CTH: shows sellar lesion with apparent mas effect on the optic chiasm. no acute pathology  reports visual impairment due to left eye injury 1970s and retinal detachment, with change from baseline   CK mildly elevated 473 with FINA 1.79, low suspicion for rhabdo, trended down   c/w IVF   PT to be consulted after OR  Neurology consulted: Dr. Restrepo  - mass-like lesion less likely cause for fall, more likely d/t diabetic neuropathy  - Hormone work-up unremarkable

## 2023-05-04 NOTE — PROGRESS NOTE ADULT - ASSESSMENT
71 y/o M from home with a PMHx of DM, HTN, HLD and medication non-compliance admitted for sepsis 2/2 gas gangrene of left foot. Blood cx positive for MSSA. MR head showed 1.8 cm AP by 1.6 cm CC by 2 cm TRV pituitary mass consistent with an adenoma

## 2023-05-05 ENCOUNTER — TRANSCRIPTION ENCOUNTER (OUTPATIENT)
Age: 73
End: 2023-05-05

## 2023-05-05 LAB
ALBUMIN SERPL ELPH-MCNC: 2.1 G/DL — LOW (ref 3.5–5)
ALP SERPL-CCNC: 84 U/L — SIGNIFICANT CHANGE UP (ref 40–120)
ALT FLD-CCNC: 38 U/L DA — SIGNIFICANT CHANGE UP (ref 10–60)
ANION GAP SERPL CALC-SCNC: 4 MMOL/L — LOW (ref 5–17)
APTT BLD: 36.4 SEC — HIGH (ref 27.5–35.5)
AST SERPL-CCNC: 35 U/L — SIGNIFICANT CHANGE UP (ref 10–40)
BASOPHILS # BLD AUTO: 0.03 K/UL — SIGNIFICANT CHANGE UP (ref 0–0.2)
BASOPHILS NFR BLD AUTO: 0.3 % — SIGNIFICANT CHANGE UP (ref 0–2)
BILIRUB SERPL-MCNC: 0.3 MG/DL — SIGNIFICANT CHANGE UP (ref 0.2–1.2)
BLD GP AB SCN SERPL QL: SIGNIFICANT CHANGE UP
BUN SERPL-MCNC: 9 MG/DL — SIGNIFICANT CHANGE UP (ref 7–18)
CALCIUM SERPL-MCNC: 9.7 MG/DL — SIGNIFICANT CHANGE UP (ref 8.4–10.5)
CHLORIDE SERPL-SCNC: 106 MMOL/L — SIGNIFICANT CHANGE UP (ref 96–108)
CO2 SERPL-SCNC: 27 MMOL/L — SIGNIFICANT CHANGE UP (ref 22–31)
CREAT SERPL-MCNC: 1.09 MG/DL — SIGNIFICANT CHANGE UP (ref 0.5–1.3)
CULTURE RESULTS: SIGNIFICANT CHANGE UP
EGFR: 73 ML/MIN/1.73M2 — SIGNIFICANT CHANGE UP
EOSINOPHIL # BLD AUTO: 0.15 K/UL — SIGNIFICANT CHANGE UP (ref 0–0.5)
EOSINOPHIL NFR BLD AUTO: 1.6 % — SIGNIFICANT CHANGE UP (ref 0–6)
ERYTHROCYTE [SEDIMENTATION RATE] IN BLOOD: 90 MM/HR — HIGH (ref 0–20)
GLUCOSE BLDC GLUCOMTR-MCNC: 113 MG/DL — HIGH (ref 70–99)
GLUCOSE BLDC GLUCOMTR-MCNC: 115 MG/DL — HIGH (ref 70–99)
GLUCOSE BLDC GLUCOMTR-MCNC: 117 MG/DL — HIGH (ref 70–99)
GLUCOSE BLDC GLUCOMTR-MCNC: 84 MG/DL — SIGNIFICANT CHANGE UP (ref 70–99)
GLUCOSE BLDC GLUCOMTR-MCNC: 98 MG/DL — SIGNIFICANT CHANGE UP (ref 70–99)
GLUCOSE SERPL-MCNC: 124 MG/DL — HIGH (ref 70–99)
HCT VFR BLD CALC: 39.1 % — SIGNIFICANT CHANGE UP (ref 39–50)
HGB BLD-MCNC: 12.2 G/DL — LOW (ref 13–17)
IMM GRANULOCYTES NFR BLD AUTO: 0.7 % — SIGNIFICANT CHANGE UP (ref 0–0.9)
INR BLD: 1.2 RATIO — HIGH (ref 0.88–1.16)
LYMPHOCYTES # BLD AUTO: 2.24 K/UL — SIGNIFICANT CHANGE UP (ref 1–3.3)
LYMPHOCYTES # BLD AUTO: 23.6 % — SIGNIFICANT CHANGE UP (ref 13–44)
MAGNESIUM SERPL-MCNC: 2.4 MG/DL — SIGNIFICANT CHANGE UP (ref 1.6–2.6)
MCHC RBC-ENTMCNC: 26.3 PG — LOW (ref 27–34)
MCHC RBC-ENTMCNC: 31.2 GM/DL — LOW (ref 32–36)
MCV RBC AUTO: 84.4 FL — SIGNIFICANT CHANGE UP (ref 80–100)
MONOCYTES # BLD AUTO: 0.76 K/UL — SIGNIFICANT CHANGE UP (ref 0–0.9)
MONOCYTES NFR BLD AUTO: 8 % — SIGNIFICANT CHANGE UP (ref 2–14)
NEUTROPHILS # BLD AUTO: 6.24 K/UL — SIGNIFICANT CHANGE UP (ref 1.8–7.4)
NEUTROPHILS NFR BLD AUTO: 65.8 % — SIGNIFICANT CHANGE UP (ref 43–77)
NRBC # BLD: 0 /100 WBCS — SIGNIFICANT CHANGE UP (ref 0–0)
PHOSPHATE SERPL-MCNC: 2.9 MG/DL — SIGNIFICANT CHANGE UP (ref 2.5–4.5)
PLATELET # BLD AUTO: 333 K/UL — SIGNIFICANT CHANGE UP (ref 150–400)
POTASSIUM SERPL-MCNC: 4.7 MMOL/L — SIGNIFICANT CHANGE UP (ref 3.5–5.3)
POTASSIUM SERPL-SCNC: 4.7 MMOL/L — SIGNIFICANT CHANGE UP (ref 3.5–5.3)
PROT SERPL-MCNC: 8.1 G/DL — SIGNIFICANT CHANGE UP (ref 6–8.3)
PROTHROM AB SERPL-ACNC: 14.3 SEC — HIGH (ref 10.5–13.4)
RBC # BLD: 4.63 M/UL — SIGNIFICANT CHANGE UP (ref 4.2–5.8)
RBC # FLD: 14.1 % — SIGNIFICANT CHANGE UP (ref 10.3–14.5)
SODIUM SERPL-SCNC: 137 MMOL/L — SIGNIFICANT CHANGE UP (ref 135–145)
WBC # BLD: 9.49 K/UL — SIGNIFICANT CHANGE UP (ref 3.8–10.5)
WBC # FLD AUTO: 9.49 K/UL — SIGNIFICANT CHANGE UP (ref 3.8–10.5)

## 2023-05-05 PROCEDURE — 99233 SBSQ HOSP IP/OBS HIGH 50: CPT | Mod: GC

## 2023-05-05 RX ORDER — INSULIN LISPRO 100/ML
VIAL (ML) SUBCUTANEOUS EVERY 6 HOURS
Refills: 0 | Status: DISCONTINUED | OUTPATIENT
Start: 2023-05-05 | End: 2023-05-08

## 2023-05-05 RX ORDER — ONDANSETRON 8 MG/1
4 TABLET, FILM COATED ORAL ONCE
Refills: 0 | Status: DISCONTINUED | OUTPATIENT
Start: 2023-05-05 | End: 2023-05-05

## 2023-05-05 RX ORDER — ENOXAPARIN SODIUM 100 MG/ML
40 INJECTION SUBCUTANEOUS EVERY 24 HOURS
Refills: 0 | Status: DISCONTINUED | OUTPATIENT
Start: 2023-05-05 | End: 2023-05-11

## 2023-05-05 RX ORDER — ACETAMINOPHEN 500 MG
1000 TABLET ORAL ONCE
Refills: 0 | Status: COMPLETED | OUTPATIENT
Start: 2023-05-05 | End: 2023-05-05

## 2023-05-05 RX ORDER — SODIUM CHLORIDE 9 MG/ML
1000 INJECTION, SOLUTION INTRAVENOUS
Refills: 0 | Status: DISCONTINUED | OUTPATIENT
Start: 2023-05-05 | End: 2023-05-05

## 2023-05-05 RX ADMIN — FAMOTIDINE 40 MILLIGRAM(S): 10 INJECTION INTRAVENOUS at 06:07

## 2023-05-05 RX ADMIN — Medication 100 MILLIGRAM(S): at 06:07

## 2023-05-05 RX ADMIN — Medication 400 MILLIGRAM(S): at 14:37

## 2023-05-05 RX ADMIN — GABAPENTIN 300 MILLIGRAM(S): 400 CAPSULE ORAL at 21:42

## 2023-05-05 RX ADMIN — Medication 650 MILLIGRAM(S): at 00:17

## 2023-05-05 RX ADMIN — Medication 650 MILLIGRAM(S): at 10:01

## 2023-05-05 RX ADMIN — Medication 100 MILLIGRAM(S): at 21:42

## 2023-05-05 RX ADMIN — ERTAPENEM SODIUM 120 MILLIGRAM(S): 1 INJECTION, POWDER, LYOPHILIZED, FOR SOLUTION INTRAMUSCULAR; INTRAVENOUS at 12:27

## 2023-05-05 RX ADMIN — GABAPENTIN 300 MILLIGRAM(S): 400 CAPSULE ORAL at 06:07

## 2023-05-05 RX ADMIN — Medication 400 MILLIGRAM(S): at 22:24

## 2023-05-05 RX ADMIN — CHLORHEXIDINE GLUCONATE 1 APPLICATION(S): 213 SOLUTION TOPICAL at 06:07

## 2023-05-05 RX ADMIN — GABAPENTIN 300 MILLIGRAM(S): 400 CAPSULE ORAL at 13:27

## 2023-05-05 RX ADMIN — Medication 1000 MILLIGRAM(S): at 15:40

## 2023-05-05 RX ADMIN — Medication 1000 MILLIGRAM(S): at 22:54

## 2023-05-05 RX ADMIN — Medication 100 MILLIGRAM(S): at 13:27

## 2023-05-05 RX ADMIN — Medication 10 UNIT(S): at 16:57

## 2023-05-05 RX ADMIN — Medication 650 MILLIGRAM(S): at 11:01

## 2023-05-05 RX ADMIN — FAMOTIDINE 40 MILLIGRAM(S): 10 INJECTION INTRAVENOUS at 17:15

## 2023-05-05 RX ADMIN — BRIMONIDINE TARTRATE 1 DROP(S): 2 SOLUTION/ DROPS OPHTHALMIC at 17:15

## 2023-05-05 RX ADMIN — Medication 10 UNIT(S): at 12:26

## 2023-05-05 RX ADMIN — BRIMONIDINE TARTRATE 1 DROP(S): 2 SOLUTION/ DROPS OPHTHALMIC at 06:07

## 2023-05-05 NOTE — PACU DISCHARGE NOTE - NSPTMEETSDISCHCRITERIADT_GEN_A_CORE
Cardiac EP Progress Note          Patient: Kalen Amaro Date: 2022   : 1963 PCP: Omar Marks MD   59 year old male Ref Physician: Dr. Wakefield       Subjective: Patient is here to follow-up after VT ablation.   He is doing well with no complaints.        Pertinent Reviewed: Allergies, Medical History, and Medications    Past medical history:  1.  Premature ventricular complexes, came from [facility].  2.  Diverticulosis  3.  Hypertension, hyperlipidemia, prediabetic  4.  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  5.  Spondylolisthesis  6.  Right bundle branch block.    There were no vitals taken for this visit.    Rhythm: Sinus Rhythm    Medications:  Current Outpatient Medications   Medication Sig   • metoPROLOL succinate (TOPROL-XL) 100 MG 24 hr tablet Take 1 tablet by mouth daily.   • hydroCHLOROthiazide (HYDRODIURIL) 25 MG tablet Take 25 mg by mouth daily.   • pravastatin (PRAVACHOL) 20 MG tablet Take 20 mg by mouth daily.     No current facility-administered medications for this visit.         Review of Systems:  Constitutional: Negative for fatigue, change in activity level or change in weight.   Skin: Negative for edema, pallor, rashes or open wounds.   HEENT: Negative for visual changes, epistaxis or headaches.  Respiratory: Negative for cough, orthopnea, paroxsymal nocturnal dyspnea, wheezing or shortness of breath with or without exertion.    Cardiovascular: Negative for exertional chest discomfort, chest pressure, palpitations or diaphoresis. Negative for lightheadedness or dizziness. Negative for near syncope or syncope.  Negative for intermittent leg claudication.   Gastrointestinal: Negative for abdominal pain.   Genitourinary: Negative for hematuria.  Extremities:  Negative for edema, petechiae or clubbing.  Neuro:  Negative for changes in speech, sensory deficits or change in motor functions.  Endocrine: Negative for heat intolerance, excessive thirst or urination 
 .  Hematological: Negative for bleeding or brusing.  Psych: Negative for change in affect, change in mentation or sleep disturbance.    Physical Exam:   General appearance: alert, appears stated age, and no distress  Eyes: negative  Neck: no adenopathy and no JVD (jugular venous distention)  Lungs: clear to auscultation bilaterally  Heart: regular rate and rhythm, S1, S2 normal, no murmur, click, rub or gallop  Abdomen: soft, non-tender; bowel sounds normal; no masses,  no organomegaly  Extremities: extremities normal, atraumatic, no cyanosis or edema  Neurologic: Grossly normal    Laboratory Results:    Lab Results   Component Value Date    SODIUM 136 07/15/2022    SODIUM 137 02/25/2022    SODIUM 140 01/26/2018    SODIUM 140 04/14/2017    POTASSIUM 3.8 07/15/2022    POTASSIUM 3.8 02/25/2022    POTASSIUM 4.8 01/26/2018    POTASSIUM 4.4 04/14/2017    CHLORIDE 99 07/15/2022    CHLORIDE 102 02/25/2022    CHLORIDE 103 01/26/2018    CHLORIDE 102 04/14/2017    CO2 29 07/15/2022    CO2 28 02/25/2022    CO2 26 01/26/2018    CO2 28 04/14/2017    ANIONGAP 12 07/15/2022    ANIONGAP 11 02/25/2022    ANIONGAP 16 01/26/2018    ANIONGAP 14 04/14/2017    GLUCOSE 111 (H) 07/15/2022    GLUCOSE 113 (H) 02/25/2022    GLUCOSE 114 (H) 01/26/2018    GLUCOSE 108 (H) 04/14/2017    BUN 16 07/15/2022    BUN 15 02/25/2022    BUN 13 01/26/2018    BUN 13 04/14/2017    CREATININE 0.98 07/15/2022    CREATININE 0.97 02/25/2022    CREATININE 1.03 01/26/2018    CREATININE 0.96 04/14/2017    GFRA >90 01/26/2018    GFRA >90 04/14/2017    GFRNA 83 01/26/2018    GFRNA >90 04/14/2017    BCRAT 16 07/15/2022    BCRAT 15 02/25/2022    BCRAT 13 01/26/2018    BCRAT 14 04/14/2017    CALCIUM 8.8 07/15/2022    CALCIUM 9.4 02/25/2022    CALCIUM 9.4 01/26/2018    CALCIUM 9.1 04/14/2017    BILIRUBIN 1.1 (H) 02/25/2022    BILIRUBIN 0.9 02/19/2021    BILIRUBIN 1.1 (H) 01/26/2018    BILIRUBIN 1 04/14/2017    AST 40 (H) 02/25/2022    AST 33 02/19/2021    AST 39 (H) 
01/26/2018    AST 47 (H) 04/14/2017    GPT 40 02/25/2022    GPT 41 02/19/2021    GPT 57 01/26/2018    GPT 62 04/14/2017    ALKPT 52 02/25/2022    ALKPT 58 02/19/2021    ALKPT 66 01/26/2018    ALKPT 54 04/14/2017    TOTPROTEIN 7.7 02/25/2022    TOTPROTEIN 7.6 02/19/2021    ALBUMIN 4.6 02/25/2022    ALBUMIN 4.2 02/19/2021    ALBUMIN 4.5 01/26/2018    ALBUMIN 4.0 04/14/2017    GLOB 3.1 02/25/2022    GLOB 3.4 02/19/2021    GLOB 3.1 01/26/2018    GLOB 2.9 04/14/2017    AGR 1.5 02/25/2022    AGR 1.2 02/19/2021    AGR 1.5 01/26/2018    AGR 1.4 04/14/2017    TSH 2.928 02/25/2022    TSH 2.484 02/19/2021    TSH 3.57 01/26/2018    TSH 2.676 11/18/2016    INR 1.1 07/15/2022   Echo: Ejection fraction 66%, normal left atrial size and no pulmonary hypertension.  No significant valvular disease.  4/62022  Nuclear stress test: Ejection fraction 64% with no perfusion defects. 4/2022  Electrophysiology:  2 weeks monitor showed sinus rhythm with heart rates of 50-.  PVC burden is 40%.    Plans/Recommendations:  1. Ventricular tachycardia (CMS/HCC)  12 lead EKG showed NSR with  normal SD, QRS, and QTc intervals. No complications. It came from [facility] area. On Metoprolol for HTN. Will wean down in 3 months.   -     Electrocardiogram (ECG)  2. Primary hypertension  This is essential hypertension, the blood pressure is reasonably controlled.  Blood pressure target is below 140/80 millimeters mercury.  I have made no changes on the current medication regimen.  Patient is to follow a low-salt diet, keep a log for blood pressure readings to review on the next visit.         Discussed with Patient/and Family and Primary service and questions answered    
05-May-2023 09:23
30-Apr-2023 10:25

## 2023-05-05 NOTE — BRIEF OPERATIVE NOTE - NSICDXBRIEFPOSTOP_GEN_ALL_CORE_FT
POST-OP DIAGNOSIS:  Gangrene of left foot 30-Apr-2023 09:43:15 Left 5th digit and metatarsal gas ganrene infection with gas foci visible on x-ray Veronica Lobo  
POST-OP DIAGNOSIS:  Diabetic foot ulcer 05-May-2023 08:44:20 left foot Wesley Johns

## 2023-05-05 NOTE — PROGRESS NOTE ADULT - PROBLEM SELECTOR PLAN 9
Given heparin SQ for DVT ppx, one time dose in the ED then held for operation   DVT prophylaxis: Lovenox 40 QD DVT prophylaxis: Lovenox 40 QD

## 2023-05-05 NOTE — DIETITIAN INITIAL EVALUATION ADULT - FACTORS AFF FOOD INTAKE
acute on chronic comorbidities including gas gangrene of foot, uncontrolled DM/Yazidi/ethnic/cultural/personal food preferences

## 2023-05-05 NOTE — PROGRESS NOTE ADULT - PROBLEM SELECTOR PLAN 3
h/o DM on Metformin 1000mg BID, Lantus 35 qhs and Humolog 14 TID before meals (per sure scripts Lantus 45u and Humalog 20u), also reports sugars are not controlled at home with prior A1C 7.9  will hold oral dm meds while inpatient   HbA1c: 12.1%  c/w Lantus 35, Lispro 10 units  Mod ISS  Endocrine Dr. Arreola consulted h/o DM on Metformin 1000mg BID, Lantus 35 qhs and Humolog 14 TID before meals (per sure scripts Lantus 45u and Humalog 20u), also reports sugars are not controlled at home with prior A1C 7.9  will hold oral dm meds while inpatient   HbA1c: 12.1%  c/w Lantus 35, Lispro 10 units  as pt was going for surgery decreased Lantus to 25 units and Lantus was held  pt has low sugar level, will adjust the Lantus and Lispro   Mod ISS  Endocrine Dr. Arreola consulted

## 2023-05-05 NOTE — PROGRESS NOTE ADULT - PROBLEM SELECTOR PLAN 2
Blood cultures: +ve MSSA  Vanc D/Leo  D/Leo Cefepim and Nafcillin for MSSA on blood cultures  started on Ertapenem 1g qd and Cefazolin 2g q8   TTE unremarkable  Repeat blood cx positive for Staph Aureus  FABIAN unremarkable  Dr. Gao ID consulted

## 2023-05-05 NOTE — DIETITIAN INITIAL EVALUATION ADULT - OTHER INFO
Pt lives home with family PTA, went to OR when visited today, NPO at present; h/o DM, finger stick range=92 to 343, Uug=996-->141, IwbR8H=03.2, s/p Endocrinology consult, uncontrolled DM due to non-compliance to diet/medications per MD;  Pt lives home with family PTA, went to OR when visited today, NPO at present; h/o DM, finger stick range=92 to 343, Kdg=303-->141, ElvY1I=42.2, s/p Endocrinology consult, uncontrolled DM due to non-compliance to diet/medications per MD; Unknown food allergies per Chart

## 2023-05-05 NOTE — PROGRESS NOTE ADULT - ASSESSMENT
73 y/o M from home with a PMHx of DM, HTN, HLD and medication non-compliance admitted for sepsis 2/2 gas gangrene of left foot. Blood cx positive for MSSA. MR head showed 1.8 cm AP by 1.6 cm CC by 2 cm TRV pituitary mass consistent with an adenoma

## 2023-05-05 NOTE — PROGRESS NOTE ADULT - PROBLEM SELECTOR PLAN 1
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   surgical path prelim result +ve for Morganella morgani, and Staph  Blood cultures: +ve MSSA   Vanc D/Leo  D/Leo Cefepim and Nafcillin for MSSA on blood cultures  c/w Ertapenem 1g qd and Cefazolin 2g q8   Repeat blood cx positive for Staph Aureus  Dr. Gao ID consulted   Patient to go for debridement and graft tomorrow c/o left foot pain, swelling, and erythema   XR for left foot/ ankle: suspicion of gas foci to 4th interspace and lateral 5th toe  aseptic appearing with leukocytosis WBC 16.95 and elevated lactate 2.2  Podiatry consulted-  s/p left partial 5th ray amputation due to gas gangrene.   surgical path prelim result +ve for Morganella morgani, and Staph  Blood cultures: +ve MSSA   Vanc D/Leo  D/Leo Cefepim and Nafcillin for MSSA on blood cultures  c/w Ertapenem 1g qd and Cefazolin 2g q8   Repeat blood cx positive for Staph Aureus  repeat culture on 5/3 : neg   debridement and graft done today on 5/5   PICC line placement likely on Monday   Dr. Gao ID consulted

## 2023-05-05 NOTE — PROGRESS NOTE ADULT - SUBJECTIVE AND OBJECTIVE BOX
Podiatry Interval: patient is seen in bed resting. Pt is aware and amenable for OR procedure today at 745 am for L foot debridement and graft application.  Has not eaten anything last night. Denies nausea, vomiting, fever, chills, diarrhea, constipation. Denies overnight acute events.       Podiatry HPI  72-year-old male with history of NIDDM,  Patient claims he slipped and fell on Tuesday night in the bedroom and hit his head and had LOC for a few minutes. Patient denies any injuries at the time, but complaining of feeling lightheaded after the fall, confused on Wednesday.  Patient works as a  and on Thursday he noticed with left foot pain, swelling, difficulty ambulating after excessively ambulating by his work in tight work shoes.  Patient took nothing for his pain. Presents to ED with progressive pain to Left foot and increased warmth with concern for infection. Follow OP Podiatrist Dr. Spain who he last saw two weeks ago. States in 2017 had a bone infection in his Right foot which was resected. Cannot recall exact procedure,  Denies fever, chills.    Patient admits to  (-) Fevers, (-) Chills, (-) Nausea, (-) Vomiting, (-) Shortness of Breath (-) calf pain (-) chest pain     Medications acetaminophen     Tablet .. 650 milliGRAM(s) Oral every 6 hours PRN  brimonidine 0.2% Ophthalmic Solution 1 Drop(s) Left EYE every 12 hours  ceFAZolin   IVPB      ceFAZolin   IVPB 2000 milliGRAM(s) IV Intermittent every 8 hours  chlorhexidine 2% Cloths 1 Application(s) Topical <User Schedule>  ertapenem  IVPB 1000 milliGRAM(s) IV Intermittent every 24 hours  famotidine    Tablet 40 milliGRAM(s) Oral every 12 hours  gabapentin 300 milliGRAM(s) Oral every 8 hours  insulin glargine Injectable (LANTUS) 25 Unit(s) SubCutaneous at bedtime  insulin lispro (ADMELOG) corrective regimen sliding scale   SubCutaneous every 6 hours  insulin lispro Injectable (ADMELOG) 10 Unit(s) SubCutaneous three times a day before meals  lactated ringers. 1000 milliLiter(s) IV Continuous <Continuous>  ondansetron Injectable 4 milliGRAM(s) IV Push every 8 hours PRN    FHNo pertinent family history in first degree relatives    ,   PMHDM (diabetes mellitus)    Colon cancer    HTN (hypertension)    Hyperlipidemia       PSHNo significant past surgical history    S/P inguinal hernia repair    History of colectomy    S/P arthroscopic knee surgery    History of repair of hiatal hernia        Labs              Vital Signs Last 24 Hrs  T(C): 36.8 (05 May 2023 05:10), Max: 37.2 (04 May 2023 21:25)  T(F): 98.2 (05 May 2023 05:10), Max: 98.9 (04 May 2023 21:25)  HR: 74 (05 May 2023 05:10) (74 - 88)  BP: 163/90 (05 May 2023 05:10) (138/80 - 163/90)  BP(mean): --  RR: 18 (05 May 2023 05:10) (18 - 18)  SpO2: 98% (05 May 2023 05:10) (98% - 100%)    Parameters below as of 05 May 2023 05:10  Patient On (Oxygen Delivery Method): room air      Sedimentation Rate, Erythrocyte: 94 mm/Hr (05-04-23 @ 10:25)  Sedimentation Rate, Erythrocyte: 91 mm/Hr (05-03-23 @ 05:51)         C-Reactive Protein, Serum: 95 mg/L (05-04-23 @ 10:25)  C-Reactive Protein, Serum: 230 mg/L (05-01-23 @ 05:29)       ROS unremarkable outside of HPI    PHYSICAL EXAM  LE Focused:    Vasc:  DP and PT faintly palpable b/l. No pedal hair growth noted. Diffuse edema noted distal to Ankle joint LLE  Derm: Open left lateral foot wound s/p open partial 5th ray resection on Sunday, 4/30/23. No malodor, no purulence upon manual compression, improving erythema and edema. Mixed necrotic and granular wound base, no fluctuance noted.   Neuro: Protective sensation grossly diminished  MSK: able to ambulate with pain, no TTP to dorsolateral surgical site       IMAGING: ?xray  Noted gas foci on CXR foot to 4th interspace and lateral 5th left foot    < from: [facility] Physiol Extremity Lower 3+ Level, BI (05.01.23 @ 16:33) >  FINDINGS: There are biphasic pulse volume recordings bilaterally,   dampened at the level of the metatarsals. Segmental pressures were not   obtained at the thighs.    Right RIVER = 1.15  Left RIVER = 1.11    IMPRESSION: Normal ABIs.    Dampened waveforms at the level of the metatarsals.    < end of copied text >    A:  Left foot gas gangrene    P:   Patient evaluated and Chart reviewed   Discussed diagnosis and treatment with patient  Previous X-rays noted to left foot with suspicion of gas foci to 4th interspace and lateral 5th toe (pre-operative)  Post-operative xrays taken and reviewed   S/p left partial 5th ray open amputation on 4/30/23  dressing is left CDI pre-operatively   NPO still in place since last night  Medical clearance appreciated   Written consent signed and witness present this morning   Patient scheduled for graft application and further open left foot wound debridement today, 5/5/23 at 7:45am with Dr. Burns   Due to RIVER/PVR, recommending vascular consult to assess healing potential   Recc IV antibiotics As Per ID; currently on ertapenem and ancef   Reviewed RIVER/PVR  NWB to LLE  Discussed importance of daily foot examinations and proper shoe gear and to importance of lower Fasting Blood Glucose levels.   Podiatry to follow while in house  Discussed with Attending Dr. Burns      Podiatry Interval: patient is seen in bed resting. Pt is aware and amenable for OR procedure today at 745 am for L foot debridement and graft application.  Has not eaten anything last night. Denies nausea, vomiting, fever, chills, diarrhea, constipation. Denies overnight acute events.       Podiatry HPI  72-year-old male with history of NIDDM,  Patient claims he slipped and fell on Tuesday night in the bedroom and hit his head and had LOC for a few minutes. Patient denies any injuries at the time, but complaining of feeling lightheaded after the fall, confused on Wednesday.  Patient works as a  and on Thursday he noticed with left foot pain, swelling, difficulty ambulating after excessively ambulating by his work in tight work shoes.  Patient took nothing for his pain. Presents to ED with progressive pain to Left foot and increased warmth with concern for infection. Follow OP Podiatrist Dr. Spain who he last saw two weeks ago. States in 2017 had a bone infection in his Right foot which was resected. Cannot recall exact procedure,  Denies fever, chills.    Patient admits to  (-) Fevers, (-) Chills, (-) Nausea, (-) Vomiting, (-) Shortness of Breath (-) calf pain (-) chest pain     Medications acetaminophen     Tablet .. 650 milliGRAM(s) Oral every 6 hours PRN  brimonidine 0.2% Ophthalmic Solution 1 Drop(s) Left EYE every 12 hours  ceFAZolin   IVPB      ceFAZolin   IVPB 2000 milliGRAM(s) IV Intermittent every 8 hours  chlorhexidine 2% Cloths 1 Application(s) Topical <User Schedule>  ertapenem  IVPB 1000 milliGRAM(s) IV Intermittent every 24 hours  famotidine    Tablet 40 milliGRAM(s) Oral every 12 hours  gabapentin 300 milliGRAM(s) Oral every 8 hours  insulin glargine Injectable (LANTUS) 25 Unit(s) SubCutaneous at bedtime  insulin lispro (ADMELOG) corrective regimen sliding scale   SubCutaneous every 6 hours  insulin lispro Injectable (ADMELOG) 10 Unit(s) SubCutaneous three times a day before meals  lactated ringers. 1000 milliLiter(s) IV Continuous <Continuous>  ondansetron Injectable 4 milliGRAM(s) IV Push every 8 hours PRN    FHNo pertinent family history in first degree relatives    ,   PMHDM (diabetes mellitus)    Colon cancer    HTN (hypertension)    Hyperlipidemia       PSHNo significant past surgical history    S/P inguinal hernia repair    History of colectomy    S/P arthroscopic knee surgery    History of repair of hiatal hernia        Labs              Vital Signs Last 24 Hrs  T(C): 36.8 (05 May 2023 05:10), Max: 37.2 (04 May 2023 21:25)  T(F): 98.2 (05 May 2023 05:10), Max: 98.9 (04 May 2023 21:25)  HR: 74 (05 May 2023 05:10) (74 - 88)  BP: 163/90 (05 May 2023 05:10) (138/80 - 163/90)  BP(mean): --  RR: 18 (05 May 2023 05:10) (18 - 18)  SpO2: 98% (05 May 2023 05:10) (98% - 100%)    Parameters below as of 05 May 2023 05:10  Patient On (Oxygen Delivery Method): room air      Sedimentation Rate, Erythrocyte: 94 mm/Hr (05-04-23 @ 10:25)  Sedimentation Rate, Erythrocyte: 91 mm/Hr (05-03-23 @ 05:51)         C-Reactive Protein, Serum: 95 mg/L (05-04-23 @ 10:25)  C-Reactive Protein, Serum: 230 mg/L (05-01-23 @ 05:29)       ROS unremarkable outside of HPI    PHYSICAL EXAM  LE Focused:    Vasc:  DP and PT faintly palpable b/l. No pedal hair growth noted. Diffuse edema noted distal to Ankle joint LLE  Derm: Open left lateral foot wound s/p open partial 5th ray resection on Sunday, 4/30/23. No malodor, no purulence upon manual compression, improving erythema and edema. Mixed necrotic and granular wound base, no fluctuance noted.   Neuro: Protective sensation grossly diminished  MSK: able to ambulate with pain, no TTP to dorsolateral surgical site       IMAGING: ?xray  Noted gas foci on CXR foot to 4th interspace and lateral 5th left foot    < from: [facility] Physiol Extremity Lower 3+ Level, BI (05.01.23 @ 16:33) >  FINDINGS: There are biphasic pulse volume recordings bilaterally,   dampened at the level of the metatarsals. Segmental pressures were not   obtained at the thighs.    Right RIVER = 1.15  Left RIVER = 1.11    IMPRESSION: Normal ABIs.    Dampened waveforms at the level of the metatarsals.    < end of copied text >    A:  Left foot gas gangrene    P:   Patient evaluated and Chart reviewed   Discussed diagnosis and treatment with patient  Previous X-rays noted to left foot with suspicion of gas foci to 4th interspace and lateral 5th toe (pre-operative)  Post-operative xrays taken and reviewed   S/p left partial 5th ray open amputation on 4/30/23  dressing is left CDI pre-operatively   NPO still in place since last night  Medical clearance appreciated   Written consent signed and witness present this morning   Patient scheduled for graft application and further open left foot wound debridement today, 5/5/23 at 7:45am with Dr. Burns   Pt understood risks and benefits of procedure  Due to RIVER/PVR, recommending vascular consult to assess healing potential   Recc IV antibiotics As Per ID; currently on ertapenem and ancef   Reviewed RIVER/PVR  NWB to LLE  Discussed importance of daily foot examinations and proper shoe gear and to importance of lower Fasting Blood Glucose levels.   Podiatry to follow while in house  Discussed with Attending Dr. Burns

## 2023-05-05 NOTE — PROGRESS NOTE ADULT - SUBJECTIVE AND OBJECTIVE BOX
PGY-1 Progress Note discussed with attending    PAGER #: [[phone]] TILL 5:00 PM  PLEASE CONTACT ON CALL TEAM:  - On Call Team (Please refer to Marlena) FROM 5:00 PM - 8:30PM  - Nightfloat Team FROM 8:30 -7:30 AM    OVERNIGHT EVENTS:   -     REVIEW OF SYSTEMS:  CONSTITUTIONAL: No fever, weight loss, or fatigue  RESPIRATORY: No cough, wheezing, chills or hemoptysis; No shortness of breath  CARDIOVASCULAR: No chest pain, palpitations, dizziness, or leg swelling  GASTROINTESTINAL: No abdominal pain. No nausea, vomiting, or hematemesis; No diarrhea or constipation. No melena or hematochezia.  GENITOURINARY: No dysuria or hematuria, urinary frequency  NEUROLOGICAL: No headaches, memory loss, loss of strength, numbness, or tremors  SKIN: No itching, burning, rashes, or lesions     MEDICATIONS  (STANDING):  brimonidine 0.2% Ophthalmic Solution 1 Drop(s) Left EYE every 12 hours  ceFAZolin   IVPB 2000 milliGRAM(s) IV Intermittent every 8 hours  ceFAZolin   IVPB      chlorhexidine 2% Cloths 1 Application(s) Topical <User Schedule>  ertapenem  IVPB 1000 milliGRAM(s) IV Intermittent every 24 hours  famotidine    Tablet 40 milliGRAM(s) Oral every 12 hours  gabapentin 300 milliGRAM(s) Oral every 8 hours  insulin glargine Injectable (LANTUS) 25 Unit(s) SubCutaneous at bedtime  insulin lispro (ADMELOG) corrective regimen sliding scale   SubCutaneous every 6 hours  insulin lispro Injectable (ADMELOG) 10 Unit(s) SubCutaneous three times a day before meals  lactated ringers. 1000 milliLiter(s) (100 mL/Hr) IV Continuous <Continuous>    MEDICATIONS  (PRN):  acetaminophen     Tablet .. 650 milliGRAM(s) Oral every 6 hours PRN Temp greater or equal to 38C (100.4F), Mild Pain (1 - 3)  ondansetron Injectable 4 milliGRAM(s) IV Push every 8 hours PRN Nausea and/or Vomiting      Vital Signs Last 24 Hrs  T(C): 36.8 (05 May 2023 05:10), Max: 37.2 (04 May 2023 21:25)  T(F): 98.2 (05 May 2023 05:10), Max: 98.9 (04 May 2023 21:25)  HR: 74 (05 May 2023 05:10) (74 - 88)  BP: 163/90 (05 May 2023 05:10) (138/80 - 163/90)  BP(mean): --  RR: 18 (05 May 2023 05:10) (18 - 18)  SpO2: 98% (05 May 2023 05:10) (98% - 100%)    Parameters below as of 05 May 2023 05:10  Patient On (Oxygen Delivery Method): room air        PHYSICAL EXAMINATION:  GENERAL: NAD, AAOx  HEAD: AT/NC  EYES: conjunctiva and sclera clear  NECK: supple, No JVD noted, Normal thyroid  CHEST/LUNG: CTABL; no rales, rhonchi, wheezing, or rubs  HEART: regular rate and rhythm; no murmurs, rubs, or gallops  ABDOMEN: soft, nontender, nondistended; Bowel sounds present  EXTREMITIES:  2+ Peripheral Pulses, No clubbing, cyanosis, or edema  SKIN: warm dry                          12.2   9.49  )-----------( 333      ( 05 May 2023 06:38 )             39.1     05-05    137  |  106  |  9   ----------------------------<  124<H>  4.7   |  27  |  1.09    Ca    9.7      05 May 2023 06:38  Phos  2.9     05-05  Mg     2.4     05-05    TPro  8.1  /  Alb  2.1<L>  /  TBili  0.3  /  DBili  x   /  AST  35  /  ALT  38  /  AlkPhos  84  05-05    LIVER FUNCTIONS - ( 05 May 2023 06:38 )  Alb: 2.1 g/dL / Pro: 8.1 g/dL / ALK PHOS: 84 U/L / ALT: 38 U/L DA / AST: 35 U/L / GGT: x               PT/INR - ( 05 May 2023 06:38 )   PT: 14.3 sec;   INR: 1.20 ratio         PTT - ( 05 May 2023 06:38 )  PTT:36.4 sec  COVID-19 PCR: NotDetec (04 May 2023 16:24)  COVID-19 PCR: NotDetec (29 Apr 2023 14:50)      CAPILLARY BLOOD GLUCOSE      POCT Blood Glucose.: 113 mg/dL (05 May 2023 06:56)  POCT Blood Glucose.: 127 mg/dL (04 May 2023 21:48)  POCT Blood Glucose.: 115 mg/dL (04 May 2023 17:02)  POCT Blood Glucose.: 157 mg/dL (04 May 2023 13:46)  POCT Blood Glucose.: 142 mg/dL (04 May 2023 12:05)  POCT Blood Glucose.: 92 mg/dL (04 May 2023 08:40)      RADIOLOGY & ADDITIONAL TESTS:                   PGY-1 Progress Note discussed with attending    PAGER #: [[phone]] TILL 5:00 PM  PLEASE CONTACT ON CALL TEAM:  - On Call Team (Please refer to Marlena) FROM 5:00 PM - 8:30PM  - Nightfloat Team FROM 8:30 -7:30 AM    OVERNIGHT EVENTS/ INTERVAL HPI:   - No acute overnight event. Pt had surgery done on th left lateral foot done by podiatry, denied any pain on my evaluation. Podiatry to apply wound vac. Pt was NPo overnight, will resume diet.     REVIEW OF SYSTEMS:  CONSTITUTIONAL: No fever, weight loss, or fatigue  RESPIRATORY: No cough, wheezing, chills or hemoptysis; No shortness of breath  CARDIOVASCULAR: No chest pain, palpitations, dizziness, or leg swelling  GASTROINTESTINAL: No abdominal pain. No nausea, vomiting, or hematemesis; No diarrhea or constipation. No melena or hematochezia.  GENITOURINARY: No dysuria or hematuria, urinary frequency  NEUROLOGICAL: No headaches, memory loss, loss of strength, numbness, or tremors  SKIN: No itching, burning, rashes, or lesions     MEDICATIONS  (STANDING):  brimonidine 0.2% Ophthalmic Solution 1 Drop(s) Left EYE every 12 hours  ceFAZolin   IVPB 2000 milliGRAM(s) IV Intermittent every 8 hours  ceFAZolin   IVPB      chlorhexidine 2% Cloths 1 Application(s) Topical <User Schedule>  ertapenem  IVPB 1000 milliGRAM(s) IV Intermittent every 24 hours  famotidine    Tablet 40 milliGRAM(s) Oral every 12 hours  gabapentin 300 milliGRAM(s) Oral every 8 hours  insulin glargine Injectable (LANTUS) 25 Unit(s) SubCutaneous at bedtime  insulin lispro (ADMELOG) corrective regimen sliding scale   SubCutaneous every 6 hours  insulin lispro Injectable (ADMELOG) 10 Unit(s) SubCutaneous three times a day before meals  lactated ringers. 1000 milliLiter(s) (100 mL/Hr) IV Continuous <Continuous>    MEDICATIONS  (PRN):  acetaminophen     Tablet .. 650 milliGRAM(s) Oral every 6 hours PRN Temp greater or equal to 38C (100.4F), Mild Pain (1 - 3)  ondansetron Injectable 4 milliGRAM(s) IV Push every 8 hours PRN Nausea and/or Vomiting      Vital Signs Last 24 Hrs  T(C): 36.8 (05 May 2023 05:10), Max: 37.2 (04 May 2023 21:25)  T(F): 98.2 (05 May 2023 05:10), Max: 98.9 (04 May 2023 21:25)  HR: 74 (05 May 2023 05:10) (74 - 88)  BP: 163/90 (05 May 2023 05:10) (138/80 - 163/90)  BP(mean): --  RR: 18 (05 May 2023 05:10) (18 - 18)  SpO2: 98% (05 May 2023 05:10) (98% - 100%)    Parameters below as of 05 May 2023 05:10  Patient On (Oxygen Delivery Method): room air        PHYSICAL EXAMINATION:  GENERAL: NAD, AAOx3  HEAD: AT/NC  EYES: conjunctiva and sclera clear  NECK: supple, No JVD noted  CHEST/LUNG: CTABL; no rales, rhonchi, wheezing, or rubs  HEART: regular rate and rhythm; no murmurs, rubs, or gallops  ABDOMEN: soft, nontender, nondistended; Bowel sounds present  EXTREMITIES:  2+ Peripheral Pulses, No clubbing, cyanosis, or edema  SKIN: warm dry                          12.2   9.49  )-----------( 333      ( 05 May 2023 06:38 )             39.1     05-05    137  |  106  |  9   ----------------------------<  124<H>  4.7   |  27  |  1.09    Ca    9.7      05 May 2023 06:38  Phos  2.9     05-05  Mg     2.4     05-05    TPro  8.1  /  Alb  2.1<L>  /  TBili  0.3  /  DBili  x   /  AST  35  /  ALT  38  /  AlkPhos  84  05-05    LIVER FUNCTIONS - ( 05 May 2023 06:38 )  Alb: 2.1 g/dL / Pro: 8.1 g/dL / ALK PHOS: 84 U/L / ALT: 38 U/L DA / AST: 35 U/L / GGT: x               PT/INR - ( 05 May 2023 06:38 )   PT: 14.3 sec;   INR: 1.20 ratio         PTT - ( 05 May 2023 06:38 )  PTT:36.4 sec  COVID-19 PCR: NotDetec (04 May 2023 16:24)  COVID-19 PCR: NotDetec (29 Apr 2023 14:50)      CAPILLARY BLOOD GLUCOSE      POCT Blood Glucose.: 113 mg/dL (05 May 2023 06:56)  POCT Blood Glucose.: 127 mg/dL (04 May 2023 21:48)  POCT Blood Glucose.: 115 mg/dL (04 May 2023 17:02)  POCT Blood Glucose.: 157 mg/dL (04 May 2023 13:46)  POCT Blood Glucose.: 142 mg/dL (04 May 2023 12:05)  POCT Blood Glucose.: 92 mg/dL (04 May 2023 08:40)      RADIOLOGY & ADDITIONAL TESTS:

## 2023-05-05 NOTE — BRIEF OPERATIVE NOTE - DISPOSITION
Topical Retinoid counseling:  Patient advised to apply a pea-sized amount only at bedtime and wait 30 minutes after washing their face before applying.  If too drying, patient may add a non-comedogenic moisturizer. The patient verbalized understanding of the proper use and possible adverse effects of retinoids.  All of the patient's questions and concerns were addressed.
pacu then floors
Pacu to floors

## 2023-05-05 NOTE — DIETITIAN INITIAL EVALUATION ADULT - PERTINENT LABORATORY DATA
05-05    137  |  106  |  9   ----------------------------<  124<H>  4.7   |  27  |  1.09    Ca    9.7      05 May 2023 06:38  Phos  2.9     05-05  Mg     2.4     05-05    TPro  8.1  /  Alb  2.1<L>  /  TBili  0.3  /  DBili  x   /  AST  35  /  ALT  38  /  AlkPhos  84  05-05  POCT Blood Glucose.: 117 mg/dL (05-05-23 @ 08:40)  A1C with Estimated Average Glucose Result: 12.2 % (05-01-23 @ 05:29)  A1C with Estimated Average Glucose Result: 12.1 % (04-30-23 @ 08:00)

## 2023-05-05 NOTE — PROGRESS NOTE ADULT - PROBLEM SELECTOR PLAN 5
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 Restrepo  Endocrinology Dr. 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 Restrepo  Endocrinology Dr. Arreola consulted

## 2023-05-05 NOTE — DIETITIAN INITIAL EVALUATION ADULT - NS FNS DIET ORDER
Diet, NPO after Midnight:      NPO Start Date: 04-May-2023,   NPO Start Time: 23:59 (05-04-23 @ 15:19)

## 2023-05-05 NOTE — BRIEF OPERATIVE NOTE - NSICDXBRIEFPREOP_GEN_ALL_CORE_FT
PRE-OP DIAGNOSIS:  Gangrene of left foot 30-Apr-2023 09:42:38 Left 5th digit and metatarsal gas ganrene infection with gas foci visible on x-ray Veronica Lobo  
PRE-OP DIAGNOSIS:  Diabetic foot ulcer 05-May-2023 08:44:05 left foot Wesley Johns

## 2023-05-05 NOTE — PROGRESS NOTE ADULT - PROBLEM SELECTOR PLAN 4
s/p mechanical fall with head injury and LOC with downtime on the floor for 4h  CTH: shows sellar lesion with apparent mas effect on the optic chiasm. no acute pathology  reports visual impairment due to left eye injury 1970s and retinal detachment, with change from baseline   CK mildly elevated 473 with FINA 1.79, low suspicion for rhabdo, trended down   c/w IVF   PT to be consulted after OR  Neurology consulted: Dr. Restrepo  - mass-like lesion less likely cause for fall, more likely d/t diabetic neuropathy  - Hormone work-up unremarkable

## 2023-05-05 NOTE — PROGRESS NOTE ADULT - ATTENDING COMMENTS
Patient seen and examined this afternoon    73 yo man presented with c/o pain and swelling of his left foot after a fall noted to have Gas gangrene s/p Debridement and now awaiting another debridement and graft on Friday. Patient also with incidental Adenoma noted on CT/ MRI; Hx Glaucoma and Retinal detachment    Patient doing well. No new complaints. Sugar was low this morning to 92. Has baseline 2 to 4 BMs. No fever. denies SOB, palpitations, chest pain, nausea, vomiting, diarrhea, constipation, dizziness. No new complaints    Vital Signs Last 24 Hrs  T(C): 37 (05 May 2023 13:11), Max: 37.2 (04 May 2023 21:25)  T(F): 98.6 (05 May 2023 13:11), Max: 98.9 (04 May 2023 21:25)  HR: 87 (05 May 2023 13:11) (74 - 88)  BP: 127/71 (05 May 2023 13:11) (103/64 - 163/90)  BP(mean): 95 (05 May 2023 09:19) (67 - 95)  RR: 18 (05 May 2023 13:11) (12 - 18)  SpO2: 100% (05 May 2023 13:11) (97% - 100%) room air        P/E:  elderly male, comfortable in bed in NAD  Neuro: AAO x3, no gross focal deficits  Chest: clear, BLAE+, No wheeze or Rhonchi  CVS: S!S2 present, Regular  Abdomen:  soft, BS+, NT, ND  Ext.: Left foot dressing intact  No significant edema or calf tenderness; chronic dermatitis    Labs: reviewed no new CBC/ CMP; repeat AM    Prolactin, Serum (05.03.23 @ 05:51) Prolactin, Serum: 12.4 ng/mL  Thyroid Stimulating Hormone, Serum in AM (05.03.23 @ 05:51) Thyroid Stimulating Hormone, Serum: 2.19 uU/mL  Luteinizing Hormone, Serum (05.03.23 @ 05:51) Luteinizing Hormone, Serum: 4.5: LH (IU/L)   Follicle Stimulating Hormone, Serum (05.03.23 @ 05:51) Follicle Stimulating Hormone, Serum: 2.7: FSH (IU/L)   Adrenocorticotropic Hormone, Serum (05.03.23 @ 05:51) Adrenocorticotropic Hormone, Serum: 24.3 pg/mL    Culture - Blood in AM (05.03.23 @ 06:30)   Specimen Source: .Blood Blood  Culture Results: No growth to date  Culture - Blood in AM (05.03.23 @ 05:51)   Specimen Source: .Blood Blood  Culture Results: No growth to date.    Culture - Blood (05.01.23 @ 12:25)   Culture Results: Growth in aerobic bottle: Staphylococcus aureus     MR Head w/wo IV Cont (05.01.23 @ 15:37)   MR Head w/wo IV Cont (05.01.23 @ 15:37)   PROCEDURE DATE:  05/01/2023     IMPRESSION:  MR orbits/sella:   1.8 cm AP by 1.6 cm CC by 2 cm TRV pituitary mass consistent with an adenoma. There is mild extension into the LEFT   cavernous sinus. The RIGHT cavernous sinuses and para cavernous regions appear intact.    The cavernous segments of the internal carotid arteries   demonstrate expected flow-void indicating patency. There is mild mass effect on the optic chiasm, LEFT greater than RIGHT. The infundibulum is   slightly deviated to the LEFT.    MR brain: Minimal white matter ischemia at the bifrontal and LEFT parietal subcortical white matter.    Moderate mucosal thickening/fluid   in the LEFT mastoid air cells.    D/D:  Gas gangrene Left foot  MSSA bacteremia likely source   Pituitary adenoma, likely non-secreting, possibly compression on optic chiasm.    Poorly controlled DM    Plan:      ID follow up; Continue antibiotics to Ertapenem daily and Cefazolin q 8 hrs  FABIAN appreciated; no vegetations  Blood Cx from 5/1 was positive; so far Cx from 5/3 negative  Podiatry f/u, planned repeat debridement AM; NPO after MN  Patient is optimized for planned surgical intervention; Routine labs and coags AM;   Basal Insulin reduce to 25 units HS as patient is NPO after MN; Hold short acting Insulin while NPO  Endocrine f/u; appear adenoma is non secretory; FSH, LH, prolactin, TSH,  cortisol, LH, FSH all WNL  NeuroSx intervention once completed antibiotic course as adenoma with mild compression on Optic chiasm  F/u outpatient for pituitary adenoma and refer to Neurosurgery.  possible transphenoidal resection after completion of six weeks of antibiotics for osteomyelitis with bacteremia.   Adjust Insulin as per Endo recs  DVT ppx; Hold after todays dose  PT eval after Sx;   Patient lives with wife Peri Pérez to make decisions, if he can't.  Rest as per PGY1 above    Discussed with PGY1 Dr. Reynolds and PGY3 Dr. Leary and RN  Discussed with patient all the above at length; He was advised he may need MO for wound care and Antibiotics if unable to manage at home Patient seen and examined this afternoon/evening with wife at bedside earlier saw the wound with Podiatry placing wound vac after debridement     71 yo man presented with c/o pain and swelling of his left foot after a fall noted to have Gas gangrene s/p Debridement and graft early this morning. Patient also with incidental Adenoma noted on CT/ MRI; Hx Glaucoma and Retinal detachment    Patient doing well. No new complaints. Sugar was low this morning to 92. was NPO for OR. No fever. denies SOB, palpitations, chest pain, nausea, vomiting, diarrhea, constipation, dizziness. No new complaints    Vital Signs Last 24 Hrs  T(C): 37 (05 May 2023 13:11), Max: 37.2 (04 May 2023 21:25)  T(F): 98.6 (05 May 2023 13:11), Max: 98.9 (04 May 2023 21:25)  HR: 87 (05 May 2023 13:11) (74 - 88)  BP: 127/71 (05 May 2023 13:11) (103/64 - 163/90)  BP(mean): 95 (05 May 2023 09:19) (67 - 95)  RR: 18 (05 May 2023 13:11) (12 - 18)  SpO2: 100% (05 May 2023 13:11) (97% - 100%) room air    P/E:  elderly male, comfortable in bed in NAD  Neuro: AAO x3, no gross focal deficits  Chest: clear, BLAE+, No wheeze or Rhonchi  CVS: S!S2 present, Regular  Abdomen:  soft, BS+, NT, ND  Ext.: Left foot dressing intact  No significant edema or calf tenderness; chronic dermatitis    Labs:                         12.2   9.49  )-----------( 333      ( 05 May 2023 06:38 )             39.1   05-05    137  |  106  |  9   ----------------------------<  124<H>  4.7   |  27  |  1.09    Ca    9.7      05 May 2023 06:38  Phos  2.9     05-05  Mg     2.4     05-05    TPro  8.1  /  Alb  2.1<L>  /  TBili  0.3  /  DBili  x   /  AST  35  /  ALT  38  /  AlkPhos  84  05-05    Pitutiary Hormones reviewed essentially WNL    Culture - Blood in AM (05.03.23 @ 06:30)   Specimen Source: .Blood Blood  Culture Results: No growth to date  Culture - Blood in AM (05.03.23 @ 05:51)   Specimen Source: .Blood Blood  Culture Results: No growth to date.    Culture - Blood (05.01.23 @ 12:25)   Culture Results: Growth in aerobic bottle: Staphylococcus aureus     D/D:  Gas gangrene Left foot  MSSA bacteremia likely source   Pituitary adenoma, non-secreting, possibly compression on optic chiasm.    Poorly controlled DM    Plan:      ID follow up; Continue antibiotics with Ertapenem daily and Cefazolin q 8 hrs  FABIAN appreciated; no vegetations  Blood Cx from 5/1 was positive; so far Cx from 5/3 negative  Podiatry f/u, s/p repeat debridement graft and now Wound Vac  As per podiatry weight bearing on heel left foot with surgical shoes  Basal Insulin reduce to 25 units HS as patient is NPO after MN; Hold short acting Insulin while NPO  Endocrine f/u; appear adenoma is non secretory; FSH, LH, prolactin, TSH,  cortisol, LH, FSH all WNL  NeuroSx intervention once completed antibiotic course as adenoma with mild compression on Optic chiasm;   F/u outpatient for pituitary adenoma and refer to Neurosurgery.  possible transphenoidal resection after completion of six weeks of antibiotics for osteomyelitis with bacteremia.   Adjust Insulin as per Endo recs  DVT ppx; Resume tomorrow  PT eval after Sx;   Patient lives with wife Peri Pérez to make decisions,   Spoke with patient and wife at length this evening and reviewed need for administration of antibiotics 3 times one and other one once daily, Wound care as well as wound Vac mgmt and Family should be availably to assist VNS. If unable to mamange as wife works 3PM to 11PM may need MO. Advised to discuss and get back to us this weekend for D/C Plan early next week MO/Home with HC and VNS discussed.  Discussed with Housestaff and RN  Rest as per PGY1 above    Discussed with PGY1 Dr. Reynolds and PGY3 Dr. Leary and RN  Discussed with patient all the above at length; He was advised he may need MO for wound care and Antibiotics if unable to manage at home

## 2023-05-05 NOTE — DIETITIAN INITIAL EVALUATION ADULT - PROBLEM SELECTOR PLAN 7
h/o HTN but admits to medication noncompliance, unable to recall BP meds supposed to be on    currently normotensive   primary team to confirm meds in the AM   NPO after midnight for OR tomorrow   monitor BP

## 2023-05-05 NOTE — DIETITIAN INITIAL EVALUATION ADULT - PERTINENT MEDS FT
MEDICATIONS  (STANDING):  brimonidine 0.2% Ophthalmic Solution 1 Drop(s) Left EYE every 12 hours  ceFAZolin   IVPB 2000 milliGRAM(s) IV Intermittent every 8 hours  ceFAZolin   IVPB      chlorhexidine 2% Cloths 1 Application(s) Topical <User Schedule>  ertapenem  IVPB 1000 milliGRAM(s) IV Intermittent every 24 hours  famotidine    Tablet 40 milliGRAM(s) Oral every 12 hours  gabapentin 300 milliGRAM(s) Oral every 8 hours  insulin glargine Injectable (LANTUS) 25 Unit(s) SubCutaneous at bedtime  insulin lispro (ADMELOG) corrective regimen sliding scale   SubCutaneous every 6 hours  insulin lispro Injectable (ADMELOG) 10 Unit(s) SubCutaneous three times a day before meals  lactated ringers. 1000 milliLiter(s) (100 mL/Hr) IV Continuous <Continuous>    MEDICATIONS  (PRN):  acetaminophen     Tablet .. 650 milliGRAM(s) Oral every 6 hours PRN Temp greater or equal to 38C (100.4F), Mild Pain (1 - 3)  ondansetron Injectable 4 milliGRAM(s) IV Push every 8 hours PRN Nausea and/or Vomiting

## 2023-05-05 NOTE — CHART NOTE - NSCHARTNOTEFT_GEN_A_CORE
Applied Wound Vac to patient to left lateral foot s/p debridement and graft application  to change MW

## 2023-05-05 NOTE — BRIEF OPERATIVE NOTE - NSICDXBRIEFPROCEDURE_GEN_ALL_CORE_FT
PROCEDURES:  Debridement of skin, subcutan tissue, muscle, and bone of left foot 05-May-2023 08:43:03  Wesley Johns  Partial excision of bone of foot 05-May-2023 08:43:19 excision of remnant 5th metatarsal Wesley Johns  Application, graft, foot 05-May-2023 08:43:46 somagen fraft to left foot Wesley Johns  
PROCEDURES:  Partial amputation of fifth ray of left foot by open approach 30-Apr-2023 09:39:49  Veronica Lobo

## 2023-05-06 ENCOUNTER — TRANSCRIPTION ENCOUNTER (OUTPATIENT)
Age: 73
End: 2023-05-06

## 2023-05-06 LAB
GLUCOSE BLDC GLUCOMTR-MCNC: 107 MG/DL — HIGH (ref 70–99)
GLUCOSE BLDC GLUCOMTR-MCNC: 110 MG/DL — HIGH (ref 70–99)
GLUCOSE BLDC GLUCOMTR-MCNC: 114 MG/DL — HIGH (ref 70–99)
GLUCOSE BLDC GLUCOMTR-MCNC: 116 MG/DL — HIGH (ref 70–99)
GLUCOSE BLDC GLUCOMTR-MCNC: 127 MG/DL — HIGH (ref 70–99)
GLUCOSE BLDC GLUCOMTR-MCNC: 170 MG/DL — HIGH (ref 70–99)

## 2023-05-06 PROCEDURE — 99233 SBSQ HOSP IP/OBS HIGH 50: CPT | Mod: GC

## 2023-05-06 RX ORDER — INSULIN GLARGINE 100 [IU]/ML
20 INJECTION, SOLUTION SUBCUTANEOUS AT BEDTIME
Refills: 0 | Status: DISCONTINUED | OUTPATIENT
Start: 2023-05-06 | End: 2023-05-11

## 2023-05-06 RX ORDER — ACETAMINOPHEN 500 MG
1000 TABLET ORAL ONCE
Refills: 0 | Status: COMPLETED | OUTPATIENT
Start: 2023-05-06 | End: 2023-05-06

## 2023-05-06 RX ORDER — INSULIN LISPRO 100/ML
6 VIAL (ML) SUBCUTANEOUS
Refills: 0 | Status: DISCONTINUED | OUTPATIENT
Start: 2023-05-06 | End: 2023-05-11

## 2023-05-06 RX ADMIN — Medication 650 MILLIGRAM(S): at 01:58

## 2023-05-06 RX ADMIN — Medication 650 MILLIGRAM(S): at 02:28

## 2023-05-06 RX ADMIN — Medication 400 MILLIGRAM(S): at 06:56

## 2023-05-06 RX ADMIN — GABAPENTIN 300 MILLIGRAM(S): 400 CAPSULE ORAL at 15:34

## 2023-05-06 RX ADMIN — ERTAPENEM SODIUM 120 MILLIGRAM(S): 1 INJECTION, POWDER, LYOPHILIZED, FOR SOLUTION INTRAMUSCULAR; INTRAVENOUS at 12:01

## 2023-05-06 RX ADMIN — Medication 100 MILLIGRAM(S): at 05:23

## 2023-05-06 RX ADMIN — Medication 100 MILLIGRAM(S): at 22:02

## 2023-05-06 RX ADMIN — Medication 6 UNIT(S): at 12:01

## 2023-05-06 RX ADMIN — FAMOTIDINE 40 MILLIGRAM(S): 10 INJECTION INTRAVENOUS at 05:23

## 2023-05-06 RX ADMIN — Medication 650 MILLIGRAM(S): at 23:17

## 2023-05-06 RX ADMIN — Medication 1000 MILLIGRAM(S): at 07:26

## 2023-05-06 RX ADMIN — FAMOTIDINE 40 MILLIGRAM(S): 10 INJECTION INTRAVENOUS at 17:59

## 2023-05-06 RX ADMIN — CHLORHEXIDINE GLUCONATE 1 APPLICATION(S): 213 SOLUTION TOPICAL at 05:28

## 2023-05-06 RX ADMIN — BRIMONIDINE TARTRATE 1 DROP(S): 2 SOLUTION/ DROPS OPHTHALMIC at 18:01

## 2023-05-06 RX ADMIN — INSULIN GLARGINE 20 UNIT(S): 100 INJECTION, SOLUTION SUBCUTANEOUS at 22:02

## 2023-05-06 RX ADMIN — Medication 100 MILLIGRAM(S): at 15:34

## 2023-05-06 RX ADMIN — GABAPENTIN 300 MILLIGRAM(S): 400 CAPSULE ORAL at 05:23

## 2023-05-06 RX ADMIN — GABAPENTIN 300 MILLIGRAM(S): 400 CAPSULE ORAL at 22:02

## 2023-05-06 NOTE — PROGRESS NOTE ADULT - ATTENDING COMMENTS
Patient seen and examined this afternoon/evening with wife at bedside earlier saw the wound with Podiatry placing wound vac after debridement     71 yo man presented with c/o pain and swelling of his left foot after a fall noted to have Gas gangrene s/p Debridement and graft early this morning. Patient also with incidental Adenoma noted on CT/ MRI; Hx Glaucoma and Retinal detachment    Patient doing well. No new complaints. Sugar was low this morning to 92. was NPO for OR. No fever. denies SOB, palpitations, chest pain, nausea, vomiting, diarrhea, constipation, dizziness. No new complaints    Vital Signs Last 24 Hrs  T(C): 36.9 (06 May 2023 05:07), Max: 37 (05 May 2023 13:11)  T(F): 98.4 (06 May 2023 05:07), Max: 98.6 (05 May 2023 13:11)  HR: 78 (06 May 2023 05:07) (75 - 87)  BP: 155/81 (06 May 2023 05:07) (127/71 - 155/81)  RR: 17 (06 May 2023 05:07) (16 - 18)  SpO2: 98% (06 May 2023 05:07) (98% - 100%) room air    P/E:  elderly male, comfortable in bed in NAD  Neuro: AAO x3, no gross focal deficits  Chest: clear, BLAE+, No wheeze or Rhonchi  CVS: S!S2 present, Regular  Abdomen:  soft, BS+, NT, ND  Ext.: Left foot dressing intact  No significant edema or calf tenderness; chronic dermatitis    Labs: No new today; stable CBC and CMP 5/5/23 as below                        12.2   9.49  )-----------( 333      ( 05 May 2023 06:38 )             39.1   05-05    137  |  106  |  9   ----------------------------<  124<H>  4.7   |  27  |  1.09    Ca    9.7      05 May 2023 06:38  Phos  2.9     05-05  Mg     2.4     05-05    TPro  8.1  /  Alb  2.1<L>  /  TBili  0.3  /  DBili  x   /  AST  35  /  ALT  38  /  AlkPhos  84  05-05    Pitutiary Hormones reviewed essentially WNL    Culture - Blood in AM (05.03.23 @ 06:30)   Specimen Source: .Blood Blood  Culture Results: No growth to date  Culture - Blood in AM (05.03.23 @ 05:51)   Specimen Source: .Blood Blood  Culture Results: No growth to date.    Culture - Blood (05.01.23 @ 12:25)   Culture Results: Growth in aerobic bottle: Staphylococcus aureus     D/D:  Gas gangrene Left foot  MSSA bacteremia likely source   Pituitary adenoma, non-secreting, possibly compression on optic chiasm.    Poorly controlled DM    Plan:      ID follow up; Continue antibiotics with Ertapenem daily and Cefazolin q 8 hrs  FABIAN appreciated; no vegetations  Blood Cx from 5/1 was positive; so far Cx from 5/3 negative  Podiatry f/u, s/p repeat debridement graft and now Wound Vac  As per podiatry weight bearing on heel left foot with surgical shoes  Basal Insulin reduce to 25 units HS as patient is NPO after MN; Hold short acting Insulin while NPO  Endocrine f/u; appear adenoma is non secretory; FSH, LH, prolactin, TSH,  cortisol, LH, FSH all WNL  NeuroSx intervention once completed antibiotic course as adenoma with mild compression on Optic chiasm;   F/u outpatient for pituitary adenoma and refer to Neurosurgery.  possible transphenoidal resection after completion of six weeks of antibiotics for osteomyelitis with bacteremia.   Adjust Insulin as per Endo recs  DVT ppx; Resume tomorrow  PT eval after Sx;   Patient lives with wife Peri Pérez to make decisions,   Spoke with patient and wife at length this evening and reviewed need for administration of antibiotics 3 times one and other one once daily, Wound care as well as wound Vac mgmt and Family should be availably to assist VNS. If unable to mamange as wife works 3PM to 11PM may need MO. Advised to discuss and get back to us this weekend for D/C Plan early next week MO/Home with HC and VNS discussed.  Discussed with Housestaff and RN  Rest as per PGY1 above    Discussed with PGY1 Dr. Reynolds and PGY3 Dr. Leary and RN  Discussed with patient all the above at length; He was advised he may need MO for wound care and Antibiotics if unable to manage at home Patient seen and examined this morning    71 yo man presented with c/o pain and swelling of his left foot after a fall noted to have Gas gangrene s/p Debridement and graft early this morning. Patient also with incidental Adenoma noted on CT/ MRI; Hx Glaucoma and Retinal detachment    Patient doing well. No new complaints. No fever. denies SOB, palpitations, chest pain, nausea, vomiting, diarrhea, constipation, dizziness. No new complaints. was able to ambulate with PT with walker and weight bearing to the heel; He has surgical shoes    Vital Signs Last 24 Hrs  T(C): 36.9 (06 May 2023 05:07), Max: 37 (05 May 2023 13:11)  T(F): 98.4 (06 May 2023 05:07), Max: 98.6 (05 May 2023 13:11)  HR: 78 (06 May 2023 05:07) (75 - 87)  BP: 155/81 (06 May 2023 05:07) (127/71 - 155/81)  RR: 17 (06 May 2023 05:07) (16 - 18)  SpO2: 98% (06 May 2023 05:07) (98% - 100%) room air    P/E:  elderly male, comfortable in bed in NAD  Neuro: AAO x3, no gross focal deficits  Chest: clear, BLAE+, No wheeze or Rhonchi  CVS: S!S2 present, Regular  Abdomen:  soft, BS+, NT, ND  Ext.: Left foot dressing intact  No significant edema or calf tenderness; chronic dermatitis    Labs: No new today; stable CBC and CMP 5/5/23 as below                        12.2   9.49  )-----------( 333      ( 05 May 2023 06:38 )             39.1   05-05    137  |  106  |  9   ----------------------------<  124<H>  4.7   |  27  |  1.09    Ca    9.7      05 May 2023 06:38  Phos  2.9     05-05  Mg     2.4     05-05    TPro  8.1  /  Alb  2.1<L>  /  TBili  0.3  /  DBili  x   /  AST  35  /  ALT  38  /  AlkPhos  84  05-05    Pituitary Hormones reviewed essentially WNL    Culture - Blood in AM (05.03.23 @ 06:30)   Specimen Source: .Blood Blood  Culture Results: No growth to date  Culture - Blood in AM (05.03.23 @ 05:51)   Specimen Source: .Blood Blood  Culture Results: No growth to date.    Culture - Blood (05.01.23 @ 12:25)   Culture Results: Growth in aerobic bottle: Staphylococcus aureus     D/D:  Gas gangrene Left foot s/p debridement and Graft POD#1  MSSA bacteremia likely source foot  Pituitary adenoma, non-secreting, possibly compression on optic chiasm.    Poorly controlled DM    Plan:      Continue antibiotics with Ertapenem daily and Cefazolin q 8 hrs; ESR/CRP weekly  FABIAN appreciated; no vegetations  Blood Cx from 5/1 was positive; so far Cx from 5/3 negative  Podiatry f/u, s/p repeat debridement graft and now Wound Vac  As per podiatry weight bearing on heel left foot with surgical shoes; patient was able to mabulate; PT eval appreciated  Basal Insulin reduce to 20 units HS and Admelog to 6 units as sugars too tightly controlled; titrate up if need to keep 120 to 160  Endocrine f/u; appear adenoma is non secretory; FSH, LH, prolactin, TSH,  cortisol, LH, FSH all WNL  NeuroSx intervention once completed antibiotic course as adenoma with mild compression on Optic chiasm;   F/u outpatient for pituitary adenoma and refer to Neurosurgery.  possible transphenoidal resection after completion of six weeks of antibiotics for osteomyelitis with bacteremia.   DVT ppx; Resume tomorrow  Patient lives with wife Peri Pérez; we spoke at length; patient and wife to determine if could manage Antibiotics and Wound care at home; if agrees can be D/C Home once arranged; If not, MO and choices; wife works 3PM to 11PM may need MO. Advised to discuss and get back to us this weekend for D/C Plan early next week MO/Home with HC and VNS discussed.  Rest as per PGY1 above    Discussed with PGY1 Dr. Reynolds and PGY3 Dr. Leary and RN  Discussed with patient all the above at length;

## 2023-05-06 NOTE — PROGRESS NOTE ADULT - PROBLEM SELECTOR PLAN 3
h/o DM on Metformin 1000mg BID, Lantus 35 qhs and Humolog 14 TID before meals (per sure scripts Lantus 45u and Humalog 20u), also reports sugars are not controlled at home with prior A1C 7.9  will hold oral dm meds while inpatient   HbA1c: 12.1%  c/w Lantus 35, Lispro 10 units  as pt was going for surgery decreased Lantus to 25 units and Lantus was held  pt has low sugar level, will adjust the Lantus and Lispro   Mod ISS  Endocrine Dr. Arreola consulted h/o DM on Metformin 1000mg BID, Lantus 35 qhs and Humolog 14 TID before meals (per sure scripts Lantus 45u and Humalog 20u), also reports sugars are not controlled at home with prior A1C 7.9  will hold oral dm meds while inpatient   HbA1c: 12.1%  Decreased Lantus to 20 units, and Lispro 6 units on 5/6 and will adjust further   Mod ISS  Endocrine Dr. Arreola consulted

## 2023-05-06 NOTE — PHYSICAL THERAPY INITIAL EVALUATION ADULT - GENERAL OBSERVATIONS, REHAB EVAL
Pt seen supine in bed w/IV, left foot in ace bandage, (+) wound vac, denied c/o pain, was cooperative during assessment

## 2023-05-06 NOTE — PROGRESS NOTE ADULT - SUBJECTIVE AND OBJECTIVE BOX
PGY-1 Progress Note discussed with attending    PAGER #: [phone]] TILL 5:00 PM  PLEASE CONTACT ON CALL TEAM:  - On Call Team (Please refer to Marlena) FROM 5:00 PM - 8:30PM  - Nightfloat Team FROM 8:30 -7:30 AM    OVERNIGHT EVENTS/ INTERVAL HPI:  - No acute overnight events. Pt evaluated at bedside. Denied any acute complaints. Wound Vac in place. Pt noted to have glucose level ranging in 110-120s which could be due to the infection being better controlled and resolving, will decrease the Lantus and lispro and adjust further accordingly    REVIEW OF SYSTEMS:  CONSTITUTIONAL: No fever, weight loss, or fatigue  RESPIRATORY: No cough, wheezing, chills or hemoptysis; No shortness of breath  CARDIOVASCULAR: No chest pain, palpitations, dizziness, or leg swelling  GASTROINTESTINAL: No abdominal pain. No nausea, vomiting, or hematemesis; No diarrhea or constipation. No melena or hematochezia.  GENITOURINARY: No dysuria or hematuria, urinary frequency  NEUROLOGICAL: No headaches, memory loss, loss of strength, numbness, or tremors  SKIN: No itching, burning, rashes, or lesions     MEDICATIONS  (STANDING):  brimonidine 0.2% Ophthalmic Solution 1 Drop(s) Left EYE every 12 hours  ceFAZolin   IVPB 2000 milliGRAM(s) IV Intermittent every 8 hours  ceFAZolin   IVPB      chlorhexidine 2% Cloths 1 Application(s) Topical <User Schedule>  enoxaparin Injectable 40 milliGRAM(s) SubCutaneous every 24 hours  ertapenem  IVPB 1000 milliGRAM(s) IV Intermittent every 24 hours  famotidine    Tablet 40 milliGRAM(s) Oral every 12 hours  gabapentin 300 milliGRAM(s) Oral every 8 hours  insulin glargine Injectable (LANTUS) 20 Unit(s) SubCutaneous at bedtime  insulin lispro (ADMELOG) corrective regimen sliding scale   SubCutaneous every 6 hours  insulin lispro Injectable (ADMELOG) 6 Unit(s) SubCutaneous three times a day before meals  lactated ringers. 1000 milliLiter(s) (100 mL/Hr) IV Continuous <Continuous>    MEDICATIONS  (PRN):  acetaminophen     Tablet .. 650 milliGRAM(s) Oral every 6 hours PRN Temp greater or equal to 38C (100.4F), Mild Pain (1 - 3)  ondansetron Injectable 4 milliGRAM(s) IV Push every 8 hours PRN Nausea and/or Vomiting      Vital Signs Last 24 Hrs  T(C): 36.9 (06 May 2023 05:07), Max: 37 (05 May 2023 13:11)  T(F): 98.4 (06 May 2023 05:07), Max: 98.6 (05 May 2023 13:11)  HR: 78 (06 May 2023 05:07) (75 - 87)  BP: 155/81 (06 May 2023 05:07) (127/71 - 155/81)  BP(mean): --  RR: 17 (06 May 2023 05:07) (16 - 18)  SpO2: 98% (06 May 2023 05:07) (98% - 100%)    Parameters below as of 06 May 2023 05:07  Patient On (Oxygen Delivery Method): room air        PHYSICAL EXAMINATION:  GENERAL: NAD, AAOx3  HEAD: AT/NC  EYES: conjunctiva and sclera clear  NECK: supple, No JVD noted  CHEST/LUNG: CTABL; no rales, rhonchi, wheezing, or rubs  HEART: regular rate and rhythm; no murmurs, rubs, or gallops  ABDOMEN: soft, nontender, nondistended; Bowel sounds present  EXTREMITIES:  2+ Peripheral Pulses, No clubbing, cyanosis, or edema, Wound Vac in place on the Left foot  SKIN: warm dry                          12.2   9.49  )-----------( 333      ( 05 May 2023 06:38 )             39.1     05-05    137  |  106  |  9   ----------------------------<  124<H>  4.7   |  27  |  1.09    Ca    9.7      05 May 2023 06:38  Phos  2.9     05-05  Mg     2.4     05-05    TPro  8.1  /  Alb  2.1<L>  /  TBili  0.3  /  DBili  x   /  AST  35  /  ALT  38  /  AlkPhos  84  05-05    LIVER FUNCTIONS - ( 05 May 2023 06:38 )  Alb: 2.1 g/dL / Pro: 8.1 g/dL / ALK PHOS: 84 U/L / ALT: 38 U/L DA / AST: 35 U/L / GGT: x               PT/INR - ( 05 May 2023 06:38 )   PT: 14.3 sec;   INR: 1.20 ratio         PTT - ( 05 May 2023 06:38 )  PTT:36.4 sec  COVID-19 PCR: NotDetec (04 May 2023 16:24)  COVID-19 PCR: NotDetec (29 Apr 2023 14:50)      CAPILLARY BLOOD GLUCOSE      POCT Blood Glucose.: 114 mg/dL (06 May 2023 07:31)  POCT Blood Glucose.: 107 mg/dL (06 May 2023 06:00)  POCT Blood Glucose.: 127 mg/dL (06 May 2023 00:03)  POCT Blood Glucose.: 84 mg/dL (05 May 2023 21:26)  POCT Blood Glucose.: 98 mg/dL (05 May 2023 16:53)  POCT Blood Glucose.: 115 mg/dL (05 May 2023 11:52)      RADIOLOGY & ADDITIONAL TESTS:

## 2023-05-06 NOTE — DISCHARGE NOTE PROVIDER - CARE PROVIDER_API CALL
Benji Yan (MD; PERRY; MS)  Neurosurgery  [address], [address]  [place] [address]  Phone: [phone]  Fax: [phone]  Follow Up Time: 1 week

## 2023-05-06 NOTE — PHYSICAL THERAPY INITIAL EVALUATION ADULT - CRITERIA FOR SKILLED THERAPEUTIC INTERVENTIONS
home/impairments found/functional limitations in following categories/anticipated discharge recommendation

## 2023-05-06 NOTE — DISCHARGE NOTE PROVIDER - HOSPITAL COURSE
72M from home, ambulates independently, PMHx DM on insulin (last dose was yesterday), HLD, HTN, and medication non-compliance, PSHx hemicolon resection 2011 for colon cancer (currently in remission), p/w complaints of lightheadedness today after fall with LOC and head injury. He reports he slipped and fell on Tuesday morning in the bedroom while trying to turn on the light, he remembers falling unable to hold on to anything to prevent fall and hitting back of his head against the wall with LOC. Reports being being on the floor for about 4- hours as he was unable to get up on his own at first, until he was able to reach the side of the bed and then eventually pulled himself up. Denies preceding HA, SOB, chest pain, and no dizziness or lightheadedness. Denies urinary incontinence. Patient denies any injuries at the time, but complaining of feeling lightheaded after the fall, confused on Wednesday night. He works as a  on the night shifts, reporting that directly after the fall he felt well enough to work but on Wednesday night the symptoms started preventing him to drive to work. Reports on Thursday he noticed with left foot pain, swelling, difficulty ambulating.  Patient took nothing for his pain.  Denies fever, chills.  No reported falls or syncope in the past. Denies prior foot injury or trauma. Patient denies HA, chest pain, SOB, abdominal pain, n/v/d, and no changes in urination or bm.   Pt admitted for further evaluation and management. A set of laboratory tests and imaging was performed. The XR for left foot/ ankle: suspicion of gas foci to 4th interspace and lateral 5th toe. Pt was evaluated by the podiatry team for 2x2 necrotic wound with scant purulence s/p bedside ID, cleaned and dressed. (+) Erythema noted tracking proximally to ankle joint and medially to 1st met lateral border. Pt started on antibiotics on vanc and zosyn on pre-op. 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 surgical path culture result +ve for Morganella morgani, and Staph and proteus mirabilis. Blood cultures was +ve for MSSA. Vanc was Discontinued. Pt was evaluated by the ID team. He was started on  Cefepim and Nafcillin , however as the repeat cultures remained positive, the antibiotic was changed to Ertapenem 1g qd and Cefazolin 2g q8. Repeat BCx on 5/3 were negative. Podiatry team performed debridement and graft on 5/5. Pt had PICC line placement ???? Antibiotics to be continued until ----- for ----- duration to complete the course of treatment.     As patient reported a fall, A CT scan of the head was performed which showed No acute intracranial hemorrhage, hydrocephalus or extra-axial fluid collection. Mild parenchymal volume loss and mild chronic microvascular ischemic changes. Abnormal enlargement of the sella with suggestion of masslike lesion perhaps pituitary in origin with apparent mass effect on the optic chiasm. Further evaluation of this finding with dedicated contrast-enhanced MR imaging of the brain, sella/pituitary protocol may be obtained, as clinically warranted No CT evidence for acute transcortical infarction. Pt was evaluated by the Neurologist and Endocrinologist. MR brain and orbits performed which showed MR orbits/sella:   1.8 cm AP by 1.6 cm CC by 2 cm TRV pituitary mass consistent with an adenoma. There is mild extension into the LEFT cavernous sinus. The RIGHT cavernous sinuses and para cavernous regions appear intact. The cavernous segments of the internal carotid arteries demonstrate expected flow-void indicating patency. There is mild mass effect on the optic chiasm, LEFT greater than RIGHT. The infundibulum is slightly deviated to the LEFT. MR brain: Minimal white matter ischemia at the bifrontal and LEFT parietal subcortical white matter. Moderate mucosal thickening/fluid in the LEFT mastoid air cells. The hormones including FSH, LH, prolactin, TSH,  cortisol, LH, FSH all WNL, this appear adenoma is non secretory          72M from home, ambulates independently, PMHx DM on insulin (last dose was yesterday), HLD, HTN, and medication non-compliance, PSHx hemicolon resection 2011 for colon cancer (currently in remission), p/w complaints of lightheadedness today after fall with LOC and head injury. He reports he slipped and fell on Tuesday morning in the bedroom while trying to turn on the light, he remembers falling unable to hold on to anything to prevent fall and hitting back of his head against the wall with LOC. Reports being being on the floor for about 4- hours as he was unable to get up on his own at first, until he was able to reach the side of the bed and then eventually pulled himself up. Denies preceding HA, SOB, chest pain, and no dizziness or lightheadedness. Denies urinary incontinence. Patient denies any injuries at the time, but complaining of feeling lightheaded after the fall, confused on Wednesday night. He works as a  on the night shifts, reporting that directly after the fall he felt well enough to work but on Wednesday night the symptoms started preventing him to drive to work. Reports on Thursday he noticed with left foot pain, swelling, difficulty ambulating.  Patient took nothing for his pain.  Denies fever, chills.  No reported falls or syncope in the past. Denies prior foot injury or trauma. Patient denies HA, chest pain, SOB, abdominal pain, n/v/d, and no changes in urination or bm.     Pt admitted for further evaluation and management. A set of laboratory tests and imaging was performed. The XR for left foot/ ankle: suspicion of gas foci to 4th interspace and lateral 5th toe. Pt was evaluated by the podiatry team for 2x2 necrotic wound with scant purulence s/p bedside ID, cleaned and dressed. (+) Erythema noted tracking proximally to ankle joint and medially to 1st met lateral border. Pt started on antibiotics on vanc and zosyn on pre-op. 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 surgical path culture result +ve for Morganella morgani, and Staph and proteus mirabilis. Blood cultures was +ve for MSSA. Vanc was Discontinued. Pt was evaluated by the ID team. He was started on  Cefepim and Nafcillin , however as the repeat cultures remained positive, the antibiotic was changed to Ertapenem 1g qd and Cefazolin 2g q8. Repeat BCx on 5/3 were negative. Podiatry team performed debridement and graft on 5/5. TTE and FABIAN performed which were negative for any signs of vegetation of the valves.   Pt had PICC line placement ???? Antibiotics to be continued until ----- for ----- duration to complete the course of treatment. Podiatry applied Wound Vac. Pt was evaluated by the PT which recommended -------------. ????? podiatry recommended????????    As patient reported a fall, A CT scan of the head was performed which showed No acute intracranial hemorrhage, hydrocephalus or extra-axial fluid collection. Mild parenchymal volume loss and mild chronic microvascular ischemic changes. Abnormal enlargement of the sella with suggestion of masslike lesion perhaps pituitary in origin with apparent mass effect on the optic chiasm. Further evaluation of this finding with dedicated contrast-enhanced MR imaging of the brain, sella/pituitary protocol may be obtained, as clinically warranted No CT evidence for acute transcortical infarction. Pt was evaluated by the Neurologist and Endocrinologist. MR brain and orbits performed which showed MR orbits/sella:   1.8 cm AP by 1.6 cm CC by 2 cm TRV pituitary mass consistent with an adenoma. There is mild extension into the LEFT cavernous sinus. The RIGHT cavernous sinuses and para cavernous regions appear intact. The cavernous segments of the internal carotid arteries demonstrate expected flow-void indicating patency. There is mild mass effect on the optic chiasm, LEFT greater than RIGHT. The infundibulum is slightly deviated to the LEFT. MR brain: Minimal white matter ischemia at the bifrontal and LEFT parietal subcortical white matter. Moderate mucosal thickening/fluid in the LEFT mastoid air cells. The hormones including FSH, LH, prolactin, TSH,  cortisol, LH, FSH all WNL, this appear adenoma is non secretory. Pt to follow up with Neurosurgery as outpatient for further evaluation and intervention.     Pt has pmhx of DM, evaluated by  Endorinology, insulin Lantus, Lispro and ISS provided and adjusted based on the sugar level.     Pt is stable for discharge. Pt has been advised to follow up as outpatient. Case has been discussed with the attending. This is just a summary of the case. For further information please refer to pt. chart document.   72M from home, ambulates independently, PMHx DM on insulin (last dose was yesterday), HLD, HTN, and medication non-compliance, PSHx hemicolon resection 2011 for colon cancer (currently in remission), p/w complaints of lightheadedness today after fall with LOC and head injury. He reports he slipped and fell on Tuesday morning in the bedroom while trying to turn on the light, he remembers falling unable to hold on to anything to prevent fall and hitting back of his head against the wall with LOC. Reports being being on the floor for about 4- hours as he was unable to get up on his own at first, until he was able to reach the side of the bed and then eventually pulled himself up. Denies preceding HA, SOB, chest pain, and no dizziness or lightheadedness. Denies urinary incontinence. Patient denies any injuries at the time, but complaining of feeling lightheaded after the fall, confused on Wednesday night. He works as a  on the night shifts, reporting that directly after the fall he felt well enough to work but on Wednesday night the symptoms started preventing him to drive to work. Reports on Thursday he noticed with left foot pain, swelling, difficulty ambulating.  Patient took nothing for his pain.  Denies fever, chills.  No reported falls or syncope in the past. Denies prior foot injury or trauma. Patient denies HA, chest pain, SOB, abdominal pain, n/v/d, and no changes in urination or bm.     Pt admitted for further evaluation and management. A set of laboratory tests and imaging was performed. The XR for left foot/ ankle: suspicion of gas foci to 4th interspace and lateral 5th toe. Pt was evaluated by the podiatry team for 2x2 necrotic wound with scant purulence s/p bedside ID, cleaned and dressed. (+) Erythema noted tracking proximally to ankle joint and medially to 1st met lateral border. Pt started on antibiotics on vanc and zosyn on pre-op. 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 surgical path culture result +ve for Morganella morgani, and Staph and proteus mirabilis. Blood cultures was +ve for MSSA. Vanc was Discontinued. Pt was evaluated by the ID team. He was started on  Cefepim and Nafcillin , however as the repeat cultures remained positive, the antibiotic was changed to Ertapenem 1g qd and Cefazolin 2g q8. Repeat BCx on 5/3 were negative. Podiatry team performed debridement and graft on 5/5. TTE and FABIAN performed which were negative for any signs of vegetation of the valves.   Pt had PICC line placement ???? Antibiotics to be continued until 6/14 duration to complete the course of treatment. Podiatry applied Wound Vac. Pt was evaluated by the PT which recommended -------------. ????? podiatry recommended [place]    As patient reported a fall, A CT scan of the head was performed which showed No acute intracranial hemorrhage, hydrocephalus or extra-axial fluid collection. Mild parenchymal volume loss and mild chronic microvascular ischemic changes. Abnormal enlargement of the sella with suggestion of masslike lesion perhaps pituitary in origin with apparent mass effect on the optic chiasm. Further evaluation of this finding with dedicated contrast-enhanced MR imaging of the brain, sella/pituitary protocol may be obtained, as clinically warranted No CT evidence for acute transcortical infarction. Pt was evaluated by the Neurologist and Endocrinologist. MR brain and orbits performed which showed MR orbits/sella:   1.8 cm AP by 1.6 cm CC by 2 cm TRV pituitary mass consistent with an adenoma. There is mild extension into the LEFT cavernous sinus. The RIGHT cavernous sinuses and para cavernous regions appear intact. The cavernous segments of the internal carotid arteries demonstrate expected flow-void indicating patency. There is mild mass effect on the optic chiasm, LEFT greater than RIGHT. The infundibulum is slightly deviated to the LEFT. MR brain: Minimal white matter ischemia at the bifrontal and LEFT parietal subcortical white matter. Moderate mucosal thickening/fluid in the LEFT mastoid air cells. The hormones including FSH, LH, prolactin, TSH,  cortisol, LH, FSH all WNL, this appear adenoma is non secretory. Pt to follow up with Neurosurgery as outpatient for further evaluation and intervention.     Pt has pmhx of DM, evaluated by  Endorinology, insulin Lantus, Lispro and ISS provided and adjusted based on the sugar level.     Pt is stable for discharge. Pt has been advised to follow up as outpatient. Case has been discussed with the attending. This is just a summary of the case. For further information please refer to pt. chart document.   72M from home, ambulates independently, PMHx DM on insulin (last dose was yesterday), HLD, HTN, and medication non-compliance, PSHx hemicolon resection 2011 for colon cancer (currently in remission), p/w complaints of lightheadedness today after fall with LOC and head injury. He reports he slipped and fell on Tuesday morning in the bedroom while trying to turn on the light, he remembers falling unable to hold on to anything to prevent fall and hitting back of his head against the wall with LOC. Reports being being on the floor for about 4- hours as he was unable to get up on his own at first, until he was able to reach the side of the bed and then eventually pulled himself up. Denies preceding HA, SOB, chest pain, and no dizziness or lightheadedness. Denies urinary incontinence. Patient denies any injuries at the time, but complaining of feeling lightheaded after the fall, confused on Wednesday night. He works as a  on the night shifts, reporting that directly after the fall he felt well enough to work but on Wednesday night the symptoms started preventing him to drive to work. Reports on Thursday he noticed with left foot pain, swelling, difficulty ambulating.  Patient took nothing for his pain.  Denies fever, chills.  No reported falls or syncope in the past. Denies prior foot injury or trauma. Patient denies HA, chest pain, SOB, abdominal pain, n/v/d, and no changes in urination or bm.     Pt admitted for further evaluation and management. A set of laboratory tests and imaging was performed. The XR for left foot/ ankle: suspicion of gas foci to 4th interspace and lateral 5th toe. Pt was evaluated by the podiatry team for 2x2 necrotic wound with scant purulence s/p bedside ID, cleaned and dressed. (+) Erythema noted tracking proximally to ankle joint and medially to 1st met lateral border. Pt started on antibiotics on vanc and zosyn on pre-op. 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 surgical path culture result +ve for Morganella morgani, and Staph and proteus mirabilis. Blood cultures was +ve for MSSA. Vanc was Discontinued. Pt was evaluated by the ID team. He was started on  Cefepim and Nafcillin , however as the repeat cultures remained positive, the antibiotic was changed to Ertapenem 1g qd and Cefazolin 2g q8. Repeat BCx on 5/3 were negative. Podiatry team performed debridement and graft on 5/5. TTE and FABIAN performed which were negative for any signs of vegetation of the valves.   Pt had PICC line placement ERTAPENEM to be continued until 6/14 duration to complete the course of treatment. Podiatry applied Wound Vac. Pt was evaluated by the PT which recommended -------------. ????? podiatry recommended [place]    As patient reported a fall, A CT scan of the head was performed which showed No acute intracranial hemorrhage, hydrocephalus or extra-axial fluid collection. Mild parenchymal volume loss and mild chronic microvascular ischemic changes. Abnormal enlargement of the sella with suggestion of masslike lesion perhaps pituitary in origin with apparent mass effect on the optic chiasm. Further evaluation of this finding with dedicated contrast-enhanced MR imaging of the brain, sella/pituitary protocol may be obtained, as clinically warranted No CT evidence for acute transcortical infarction. Pt was evaluated by the Neurologist and Endocrinologist. MR brain and orbits performed which showed MR orbits/sella:   1.8 cm AP by 1.6 cm CC by 2 cm TRV pituitary mass consistent with an adenoma. There is mild extension into the LEFT cavernous sinus. The RIGHT cavernous sinuses and para cavernous regions appear intact. The cavernous segments of the internal carotid arteries demonstrate expected flow-void indicating patency. There is mild mass effect on the optic chiasm, LEFT greater than RIGHT. The infundibulum is slightly deviated to the LEFT. MR brain: Minimal white matter ischemia at the bifrontal and LEFT parietal subcortical white matter. Moderate mucosal thickening/fluid in the LEFT mastoid air cells. The hormones including FSH, LH, prolactin, TSH,  cortisol, LH, FSH all WNL, this appear adenoma is non secretory. Pt to follow up with Neurosurgery as outpatient for further evaluation and intervention.     Pt has pmhx of DM, evaluated by  Endocrinology, insulin Lantus, Lispro and ISS provided and adjusted based on the sugar level.     Pt is stable for discharge. Pt has been advised to follow up as outpatient. Case has been discussed with the attending. This is just a summary of the case. For further information please refer to pt. chart document.   72M from home, ambulates independently, PMHx DM on insulin (last dose was yesterday), HLD, HTN, and medication non-compliance, PSHx hemicolon resection 2011 for colon cancer (currently in remission), p/w complaints of lightheadedness today after fall with LOC and head injury. He reports he slipped and fell on Tuesday morning in the bedroom while trying to turn on the light, he remembers falling unable to hold on to anything to prevent fall and hitting back of his head against the wall with LOC. Reports being being on the floor for about 4- hours as he was unable to get up on his own at first, until he was able to reach the side of the bed and then eventually pulled himself up. Denies preceding HA, SOB, chest pain, and no dizziness or lightheadedness. Denies urinary incontinence. Patient denies any injuries at the time, but complaining of feeling lightheaded after the fall, confused on Wednesday night. He works as a  on the night shifts, reporting that directly after the fall he felt well enough to work but on Wednesday night the symptoms started preventing him to drive to work. Reports on Thursday he noticed with left foot pain, swelling, difficulty ambulating.  Patient took nothing for his pain.  Denies fever, chills.  No reported falls or syncope in the past. Denies prior foot injury or trauma. Patient denies HA, chest pain, SOB, abdominal pain, n/v/d, and no changes in urination or bm.     Pt admitted for further evaluation and management. A set of laboratory tests and imaging was performed. The XR for left foot/ ankle: suspicion of gas foci to 4th interspace and lateral 5th toe. Pt was evaluated by the podiatry team for 2x2 necrotic wound with scant purulence s/p bedside ID, cleaned and dressed. (+) Erythema noted tracking proximally to ankle joint and medially to 1st met lateral border. Pt started on antibiotics on vanc and zosyn on pre-op. 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 surgical path culture result +ve for Morganella morgani, and Staph and proteus mirabilis. Blood cultures was +ve for MSSA. Vanc was Discontinued. Pt was evaluated by the ID team. He was started on  Cefepim and Nafcillin , however as the repeat cultures remained positive, the antibiotic was changed to Ertapenem 1g qd and Cefazolin 2g q8. Repeat BCx on 5/3 were negative. Podiatry team performed debridement and graft on 5/5. TTE and FABIAN performed which were negative for any signs of vegetation of the valves.   Pt had PICC line placement ERTAPENEM to be continued until 6/14 duration to complete the course of treatment. Podiatry applied Wound Vac. Pt was evaluated by the PT which recommended -------------. ????? podiatry recommended [place]. On 5/10; podiatry removed dressing which showed the 4 digit more dusky than expected; vascular surgery was consulted and recommended_____    As patient reported a fall, A CT scan of the head was performed which showed No acute intracranial hemorrhage, hydrocephalus or extra-axial fluid collection. Mild parenchymal volume loss and mild chronic microvascular ischemic changes. Abnormal enlargement of the sella with suggestion of masslike lesion perhaps pituitary in origin with apparent mass effect on the optic chiasm. Further evaluation of this finding with dedicated contrast-enhanced MR imaging of the brain, sella/pituitary protocol may be obtained, as clinically warranted No CT evidence for acute transcortical infarction. Pt was evaluated by the Neurologist and Endocrinologist. MR brain and orbits performed which showed MR orbits/sella:   1.8 cm AP by 1.6 cm CC by 2 cm TRV pituitary mass consistent with an adenoma. There is mild extension into the LEFT cavernous sinus. The RIGHT cavernous sinuses and para cavernous regions appear intact. The cavernous segments of the internal carotid arteries demonstrate expected flow-void indicating patency. There is mild mass effect on the optic chiasm, LEFT greater than RIGHT. The infundibulum is slightly deviated to the LEFT. MR brain: Minimal white matter ischemia at the bifrontal and LEFT parietal subcortical white matter. Moderate mucosal thickening/fluid in the LEFT mastoid air cells. The hormones including FSH, LH, prolactin, TSH,  cortisol, LH, FSH all WNL, this appear adenoma is non secretory. Pt to follow up with Neurosurgery as outpatient for further evaluation and intervention.     Pt has pmhx of DM, evaluated by  Endocrinology, insulin Lantus, Lispro and ISS provided and adjusted based on the sugar level.     Pt is stable for discharge. Pt has been advised to follow up as outpatient. Case has been discussed with the attending. This is just a summary of the case. For further information please refer to pt. chart document.   72M from home, ambulates independently, PMHx DM on insulin (last dose was yesterday), HLD, HTN, and medication non-compliance, PSHx hemicolon resection 2011 for colon cancer (currently in remission), p/w complaints of lightheadedness today after fall with LOC and head injury. He reports he slipped and fell on Tuesday morning in the bedroom while trying to turn on the light, he remembers falling unable to hold on to anything to prevent fall and hitting back of his head against the wall with LOC. Reports being being on the floor for about 4- hours as he was unable to get up on his own at first, until he was able to reach the side of the bed and then eventually pulled himself up. Denies preceding HA, SOB, chest pain, and no dizziness or lightheadedness. Denies urinary incontinence. Patient denies any injuries at the time, but complaining of feeling lightheaded after the fall, confused on Wednesday night. He works as a  on the night shifts, reporting that directly after the fall he felt well enough to work but on Wednesday night the symptoms started preventing him to drive to work. Reports on Thursday he noticed with left foot pain, swelling, difficulty ambulating.  Patient took nothing for his pain.  Denies fever, chills.  No reported falls or syncope in the past. Denies prior foot injury or trauma. Patient denies HA, chest pain, SOB, abdominal pain, n/v/d, and no changes in urination or bm.     Pt admitted for further evaluation and management. A set of laboratory tests and imaging was performed. The XR for left foot/ ankle: suspicion of gas foci to 4th interspace and lateral 5th toe. Pt was evaluated by the podiatry team for 2x2 necrotic wound with scant purulence s/p bedside ID, cleaned and dressed. (+) Erythema noted tracking proximally to ankle joint and medially to 1st met lateral border. Pt started on antibiotics on vanc and zosyn on pre-op. 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 surgical path culture result +ve for Morganella morgani, and Staph and proteus mirabilis. Blood cultures was +ve for MSSA. Vanc was Discontinued. Pt was evaluated by the ID team. He was started on  Cefepim and Nafcillin , however as the repeat cultures remained positive, the antibiotic was changed to Ertapenem 1g qd and Cefazolin 2g q8. Repeat BCx on 5/3 were negative. Podiatry team performed debridement and graft on 5/5. TTE and FABIAN performed which were negative for any signs of vegetation of the valves.   Pt had PICC line placement ERTAPENEM to be continued until 6/14 duration to complete the course of treatment. Podiatry applied Wound Vac. Pt was evaluated by the PT which recommended -MO. podiatry recommended MO. On 5/10; podiatry removed dressing which showed the 4 digit more dusky than expected; vascular surgery was consulted and recommended outpatient follow up.    As patient reported a fall, A CT scan of the head was performed which showed No acute intracranial hemorrhage, hydrocephalus or extra-axial fluid collection. Mild parenchymal volume loss and mild chronic microvascular ischemic changes. Abnormal enlargement of the sella with suggestion of masslike lesion perhaps pituitary in origin with apparent mass effect on the optic chiasm. Further evaluation of this finding with dedicated contrast-enhanced MR imaging of the brain, sella/pituitary protocol may be obtained, as clinically warranted No CT evidence for acute transcortical infarction. Pt was evaluated by the Neurologist and Endocrinologist. MR brain and orbits performed which showed MR orbits/sella:   1.8 cm AP by 1.6 cm CC by 2 cm TRV pituitary mass consistent with an adenoma. There is mild extension into the LEFT cavernous sinus. The RIGHT cavernous sinuses and para cavernous regions appear intact. The cavernous segments of the internal carotid arteries demonstrate expected flow-void indicating patency. There is mild mass effect on the optic chiasm, LEFT greater than RIGHT. The infundibulum is slightly deviated to the LEFT. MR brain: Minimal white matter ischemia at the bifrontal and LEFT parietal subcortical white matter. Moderate mucosal thickening/fluid in the LEFT mastoid air cells. The hormones including FSH, LH, prolactin, TSH,  cortisol, LH, FSH all WNL, this appear adenoma is non secretory. Pt to follow up with Neurosurgery as outpatient for further evaluation and intervention.     Pt has pmhx of DM, evaluated by  Endocrinology, insulin Lantus, Lispro and ISS provided and adjusted based on the sugar level.     Pt is stable for discharge. Pt has been advised to follow up as outpatient. Case has been discussed with the attending. This is just a summary of the case. For further information please refer to pt. chart document.   72M from home, ambulates independently, PMHx DM on insulin (last dose was yesterday), HLD, HTN, and medication non-compliance, PSHx hemicolon resection 2011 for colon cancer (currently in remission), p/w complaints of lightheadedness today after fall with LOC and head injury. He reports he slipped and fell on Tuesday morning in the bedroom while trying to turn on the light, he remembers falling unable to hold on to anything to prevent fall and hitting back of his head against the wall with LOC. Reports being being on the floor for about 4- hours as he was unable to get up on his own at first, until he was able to reach the side of the bed and then eventually pulled himself up. Denies preceding HA, SOB, chest pain, and no dizziness or lightheadedness. Denies urinary incontinence. Patient denies any injuries at the time, but complaining of feeling lightheaded after the fall, confused on Wednesday night. He works as a  on the night shifts, reporting that directly after the fall he felt well enough to work but on Wednesday night the symptoms started preventing him to drive to work. Reports on Thursday he noticed with left foot pain, swelling, difficulty ambulating.  Patient took nothing for his pain.  Denies fever, chills.  No reported falls or syncope in the past. Denies prior foot injury or trauma. Patient denies HA, chest pain, SOB, abdominal pain, n/v/d, and no changes in urination or bm.     Pt admitted for further evaluation and management. A set of laboratory tests and imaging was performed. The XR for left foot/ ankle: suspicion of gas foci to 4th interspace and lateral 5th toe. Pt was evaluated by the podiatry team for 2x2 necrotic wound with scant purulence s/p bedside ID, cleaned and dressed. (+) Erythema noted tracking proximally to ankle joint and medially to 1st met lateral border. Pt started on antibiotics on vanc and zosyn on pre-op. 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 surgical path culture result +ve for Morganella morgani, and Staph and proteus mirabilis. Blood cultures was +ve for MSSA. Vanc was Discontinued. Pt was evaluated by the ID team. He was started on  Cefepim and Nafcillin , however as the repeat cultures remained positive, the antibiotic was changed to Ertapenem 1g qd and Cefazolin 2g q8. Repeat BCx on 5/3 were negative. Podiatry team performed debridement and graft on 5/5. TTE and FABIAN performed which were negative for any signs of vegetation of the valves.   Pt had PICC line placement ERTAPENEM to be continued until 6/14 duration to complete the course of treatment. Podiatry applied Wound Vac. Pt was evaluated by the PT which recommended -MO. podiatry recommended MO. On 5/10; podiatry removed dressing which showed the 4 digit more dusky than expected; vascular surgery was consulted and recommended outpatient follow up.    As patient reported a fall, A CT scan of the head was performed which showed No acute intracranial hemorrhage, hydrocephalus or extra-axial fluid collection. Mild parenchymal volume loss and mild chronic microvascular ischemic changes. Abnormal enlargement of the sella with suggestion of masslike lesion perhaps pituitary in origin with apparent mass effect on the optic chiasm. Further evaluation of this finding with dedicated contrast-enhanced MR imaging of the brain, sella/pituitary protocol may be obtained, as clinically warranted No CT evidence for acute transcortical infarction. Pt was evaluated by the Neurologist and Endocrinologist. MR brain and orbits performed which showed MR orbits/sella:   1.8 cm AP by 1.6 cm CC by 2 cm TRV pituitary mass consistent with an adenoma. There is mild extension into the LEFT cavernous sinus. The RIGHT cavernous sinuses and para cavernous regions appear intact. The cavernous segments of the internal carotid arteries demonstrate expected flow-void indicating patency. There is mild mass effect on the optic chiasm, LEFT greater than RIGHT. The infundibulum is slightly deviated to the LEFT. MR brain: Minimal white matter ischemia at the bifrontal and LEFT parietal subcortical white matter. Moderate mucosal thickening/fluid in the LEFT mastoid air cells. The hormones including FSH, LH, prolactin, TSH,  cortisol, LH, FSH all WNL, this appear adenoma is non secretory. Pt to follow up with Neurosurgery as outpatient for further evaluation and intervention.     Pt has pmhx of DM, evaluated by  Endocrinology, insulin Lantus, Lispro and ISS provided and adjusted based on the sugar level.     Pt is stable for discharge. Pt has been advised to follow up as outpatient. Case has been discussed with the attending. This is just a summary of the case. For further information please refer to pt. chart document.    Dressed L. foot wound with saline flush, adaptic, 4x4 gauze soaked with saline, ABD, Mauro, light ACE  Appreciate vascular consultation   Recc IV antibiotics As Per ID; currently on ertapenem and ancef   Reviewed RIVER/PVR   NWB to LLE  Discussed importance of daily foot examinations and proper shoe gear and to importance of lower Fasting Blood Glucose levels.   Podiatry to follow while in house  Discussed with Attending Dr. Grant NUÑEZ:  Wound vaccuum instructions for discharge: Every other day: Cleanse wound with normal saline, pat dry with sterile guaze, apply betadine to edges of wound if macerated, place adaptic over graft and open wound, Cut black granulo-foam to size of wound base and adhere to wound with adhesive strips. Cut dime sized whole in adhesive overlying black granulo-foam and apply suction tubing. Ensure seal is air tight with seal check. Wound vaccuum setting should be 125mmhg with continuous suction. Cover with kerlix and light ACE bandage.

## 2023-05-06 NOTE — CHART NOTE - NSCHARTNOTEFT_GEN_A_CORE
Assessment:       Nutrition reassessment for follow-up visit. Dietitian Initial Evaluation done 5/5/23, pt went for surgery for diabetic foot gas gangrene. Chart reviewed, pt re-visited today, alert, oriented, well-engaged; appetite good, no nutrition related concerns, tolerating meals well, food choices obtained and forwarded to Dietary, also pt assisted by Diet Technician for food preferences/daily menu selection; DM better controlled with infection resolving, finger stick level=84 to 127, medication adjusted and continue DM diet per MD; Pt reported Knowledgeable about DM diet, watching portions of food; RD business card given for contact as needed     Factors impacting intake: [ X ] other: Religion/ethnic/cultural/personal food preferences; acute on chronic comorbidities including gas gangrene of foot, uncontrolled DM    Diet Prescription:   Diet, DASH/TLC:   Sodium & Cholesterol Restricted (05-05-23 @ 08:41)    Pertinent Medications: MEDICATIONS  (STANDING):  brimonidine 0.2% Ophthalmic Solution 1 Drop(s) Left EYE every 12 hours  ceFAZolin   IVPB 2000 milliGRAM(s) IV Intermittent every 8 hours  ceFAZolin   IVPB      chlorhexidine 2% Cloths 1 Application(s) Topical <User Schedule>  enoxaparin Injectable 40 milliGRAM(s) SubCutaneous every 24 hours  ertapenem  IVPB 1000 milliGRAM(s) IV Intermittent every 24 hours  famotidine    Tablet 40 milliGRAM(s) Oral every 12 hours  gabapentin 300 milliGRAM(s) Oral every 8 hours  insulin glargine Injectable (LANTUS) 20 Unit(s) SubCutaneous at bedtime  insulin lispro (ADMELOG) corrective regimen sliding scale   SubCutaneous every 6 hours  insulin lispro Injectable (ADMELOG) 6 Unit(s) SubCutaneous three times a day before meals  lactated ringers. 1000 milliLiter(s) (100 mL/Hr) IV Continuous <Continuous>    MEDICATIONS  (PRN):  acetaminophen     Tablet .. 650 milliGRAM(s) Oral every 6 hours PRN Temp greater or equal to 38C (100.4F), Mild Pain (1 - 3)  ondansetron Injectable 4 milliGRAM(s) IV Push every 8 hours PRN Nausea and/or Vomiting    Pertinent Labs: 05-05 Na137 mmol/L Glu 124 mg/dL<H> K+ 4.7 mmol/L Cr  1.09 mg/dL BUN 9 mg/dL 05-05 Phos 2.9 mg/dL 05-05 Alb 2.1 g/dL<L> 04-30 Chol 147 mg/dL LDL --    HDL 33 mg/dL<L> Trig 107 mg/dL     CAPILLARY BLOOD GLUCOSE    POCT Blood Glucose.: 110 mg/dL (06 May 2023 11:35)  POCT Blood Glucose.: 114 mg/dL (06 May 2023 07:31)  POCT Blood Glucose.: 107 mg/dL (06 May 2023 06:00)  POCT Blood Glucose.: 127 mg/dL (06 May 2023 00:03)  POCT Blood Glucose.: 84 mg/dL (05 May 2023 21:26)  POCT Blood Glucose.: 98 mg/dL (05 May 2023 16:53)    Skin: wounds     Estimated Needs:   [ X ] no change since previous assessment  [ ] recalculated:     Previous Nutrition Diagnosis:   [ X ] Altered Nutrition Related Laboratory Values     Nutrition Diagnosis is [ X ] ongoing  [ X ] Improving   [ ] resolved [ ] not applicable     New Nutrition Diagnosis: [ X ] not applicable     Interventions:   Recommend  [ X ] Change Diet To: Consistent CHO DASH diet ( continue Diabetic modification per MD)  [ ] Nutrition Supplement  [ ] Nutrition Support  [ X ] Other: Discussed with MD/RN; diet Rx modified and pending verification                    Provide food choices within diet Rx as available/updated     Monitoring and Evaluation:   [ X ] PO intake [ x ] Tolerance to diet prescription [ x ] weights [ x ] labs[ x ] follow up per protocol  [ ] other:

## 2023-05-06 NOTE — PROGRESS NOTE ADULT - PROBLEM SELECTOR PLAN 5
CTH: noted above   MR brain w and w/o IV contrast w/ pituitary sections and MR orbits - noted above  - Hormone work-up unremarkable  -  appear adenoma is non secretory  - NeuroSx intervention as outpt once completed antibiotic course as adenoma with mild compression on Optic chiasm  Neurology consulted: Dr. Restrepo  Endocrinology Dr. Arreola consulted

## 2023-05-06 NOTE — PROGRESS NOTE ADULT - PROBLEM SELECTOR PLAN 2
Blood cultures: +ve MSSA  Vanc D/Leo  D/Leo Cefepim and Nafcillin for MSSA on blood cultures  c/w  Ertapenem 1g qd and Cefazolin 2g q8   TTE unremarkable  Repeat blood cx positive for Staph Aureus  FABIAN unremarkable  Dr. Gao ID consulted

## 2023-05-06 NOTE — DISCHARGE NOTE PROVIDER - NSFOLLOWUPCLINICS_GEN_ALL_ED_FT
[facility]  Ophthalmology  [address] [place] [address], [facility] [address]  [place] [address]  Phone: [phone]  Fax:

## 2023-05-06 NOTE — PROGRESS NOTE ADULT - PROBLEM SELECTOR PLAN 1
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   surgical path prelim result +ve for Morganella morgani, and Staph  Blood cultures: +ve MSSA   Vanc D/Leo  D/Leo Cefepim and Nafcillin for MSSA on blood cultures  c/w Ertapenem 1g qd and Cefazolin 2g q8   Repeat blood cx positive for Staph Aureus  Repeat culture on 5/3 : neg   debridement and graft done today on 5/5   PICC line placement likely on Monday   Dr. Gao ID consulted

## 2023-05-06 NOTE — DISCHARGE NOTE PROVIDER - NSDCFUSCHEDAPPT_GEN_ALL_CORE_FT
Ewa Arreola  [facility]  ENDOCRIN [phone] [facility]  Scheduled Appointment: 05/31/2023    Benji Yan  [facility]  NEUROSURG [address] [facility]  Scheduled Appointment: 06/13/2023

## 2023-05-06 NOTE — DISCHARGE NOTE PROVIDER - NSDCCPCAREPLAN_GEN_ALL_CORE_FT
PRINCIPAL DISCHARGE DIAGNOSIS  Diagnosis: Gas gangrene of foot  Assessment and Plan of Treatment: You presented to the hospital with left foot pain and unable to bear weight. You were admitted for further evaluation and management. A set of laboratory tests and imaging was performed. The x-ray for left foot and ankle showed suspicion of gas foci to 4th interspace and lateral 5th toe.You were evaluated by the podiatry team which performed a bedside incision and draingae and wound was, cleaned and dressed. You were provided with different types of antibiotic intravenously which was adjusted based on the bacterial cultures from the wound as well as the blood. Wound culture was positive for multiple bacterias including Morganella morgani, and Staph aureus. Blood cultures was positive for Methicillin sensitive staph aureus.You were evaluated by the ID team. The antibiotics of choice were Ertapenem  qd and Cefazolin. You had a negative repeat blood culture on 5/3. Podiatry team performed debridement and graft on 5/5. Transthoracic and Transesophageal echocardiography [ultrasound of the heart] were negative for any signs of vegetation on the valves of the heart.  Pt had PICC line placement ???? Antibiotics to be continued until ----- for ----- duration to complete the course of treatment. Podiatry applied Wound Vac.   PLEASE continue to take -------- and follow up with -----as outpatient      SECONDARY DISCHARGE DIAGNOSES  Diagnosis: Bacteremia  Assessment and Plan of Treatment: see above [Gas gangrene of foot]    Diagnosis: Sellar or suprasellar mass  Assessment and Plan of Treatment: You  reported a fall, A CT scan of the head was performed which showed No acute intracranial hemorrhage, hydrocephalus or extra-axial fluid collection. Mild parenchymal volume loss and mild chronic microvascular ischemic changes. Abnormal enlargement of the sella with suggestion of masslike lesion perhaps pituitary in origin with apparent mass effect on the optic chiasm. No CT evidence for acute transcortical infarction. Pt was evaluated by the Neurologist and Endocrinologist.   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 There is mild extension into the LEFT cavernous sinus. The RIGHT cavernous sinuses and para cavernous regions appear intact. The cavernous segments of the internal carotid arteries demonstrate expected flow-void indicating patency. There is mild mass effect on the optic chiasm, LEFT greater than RIGHT. The infundibulum is slightly deviated to the LEFT. MR brain: Minimal white matter ischemia at the bifrontal and LEFT parietal subcortical white matter. Moderate mucosal thickening/fluid in the LEFT mastoid air cells.   Your blood test for the hormones produced by the pituitary gland were all within normal range. 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   PLEASE follow up with Neurosurgery as outpatient for further evaluation and intervention.     PRINCIPAL DISCHARGE DIAGNOSIS  Diagnosis: Gas gangrene of foot  Assessment and Plan of Treatment: You presented to the hospital with left foot pain and unable to bear weight. You were admitted for further evaluation and management. A set of laboratory tests and imaging was performed. The x-ray for left foot and ankle showed suspicion of gas foci to 4th interspace and lateral 5th toe.You were evaluated by the podiatry team which performed a bedside incision and draingae and wound was, cleaned and dressed. You were provided with different types of antibiotic intravenously which was adjusted based on the bacterial cultures from the wound as well as the blood. Wound culture was positive for multiple bacterias including Morganella morgani, and Staph aureus. Blood cultures was positive for Methicillin sensitive staph aureus.You were evaluated by the ID team. The antibiotics of choice were Ertapenem  qd and Cefazolin. You had a negative repeat blood culture on 5/3. Podiatry team performed debridement and graft on 5/5. Transthoracic and Transesophageal echocardiography [ultrasound of the heart] were negative for any signs of vegetation on the valves of the heart.  Pt had PICC line placement ???? Antibiotics to be continued until ----- for ----- duration to complete the course of treatment. Podiatry applied Wound Vac.   PLEASE continue to take -------- and follow up with -----as outpatient      SECONDARY DISCHARGE DIAGNOSES  Diagnosis: Bacteremia  Assessment and Plan of Treatment: see above [Gas gangrene of foot]    Diagnosis: Sellar or suprasellar mass  Assessment and Plan of Treatment: You  reported a fall, A CT scan of the head was performed which showed No acute intracranial hemorrhage, hydrocephalus or extra-axial fluid collection. Mild parenchymal volume loss and mild chronic microvascular ischemic changes. Abnormal enlargement of the sella with suggestion of masslike lesion perhaps pituitary in origin with apparent mass effect on the optic chiasm. No CT evidence for acute transcortical infarction. Pt was evaluated by the Neurologist and Endocrinologist.   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 There is mild extension into the LEFT cavernous sinus. The RIGHT cavernous sinuses and para cavernous regions appear intact. The cavernous segments of the internal carotid arteries demonstrate expected flow-void indicating patency. There is mild mass effect on the optic chiasm, LEFT greater than RIGHT. The infundibulum is slightly deviated to the LEFT. MR brain: Minimal white matter ischemia at the bifrontal and LEFT parietal subcortical white matter. Moderate mucosal thickening/fluid in the LEFT mastoid air cells.   Your blood test for the hormones produced by the pituitary gland were all within normal range. 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   PLEASE follow up with Neurosurgery as outpatient for further evaluation and intervention.    Diagnosis: DM (diabetes mellitus)  Assessment and Plan of Treatment: You have past medical history of diabetes. Your were provided with insulin which was adjusted based on your sugar levels during the hospital course. Your HbA1C which is your average three month sugar was 12.2% which is very high.  PLEASE continue to take  -----------------------------  and follow up with your PCP and Endocrinologist as outpatient for further adjsutmend of medication if required.   Kindly monitor you surgar level at home and document the numbers to present to your Endocrinologist for better adjustment of your medications and monitor your diet and decrease your intake of salt, sugar, saturated fat. Please exercise 30-45 minutes per day for 5 days a week as tolerated.       PRINCIPAL DISCHARGE DIAGNOSIS  Diagnosis: Gas gangrene of foot  Assessment and Plan of Treatment: You presented to the hospital with left foot pain and unable to bear weight. You were admitted for further evaluation and management. A set of laboratory tests and imaging was performed. The x-ray for left foot and ankle showed suspicion of gas foci to 4th interspace and lateral 5th toe.You were evaluated by the podiatry team which performed a bedside incision and draingae and wound was, cleaned and dressed. You were provided with different types of antibiotic intravenously which was adjusted based on the bacterial cultures from the wound as well as the blood. Wound culture was positive for multiple bacterias including Morganella morgani, and Staph aureus. Blood cultures was positive for Methicillin sensitive staph aureus.You were evaluated by the ID team. The antibiotics of choice were Ertapenem  qd and Cefazolin. You had a negative repeat blood culture on 5/3. Podiatry team performed debridement and graft on 5/5. Transthoracic and Transesophageal echocardiography [ultrasound of the heart] were negative for any signs of vegetation on the valves of the heart.  Pt had PICC line placement ERTAPENEM 1G DAILY to be continued until 6/14for 6 Weeks duration to complete the course of treatment. Podiatry applied Wound Vac.   PLEASE continue to take ERTAPENEM 1G DAILY and follow up with as outpatient      SECONDARY DISCHARGE DIAGNOSES  Diagnosis: Bacteremia  Assessment and Plan of Treatment: see above [Gas gangrene of foot]    Diagnosis: Sellar or suprasellar mass  Assessment and Plan of Treatment: You  reported a fall, A CT scan of the head was performed which showed No acute intracranial hemorrhage, hydrocephalus or extra-axial fluid collection. Mild parenchymal volume loss and mild chronic microvascular ischemic changes. Abnormal enlargement of the sella with suggestion of masslike lesion perhaps pituitary in origin with apparent mass effect on the optic chiasm. No CT evidence for acute transcortical infarction. Pt was evaluated by the Neurologist and Endocrinologist.   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 There is mild extension into the LEFT cavernous sinus. The RIGHT cavernous sinuses and para cavernous regions appear intact. The cavernous segments of the internal carotid arteries demonstrate expected flow-void indicating patency. There is mild mass effect on the optic chiasm, LEFT greater than RIGHT. The infundibulum is slightly deviated to the LEFT. MR brain: Minimal white matter ischemia at the bifrontal and LEFT parietal subcortical white matter. Moderate mucosal thickening/fluid in the LEFT mastoid air cells.   Your blood test for the hormones produced by the pituitary gland were all within normal range. 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   PLEASE follow up with Neurosurgery DR HARTMAN as outpatient for further evaluation and intervention.    Diagnosis: DM (diabetes mellitus)  Assessment and Plan of Treatment: You have past medical history of diabetes. Your were provided with insulin which was adjusted based on your sugar levels during the hospital course. Your HbA1C which is your average three month sugar was 12.2% which is very high.  PLEASE continue to take your diabetes medication  and follow up with your PCP and Endocrinologist as outpatient for further adjsutmend of medication if required.   Kindly monitor you surgar level at home and document the numbers to present to your Endocrinologist for better adjustment of your medications and monitor your diet and decrease your intake of salt, sugar, saturated fat. Please exercise 30-45 minutes per day for 5 days a week as tolerated.

## 2023-05-06 NOTE — DISCHARGE NOTE PROVIDER - NSDCMRMEDTOKEN_GEN_ALL_CORE_FT
ALPHAGAN P 0.1% DROPS: PLACE 1 DROP INTO THE LEFT EYE 2 TIMES A DAY.  FAMOTIDINE 40 MG TABLET: TAKE 1 TABLET (40 MG TOTAL) BY MOUTH 2 (TWO) TIMES A DAY MAX DAILY AMOUNT: 80 MG  GABAPENTIN 300 MG CAPSULE: TAKE 1 CAPSULE BY MOUTH THREE TIMES A DAY  HUMALOG 100 UNIT/ML KWIKPEN: INJECT 20 UNITS BEFORE BREAKFAST AND 40 UNITS BEFORE DINNER  LANTUS SOLOSTAR 100 UNIT/ML: INJECT 45 UNITS SUBCUTANEOUSLY EVERY DAY IN THE MORNING  METFORMIN HCL  MG TABLET: TAKE 2 TABLETS BY MOUTH TWICE A DAY WITH MEALS   ALPHAGAN P 0.1% DROPS: to each affected eye 2 times a day PLACE 1 DROP INTO THE LEFT EYE 2 TIMES A DAY.  Discontinue PICC line at the end of treatment: Discontinue PICC line after the end of treatment on June 10th  FAMOTIDINE 40 MG TABLET: orally 2 times a day TAKE 1 TABLET (40 MG TOTAL) BY MOUTH 2 (TWO) TIMES A DAY MAX DAILY AMOUNT: 80 MG  GABAPENTIN 300 MG CAPSULE: TAKE 1 CAPSULE BY MOUTH THREE TIMES A DAY  HUMALOG 100 UNIT/ML KWIKPEN: INJECT 20 UNITS BEFORE BREAKFAST AND 40 UNITS BEFORE DINNER  LANTUS SOLOSTAR 100 UNIT/ML: INJECT 45 UNITS SUBCUTANEOUSLY EVERY DAY IN THE MORNING  METFORMIN HCL  MG TABLET: TAKE 2 TABLETS BY MOUTH TWICE A DAY WITH MEALS  Normal Saline Flush 0.9% injectable solution: 1 each injectable twice   ALPHAGAN P 0.1% DROPS: to each affected eye 2 times a day PLACE 1 DROP INTO THE LEFT EYE 2 TIMES A DAY.  Discontinue PICC line at the end of treatment: Discontinue PICC line after the end of treatment on June 10th  ertapenem 1 g injection: 1 gram(s) intravenously once a day  FAMOTIDINE 40 MG TABLET: orally 2 times a day TAKE 1 TABLET (40 MG TOTAL) BY MOUTH 2 (TWO) TIMES A DAY MAX DAILY AMOUNT: 80 MG  GABAPENTIN 300 MG CAPSULE: TAKE 1 CAPSULE BY MOUTH THREE TIMES A DAY  HUMALOG 100 UNIT/ML KWIKPEN: INJECT 20 UNITS BEFORE BREAKFAST AND 40 UNITS BEFORE DINNER  Laboratory studies: Please, get labs once a week for 6 weeks.  CBC, CMP, ESR, CRP  LANTUS SOLOSTAR 100 UNIT/ML: INJECT 45 UNITS SUBCUTANEOUSLY EVERY DAY IN THE MORNING  METFORMIN HCL  MG TABLET: TAKE 2 TABLETS BY MOUTH TWICE A DAY WITH MEALS  Normal Saline Flush 0.9% injectable solution: 1 each injectable twice

## 2023-05-06 NOTE — DISCHARGE NOTE PROVIDER - NSDCCAREPROVSEEN_GEN_ALL_CORE_FT
Alexy, Matty Park, Ghazal Mckinney, Gail Gaston, Laura Cabrales, Dori Johns, Wesley Choi, Elicia Arreola, Taya Dowell

## 2023-05-06 NOTE — DISCHARGE NOTE PROVIDER - ATTENDING DISCHARGE PHYSICAL EXAMINATION:
Alert, cooperative man in NAD  Vital Signs Last 24 Hrs  T(C): 36.4 (11 May 2023 13:32), Max: 37 (10 May 2023 21:25)  T(F): 97.5 (11 May 2023 13:32), Max: 98.6 (10 May 2023 21:25)  HR: 89 (11 May 2023 13:32) (77 - 89)  BP: 122/78 (11 May 2023 13:32) (122/78 - 146/79)  BP(mean): --  RR: 18 (11 May 2023 13:32) (18 - 18)  SpO2: 99% (11 May 2023 13:32) (97% - 99%)    Parameters below as of 11 May 2023 13:32  Patient On (Oxygen Delivery Method): room air    Lungs, clear  Cor, RRR  Abdomen, soft  Ext--left foot, s/p removal of 5th digit and 5th metatarsal head.  No exudate.  4th digit is dark in color.   Neurological, non-focal (stocking hypesthesia)

## 2023-05-06 NOTE — PHYSICAL THERAPY INITIAL EVALUATION ADULT - GAIT DEVIATIONS NOTED, PT EVAL
decreased lou/increased time in double stance/decreased velocity of limb motion/decreased stride length

## 2023-05-07 LAB
ALBUMIN SERPL ELPH-MCNC: 2.1 G/DL — LOW (ref 3.5–5)
ALP SERPL-CCNC: 82 U/L — SIGNIFICANT CHANGE UP (ref 40–120)
ALT FLD-CCNC: 21 U/L DA — SIGNIFICANT CHANGE UP (ref 10–60)
ANION GAP SERPL CALC-SCNC: 4 MMOL/L — LOW (ref 5–17)
AST SERPL-CCNC: 19 U/L — SIGNIFICANT CHANGE UP (ref 10–40)
BASOPHILS # BLD AUTO: 0.03 K/UL — SIGNIFICANT CHANGE UP (ref 0–0.2)
BASOPHILS NFR BLD AUTO: 0.4 % — SIGNIFICANT CHANGE UP (ref 0–2)
BILIRUB SERPL-MCNC: 0.2 MG/DL — SIGNIFICANT CHANGE UP (ref 0.2–1.2)
BUN SERPL-MCNC: 11 MG/DL — SIGNIFICANT CHANGE UP (ref 7–18)
CALCIUM SERPL-MCNC: 8.9 MG/DL — SIGNIFICANT CHANGE UP (ref 8.4–10.5)
CHLORIDE SERPL-SCNC: 106 MMOL/L — SIGNIFICANT CHANGE UP (ref 96–108)
CO2 SERPL-SCNC: 28 MMOL/L — SIGNIFICANT CHANGE UP (ref 22–31)
CREAT SERPL-MCNC: 1.15 MG/DL — SIGNIFICANT CHANGE UP (ref 0.5–1.3)
EGFR: 68 ML/MIN/1.73M2 — SIGNIFICANT CHANGE UP
EOSINOPHIL # BLD AUTO: 0.12 K/UL — SIGNIFICANT CHANGE UP (ref 0–0.5)
EOSINOPHIL NFR BLD AUTO: 1.4 % — SIGNIFICANT CHANGE UP (ref 0–6)
ERYTHROCYTE [SEDIMENTATION RATE] IN BLOOD: 97 MM/HR — HIGH (ref 0–20)
GLUCOSE BLDC GLUCOMTR-MCNC: 111 MG/DL — HIGH (ref 70–99)
GLUCOSE BLDC GLUCOMTR-MCNC: 118 MG/DL — HIGH (ref 70–99)
GLUCOSE BLDC GLUCOMTR-MCNC: 124 MG/DL — HIGH (ref 70–99)
GLUCOSE BLDC GLUCOMTR-MCNC: 142 MG/DL — HIGH (ref 70–99)
GLUCOSE BLDC GLUCOMTR-MCNC: 162 MG/DL — HIGH (ref 70–99)
GLUCOSE BLDC GLUCOMTR-MCNC: 177 MG/DL — HIGH (ref 70–99)
GLUCOSE SERPL-MCNC: 126 MG/DL — HIGH (ref 70–99)
HCT VFR BLD CALC: 36.6 % — LOW (ref 39–50)
HGB BLD-MCNC: 11.7 G/DL — LOW (ref 13–17)
IMM GRANULOCYTES NFR BLD AUTO: 0.4 % — SIGNIFICANT CHANGE UP (ref 0–0.9)
LYMPHOCYTES # BLD AUTO: 2.05 K/UL — SIGNIFICANT CHANGE UP (ref 1–3.3)
LYMPHOCYTES # BLD AUTO: 24.6 % — SIGNIFICANT CHANGE UP (ref 13–44)
MAGNESIUM SERPL-MCNC: 2.2 MG/DL — SIGNIFICANT CHANGE UP (ref 1.6–2.6)
MCHC RBC-ENTMCNC: 26.7 PG — LOW (ref 27–34)
MCHC RBC-ENTMCNC: 32 GM/DL — SIGNIFICANT CHANGE UP (ref 32–36)
MCV RBC AUTO: 83.4 FL — SIGNIFICANT CHANGE UP (ref 80–100)
MONOCYTES # BLD AUTO: 0.75 K/UL — SIGNIFICANT CHANGE UP (ref 0–0.9)
MONOCYTES NFR BLD AUTO: 9 % — SIGNIFICANT CHANGE UP (ref 2–14)
NEUTROPHILS # BLD AUTO: 5.37 K/UL — SIGNIFICANT CHANGE UP (ref 1.8–7.4)
NEUTROPHILS NFR BLD AUTO: 64.2 % — SIGNIFICANT CHANGE UP (ref 43–77)
NRBC # BLD: 0 /100 WBCS — SIGNIFICANT CHANGE UP (ref 0–0)
PHOSPHATE SERPL-MCNC: 2.3 MG/DL — LOW (ref 2.5–4.5)
PLATELET # BLD AUTO: 361 K/UL — SIGNIFICANT CHANGE UP (ref 150–400)
POTASSIUM SERPL-MCNC: 4 MMOL/L — SIGNIFICANT CHANGE UP (ref 3.5–5.3)
POTASSIUM SERPL-SCNC: 4 MMOL/L — SIGNIFICANT CHANGE UP (ref 3.5–5.3)
PROT SERPL-MCNC: 7.9 G/DL — SIGNIFICANT CHANGE UP (ref 6–8.3)
RBC # BLD: 4.39 M/UL — SIGNIFICANT CHANGE UP (ref 4.2–5.8)
RBC # FLD: 13.9 % — SIGNIFICANT CHANGE UP (ref 10.3–14.5)
SODIUM SERPL-SCNC: 138 MMOL/L — SIGNIFICANT CHANGE UP (ref 135–145)
WBC # BLD: 8.35 K/UL — SIGNIFICANT CHANGE UP (ref 3.8–10.5)
WBC # FLD AUTO: 8.35 K/UL — SIGNIFICANT CHANGE UP (ref 3.8–10.5)

## 2023-05-07 PROCEDURE — 99232 SBSQ HOSP IP/OBS MODERATE 35: CPT | Mod: GC

## 2023-05-07 RX ADMIN — Medication 650 MILLIGRAM(S): at 07:57

## 2023-05-07 RX ADMIN — Medication 100 MILLIGRAM(S): at 22:01

## 2023-05-07 RX ADMIN — Medication 6 UNIT(S): at 12:27

## 2023-05-07 RX ADMIN — Medication 650 MILLIGRAM(S): at 00:11

## 2023-05-07 RX ADMIN — Medication 6 UNIT(S): at 07:54

## 2023-05-07 RX ADMIN — FAMOTIDINE 40 MILLIGRAM(S): 10 INJECTION INTRAVENOUS at 18:26

## 2023-05-07 RX ADMIN — CHLORHEXIDINE GLUCONATE 1 APPLICATION(S): 213 SOLUTION TOPICAL at 06:16

## 2023-05-07 RX ADMIN — GABAPENTIN 300 MILLIGRAM(S): 400 CAPSULE ORAL at 06:15

## 2023-05-07 RX ADMIN — ERTAPENEM SODIUM 120 MILLIGRAM(S): 1 INJECTION, POWDER, LYOPHILIZED, FOR SOLUTION INTRAMUSCULAR; INTRAVENOUS at 10:00

## 2023-05-07 RX ADMIN — GABAPENTIN 300 MILLIGRAM(S): 400 CAPSULE ORAL at 13:06

## 2023-05-07 RX ADMIN — Medication 100 MILLIGRAM(S): at 06:14

## 2023-05-07 RX ADMIN — Medication 6 UNIT(S): at 17:45

## 2023-05-07 RX ADMIN — FAMOTIDINE 40 MILLIGRAM(S): 10 INJECTION INTRAVENOUS at 06:15

## 2023-05-07 RX ADMIN — GABAPENTIN 300 MILLIGRAM(S): 400 CAPSULE ORAL at 22:02

## 2023-05-07 RX ADMIN — Medication 1: at 12:28

## 2023-05-07 RX ADMIN — Medication 100 MILLIGRAM(S): at 13:06

## 2023-05-07 RX ADMIN — Medication 650 MILLIGRAM(S): at 08:45

## 2023-05-07 RX ADMIN — INSULIN GLARGINE 20 UNIT(S): 100 INJECTION, SOLUTION SUBCUTANEOUS at 22:03

## 2023-05-07 NOTE — PROGRESS NOTE ADULT - SUBJECTIVE AND OBJECTIVE BOX
PGY-1 Progress Note discussed with attending      PLEASE CONTACT ON CALL TEAM:  - On Call Team (Please refer to Marlena) FROM 5:00 PM - 8:30PM  - Nightfloat Team FROM 8:30 -7:30 AM    INTERVAL HPI/OVERNIGHT EVENTS:       REVIEW OF SYSTEMS:  CONSTITUTIONAL: No fever, weight loss, or fatigue  RESPIRATORY: No cough, wheezing, chills or hemoptysis; No shortness of breath  CARDIOVASCULAR: No chest pain, palpitations, dizziness, or leg swelling  GASTROINTESTINAL: No abdominal pain. No nausea, vomiting, or hematemesis; No diarrhea or constipation. No melena or hematochezia.  GENITOURINARY: No dysuria or hematuria, urinary frequency  NEUROLOGICAL: No headaches, memory loss, loss of strength, numbness, or tremors  SKIN: No itching, burning, rashes, or lesions     MEDICATIONS  (STANDING):  brimonidine 0.2% Ophthalmic Solution 1 Drop(s) Left EYE every 12 hours  ceFAZolin   IVPB      ceFAZolin   IVPB 2000 milliGRAM(s) IV Intermittent every 8 hours  chlorhexidine 2% Cloths 1 Application(s) Topical <User Schedule>  enoxaparin Injectable 40 milliGRAM(s) SubCutaneous every 24 hours  ertapenem  IVPB 1000 milliGRAM(s) IV Intermittent every 24 hours  famotidine    Tablet 40 milliGRAM(s) Oral every 12 hours  gabapentin 300 milliGRAM(s) Oral every 8 hours  insulin glargine Injectable (LANTUS) 20 Unit(s) SubCutaneous at bedtime  insulin lispro (ADMELOG) corrective regimen sliding scale   SubCutaneous every 6 hours  insulin lispro Injectable (ADMELOG) 6 Unit(s) SubCutaneous three times a day before meals  lactated ringers. 1000 milliLiter(s) (100 mL/Hr) IV Continuous <Continuous>    MEDICATIONS  (PRN):  acetaminophen     Tablet .. 650 milliGRAM(s) Oral every 6 hours PRN Temp greater or equal to 38C (100.4F), Mild Pain (1 - 3)  ondansetron Injectable 4 milliGRAM(s) IV Push every 8 hours PRN Nausea and/or Vomiting      Vital Signs Last 24 Hrs  T(C): 36.6 (07 May 2023 05:13), Max: 37.1 (06 May 2023 13:39)  T(F): 97.8 (07 May 2023 05:13), Max: 98.7 (06 May 2023 13:39)  HR: 73 (07 May 2023 05:13) (73 - 94)  BP: 154/78 (07 May 2023 05:13) (142/80 - 163/88)  BP(mean): --  RR: 18 (07 May 2023 05:13) (17 - 18)  SpO2: 98% (07 May 2023 05:13) (98% - 99%)    Parameters below as of 07 May 2023 05:13  Patient On (Oxygen Delivery Method): room air        PHYSICAL EXAMINATION:  GENERAL: NAD, well built  HEAD:  Atraumatic, Normocephalic  EYES:  conjunctiva and sclera clear  NECK: Supple, No JVD, Normal thyroid  CHEST/LUNG: Clear to auscultation. Clear to percussion bilaterally; No rales, rhonchi, wheezing, or rubs  HEART: Regular rate and rhythm; No murmurs, rubs, or gallops  ABDOMEN: Soft, Nontender, Nondistended; Bowel sounds present, no pain or masses on palpation  NERVOUS SYSTEM:  Alert & Oriented X3  : voiding well  EXTREMITIES:  2+ Peripheral Pulses, No clubbing, cyanosis, or edema  SKIN: warm dry                      I&O's Summary          CAPILLARY BLOOD GLUCOSE      RADIOLOGY & ADDITIONAL TESTS:                   PGY-1 Progress Note discussed with attending      PLEASE CONTACT ON CALL TEAM:  - On Call Team (Please refer to Marlena) FROM 5:00 PM - 8:30PM  - Nightfloat Team FROM 8:30 -7:30 AM    INTERVAL HPI / OVERNIGHT EVENTS: No acute events overnight.  Patient examined at bedside this AM.  Patient denies acute complaints.  Patient was able to ambulate with physical therapy on 5/6. Patient pending PICC Line placement.      REVIEW OF SYSTEMS:  CONSTITUTIONAL: No fever, weight loss, or fatigue  RESPIRATORY: No cough, wheezing, chills or hemoptysis; No shortness of breath  CARDIOVASCULAR: No chest pain, palpitations, dizziness, or leg swelling  GASTROINTESTINAL: No abdominal pain. No nausea, vomiting, or hematemesis; No diarrhea or constipation. No melena or hematochezia.  GENITOURINARY: No dysuria or hematuria, urinary frequency  NEUROLOGICAL: No headaches, memory loss, loss of strength, numbness, or tremors  SKIN: No itching, burning, rashes, or lesions     MEDICATIONS  (STANDING):  brimonidine 0.2% Ophthalmic Solution 1 Drop(s) Left EYE every 12 hours  ceFAZolin   IVPB      ceFAZolin   IVPB 2000 milliGRAM(s) IV Intermittent every 8 hours  chlorhexidine 2% Cloths 1 Application(s) Topical <User Schedule>  enoxaparin Injectable 40 milliGRAM(s) SubCutaneous every 24 hours  ertapenem  IVPB 1000 milliGRAM(s) IV Intermittent every 24 hours  famotidine    Tablet 40 milliGRAM(s) Oral every 12 hours  gabapentin 300 milliGRAM(s) Oral every 8 hours  insulin glargine Injectable (LANTUS) 20 Unit(s) SubCutaneous at bedtime  insulin lispro (ADMELOG) corrective regimen sliding scale   SubCutaneous every 6 hours  insulin lispro Injectable (ADMELOG) 6 Unit(s) SubCutaneous three times a day before meals  lactated ringers. 1000 milliLiter(s) (100 mL/Hr) IV Continuous <Continuous>    MEDICATIONS  (PRN):  acetaminophen     Tablet .. 650 milliGRAM(s) Oral every 6 hours PRN Temp greater or equal to 38C (100.4F), Mild Pain (1 - 3)  ondansetron Injectable 4 milliGRAM(s) IV Push every 8 hours PRN Nausea and/or Vomiting      Vital Signs Last 24 Hrs  T(C): 36.6 (07 May 2023 05:13), Max: 37.1 (06 May 2023 13:39)  T(F): 97.8 (07 May 2023 05:13), Max: 98.7 (06 May 2023 13:39)  HR: 73 (07 May 2023 05:13) (73 - 94)  BP: 154/78 (07 May 2023 05:13) (142/80 - 163/88)  BP(mean): --  RR: 18 (07 May 2023 05:13) (17 - 18)  SpO2: 98% (07 May 2023 05:13) (98% - 99%)    Parameters below as of 07 May 2023 05:13  Patient On (Oxygen Delivery Method): room air        PHYSICAL EXAMINATION:  GENERAL: NAD, AAOx3  HEAD: AT/NC  EYES: conjunctiva and sclera clear  NECK: supple, No JVD noted  CHEST/LUNG: CTABL; no rales, rhonchi, wheezing, or rubs  HEART: regular rate and rhythm; no murmurs, rubs, or gallops  ABDOMEN: soft, nontender, nondistended; Bowel sounds present  EXTREMITIES:  2+ Peripheral Pulses, No clubbing, cyanosis, or edema, Wound Vac in place on the Left foot  SKIN: warm           I&O's Summary          CAPILLARY BLOOD GLUCOSE      RADIOLOGY & ADDITIONAL TESTS:

## 2023-05-07 NOTE — PROGRESS NOTE ADULT - ASSESSMENT
73 y/o M from home with a PMHx of DM, HTN, HLD and medication non-compliance admitted for sepsis 2/2 gas gangrene of left foot. Blood cx positive for MSSA. MR head showed 1.8 cm AP by 1.6 cm CC by 2 cm TRV pituitary mass consistent with an adenoma 71 y/o M from home with a PMHx of DM, HTN, HLD and medication non-compliance admitted for sepsis 2/2 gas gangrene of left foot. Blood cx positive for MSSA. MR head showed 1.8 cm AP by 1.6 cm CC by 2 cm TRV pituitary mass consistent with an adenoma      Patient requested his eye drops be updated, provided pharmacy and eye doctor contact information however was unable to contact offices over the weekend, primary team to follow up regarding correct eye drops:  [facility] [address], Phone Number: [phone]  Dr. Garcia [phone]  Dr. Fermin [phone]

## 2023-05-07 NOTE — PROGRESS NOTE ADULT - PROBLEM SELECTOR PLAN 1
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   surgical path prelim result +ve for Morganella morgani, and Staph  Blood cultures: +ve MSSA   Vanc D/Leo  D/Leo Cefepim and Nafcillin for MSSA on blood cultures  c/w Ertapenem 1g qd and Cefazolin 2g q8   Repeat blood cx positive for Staph Aureus  Repeat culture on 5/3 : neg   debridement and graft done today on 5/5   PICC line placement likely on Monday   Dr. Gao ID consulted c/o left foot pain, swelling, and erythema   XR for left foot/ ankle: suspicion of gas foci to 4th interspace and lateral 5th toe  aseptic appearing with leukocytosis WBC 16.95 and elevated lactate 2.2  Podiatry consulted-  s/p left partial 5th ray amputation due to gas gangrene.   surgical path prelim result +ve for Morganella morgani, and Staph  Blood cultures: +ve MSSA   Vanc D/Leo  D/Leo Cefepim and Nafcillin for MSSA on blood cultures  c/w Ertapenem 1g qd and Cefazolin 2g q8   Repeat blood cx positive for Staph Aureus  Repeat culture on 5/3 : neg   S/P debridement and graft done 5/5   ambulated well with Physical therapy on 5/6  PICC line placement likely on Monday   Dr. Gao ID consulted

## 2023-05-07 NOTE — PROGRESS NOTE ADULT - ATTENDING COMMENTS
Patient seen and examined this afternoon around 1 PM    71 yo man presented with c/o pain and swelling of his left foot after a fall noted to have Gas gangrene s/p Debridement and graft early this morning. Patient also with incidental Adenoma noted on CT/ MRI; Hx Glaucoma and Retinal detachment    Patient doing well. No new complaints. No fever. denies SOB, palpitations, chest pain, nausea, vomiting, diarrhea, constipation, dizziness. No new complaints. Podiatry advised ambulate with partial weight bearing to the left leg on heel. For PICC line tomorrow.     Vital Signs Last 24 Hrs  T(C): 36.9 (07 May 2023 13:31), Max: 36.9 (07 May 2023 13:31)  T(F): 98.4 (07 May 2023 13:31), Max: 98.4 (07 May 2023 13:31)  HR: 81 (07 May 2023 13:31) (73 - 82)  BP: 140/75 (07 May 2023 13:31) (140/75 - 163/88)  RR: 17 (07 May 2023 13:31) (17 - 18)  SpO2: 98% (07 May 2023 13:31) (98% - 99%) room air    P/E: as above; unchanged  Left foot dressing in place with wound vac draining;   chronic stasis dermatitis right lower leg    Labs: reviewed as below                        11.7   8.35  )-----------( 361      ( 07 May 2023 07:42 )             36.6   05-07  138  |  106  |  11  ----------------------------<  126<H>  4.0   |  28  |  1.15    Ca    8.9      07 May 2023 07:42  Phos  2.3     05-07  Mg     2.2     05-07  TPro  7.9  /  Alb  2.1<L>  /  TBili  0.2  /  DBili  x   /  AST  19  /  ALT  21  /  AlkPhos  82  05-07    Pituitary Hormones reviewed essentially WNL    Culture - Blood in AM (05.03.23 @ 06:30)   Specimen Source: .Blood Blood  Culture Results: No growth to date  Culture - Blood in AM (05.03.23 @ 05:51)   Specimen Source: .Blood Blood  Culture Results: No growth to date.    Culture - Blood (05.01.23 @ 12:25)   Culture Results: Growth in aerobic bottle: Staphylococcus aureus     D/D:  Gas gangrene with Left foot Osteomyelitis s/p debridement and Graft POD#2  MSSA bacteremia likely source foot  Pituitary adenoma, non-secreting, possibly compression on optic chiasm.    Poorly controlled DM    Plan:      Continue antibiotics with Ertapenem daily and Cefazolin q 8 hrs x 6 weeks total antibiotics; ESR/CRP weekly  FABIAN showed no vegetations  Blood Cx from 5/1 was positive; so far Cx from 5/3 negative Day 4  Podiatry f/u, s/p repeat debridement graft and now Wound Vac; Podiatry follow up  As per podiatry weight bearing on heel left foot with surgical shoes; patient able to ambulate to bathroom;  PT eval appreciated  Sugars well controlled; Basal Insulin 20 units HS and Admelog 6 units ; titrate up if need to keep 120 to 160  Endocrine f/u; appear adenoma is non secretory; FSH, LH, prolactin, TSH,  cortisol, LH, FSH all WNL  Neurosurgery intervention once completed antibiotic course as adenoma with mild compression on Optic chiasm;   F/u outpatient for pituitary adenoma and refer to Neurosurgery.  possible transphenoidal resection after completion of six weeks of antibiotics for osteomyelitis with bacteremia.   DVT ppx;   Patient lives with wife Peri Pérez;  patient and wife to determine if could manage Antibiotics and Wound care at home; Patient reports plan to go home; D/C Home once arranged Home infusion services and wound care.  wife works 3PM to 11PM. Case mgmt follow up AM  Rest as per PGY1 above  Discussed with PGY1 Dr. Quiñones and RN  Discussed with patient all the above at length;

## 2023-05-07 NOTE — PROGRESS NOTE ADULT - PROBLEM SELECTOR PLAN 3
h/o DM on Metformin 1000mg BID, Lantus 35 qhs and Humolog 14 TID before meals (per sure scripts Lantus 45u and Humalog 20u), also reports sugars are not controlled at home with prior A1C 7.9  will hold oral dm meds while inpatient   HbA1c: 12.1%  Decreased Lantus to 20 units, and Lispro 6 units on 5/6 and will adjust further   Mod ISS  Endocrine Dr. Arreola consulted

## 2023-05-08 LAB
CULTURE RESULTS: SIGNIFICANT CHANGE UP
CULTURE RESULTS: SIGNIFICANT CHANGE UP
GLUCOSE BLDC GLUCOMTR-MCNC: 102 MG/DL — HIGH (ref 70–99)
GLUCOSE BLDC GLUCOMTR-MCNC: 120 MG/DL — HIGH (ref 70–99)
GLUCOSE BLDC GLUCOMTR-MCNC: 127 MG/DL — HIGH (ref 70–99)
GLUCOSE BLDC GLUCOMTR-MCNC: 146 MG/DL — HIGH (ref 70–99)
GLUCOSE BLDC GLUCOMTR-MCNC: 201 MG/DL — HIGH (ref 70–99)
GLUCOSE BLDC GLUCOMTR-MCNC: 72 MG/DL — SIGNIFICANT CHANGE UP (ref 70–99)
GLUCOSE BLDC GLUCOMTR-MCNC: 81 MG/DL — SIGNIFICANT CHANGE UP (ref 70–99)
IGF-I SERPL-MCNC: 27 NG/ML — SIGNIFICANT CHANGE UP
SPECIMEN SOURCE: SIGNIFICANT CHANGE UP
SPECIMEN SOURCE: SIGNIFICANT CHANGE UP

## 2023-05-08 PROCEDURE — 99233 SBSQ HOSP IP/OBS HIGH 50: CPT

## 2023-05-08 PROCEDURE — 36573 INSJ PICC RS&I 5 YR+: CPT | Mod: LT

## 2023-05-08 PROCEDURE — 99232 SBSQ HOSP IP/OBS MODERATE 35: CPT

## 2023-05-08 PROCEDURE — 36573 INSJ PICC RS&I 5 YR+: CPT

## 2023-05-08 PROCEDURE — 36572 INSJ PICC RS&I <5 YR: CPT | Mod: LT

## 2023-05-08 PROCEDURE — 99233 SBSQ HOSP IP/OBS HIGH 50: CPT | Mod: GC

## 2023-05-08 RX ORDER — SODIUM CHLORIDE 9 MG/ML
10 INJECTION INTRAMUSCULAR; INTRAVENOUS; SUBCUTANEOUS
Refills: 0 | Status: DISCONTINUED | OUTPATIENT
Start: 2023-05-08 | End: 2023-05-11

## 2023-05-08 RX ORDER — CEFAZOLIN SODIUM 1 G
2 VIAL (EA) INJECTION
Qty: 222 | Refills: 0
Start: 2023-05-08 | End: 2023-06-13

## 2023-05-08 RX ORDER — CHLORHEXIDINE GLUCONATE 213 G/1000ML
1 SOLUTION TOPICAL
Refills: 0 | Status: DISCONTINUED | OUTPATIENT
Start: 2023-05-08 | End: 2023-05-11

## 2023-05-08 RX ORDER — ALTEPLASE 100 MG
2 KIT INTRAVENOUS ONCE
Refills: 0 | Status: COMPLETED | OUTPATIENT
Start: 2023-05-08 | End: 2023-05-08

## 2023-05-08 RX ORDER — SODIUM CHLORIDE 9 MG/ML
1 INJECTION INTRAMUSCULAR; INTRAVENOUS; SUBCUTANEOUS
Qty: 296 | Refills: 0
Start: 2023-05-08

## 2023-05-08 RX ORDER — INSULIN LISPRO 100/ML
VIAL (ML) SUBCUTANEOUS
Refills: 0 | Status: DISCONTINUED | OUTPATIENT
Start: 2023-05-08 | End: 2023-05-11

## 2023-05-08 RX ORDER — INSULIN LISPRO 100/ML
VIAL (ML) SUBCUTANEOUS AT BEDTIME
Refills: 0 | Status: DISCONTINUED | OUTPATIENT
Start: 2023-05-08 | End: 2023-05-11

## 2023-05-08 RX ORDER — ERTAPENEM SODIUM 1 G/1
1 INJECTION, POWDER, LYOPHILIZED, FOR SOLUTION INTRAMUSCULAR; INTRAVENOUS
Qty: 37 | Refills: 0
Start: 2023-05-08 | End: 2023-06-13

## 2023-05-08 RX ADMIN — CHLORHEXIDINE GLUCONATE 1 APPLICATION(S): 213 SOLUTION TOPICAL at 12:14

## 2023-05-08 RX ADMIN — Medication 6 UNIT(S): at 08:00

## 2023-05-08 RX ADMIN — Medication 650 MILLIGRAM(S): at 03:16

## 2023-05-08 RX ADMIN — FAMOTIDINE 40 MILLIGRAM(S): 10 INJECTION INTRAVENOUS at 17:37

## 2023-05-08 RX ADMIN — GABAPENTIN 300 MILLIGRAM(S): 400 CAPSULE ORAL at 05:19

## 2023-05-08 RX ADMIN — INSULIN GLARGINE 20 UNIT(S): 100 INJECTION, SOLUTION SUBCUTANEOUS at 22:31

## 2023-05-08 RX ADMIN — CHLORHEXIDINE GLUCONATE 1 APPLICATION(S): 213 SOLUTION TOPICAL at 05:28

## 2023-05-08 RX ADMIN — Medication 6 UNIT(S): at 17:12

## 2023-05-08 RX ADMIN — Medication 100 MILLIGRAM(S): at 14:00

## 2023-05-08 RX ADMIN — Medication 6 UNIT(S): at 12:13

## 2023-05-08 RX ADMIN — ALTEPLASE 2 MILLIGRAM(S): KIT at 23:58

## 2023-05-08 RX ADMIN — Medication 100 MILLIGRAM(S): at 05:20

## 2023-05-08 RX ADMIN — FAMOTIDINE 40 MILLIGRAM(S): 10 INJECTION INTRAVENOUS at 05:20

## 2023-05-08 RX ADMIN — Medication 650 MILLIGRAM(S): at 02:38

## 2023-05-08 RX ADMIN — GABAPENTIN 300 MILLIGRAM(S): 400 CAPSULE ORAL at 22:31

## 2023-05-08 RX ADMIN — Medication 4: at 12:14

## 2023-05-08 RX ADMIN — ERTAPENEM SODIUM 120 MILLIGRAM(S): 1 INJECTION, POWDER, LYOPHILIZED, FOR SOLUTION INTRAMUSCULAR; INTRAVENOUS at 12:13

## 2023-05-08 RX ADMIN — Medication 100 MILLIGRAM(S): at 22:31

## 2023-05-08 RX ADMIN — GABAPENTIN 300 MILLIGRAM(S): 400 CAPSULE ORAL at 14:00

## 2023-05-08 NOTE — PROGRESS NOTE ADULT - ATTENDING COMMENTS
Admitted on 4/29 with CC of LLE pain s/p mechanical fall while going to the bathroom.  71 yo male with H/O IDDM, HLD, HTN, Colon CA s/p hemicolon resection in 2011(in remission) admitted for severe sepsis due to gas gangrene L foot on 4/29 admitted for DFI/DFO. Initially started on (vanco + zosyn 4/29) Blood cx 4/29 + MSSA high grade bacteremia. S/P partial amputation L 5th ray on 4/30(polymicrobial growth) + Morganella Morganii, Proteus mirabilis and Staph Epi surgical swab. Wound cx 4/30 + MSSA and M morganii. Started on Nafcillin + cefepime 5/1-5/3, his blood cultures cultures were positive for MSSA on 5/1. He underwent TTE and FABIAN(4/5) with no evidence of vegetations. He is currently on Cefazolin + ertapenem tolerating abx well. On 5/5 underwent Excisional debridement of non viable tissue(L foot wound) and resection of the 5th metatarsal remnant with graft application.   BC 5/3 NGTD.    Continue Cefazolin 2g q8 hrs IV + Ertapenem 1g q24 hrs IV combination for 7 days after negative blood cultures and once pt is clinically ready for discharge can d/c cefazolin and continue the Ertapenem 1g q24 hrs IV for total 6 weeks form negative blood cx(5/3-6/14).    Follow up tissue cx and path from most recent surgery    Weekly labs while on abx CBC w/Diff, CMP, ESR and CRP    DM control    F/up with PMD (Dr. Roberto Walker) and Podiatry OP    Wound care    Discussed with medicine attending    Please reach ID with any questions or concerns  Dr. Laura Payne  Available in Teams

## 2023-05-08 NOTE — PROGRESS NOTE ADULT - SUBJECTIVE AND OBJECTIVE BOX
Podiatry Interval: patient is seen in chair bedsideresting. Pt is s/p 5/5 L foot debridement and graft application with wound vac intact. Denies nausea, vomiting, fever, chills, diarrhea, constipation. Denies overnight acute events.       Podiatry HPI  72-year-old male with history of NIDDM,  Patient claims he slipped and fell on Tuesday night in the bedroom and hit his head and had LOC for a few minutes. Patient denies any injuries at the time, but complaining of feeling lightheaded after the fall, confused on Wednesday.  Patient works as a  and on Thursday he noticed with left foot pain, swelling, difficulty ambulating after excessively ambulating by his work in tight work shoes.  Patient took nothing for his pain. Presents to ED with progressive pain to Left foot and increased warmth with concern for infection. Follow OP Podiatrist Dr. [place] who he last saw two weeks ago. States in 2017 had a bone infection in his Right foot which was resected. Cannot recall exact procedure,  Denies fever, chills.    Patient admits to  (-) Fevers, (-) Chills, (-) Nausea, (-) Vomiting, (-) Shortness of Breath (-) calf pain (-) chest pain     Medications acetaminophen     Tablet .. 650 milliGRAM(s) Oral every 6 hours PRN  brimonidine 0.2% Ophthalmic Solution 1 Drop(s) Left EYE every 12 hours  ceFAZolin   IVPB 2000 milliGRAM(s) IV Intermittent every 8 hours  ceFAZolin   IVPB      chlorhexidine 2% Cloths 1 Application(s) Topical <User Schedule>  enoxaparin Injectable 40 milliGRAM(s) SubCutaneous every 24 hours  ertapenem  IVPB 1000 milliGRAM(s) IV Intermittent every 24 hours  famotidine    Tablet 40 milliGRAM(s) Oral every 12 hours  gabapentin 300 milliGRAM(s) Oral every 8 hours  insulin glargine Injectable (LANTUS) 20 Unit(s) SubCutaneous at bedtime  insulin lispro (ADMELOG) corrective regimen sliding scale   SubCutaneous at bedtime  insulin lispro (ADMELOG) corrective regimen sliding scale   SubCutaneous three times a day before meals  insulin lispro Injectable (ADMELOG) 6 Unit(s) SubCutaneous three times a day before meals  lactated ringers. 1000 milliLiter(s) IV Continuous <Continuous>  ondansetron Injectable 4 milliGRAM(s) IV Push every 8 hours PRN    FHNo pertinent family history in first degree relatives    ,   PMHDM (diabetes mellitus)    Colon cancer    HTN (hypertension)    Hyperlipidemia       PSHNo significant past surgical history    S/P inguinal hernia repair    History of colectomy    S/P arthroscopic knee surgery    History of repair of hiatal hernia        Labs                          11.7   8.35  )-----------( 361      ( 07 May 2023 07:42 )             36.6      05-07    138  |  106  |  11  ----------------------------<  126<H>  4.0   |  28  |  1.15    Ca    8.9      07 May 2023 07:42  Phos  2.3     05-07  Mg     2.2     05-07    TPro  7.9  /  Alb  2.1<L>  /  TBili  0.2  /  DBili  x   /  AST  19  /  ALT  21  /  AlkPhos  82  05-07     Vital Signs Last 24 Hrs  T(C): 36.9 (08 May 2023 05:14), Max: 36.9 (07 May 2023 13:31)  T(F): 98.4 (08 May 2023 05:14), Max: 98.4 (07 May 2023 13:31)  HR: 78 (08 May 2023 05:14) (78 - 83)  BP: 130/83 (08 May 2023 05:14) (130/83 - 148/85)  BP(mean): --  RR: 17 (08 May 2023 05:14) (17 - 18)  SpO2: 96% (08 May 2023 05:14) (96% - 98%)    Parameters below as of 08 May 2023 05:14  Patient On (Oxygen Delivery Method): room air      Sedimentation Rate, Erythrocyte: 97 mm/Hr (05-07-23 @ 07:42)  Sedimentation Rate, Erythrocyte: 90 mm/Hr (05-05-23 @ 06:38)         C-Reactive Protein, Serum: 95 mg/L (05-04-23 @ 10:25)  C-Reactive Protein, Serum: 230 mg/L (05-01-23 @ 05:29)       ROS: Unremarkable outside HPI    PHYSICAL EXAM  LE Focused:    Vasc:  DP and PT faintly palpable b/l. No pedal hair growth noted. Diffuse edema noted distal to Ankle joint LLE  Derm: graft intact to left lateral foot wound with staples intact, skin well coapted, no signs of dehiscence or underlying hematoma/seroma. No malodor, no purulence upon manual compression, improving erythema and edema. No fluctuance noted.   Neuro: Protective sensation grossly diminished  MSK: able to ambulate with pain, no TTP to dorsolateral surgical site     IMAGING: ?xray  Noted gas foci on CXR foot to 4th interspace and lateral 5th left foot    < from: [facility] Physiol Extremity Lower 3+ Level, BI (05.01.23 @ 16:33) >  FINDINGS: There are biphasic pulse volume recordings bilaterally,   dampened at the level of the metatarsals. Segmental pressures were not   obtained at the thighs.    Right RIVER = 1.15  Left RIVER = 1.11    IMPRESSION: Normal ABIs.    Dampened waveforms at the level of the metatarsals.    < end of copied text >    A:  Left foot gas gangrene  s/p 5/5 L foot debridement/graft    P:   Patient evaluated and Chart reviewed   Discussed diagnosis and treatment with patient  Previous X-rays noted to left foot with suspicion of gas foci to 4th interspace and lateral 5th toe (pre-operative)  Post-operative xrays taken and reviewed   S/p left partial 5th ray open amputation on 4/30/23  s/p left lateral foot graft application and wound debridement 5/5/23  WC: Set wound vac to 120mmHG. Cut black granulo-foam to size of wound base and adhere to wound with adhesive tegaderm strips. Cut dime sized whole in adhesive overlying black granulo-foam and apply suction tubing. Ensure seal is air tight with seal check.  Due to RIVER/PVR, recommending vascular consult to assess healing potential   Recc IV antibiotics As Per ID; currently on ertapenem and ancef   Reviewed RIVER/PVR  NWB to LLE  Discussed importance of daily foot examinations and proper shoe gear and to importance of lower Fasting Blood Glucose levels.   Podiatry to follow while in house  Discussed with Attending Dr. Grant NUÑEZ:  Wound vaccuum instructions for discharge: Every other day: Cleanse  wound with normal saline, pat dry with sterile guaze, apply betadine to edges of wound if macerated, Cut black granulo-foam to size of wound base and adhere to wound with adhesive strips. Cut dime sized whole in adhesive overlying black granulo-foam and apply suction tubing. Ensure seal is air tight with seal check. Wound vaccuum setting should be 125mmhg with continuous suction. Cover with kerlix and light ACE bandage.

## 2023-05-08 NOTE — PROGRESS NOTE ADULT - ATTENDING COMMENTS
Patient seen and examined this afternoon     73 yo man presented with c/o pain and swelling of his left foot after a fall noted to have Gas gangrene s/p Debridement and graft early this morning. Patient also with incidental Adenoma noted on CT/ MRI; Hx Glaucoma and Retinal detachment    Patient doing well. No new complaints. No fever. denies SOB, palpitations, chest pain, nausea, vomiting, diarrhea, constipation, dizziness. No new complaints. Wound Vac changed this morning by Podiatry. PICC line placed. Podiatry advised ambulate with partial weight bearing to the left leg on heel. For PICC line tomorrow.     Vital Signs Last 24 Hrs  T(C): 36.9 (07 May 2023 13:31), Max: 36.9 (07 May 2023 13:31)  T(F): 98.4 (07 May 2023 13:31), Max: 98.4 (07 May 2023 13:31)  HR: 81 (07 May 2023 13:31) (73 - 82)  BP: 140/75 (07 May 2023 13:31) (140/75 - 163/88)  RR: 17 (07 May 2023 13:31) (17 - 18)  SpO2: 98% (07 May 2023 13:31) (98% - 99%) room air    P/E: as above; unchanged  Left foot dressing in place with wound vac draining;   chronic stasis dermatitis right lower leg    Labs: reviewed as below                        11.7   8.35  )-----------( 361      ( 07 May 2023 07:42 )             36.6   05-07  138  |  106  |  11  ----------------------------<  126<H>  4.0   |  28  |  1.15    Ca    8.9      07 May 2023 07:42  Phos  2.3     05-07  Mg     2.2     05-07  TPro  7.9  /  Alb  2.1<L>  /  TBili  0.2  /  DBili  x   /  AST  19  /  ALT  21  /  AlkPhos  82  05-07    Pituitary Hormones reviewed essentially WNL    Culture - Blood in AM (05.03.23 @ 06:30)   Specimen Source: .Blood Blood  Culture Results: No growth to date  Culture - Blood in AM (05.03.23 @ 05:51)   Specimen Source: .Blood Blood  Culture Results: No growth to date.    Culture - Blood (05.01.23 @ 12:25)   Culture Results: Growth in aerobic bottle: Staphylococcus aureus     D/D:  Gas gangrene with Left foot Osteomyelitis s/p debridement and Graft POD#3  MSSA bacteremia likely source foot  Pituitary adenoma, non-secreting, possibly compression on optic chiasm.    Poorly controlled DM    Plan:      Continue antibiotics with Ertapenem daily and Cefazolin q 8 hrs x 6 weeks total antibiotics from negative culture; ESR/CRP weekly    FABIAN showed no vegetations  Blood Cx from 5/1 was positive; so far Cx from 5/3 negative Day 4  Podiatry f/u, s/p repeat debridement graft and now Wound Vac; Podiatry follow up  As per podiatry weight bearing on heel left foot with surgical shoes; patient able to ambulate to bathroom;  PT eval appreciated  Sugars well controlled; Basal Insulin 20 units HS and Admelog 6 units ; titrate up if need to keep 120 to 160  Endocrine f/u; appear adenoma is non secretory; FSH, LH, prolactin, TSH,  cortisol, LH, FSH all WNL  Neurosurgery intervention once completed antibiotic course as adenoma with mild compression on Optic chiasm;   F/u outpatient for pituitary adenoma and refer to Neurosurgery.  possible transphenoidal resection after completion of six weeks of antibiotics for osteomyelitis with bacteremia.   DVT ppx;   Patient lives with wife Peri Pérez;  patient and wife to determine if could manage Antibiotics and Wound care at home; Patient reports plan to go home; D/C Home once arranged Home infusion services and wound care.  wife works 3PM to 11PM. Case mgmt follow up AM  Rest as per PGY1 above  Discussed with PGY1 Dr. Quiñones and RN  Discussed with patient all the above at length; Patient seen and examined this afternoon     73 yo man presented with c/o pain and swelling of his left foot after a fall noted to have Gas gangrene s/p Debridement and graft early this morning. Patient also with incidental Adenoma noted on CT/ MRI; Hx Glaucoma and Retinal detachment    Patient doing well. No new complaints. No fever. denies SOB, palpitations, chest pain, nausea, vomiting, diarrhea, constipation, dizziness. No new complaints. Wound Vac changed this morning by Podiatry. PICC line placed. Podiatry advised ambulate with partial weight bearing to the left leg on heel. For PICC line tomorrow.     Vital Signs Last 24 Hrs: reviewed; stable as above    P/E: as above; unchanged  Left foot dressing in place with wound vac draining;   chronic stasis dermatitis right lower leg    Labs: reviewed as above; stable    Pituitary Hormones reviewed essentially WNL    Culture - Blood in AM (05.03.23 @ 06:30)   Specimen Source: .Blood Blood  Culture Results: No growth to date  Culture - Blood in AM (05.03.23 @ 05:51)   Specimen Source: .Blood Blood  Culture Results: No growth to date.    Culture - Blood (05.01.23 @ 12:25)   Culture Results: Growth in aerobic bottle: Staphylococcus aureus     D/D:  Gas gangrene with Left foot Osteomyelitis s/p debridement and Graft POD#3  MSSA bacteremia likely source foot  Pituitary adenoma, non-secreting, possibly compression on optic chiasm.    Poorly controlled DM    Plan:      Continue antibiotics with Ertapenem daily and Cefazolin q 8 hrs x 6 weeks total antibiotics from negative culture; ESR/CRP weekly    FABIAN showed no vegetations  Blood Cx from 5/1 was positive; so far Cx from 5/3 negative Day 4  Podiatry f/u, s/p repeat debridement graft and now Wound Vac; Podiatry follow up  As per podiatry weight bearing on heel left foot with surgical shoes; patient able to ambulate to bathroom;  PT eval appreciated  Sugars well controlled; Basal Insulin 20 units HS and Admelog 6 units ; titrate up if need to keep 120 to 160  Endocrine f/u; appear adenoma is non secretory; FSH, LH, prolactin, TSH,  cortisol, LH, FSH all WNL  Neurosurgery intervention once completed antibiotic course as adenoma with mild compression on Optic chiasm;   F/u outpatient for pituitary adenoma and refer to Neurosurgery.  possible transphenoidal resection after completion of six weeks of antibiotics for osteomyelitis with bacteremia.   DVT ppx;   Patient lives with wife Peri Pérez;  patient and wife to determine if could manage Antibiotics and Wound care at home; Patient reports plan to go home; D/C Home once arranged Home infusion services and wound care.  wife works 3PM to 11PM. Case mgmt follow up AM  Rest as per PGY1 above  Discussed with PGY1 Dr. Quiñones and RN  Discussed with patient all the above at length;

## 2023-05-08 NOTE — PROCEDURE NOTE - ADDITIONAL PROCEDURE DETAILS
39 cm  4Fr  PICC inserted via the left cephalic vein.  Sterile dressing applied.  Tip location SVC/ RA Junction.

## 2023-05-08 NOTE — PROGRESS NOTE ADULT - ASSESSMENT
Subjective:    REVIEW OF SYSTEMS:  CONSTITUTIONAL:  No weakness, fevers or chills  EYES/ENT:  No visual changes;  No vertigo or throat pain   NECK:  No pain or stiffness  RESPIRATORY:  No cough, wheezing, hemoptysis; No shortness of breath  CARDIOVASCULAR:  No chest pain or palpitations  GASTROINTESTINAL:  No abdominal or epigastric pain. No nausea, vomiting, or hematemesis; No diarrhea or constipation. No melena or hematochezia.  GENITOURINARY:  No dysuria, frequency or hematuria  NEUROLOGICAL:  No numbness or weakness  MSK: no back pain, no joint pain  SKIN:  No itching, rashes    PHYSICAL EXAM:   Vital Signs Last 24 Hrs  T(C): 36.9 (08 May 2023 05:14), Max: 36.9 (07 May 2023 13:31)  T(F): 98.4 (08 May 2023 05:14), Max: 98.4 (07 May 2023 13:31)  HR: 78 (08 May 2023 05:14) (78 - 83)  BP: 130/83 (08 May 2023 05:14) (130/83 - 148/85)  BP(mean): --  RR: 17 (08 May 2023 05:14) (17 - 18)  SpO2: 96% (08 May 2023 05:14) (96% - 98%)    Parameters below as of 08 May 2023 05:14  Patient On (Oxygen Delivery Method): room air      GENERAL: NAD, non-toxic, pleasant, lying in bed comfortably  HEAD:  Atraumatic, Normocephalic  EYES: EOMI, PERRLA, conjunctiva and sclera clear  ENT: Moist mucous membranes  NECK: Supple, No JVD  CHEST/LUNG: Clear to auscultation bilaterally; No rales, rhonchi, wheezing, or rubs. Unlabored respirations  HEART: Regular rate and rhythm; No murmurs, rubs, or gallops  ABDOMEN: BSx4; Soft, nontender, nondistended  : no dysuria  EXTREMITIES:  2+ Peripheral Pulses, brisk capillary refill. No clubbing, cyanosis, or edema  NERVOUS SYSTEM:  A&Ox3. No sensory deficits, no motor deficits  SKIN: No rashes or lesions, dry and warm skin  MSK: no back pain, no joint swelling  Psych: no anxiety, no delusional ideas, no suicidal ideation    LABS/DIAGNOSTIC TESTS:                        11.7   8.35  )-----------( 361      ( 07 May 2023 07:42 )             36.6     WBC Count: 8.35 K/uL (05-07 @ 07:42)    05-07    138  |  106  |  11  ----------------------------<  126<H>  4.0   |  28  |  1.15    Ca    8.9      07 May 2023 07:42  Phos  2.3     05-07  Mg     2.2     05-07    TPro  7.9  /  Alb  2.1<L>  /  TBili  0.2  /  DBili  x   /  AST  19  /  ALT  21  /  AlkPhos  82  05-07      LACTATE:    CULTURES:     Culture - Blood (collected 05-03-23 @ 06:30)  Source: .Blood Blood  Preliminary Report (05-04-23 @ 12:02):    No growth to date.    Culture - Blood (collected 05-03-23 @ 05:51)  Source: .Blood Blood  Preliminary Report (05-04-23 @ 12:02):    No growth to date.    Culture - Blood (collected 05-01-23 @ 12:25)  Source: .Blood Blood-Peripheral  Gram Stain (05-03-23 @ 02:09):    Growth in aerobic bottle: Gram Positive Cocci in Clusters  Final Report (05-03-23 @ 21:16):    Growth in aerobic bottle: Staphylococcus aureus    See previous culture [id number]    Culture - Blood (collected 05-01-23 @ 12:15)  Source: .Blood Blood-Peripheral  Gram Stain (05-03-23 @ 23:53):    Growth in aerobic bottle: Gram Positive Cocci in Clusters    Growth in anaerobic bottle: Gram Positive Cocci in Clusters  Final Report (05-04-23 @ 19:12):    Growth in aerobic and anaerobic bottles: Staphylococcus aureus    See previous culture [id number]    Culture - Surgical Swab (collected 04-30-23 @ 10:13)  Source: .Surgical Swab Left 5th ray clean margin  Preliminary Report (05-02-23 @ 10:09):    Rare Morganella morganii    Rare Staphylococcus aureus    Few Finegoldia magna "Susceptibilities not performed"  Organism: Morganella morganii  Staphylococcus aureus (05-02-23 @ 07:52)  Organism: Morganella morganii (05-02-23 @ 07:52)      Method Type: TROY      -  Amikacin: S <=16      -  Amoxicillin/Clavulanic Acid: R >16/8      -  Ampicillin: R >16 These ampicillin results predict results for amoxicillin      -  Ampicillin/Sulbactam: R >16/8 Enterobacter, Klebsiella aerogenes, Citrobacter, and Serratia may develop resistance during prolonged therapy (3-4 days)      -  Aztreonam: S <=4      -  Cefazolin: R >16 Enterobacter, Klebsiella aerogenes, Citrobacter, and Serratia may develop resistance during prolonged therapy (3-4 days)      -  Cefepime: S <=2      -  Cefoxitin: S <=8      -  Ceftriaxone: S <=1 Enterobacter, Klebsiella aerogenes, Citrobacter, and Serratia may develop resistance during prolonged therapy      -  Ciprofloxacin: S <=0.25      -  Ertapenem: S <=0.5      -  Gentamicin: S <=2      -  Levofloxacin: S <=0.5      -  Meropenem: S <=1      -  Piperacillin/Tazobactam: S <=8      -  Tobramycin: S <=2      -  Trimethoprim/Sulfamethoxazole: S <=0.5/9.5  Organism: Staphylococcus aureus (05-02-23 @ 07:44)      Method Type: TROY      -  Ampicillin/Sulbactam: S <=8/4      -  Cefazolin: S <=4      -  Clindamycin: S <=0.25      -  Erythromycin: S <=0.25      -  Gentamicin: S <=1 Should not be used as monotherapy      -  Oxacillin: S <=0.25 Oxacillin predicts susceptibility for dicloxacillin, methicillin, and nafcillin      -  Penicillin: R 2      -  Rifampin: S <=1 Should not be used as monotherapy      -  Tetracycline: S <=1      -  Trimethoprim/Sulfamethoxazole: S <=0.5/9.5      -  Vancomycin: S 1    Culture - Surgical Swab (collected 04-30-23 @ 10:13)  Source: .Surgical Swab Left foot dirty culture  Final Report (05-05-23 @ 07:26):    Few Morganella morganii    Few Staphylococcus aureus    Few Finegoldia magna "Susceptibilities not performed"    Rare Proteus mirabilis  Organism: Morganella morganii  Staphylococcus aureus  Proteus mirabilis (05-03-23 @ 07:45)  Organism: Proteus mirabilis (05-03-23 @ 07:45)      Method Type: TROY      -  Amikacin: S <=16      -  Amoxicillin/Clavulanic Acid: S <=8/4      -  Ampicillin: S <=8 These ampicillin results predict results for amoxicillin      -  Ampicillin/Sulbactam: S <=4/2 Enterobacter, Klebsiella aerogenes, Citrobacter, and Serratia may develop resistance during prolonged therapy (3-4 days)      -  Aztreonam: S <=4      -  Cefazolin: S <=2 Enterobacter, Klebsiella aerogenes, Citrobacter, and Serratia may develop resistance during prolonged therapy (3-4 days)      -  Cefepime: S <=2      -  Cefoxitin: S <=8      -  Ceftriaxone: S <=1 Enterobacter, Klebsiella aerogenes, Citrobacter, and Serratia may develop resistance during prolonged therapy      -  Ciprofloxacin: S <=0.25      -  Ertapenem: S <=0.5      -  Gentamicin: S <=2      -  Levofloxacin: S <=0.5      -  Meropenem: S <=1      -  Piperacillin/Tazobactam: S <=8      -  Tobramycin: S 4      -  Trimethoprim/Sulfamethoxazole: S <=0.5/9.5  Organism: Staphylococcus aureus (05-03-23 @ 07:45)      Method Type: TROY      -  Ampicillin/Sulbactam: S <=8/4      -  Cefazolin: S <=4      -  Clindamycin: S <=0.25      -  Erythromycin: S <=0.25      -  Gentamicin: S <=1 Should not be used as monotherapy      -  Oxacillin: S <=0.25 Oxacillin predicts susceptibility for dicloxacillin, methicillin, and nafcillin      -  Penicillin: R 2      -  Rifampin: S <=1 Should not be used as monotherapy      -  Tetracycline: S <=1      -  Trimethoprim/Sulfamethoxazole: S <=0.5/9.5      -  Vancomycin: S 1  Organism: Morganella morganii (05-03-23 @ 07:45)      Method Type: TROY      -  Amikacin: S <=16      -  Amoxicillin/Clavulanic Acid: R >16/8      -  Ampicillin: R >16 These ampicillin results predict results for amoxicillin      -  Ampicillin/Sulbactam: R >16/8 Enterobacter, Klebsiella aerogenes, Citrobacter, and Serratia may develop resistance during prolonged therapy (3-4 days)      -  Aztreonam: S <=4      -  Cefazolin: R >16 Enterobacter, Klebsiella aerogenes, Citrobacter, and Serratia may develop resistance during prolonged therapy (3-4 days)      -  Cefepime: S <=2      -  Cefoxitin: S <=8      -  Ceftriaxone: S <=1 Enterobacter, Klebsiella aerogenes, Citrobacter, and Serratia may develop resistance during prolonged therapy      -  Ciprofloxacin: S <=0.25      -  Ertapenem: S <=0.5      -  Gentamicin: S <=2      -  Levofloxacin: S <=0.5      -  Meropenem: S <=1      -  Piperacillin/Tazobactam: S <=8      -  Tobramycin: S <=2      -  Trimethoprim/Sulfamethoxazole: S <=0.5/9.5    Culture - Surgical Swab (collected 04-29-23 @ 18:10)  Source: .Surgical Swab left foot wound  Final Report (05-04-23 @ 16:09):    Few Staphylococcus aureus    Few Morganella morganii    Rare Staphylococcus epidermidis "Susceptibilities not performed"    Few Proteus mirabilis  Organism: Staphylococcus aureus  Morganella morganii  Proteus mirabilis (05-04-23 @ 16:09)  Organism: Proteus mirabilis (05-04-23 @ 16:09)      Method Type: TROY      -  Amikacin: S <=16      -  Amoxicillin/Clavulanic Acid: S <=8/4      -  Ampicillin: S <=8 These ampicillin results predict results for amoxicillin      -  Ampicillin/Sulbactam: S <=4/2 Enterobacter, Klebsiella aerogenes, Citrobacter, and Serratia may develop resistance during prolonged therapy (3-4 days)      -  Aztreonam: R >16      -  Cefazolin: S <=2 Enterobacter, Klebsiella aerogenes, Citrobacter, and Serratia may develop resistance during prolonged therapy (3-4 days)      -  Cefepime: S <=2      -  Cefoxitin: S <=8      -  Ceftriaxone: S <=1 Enterobacter, Klebsiella aerogenes, Citrobacter, and Serratia may develop resistance during prolonged therapy      -  Ciprofloxacin: S <=0.25      -  Ertapenem: S <=0.5      -  Gentamicin: S <=2      -  Levofloxacin: S <=0.5      -  Meropenem: S <=1      -  Piperacillin/Tazobactam: S <=8      -  Tobramycin: S <=2      -  Trimethoprim/Sulfamethoxazole: S <=0.5/9.5  Organism: Morganella morganii (05-04-23 @ 16:09)      Method Type: TROY      -  Amikacin: S <=16      -  Amoxicillin/Clavulanic Acid: R >16/8      -  Ampicillin: R >16 These ampicillin results predict results for amoxicillin      -  Ampicillin/Sulbactam: R >16/8 Enterobacter, Klebsiella aerogenes, Citrobacter, and Serratia may develop resistance during prolonged therapy (3-4 days)      -  Aztreonam: S <=4      -  Cefazolin: R >16 Enterobacter, Klebsiella aerogenes, Citrobacter, and Serratia may develop resistance during prolonged therapy (3-4 days)      -  Cefepime: S <=2      -  Cefoxitin: S <=8      -  Ceftriaxone: S <=1 Enterobacter, Klebsiella aerogenes, Citrobacter, and Serratia may develop resistance during prolonged therapy      -  Ciprofloxacin: S <=0.25      -  Ertapenem: S <=0.5      -  Gentamicin: S <=2      -  Levofloxacin: S <=0.5      -  Meropenem: S <=1      -  Piperacillin/Tazobactam: S <=8      -  Tobramycin: S <=2      -  Trimethoprim/Sulfamethoxazole: S <=0.5/9.5  Organism: Staphylococcus aureus (05-04-23 @ 16:09)      Method Type: TROY      -  Ampicillin/Sulbactam: S <=8/4      -  Cefazolin: S <=4      -  Clindamycin: S <=0.25      -  Erythromycin: S <=0.25      -  Gentamicin: S 2 Should not be used as monotherapy      -  Oxacillin: S <=0.25 Oxacillin predicts susceptibility for dicloxacillin, methicillin, and nafcillin      -  Penicillin: R 8      -  Rifampin: S <=1 Should not be used as monotherapy      -  Tetracycline: S <=1      -  Trimethoprim/Sulfamethoxazole: S <=0.5/9.5      -  Vancomycin: S 2    Culture - Urine (collected 04-29-23 @ 15:10)  Source: Clean Catch Clean Catch (Midstream)  Final Report (04-30-23 @ 17:29):    <10,000 CFU/mL Normal Urogenital Sia    Culture - Blood (collected 04-29-23 @ 15:10)  Source: .Blood Blood-Peripheral  Gram Stain (05-01-23 @ 16:56):    Growth in aerobic bottle: Gram Positive Cocci in Clusters    Growth in anaerobic bottle: Gram Positive Cocci in Clusters  Final Report (05-04-23 @ 11:58):    Growth in aerobic and anaerobic bottles: Staphylococcus aureus    See previous culture [id number]    Culture - Blood (collected 04-29-23 @ 15:00)  Source: .Blood Blood-Peripheral  Gram Stain (05-02-23 @ 10:52):    Growth in aerobic bottle: Gram Positive Cocci in Clusters    Growth in anaerobic bottle: Gram Positive Cocci in Clusters  Final Report (05-03-23 @ 13:09):    Growth in aerobic and anaerobic bottles: Staphylococcus aureus    ***Blood Panel PCR results on this specimen are available    approximately 3 hours after the Gram stain result.***    Gram stain, PCR, and/or culture results may not always    correspond dueto difference in methodologies.    ************************************************************    This PCR assay was performed by multiplex PCR. This    Assay tests for 66 bacterial and resistance gene targets.    Please refer to the [facility] test directory    at [email or web address] for details.  Organism: Blood Culture PCR  Staphylococcus aureus (05-03-23 @ 13:09)  Organism: Staphylococcus aureus (05-03-23 @ 13:09)      Method Type: TROY      -  Ampicillin/Sulbactam: S <=8/4      -  Cefazolin: S <=4      -  Clindamycin: S <=0.25      -  Erythromycin: S <=0.25      -  Gentamicin: S <=1 Should not be used as monotherapy      -  Oxacillin: S <=0.25 Oxacillin predicts susceptibility for dicloxacillin, methicillin, and nafcillin      -  Penicillin: R 1      -  Rifampin: S <=1 Should not be used as monotherapy      -  Tetracycline: S <=1      -  Trimethoprim/Sulfamethoxazole: S <=0.5/9.5      -  Vancomycin: S 1  Organism: Blood Culture PCR (05-03-23 @ 13:09)      Method Type: PCR      -  Methicillin SENSITIVE Staphylococcus aureus (MSSA): Detec Any isolate of Staphylococcus aureus from a blood culture is NOT considered a contaminant.              RADIOLOGY:     ANTIBIOTICS:  ceFAZolin   IVPB    ceFAZolin   IVPB 2000 every 8 hours  ertapenem  IVPB 1000 every 24 hours      IV LINES:    IMPRESSION:      PLAN:      Podiatry resident Veronica Lobo PGY1  Please reach out to attending physician             Subjective: patient is seen sitting in bedside chair, reports no constitutional symptoms. Feels well overall    REVIEW OF SYSTEMS:  CONSTITUTIONAL:  No weakness, fevers or chills  EYES/ENT:  No visual changes;  No vertigo or throat pain   NECK:  No pain or stiffness  RESPIRATORY:  No cough, wheezing, hemoptysis; No shortness of breath  CARDIOVASCULAR:  No chest pain or palpitations  GASTROINTESTINAL:  No abdominal or epigastric pain. No nausea, vomiting, or hematemesis; No diarrhea or constipation. No melena or hematochezia.  GENITOURINARY:  No dysuria, frequency or hematuria  NEUROLOGICAL:  No numbness or weakness  MSK: no back pain, no joint pain, no pain in left foot, dressing recently changed  SKIN:  No itching, rashes    PHYSICAL EXAM:   Vital Signs Last 24 Hrs  T(C): 36.9 (08 May 2023 05:14), Max: 36.9 (07 May 2023 13:31)  T(F): 98.4 (08 May 2023 05:14), Max: 98.4 (07 May 2023 13:31)  HR: 78 (08 May 2023 05:14) (78 - 83)  BP: 130/83 (08 May 2023 05:14) (130/83 - 148/85)  BP(mean): --  RR: 17 (08 May 2023 05:14) (17 - 18)  SpO2: 96% (08 May 2023 05:14) (96% - 98%)    Parameters below as of 08 May 2023 05:14  Patient On (Oxygen Delivery Method): room air      GENERAL: NAD, non-toxic, pleasant, sitting in bedside chair  HEAD:  Atraumatic, Normocephalic  EYES: EOMI, PERRLA, conjunctiva and sclera clear  ENT: Moist mucous membranes  NECK: Supple, No JVD  CHEST/LUNG: Clear to auscultation bilaterally; No rales, rhonchi, wheezing, or rubs. Unlabored respirations  HEART: Regular rate and rhythm; No murmurs, rubs, or gallops  ABDOMEN: BSx4; Soft, nontender, nondistended  : no dysuria  EXTREMITIES:   DP and PT faintly palpable b/l. 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 No sensory deficits, no motor deficits  MSK: no back pain, no joint swelling  Psych: no anxiety, no delusional ideas, no suicidal ideation    LABS/DIAGNOSTIC TESTS:                        11.7   8.35  )-----------( 361      ( 07 May 2023 07:42 )             36.6     WBC Count: 8.35 K/uL (05-07 @ 07:42)    05-07    138  |  106  |  11  ----------------------------<  126<H>  4.0   |  28  |  1.15    Ca    8.9      07 May 2023 07:42  Phos  2.3     05-07  Mg     2.2     05-07    TPro  7.9  /  Alb  2.1<L>  /  TBili  0.2  /  DBili  x   /  AST  19  /  ALT  21  /  AlkPhos  82  05-07    CULTURES:     Culture - Blood (collected 05-03-23 @ 06:30)  Source: .Blood Blood  Preliminary Report (05-04-23 @ 12:02):    No growth to date.    Culture - Blood (collected 05-03-23 @ 05:51)  Source: .Blood Blood  Preliminary Report (05-04-23 @ 12:02):    No growth to date.    Culture - Blood (collected 05-01-23 @ 12:25)  Source: .Blood Blood-Peripheral  Gram Stain (05-03-23 @ 02:09):    Growth in aerobic bottle: Gram Positive Cocci in Clusters  Final Report (05-03-23 @ 21:16):    Growth in aerobic bottle: Staphylococcus aureus    See previous culture [id number]    Culture - Blood (collected 05-01-23 @ 12:15)  Source: .Blood Blood-Peripheral  Gram Stain (05-03-23 @ 23:53):    Growth in aerobic bottle: Gram Positive Cocci in Clusters    Growth in anaerobic bottle: Gram Positive Cocci in Clusters  Final Report (05-04-23 @ 19:12):    Growth in aerobic and anaerobic bottles: Staphylococcus aureus    See previous culture [id number]    Culture - Surgical Swab (collected 04-30-23 @ 10:13)  Source: .Surgical Swab Left 5th ray clean margin  Preliminary Report (05-02-23 @ 10:09):    Rare Morganella morganii    Rare Staphylococcus aureus    Few Finegoldia magna "Susceptibilities not performed"  Organism: Morganella morganii  Staphylococcus aureus (05-02-23 @ 07:52)  Organism: Shawanda palacioii (05-02-23 @ 07:52)      Method Type: TROY      -  Amikacin: S <=16      -  Amoxicillin/Clavulanic Acid: R >16/8      -  Ampicillin: R >16 These ampicillin results predict results for amoxicillin      -  Ampicillin/Sulbactam: R >16/8 Enterobacter, Klebsiella aerogenes, Citrobacter, and Serratia may develop resistance during prolonged therapy (3-4 days)      -  Aztreonam: S <=4      -  Cefazolin: R >16 Enterobacter, Klebsiella aerogenes, Citrobacter, and Serratia may develop resistance during prolonged therapy (3-4 days)      -  Cefepime: S <=2      -  Cefoxitin: S <=8      -  Ceftriaxone: S <=1 Enterobacter, Klebsiella aerogenes, Citrobacter, and Serratia may develop resistance during prolonged therapy      -  Ciprofloxacin: S <=0.25      -  Ertapenem: S <=0.5      -  Gentamicin: S <=2      -  Levofloxacin: S <=0.5      -  Meropenem: S <=1      -  Piperacillin/Tazobactam: S <=8      -  Tobramycin: S <=2      -  Trimethoprim/Sulfamethoxazole: S <=0.5/9.5  Organism: Staphylococcus aureus (05-02-23 @ 07:44)      Method Type: TROY      -  Ampicillin/Sulbactam: S <=8/4      -  Cefazolin: S <=4      -  Clindamycin: S <=0.25      -  Erythromycin: S <=0.25      -  Gentamicin: S <=1 Should not be used as monotherapy      -  Oxacillin: S <=0.25 Oxacillin predicts susceptibility for dicloxacillin, methicillin, and nafcillin      -  Penicillin: R 2      -  Rifampin: S <=1 Should not be used as monotherapy      -  Tetracycline: S <=1      -  Trimethoprim/Sulfamethoxazole: S <=0.5/9.5      -  Vancomycin: S 1    Culture - Surgical Swab (collected 04-30-23 @ 10:13)  Source: .Surgical Swab Left foot dirty culture  Final Report (05-05-23 @ 07:26):    Few Morganella morganii    Few Staphylococcus aureus    Few Finegoldia magna "Susceptibilities not performed"    Rare Proteus mirabilis  Organism: Morganella morganii  Staphylococcus aureus  Proteus mirabilis (05-03-23 @ 07:45)  Organism: Proteus mirabilis (05-03-23 @ 07:45)      Method Type: TROY      -  Amikacin: S <=16      -  Amoxicillin/Clavulanic Acid: S <=8/4      -  Ampicillin: S <=8 These ampicillin results predict results for amoxicillin      -  Ampicillin/Sulbactam: S <=4/2 Enterobacter, Klebsiella aerogenes, Citrobacter, and Serratia may develop resistance during prolonged therapy (3-4 days)      -  Aztreonam: S <=4      -  Cefazolin: S <=2 Enterobacter, Klebsiella aerogenes, Citrobacter, and Serratia may develop resistance during prolonged therapy (3-4 days)      -  Cefepime: S <=2      -  Cefoxitin: S <=8      -  Ceftriaxone: S <=1 Enterobacter, Klebsiella aerogenes, Citrobacter, and Serratia may develop resistance during prolonged therapy      -  Ciprofloxacin: S <=0.25      -  Ertapenem: S <=0.5      -  Gentamicin: S <=2      -  Levofloxacin: S <=0.5      -  Meropenem: S <=1      -  Piperacillin/Tazobactam: S <=8      -  Tobramycin: S 4      -  Trimethoprim/Sulfamethoxazole: S <=0.5/9.5  Organism: Staphylococcus aureus (05-03-23 @ 07:45)      Method Type: TROY      -  Ampicillin/Sulbactam: S <=8/4      -  Cefazolin: S <=4      -  Clindamycin: S <=0.25      -  Erythromycin: S <=0.25      -  Gentamicin: S <=1 Should not be used as monotherapy      -  Oxacillin: S <=0.25 Oxacillin predicts susceptibility for dicloxacillin, methicillin, and nafcillin      -  Penicillin: R 2      -  Rifampin: S <=1 Should not be used as monotherapy      -  Tetracycline: S <=1      -  Trimethoprim/Sulfamethoxazole: S <=0.5/9.5      -  Vancomycin: S 1  Organism: Morganella morganii (05-03-23 @ 07:45)      Method Type: TROY      -  Amikacin: S <=16      -  Amoxicillin/Clavulanic Acid: R >16/8      -  Ampicillin: R >16 These ampicillin results predict results for amoxicillin      -  Ampicillin/Sulbactam: R >16/8 Enterobacter, Klebsiella aerogenes, Citrobacter, and Serratia may develop resistance during prolonged therapy (3-4 days)      -  Aztreonam: S <=4      -  Cefazolin: R >16 Enterobacter, Klebsiella aerogenes, Citrobacter, and Serratia may develop resistance during prolonged therapy (3-4 days)      -  Cefepime: S <=2      -  Cefoxitin: S <=8      -  Ceftriaxone: S <=1 Enterobacter, Klebsiella aerogenes, Citrobacter, and Serratia may develop resistance during prolonged therapy      -  Ciprofloxacin: S <=0.25      -  Ertapenem: S <=0.5      -  Gentamicin: S <=2      -  Levofloxacin: S <=0.5      -  Meropenem: S <=1      -  Piperacillin/Tazobactam: S <=8      -  Tobramycin: S <=2      -  Trimethoprim/Sulfamethoxazole: S <=0.5/9.5    Culture - Surgical Swab (collected 04-29-23 @ 18:10)  Source: .Surgical Swab left foot wound  Final Report (05-04-23 @ 16:09):    Few Staphylococcus aureus    Few Morganella morganii    Rare Staphylococcus epidermidis "Susceptibilities not performed"    Few Proteus mirabilis  Organism: Staphylococcus aureus  Morganella morganii  Proteus mirabilis (05-04-23 @ 16:09)  Organism: Proteus mirabilis (05-04-23 @ 16:09)      Method Type: TROY      -  Amikacin: S <=16      -  Amoxicillin/Clavulanic Acid: S <=8/4      -  Ampicillin: S <=8 These ampicillin results predict results for amoxicillin      -  Ampicillin/Sulbactam: S <=4/2 Enterobacter, Klebsiella aerogenes, Citrobacter, and Serratia may develop resistance during prolonged therapy (3-4 days)      -  Aztreonam: R >16      -  Cefazolin: S <=2 Enterobacter, Klebsiella aerogenes, Citrobacter, and Serratia may develop resistance during prolonged therapy (3-4 days)      -  Cefepime: S <=2      -  Cefoxitin: S <=8      -  Ceftriaxone: S <=1 Enterobacter, Klebsiella aerogenes, Citrobacter, and Serratia may develop resistance during prolonged therapy      -  Ciprofloxacin: S <=0.25      -  Ertapenem: S <=0.5      -  Gentamicin: S <=2      -  Levofloxacin: S <=0.5      -  Meropenem: S <=1      -  Piperacillin/Tazobactam: S <=8      -  Tobramycin: S <=2      -  Trimethoprim/Sulfamethoxazole: S <=0.5/9.5  Organism: Morganella morganii (05-04-23 @ 16:09)      Method Type: TROY      -  Amikacin: S <=16      -  Amoxicillin/Clavulanic Acid: R >16/8      -  Ampicillin: R >16 These ampicillin results predict results for amoxicillin      -  Ampicillin/Sulbactam: R >16/8 Enterobacter, Klebsiella aerogenes, Citrobacter, and Serratia may develop resistance during prolonged therapy (3-4 days)      -  Aztreonam: S <=4      -  Cefazolin: R >16 Enterobacter, Klebsiella aerogenes, Citrobacter, and Serratia may develop resistance during prolonged therapy (3-4 days)      -  Cefepime: S <=2      -  Cefoxitin: S <=8      -  Ceftriaxone: S <=1 Enterobacter, Klebsiella aerogenes, Citrobacter, and Serratia may develop resistance during prolonged therapy      -  Ciprofloxacin: S <=0.25      -  Ertapenem: S <=0.5      -  Gentamicin: S <=2      -  Levofloxacin: S <=0.5      -  Meropenem: S <=1      -  Piperacillin/Tazobactam: S <=8      -  Tobramycin: S <=2      -  Trimethoprim/Sulfamethoxazole: S <=0.5/9.5  Organism: Staphylococcus aureus (05-04-23 @ 16:09)      Method Type: TROY      -  Ampicillin/Sulbactam: S <=8/4      -  Cefazolin: S <=4      -  Clindamycin: S <=0.25      -  Erythromycin: S <=0.25      -  Gentamicin: S 2 Should not be used as monotherapy      -  Oxacillin: S <=0.25 Oxacillin predicts susceptibility for dicloxacillin, methicillin, and nafcillin      -  Penicillin: R 8      -  Rifampin: S <=1 Should not be used as monotherapy      -  Tetracycline: S <=1      -  Trimethoprim/Sulfamethoxazole: S <=0.5/9.5      -  Vancomycin: S 2    Culture - Urine (collected 04-29-23 @ 15:10)  Source: Clean Catch Clean Catch (Midstream)  Final Report (04-30-23 @ 17:29):    <10,000 CFU/mL Normal Urogenital Sai    Culture - Blood (collected 04-29-23 @ 15:10)  Source: .Blood Blood-Peripheral  Gram Stain (05-01-23 @ 16:56):    Growth in aerobic bottle: Gram Positive Cocci in Clusters    Growth in anaerobic bottle: Gram Positive Cocci in Clusters  Final Report (05-04-23 @ 11:58):    Growth in aerobic and anaerobic bottles: Staphylococcus aureus    See previous culture [id number]    Culture - Blood (collected 04-29-23 @ 15:00)  Source: .Blood Blood-Peripheral  Gram Stain (05-02-23 @ 10:52):    Growth in aerobic bottle: Gram Positive Cocci in Clusters    Growth in anaerobic bottle: Gram Positive Cocci in Clusters  Final Report (05-03-23 @ 13:09):    Growth in aerobic and anaerobic bottles: Staphylococcus aureus    ***Blood Panel PCR results on this specimen are available    approximately 3 hours after the Gram stain result.***    Gram stain, PCR, and/or culture results may not always    correspond dueto difference in methodologies.    ************************************************************    This PCR assay was performed by multiplex PCR. This    Assay tests for 66 bacterial and resistance gene targets.    Please refer to the [facility] test directory    at [email or web address] for details.  Organism: Blood Culture PCR  Staphylococcus aureus (05-03-23 @ 13:09)  Organism: Staphylococcus aureus (05-03-23 @ 13:09)      Method Type: TROY      -  Ampicillin/Sulbactam: S <=8/4      -  Cefazolin: S <=4      -  Clindamycin: S <=0.25      -  Erythromycin: S <=0.25      -  Gentamicin: S <=1 Should not be used as monotherapy      -  Oxacillin: S <=0.25 Oxacillin predicts susceptibility for dicloxacillin, methicillin, and nafcillin      -  Penicillin: R 1      -  Rifampin: S <=1 Should not be used as monotherapy      -  Tetracycline: S <=1      -  Trimethoprim/Sulfamethoxazole: S <=0.5/9.5      -  Vancomycin: S 1  Organism: Blood Culture PCR (05-03-23 @ 13:09)      Method Type: PCR      -  Methicillin SENSITIVE Staphylococcus aureus (MSSA): Detec Any isolate of Staphylococcus aureus from a blood culture is NOT considered a contaminant.    RADIOLOGY:    < from: Xray Foot AP + Lateral + Oblique, Left (04.29.23 @ 15:15) >    IMPRESSION: No evidence of fracture or suspicious lesion. Ankle mortise is maintained. Calcaneal plantar spurs are noted. Hallux valgus deformity is seen. The periarticular erosive change of the medial margin of the first metatarsal head may be consistent with history of gout. Erosive deformity is seen at the head of the first proximal phalanx at the articular margin. There is minimal deformity of the medial margin of the head of the second proximal phalanx and a nondisplaced fracture at this level cannot be excluded.  Soft tissue infection with swelling and soft tissue air is seen at the level of the fifth digit. Vascular calcification is present.  If clinically warranted further assessment may include three-phase bone scan or MRI imaging to assess for early underlying osteomyelitis not yet apparent on plain film.    < from: VA Physiol Extremity Lower 3+ Level, BI (05.01.23 @ 16:33) >  IMPRESSION: Normal ABIs.    Dampened waveforms at the level of the metatarsals.      < from: MR Head w/wo IV Cont (05.01.23 @ 15:37) >  IMPRESSION:    MR orbits/sella:   1.8 cm AP by 1.6 cm CC by 2 cm TRV pituitary mass   consistent with an adenoma. There is mild extension into the LEFT   cavernous sinus. The RIGHT cavernous sinuses and para cavernous regions   appear intact.    The cavernous segments of the internal carotid arteries   demonstrate expected flow-void indicating patency. There is mild mass   effect on the optic chiasm, LEFT greater than RIGHT. The infundibulum is   slightly deviated to the LEFT.      MR brain: Minimal white matter ischemia at the bifrontal and LEFT   parietal subcortical white matter.    Moderate mucosal thickening/fluid   in the LEFT mastoid air cells.        ANTIBIOTICS:  ceFAZolin   IVPB    ceFAZolin   IVPB 2000 every 8 hours  ertapenem  IVPB 1000 every 24 hours      IV LINES:    IMPRESSION:      PLAN:      Podiatry resident Veronica Lobo PGY1  Please reach out to attending physician             Subjective: patient is seen sitting in bedside chair, reports no constitutional symptoms. Feels well overall    REVIEW OF SYSTEMS:  CONSTITUTIONAL:  No weakness, fevers or chills  EYES/ENT:  No visual changes;  No vertigo or throat pain   NECK:  No pain or stiffness  RESPIRATORY:  No cough, wheezing, hemoptysis; No shortness of breath  CARDIOVASCULAR:  No chest pain or palpitations  GASTROINTESTINAL:  No abdominal or epigastric pain. No nausea, vomiting, or hematemesis; No diarrhea or constipation. No melena or hematochezia.  GENITOURINARY:  No dysuria, frequency or hematuria  NEUROLOGICAL:  No numbness or weakness  MSK: no back pain, no joint pain, no pain in left foot, dressing recently changed  SKIN:  No itching, rashes    PHYSICAL EXAM:   Vital Signs Last 24 Hrs  T(C): 36.9 (08 May 2023 05:14), Max: 36.9 (07 May 2023 13:31)  T(F): 98.4 (08 May 2023 05:14), Max: 98.4 (07 May 2023 13:31)  HR: 78 (08 May 2023 05:14) (78 - 83)  BP: 130/83 (08 May 2023 05:14) (130/83 - 148/85)  BP(mean): --  RR: 17 (08 May 2023 05:14) (17 - 18)  SpO2: 96% (08 May 2023 05:14) (96% - 98%)    Parameters below as of 08 May 2023 05:14  Patient On (Oxygen Delivery Method): room air      GENERAL: NAD, non-toxic, pleasant, sitting in bedside chair  HEAD:  Atraumatic, Normocephalic  EYES: EOMI, PERRLA, conjunctiva and sclera clear  ENT: Moist mucous membranes  NECK: Supple, No JVD  CHEST/LUNG: Clear to auscultation bilaterally; No rales, rhonchi, wheezing, or rubs. Unlabored respirations  HEART: Regular rate and rhythm; No murmurs, rubs, or gallops  ABDOMEN: BSx4; Soft, nontender, nondistended  : no dysuria  EXTREMITIES:   DP and PT faintly palpable b/l. Left foot s/p partial 5th ray amputation with skin graft intact  NERVOUS SYSTEM:  A&Ox3. No sensory deficits, no motor deficits  MSK: no back pain, no joint swelling  Psych: no anxiety, no delusional ideas, no suicidal ideation    LABS/DIAGNOSTIC TESTS:                        11.7   8.35  )-----------( 361      ( 07 May 2023 07:42 )             36.6     WBC Count: 8.35 K/uL (05-07 @ 07:42)    05-07    138  |  106  |  11  ----------------------------<  126<H>  4.0   |  28  |  1.15    Ca    8.9      07 May 2023 07:42  Phos  2.3     05-07  Mg     2.2     05-07    TPro  7.9  /  Alb  2.1<L>  /  TBili  0.2  /  DBili  x   /  AST  19  /  ALT  21  /  AlkPhos  82  05-07    CULTURES:     Culture - Blood (collected 05-03-23 @ 06:30)  Source: .Blood Blood  Preliminary Report (05-04-23 @ 12:02):    No growth to date.    Culture - Blood (collected 05-03-23 @ 05:51)  Source: .Blood Blood  Preliminary Report (05-04-23 @ 12:02):    No growth to date.    Culture - Blood (collected 05-01-23 @ 12:25)  Source: .Blood Blood-Peripheral  Gram Stain (05-03-23 @ 02:09):    Growth in aerobic bottle: Gram Positive Cocci in Clusters  Final Report (05-03-23 @ 21:16):    Growth in aerobic bottle: Staphylococcus aureus    See previous culture [id number]    Culture - Blood (collected 05-01-23 @ 12:15)  Source: .Blood Blood-Peripheral  Gram Stain (05-03-23 @ 23:53):    Growth in aerobic bottle: Gram Positive Cocci in Clusters    Growth in anaerobic bottle: Gram Positive Cocci in Clusters  Final Report (05-04-23 @ 19:12):    Growth in aerobic and anaerobic bottles: Staphylococcus aureus    See previous culture [id number]    Culture - Surgical Swab (collected 04-30-23 @ 10:13)  Source: .Surgical Swab Left 5th ray clean margin  Preliminary Report (05-02-23 @ 10:09):    Rare Morganella morganii    Rare Staphylococcus aureus    Few Finegoldia magna "Susceptibilities not performed"  Organism: Morganella morganii  Staphylococcus aureus (05-02-23 @ 07:52)  Organism: Morganella morganii (05-02-23 @ 07:52)      Method Type: TROY      -  Amikacin: S <=16      -  Amoxicillin/Clavulanic Acid: R >16/8      -  Ampicillin: R >16 These ampicillin results predict results for amoxicillin      -  Ampicillin/Sulbactam: R >16/8 Enterobacter, Klebsiella aerogenes, Citrobacter, and Serratia may develop resistance during prolonged therapy (3-4 days)      -  Aztreonam: S <=4      -  Cefazolin: R >16 Enterobacter, Klebsiella aerogenes, Citrobacter, and Serratia may develop resistance during prolonged therapy (3-4 days)      -  Cefepime: S <=2      -  Cefoxitin: S <=8      -  Ceftriaxone: S <=1 Enterobacter, Klebsiella aerogenes, Citrobacter, and Serratia may develop resistance during prolonged therapy      -  Ciprofloxacin: S <=0.25      -  Ertapenem: S <=0.5      -  Gentamicin: S <=2      -  Levofloxacin: S <=0.5      -  Meropenem: S <=1      -  Piperacillin/Tazobactam: S <=8      -  Tobramycin: S <=2      -  Trimethoprim/Sulfamethoxazole: S <=0.5/9.5  Organism: Staphylococcus aureus (05-02-23 @ 07:44)      Method Type: TROY      -  Ampicillin/Sulbactam: S <=8/4      -  Cefazolin: S <=4      -  Clindamycin: S <=0.25      -  Erythromycin: S <=0.25      -  Gentamicin: S <=1 Should not be used as monotherapy      -  Oxacillin: S <=0.25 Oxacillin predicts susceptibility for dicloxacillin, methicillin, and nafcillin      -  Penicillin: R 2      -  Rifampin: S <=1 Should not be used as monotherapy      -  Tetracycline: S <=1      -  Trimethoprim/Sulfamethoxazole: S <=0.5/9.5      -  Vancomycin: S 1    Culture - Surgical Swab (collected 04-30-23 @ 10:13)  Source: .Surgical Swab Left foot dirty culture  Final Report (05-05-23 @ 07:26):    Few Morganella morganii    Few Staphylococcus aureus    Few Finegoldia magna "Susceptibilities not performed"    Rare Proteus mirabilis  Organism: Morganella morganii  Staphylococcus aureus  Proteus mirabilis (05-03-23 @ 07:45)  Organism: Proteus mirabilis (05-03-23 @ 07:45)      Method Type: TROY      -  Amikacin: S <=16      -  Amoxicillin/Clavulanic Acid: S <=8/4      -  Ampicillin: S <=8 These ampicillin results predict results for amoxicillin      -  Ampicillin/Sulbactam: S <=4/2 Enterobacter, Klebsiella aerogenes, Citrobacter, and Serratia may develop resistance during prolonged therapy (3-4 days)      -  Aztreonam: S <=4      -  Cefazolin: S <=2 Enterobacter, Klebsiella aerogenes, Citrobacter, and Serratia may develop resistance during prolonged therapy (3-4 days)      -  Cefepime: S <=2      -  Cefoxitin: S <=8      -  Ceftriaxone: S <=1 Enterobacter, Klebsiella aerogenes, Citrobacter, and Serratia may develop resistance during prolonged therapy      -  Ciprofloxacin: S <=0.25      -  Ertapenem: S <=0.5      -  Gentamicin: S <=2      -  Levofloxacin: S <=0.5      -  Meropenem: S <=1      -  Piperacillin/Tazobactam: S <=8      -  Tobramycin: S 4      -  Trimethoprim/Sulfamethoxazole: S <=0.5/9.5  Organism: Staphylococcus aureus (05-03-23 @ 07:45)      Method Type: TROY      -  Ampicillin/Sulbactam: S <=8/4      -  Cefazolin: S <=4      -  Clindamycin: S <=0.25      -  Erythromycin: S <=0.25      -  Gentamicin: S <=1 Should not be used as monotherapy      -  Oxacillin: S <=0.25 Oxacillin predicts susceptibility for dicloxacillin, methicillin, and nafcillin      -  Penicillin: R 2      -  Rifampin: S <=1 Should not be used as monotherapy      -  Tetracycline: S <=1      -  Trimethoprim/Sulfamethoxazole: S <=0.5/9.5      -  Vancomycin: S 1  Organism: Morganella morganii (05-03-23 @ 07:45)      Method Type: TROY      -  Amikacin: S <=16      -  Amoxicillin/Clavulanic Acid: R >16/8      -  Ampicillin: R >16 These ampicillin results predict results for amoxicillin      -  Ampicillin/Sulbactam: R >16/8 Enterobacter, Klebsiella aerogenes, Citrobacter, and Serratia may develop resistance during prolonged therapy (3-4 days)      -  Aztreonam: S <=4      -  Cefazolin: R >16 Enterobacter, Klebsiella aerogenes, Citrobacter, and Serratia may develop resistance during prolonged therapy (3-4 days)      -  Cefepime: S <=2      -  Cefoxitin: S <=8      -  Ceftriaxone: S <=1 Enterobacter, Klebsiella aerogenes, Citrobacter, and Serratia may develop resistance during prolonged therapy      -  Ciprofloxacin: S <=0.25      -  Ertapenem: S <=0.5      -  Gentamicin: S <=2      -  Levofloxacin: S <=0.5      -  Meropenem: S <=1      -  Piperacillin/Tazobactam: S <=8      -  Tobramycin: S <=2      -  Trimethoprim/Sulfamethoxazole: S <=0.5/9.5    Culture - Surgical Swab (collected 04-29-23 @ 18:10)  Source: .Surgical Swab left foot wound  Final Report (05-04-23 @ 16:09):    Few Staphylococcus aureus    Few Morganella morganii    Rare Staphylococcus epidermidis "Susceptibilities not performed"    Few Proteus mirabilis  Organism: Staphylococcus aureus  Morganella morganii  Proteus mirabilis (05-04-23 @ 16:09)  Organism: Proteus mirabilis (05-04-23 @ 16:09)      Method Type: TROY      -  Amikacin: S <=16      -  Amoxicillin/Clavulanic Acid: S <=8/4      -  Ampicillin: S <=8 These ampicillin results predict results for amoxicillin      -  Ampicillin/Sulbactam: S <=4/2 Enterobacter, Klebsiella aerogenes, Citrobacter, and Serratia may develop resistance during prolonged therapy (3-4 days)      -  Aztreonam: R >16      -  Cefazolin: S <=2 Enterobacter, Klebsiella aerogenes, Citrobacter, and Serratia may develop resistance during prolonged therapy (3-4 days)      -  Cefepime: S <=2      -  Cefoxitin: S <=8      -  Ceftriaxone: S <=1 Enterobacter, Klebsiella aerogenes, Citrobacter, and Serratia may develop resistance during prolonged therapy      -  Ciprofloxacin: S <=0.25      -  Ertapenem: S <=0.5      -  Gentamicin: S <=2      -  Levofloxacin: S <=0.5      -  Meropenem: S <=1      -  Piperacillin/Tazobactam: S <=8      -  Tobramycin: S <=2      -  Trimethoprim/Sulfamethoxazole: S <=0.5/9.5  Organism: Morganella morganii (05-04-23 @ 16:09)      Method Type: TROY      -  Amikacin: S <=16      -  Amoxicillin/Clavulanic Acid: R >16/8      -  Ampicillin: R >16 These ampicillin results predict results for amoxicillin      -  Ampicillin/Sulbactam: R >16/8 Enterobacter, Klebsiella aerogenes, Citrobacter, and Serratia may develop resistance during prolonged therapy (3-4 days)      -  Aztreonam: S <=4      -  Cefazolin: R >16 Enterobacter, Klebsiella aerogenes, Citrobacter, and Serratia may develop resistance during prolonged therapy (3-4 days)      -  Cefepime: S <=2      -  Cefoxitin: S <=8      -  Ceftriaxone: S <=1 Enterobacter, Klebsiella aerogenes, Citrobacter, and Serratia may develop resistance during prolonged therapy      -  Ciprofloxacin: S <=0.25      -  Ertapenem: S <=0.5      -  Gentamicin: S <=2      -  Levofloxacin: S <=0.5      -  Meropenem: S <=1      -  Piperacillin/Tazobactam: S <=8      -  Tobramycin: S <=2      -  Trimethoprim/Sulfamethoxazole: S <=0.5/9.5  Organism: Staphylococcus aureus (05-04-23 @ 16:09)      Method Type: TROY      -  Ampicillin/Sulbactam: S <=8/4      -  Cefazolin: S <=4      -  Clindamycin: S <=0.25      -  Erythromycin: S <=0.25      -  Gentamicin: S 2 Should not be used as monotherapy      -  Oxacillin: S <=0.25 Oxacillin predicts susceptibility for dicloxacillin, methicillin, and nafcillin      -  Penicillin: R 8      -  Rifampin: S <=1 Should not be used as monotherapy      -  Tetracycline: S <=1      -  Trimethoprim/Sulfamethoxazole: S <=0.5/9.5      -  Vancomycin: S 2    Culture - Urine (collected 04-29-23 @ 15:10)  Source: Clean Catch Clean Catch (Midstream)  Final Report (04-30-23 @ 17:29):    <10,000 CFU/mL Normal Urogenital Sia    Culture - Blood (collected 04-29-23 @ 15:10)  Source: .Blood Blood-Peripheral  Gram Stain (05-01-23 @ 16:56):    Growth in aerobic bottle: Gram Positive Cocci in Clusters    Growth in anaerobic bottle: Gram Positive Cocci in Clusters  Final Report (05-04-23 @ 11:58):    Growth in aerobic and anaerobic bottles: Staphylococcus aureus    See previous culture [id number]    Culture - Blood (collected 04-29-23 @ 15:00)  Source: .Blood Blood-Peripheral  Gram Stain (05-02-23 @ 10:52):    Growth in aerobic bottle: Gram Positive Cocci in Clusters    Growth in anaerobic bottle: Gram Positive Cocci in Clusters  Final Report (05-03-23 @ 13:09):    Growth in aerobic and anaerobic bottles: Staphylococcus aureus    ***Blood Panel PCR results on this specimen are available    approximately 3 hours after the Gram stain result.***    Gram stain, PCR, and/or culture results may not always    correspond dueto difference in methodologies.    ************************************************************    This PCR assay was performed by multiplex PCR. This    Assay tests for 66 bacterial and resistance gene targets.    Please refer to the [facility] test directory    at [email or web address] for details.  Organism: Blood Culture PCR  Staphylococcus aureus (05-03-23 @ 13:09)  Organism: Staphylococcus aureus (05-03-23 @ 13:09)      Method Type: TROY      -  Ampicillin/Sulbactam: S <=8/4      -  Cefazolin: S <=4      -  Clindamycin: S <=0.25      -  Erythromycin: S <=0.25      -  Gentamicin: S <=1 Should not be used as monotherapy      -  Oxacillin: S <=0.25 Oxacillin predicts susceptibility for dicloxacillin, methicillin, and nafcillin      -  Penicillin: R 1      -  Rifampin: S <=1 Should not be used as monotherapy      -  Tetracycline: S <=1      -  Trimethoprim/Sulfamethoxazole: S <=0.5/9.5      -  Vancomycin: S 1  Organism: Blood Culture PCR (05-03-23 @ 13:09)      Method Type: PCR      -  Methicillin SENSITIVE Staphylococcus aureus (MSSA): Detec Any isolate of Staphylococcus aureus from a blood culture is NOT considered a contaminant.    RADIOLOGY:    < from: Xray Foot AP + Lateral + Oblique, Left (04.29.23 @ 15:15) >    IMPRESSION: No evidence of fracture or suspicious lesion. Ankle mortise is maintained. Calcaneal plantar spurs are noted. Hallux valgus deformity is seen. The periarticular erosive change of the medial margin of the first metatarsal head may be consistent with history of gout. Erosive deformity is seen at the head of the first proximal phalanx at the articular margin. There is minimal deformity of the medial margin of the head of the second proximal phalanx and a nondisplaced fracture at this level cannot be excluded.  Soft tissue infection with swelling and soft tissue air is seen at the level of the fifth digit. Vascular calcification is present.  If clinically warranted further assessment may include three-phase bone scan or MRI imaging to assess for early underlying osteomyelitis not yet apparent on plain film.    < from: VA Physiol Extremity Lower 3+ Level, BI (05.01.23 @ 16:33) >  IMPRESSION: Normal ABIs.    Dampened waveforms at the level of the metatarsals.      < from: MR Head w/wo IV Cont (05.01.23 @ 15:37) >  IMPRESSION:    MR orbits/sella:   1.8 cm AP by 1.6 cm CC by 2 cm TRV pituitary mass   consistent with an adenoma. There is mild extension into the LEFT   cavernous sinus. The RIGHT cavernous sinuses and para cavernous regions   appear intact.    The cavernous segments of the internal carotid arteries   demonstrate expected flow-void indicating patency. There is mild mass   effect on the optic chiasm, LEFT greater than RIGHT. The infundibulum is   slightly deviated to the LEFT.      MR brain: Minimal white matter ischemia at the bifrontal and LEFT   parietal subcortical white matter.    Moderate mucosal thickening/fluid   in the LEFT mastoid air cells.    ANTIBIOTICS:  ceFAZolin   IVPB    ceFAZolin   IVPB 2000 every 8 hours  ertapenem  IVPB 1000 every 24 hours      IV LINES: PICC line placed today, 5/8/34    IMPRESSION:  Patient was admitted on 4/29 with CC of LLE pain s/p mechanical fall while going to the bathroom.  71 yo male with PMH DM, HLD, HTN, Colon CA s/p hemicolon resection in 2011(in remission). Admitted for severe sepsis due to gas gangrene L foot. S/P partial ray 5th ray resection left on 4/30 with further resection and skin graft with wound vac placement on 5/5. PICC line placement today on 5/8/23.    Cultures and antibiotics - (vanco + zosyn 4/29) Blood cx 4/29 + MSSA high grade bacteremia. 4/30(polymicrobial growth) + Morganella Morganii, Proteus mirabilis and Staph Epi surgical swab. Wound cx 4/30 + MSSA and M morganii.  On Nafcillin + cefepime 5/1-5/3, currently on Cefazolin + ertapenem tolerating abx well  CT scan and MRI head + for pituitary mass, likely an adenoma    Of note - patient with PICC line placement and wound vac to left foot. Plans to go home upon discharge and manage antibiotic administration and wound vac changes outpatient      PLAN:  Continue Cefazolin 2g q8 hrs IV + Ertapenem 1g q24 hrs  Cefazolin 2g q8 hrs IV upon D/C  Follow up pathology from 4/30  F/U discharge plan and outpatient management  DM control  Discussed with pt on the bedside  Discussed with medicine attending      Podiatry resident Veronica Lobo PGY1  Please reach out to attending physician             Subjective: patient is seen sitting in bedside chair, reports no constitutional symptoms. Feels well overall    REVIEW OF SYSTEMS:  CONSTITUTIONAL:  No weakness, fevers or chills  EYES/ENT:  No visual changes;  No vertigo or throat pain   NECK:  No pain or stiffness  RESPIRATORY:  No cough, wheezing, hemoptysis; No shortness of breath  CARDIOVASCULAR:  No chest pain or palpitations  GASTROINTESTINAL:  No abdominal or epigastric pain. No nausea, vomiting, or hematemesis; No diarrhea or constipation. No melena or hematochezia.  GENITOURINARY:  No dysuria, frequency or hematuria  NEUROLOGICAL:  No numbness or weakness  MSK: no back pain, no joint pain, no pain in left foot, dressing recently changed  SKIN:  No itching, rashes    PHYSICAL EXAM:   Vital Signs Last 24 Hrs  T(C): 36.9 (08 May 2023 05:14), Max: 36.9 (07 May 2023 13:31)  T(F): 98.4 (08 May 2023 05:14), Max: 98.4 (07 May 2023 13:31)  HR: 78 (08 May 2023 05:14) (78 - 83)  BP: 130/83 (08 May 2023 05:14) (130/83 - 148/85)  BP(mean): --  RR: 17 (08 May 2023 05:14) (17 - 18)  SpO2: 96% (08 May 2023 05:14) (96% - 98%)    Parameters below as of 08 May 2023 05:14  Patient On (Oxygen Delivery Method): room air      GENERAL: NAD, non-toxic, pleasant, sitting in bedside chair  HEAD:  Atraumatic, Normocephalic  EYES: EOMI, PERRLA, conjunctiva and sclera clear  ENT: Moist mucous membranes  NECK: Supple, No JVD  CHEST/LUNG: Clear to auscultation bilaterally; No rales, rhonchi, wheezing, or rubs. Unlabored respirations  HEART: Regular rate and rhythm; No murmurs, rubs, or gallops  ABDOMEN: BSx4; Soft, nontender, nondistended  : no dysuria  EXTREMITIES:   DP and PT faintly palpable b/l. Left foot s/p partial 5th ray amputation with skin graft intact  NERVOUS SYSTEM:  A&Ox3. No sensory deficits, no motor deficits  MSK: no back pain, no joint swelling  Psych: no anxiety, no delusional ideas, no suicidal ideation    LABS/DIAGNOSTIC TESTS:                        11.7   8.35  )-----------( 361      ( 07 May 2023 07:42 )             36.6     WBC Count: 8.35 K/uL (05-07 @ 07:42)    05-07    138  |  106  |  11  ----------------------------<  126<H>  4.0   |  28  |  1.15    Ca    8.9      07 May 2023 07:42  Phos  2.3     05-07  Mg     2.2     05-07    TPro  7.9  /  Alb  2.1<L>  /  TBili  0.2  /  DBili  x   /  AST  19  /  ALT  21  /  AlkPhos  82  05-07    CULTURES:     Culture - Blood (collected 05-03-23 @ 06:30)  Source: .Blood Blood  Preliminary Report (05-04-23 @ 12:02):    No growth to date.    Culture - Blood (collected 05-03-23 @ 05:51)  Source: .Blood Blood  Preliminary Report (05-04-23 @ 12:02):    No growth to date.    Culture - Blood (collected 05-01-23 @ 12:25)  Source: .Blood Blood-Peripheral  Gram Stain (05-03-23 @ 02:09):    Growth in aerobic bottle: Gram Positive Cocci in Clusters  Final Report (05-03-23 @ 21:16):    Growth in aerobic bottle: Staphylococcus aureus    See previous culture [id number]    Culture - Blood (collected 05-01-23 @ 12:15)  Source: .Blood Blood-Peripheral  Gram Stain (05-03-23 @ 23:53):    Growth in aerobic bottle: Gram Positive Cocci in Clusters    Growth in anaerobic bottle: Gram Positive Cocci in Clusters  Final Report (05-04-23 @ 19:12):    Growth in aerobic and anaerobic bottles: Staphylococcus aureus    See previous culture [id number]    Culture - Surgical Swab (collected 04-30-23 @ 10:13)  Source: .Surgical Swab Left 5th ray clean margin  Preliminary Report (05-02-23 @ 10:09):    Rare Morganella morganii    Rare Staphylococcus aureus    Few Finegoldia magna "Susceptibilities not performed"  Organism: Morganella morganii  Staphylococcus aureus (05-02-23 @ 07:52)  Organism: Morganella morganii (05-02-23 @ 07:52)      Method Type: TROY      -  Amikacin: S <=16      -  Amoxicillin/Clavulanic Acid: R >16/8      -  Ampicillin: R >16 These ampicillin results predict results for amoxicillin      -  Ampicillin/Sulbactam: R >16/8 Enterobacter, Klebsiella aerogenes, Citrobacter, and Serratia may develop resistance during prolonged therapy (3-4 days)      -  Aztreonam: S <=4      -  Cefazolin: R >16 Enterobacter, Klebsiella aerogenes, Citrobacter, and Serratia may develop resistance during prolonged therapy (3-4 days)      -  Cefepime: S <=2      -  Cefoxitin: S <=8      -  Ceftriaxone: S <=1 Enterobacter, Klebsiella aerogenes, Citrobacter, and Serratia may develop resistance during prolonged therapy      -  Ciprofloxacin: S <=0.25      -  Ertapenem: S <=0.5      -  Gentamicin: S <=2      -  Levofloxacin: S <=0.5      -  Meropenem: S <=1      -  Piperacillin/Tazobactam: S <=8      -  Tobramycin: S <=2      -  Trimethoprim/Sulfamethoxazole: S <=0.5/9.5  Organism: Staphylococcus aureus (05-02-23 @ 07:44)      Method Type: TROY      -  Ampicillin/Sulbactam: S <=8/4      -  Cefazolin: S <=4      -  Clindamycin: S <=0.25      -  Erythromycin: S <=0.25      -  Gentamicin: S <=1 Should not be used as monotherapy      -  Oxacillin: S <=0.25 Oxacillin predicts susceptibility for dicloxacillin, methicillin, and nafcillin      -  Penicillin: R 2      -  Rifampin: S <=1 Should not be used as monotherapy      -  Tetracycline: S <=1      -  Trimethoprim/Sulfamethoxazole: S <=0.5/9.5      -  Vancomycin: S 1    Culture - Surgical Swab (collected 04-30-23 @ 10:13)  Source: .Surgical Swab Left foot dirty culture  Final Report (05-05-23 @ 07:26):    Few Morganella morganii    Few Staphylococcus aureus    Few Finegoldia magna "Susceptibilities not performed"    Rare Proteus mirabilis  Organism: Morganella morganii  Staphylococcus aureus  Proteus mirabilis (05-03-23 @ 07:45)  Organism: Proteus mirabilis (05-03-23 @ 07:45)      Method Type: TROY      -  Amikacin: S <=16      -  Amoxicillin/Clavulanic Acid: S <=8/4      -  Ampicillin: S <=8 These ampicillin results predict results for amoxicillin      -  Ampicillin/Sulbactam: S <=4/2 Enterobacter, Klebsiella aerogenes, Citrobacter, and Serratia may develop resistance during prolonged therapy (3-4 days)      -  Aztreonam: S <=4      -  Cefazolin: S <=2 Enterobacter, Klebsiella aerogenes, Citrobacter, and Serratia may develop resistance during prolonged therapy (3-4 days)      -  Cefepime: S <=2      -  Cefoxitin: S <=8      -  Ceftriaxone: S <=1 Enterobacter, Klebsiella aerogenes, Citrobacter, and Serratia may develop resistance during prolonged therapy      -  Ciprofloxacin: S <=0.25      -  Ertapenem: S <=0.5      -  Gentamicin: S <=2      -  Levofloxacin: S <=0.5      -  Meropenem: S <=1      -  Piperacillin/Tazobactam: S <=8      -  Tobramycin: S 4      -  Trimethoprim/Sulfamethoxazole: S <=0.5/9.5  Organism: Staphylococcus aureus (05-03-23 @ 07:45)      Method Type: TROY      -  Ampicillin/Sulbactam: S <=8/4      -  Cefazolin: S <=4      -  Clindamycin: S <=0.25      -  Erythromycin: S <=0.25      -  Gentamicin: S <=1 Should not be used as monotherapy      -  Oxacillin: S <=0.25 Oxacillin predicts susceptibility for dicloxacillin, methicillin, and nafcillin      -  Penicillin: R 2      -  Rifampin: S <=1 Should not be used as monotherapy      -  Tetracycline: S <=1      -  Trimethoprim/Sulfamethoxazole: S <=0.5/9.5      -  Vancomycin: S 1  Organism: Morganella morganii (05-03-23 @ 07:45)      Method Type: TROY      -  Amikacin: S <=16      -  Amoxicillin/Clavulanic Acid: R >16/8      -  Ampicillin: R >16 These ampicillin results predict results for amoxicillin      -  Ampicillin/Sulbactam: R >16/8 Enterobacter, Klebsiella aerogenes, Citrobacter, and Serratia may develop resistance during prolonged therapy (3-4 days)      -  Aztreonam: S <=4      -  Cefazolin: R >16 Enterobacter, Klebsiella aerogenes, Citrobacter, and Serratia may develop resistance during prolonged therapy (3-4 days)      -  Cefepime: S <=2      -  Cefoxitin: S <=8      -  Ceftriaxone: S <=1 Enterobacter, Klebsiella aerogenes, Citrobacter, and Serratia may develop resistance during prolonged therapy      -  Ciprofloxacin: S <=0.25      -  Ertapenem: S <=0.5      -  Gentamicin: S <=2      -  Levofloxacin: S <=0.5      -  Meropenem: S <=1      -  Piperacillin/Tazobactam: S <=8      -  Tobramycin: S <=2      -  Trimethoprim/Sulfamethoxazole: S <=0.5/9.5    Culture - Surgical Swab (collected 04-29-23 @ 18:10)  Source: .Surgical Swab left foot wound  Final Report (05-04-23 @ 16:09):    Few Staphylococcus aureus    Few Morganella morganii    Rare Staphylococcus epidermidis "Susceptibilities not performed"    Few Proteus mirabilis  Organism: Staphylococcus aureus  Morganella morganii  Proteus mirabilis (05-04-23 @ 16:09)  Organism: Proteus mirabilis (05-04-23 @ 16:09)      Method Type: TROY      -  Amikacin: S <=16      -  Amoxicillin/Clavulanic Acid: S <=8/4      -  Ampicillin: S <=8 These ampicillin results predict results for amoxicillin      -  Ampicillin/Sulbactam: S <=4/2 Enterobacter, Klebsiella aerogenes, Citrobacter, and Serratia may develop resistance during prolonged therapy (3-4 days)      -  Aztreonam: R >16      -  Cefazolin: S <=2 Enterobacter, Klebsiella aerogenes, Citrobacter, and Serratia may develop resistance during prolonged therapy (3-4 days)      -  Cefepime: S <=2      -  Cefoxitin: S <=8      -  Ceftriaxone: S <=1 Enterobacter, Klebsiella aerogenes, Citrobacter, and Serratia may develop resistance during prolonged therapy      -  Ciprofloxacin: S <=0.25      -  Ertapenem: S <=0.5      -  Gentamicin: S <=2      -  Levofloxacin: S <=0.5      -  Meropenem: S <=1      -  Piperacillin/Tazobactam: S <=8      -  Tobramycin: S <=2      -  Trimethoprim/Sulfamethoxazole: S <=0.5/9.5  Organism: Morganella morganii (05-04-23 @ 16:09)      Method Type: TROY      -  Amikacin: S <=16      -  Amoxicillin/Clavulanic Acid: R >16/8      -  Ampicillin: R >16 These ampicillin results predict results for amoxicillin      -  Ampicillin/Sulbactam: R >16/8 Enterobacter, Klebsiella aerogenes, Citrobacter, and Serratia may develop resistance during prolonged therapy (3-4 days)      -  Aztreonam: S <=4      -  Cefazolin: R >16 Enterobacter, Klebsiella aerogenes, Citrobacter, and Serratia may develop resistance during prolonged therapy (3-4 days)      -  Cefepime: S <=2      -  Cefoxitin: S <=8      -  Ceftriaxone: S <=1 Enterobacter, Klebsiella aerogenes, Citrobacter, and Serratia may develop resistance during prolonged therapy      -  Ciprofloxacin: S <=0.25      -  Ertapenem: S <=0.5      -  Gentamicin: S <=2      -  Levofloxacin: S <=0.5      -  Meropenem: S <=1      -  Piperacillin/Tazobactam: S <=8      -  Tobramycin: S <=2      -  Trimethoprim/Sulfamethoxazole: S <=0.5/9.5  Organism: Staphylococcus aureus (05-04-23 @ 16:09)      Method Type: TROY      -  Ampicillin/Sulbactam: S <=8/4      -  Cefazolin: S <=4      -  Clindamycin: S <=0.25      -  Erythromycin: S <=0.25      -  Gentamicin: S 2 Should not be used as monotherapy      -  Oxacillin: S <=0.25 Oxacillin predicts susceptibility for dicloxacillin, methicillin, and nafcillin      -  Penicillin: R 8      -  Rifampin: S <=1 Should not be used as monotherapy      -  Tetracycline: S <=1      -  Trimethoprim/Sulfamethoxazole: S <=0.5/9.5      -  Vancomycin: S 2    Culture - Urine (collected 04-29-23 @ 15:10)  Source: Clean Catch Clean Catch (Midstream)  Final Report (04-30-23 @ 17:29):    <10,000 CFU/mL Normal Urogenital Sia    Culture - Blood (collected 04-29-23 @ 15:10)  Source: .Blood Blood-Peripheral  Gram Stain (05-01-23 @ 16:56):    Growth in aerobic bottle: Gram Positive Cocci in Clusters    Growth in anaerobic bottle: Gram Positive Cocci in Clusters  Final Report (05-04-23 @ 11:58):    Growth in aerobic and anaerobic bottles: Staphylococcus aureus    See previous culture [id number]    Culture - Blood (collected 04-29-23 @ 15:00)  Source: .Blood Blood-Peripheral  Gram Stain (05-02-23 @ 10:52):    Growth in aerobic bottle: Gram Positive Cocci in Clusters    Growth in anaerobic bottle: Gram Positive Cocci in Clusters  Final Report (05-03-23 @ 13:09):    Growth in aerobic and anaerobic bottles: Staphylococcus aureus    ***Blood Panel PCR results on this specimen are available    approximately 3 hours after the Gram stain result.***    Gram stain, PCR, and/or culture results may not always    correspond dueto difference in methodologies.    ************************************************************    This PCR assay was performed by multiplex PCR. This    Assay tests for 66 bacterial and resistance gene targets.    Please refer to the [facility] test directory    at [email or web address] for details.  Organism: Blood Culture PCR  Staphylococcus aureus (05-03-23 @ 13:09)  Organism: Staphylococcus aureus (05-03-23 @ 13:09)      Method Type: TROY      -  Ampicillin/Sulbactam: S <=8/4      -  Cefazolin: S <=4      -  Clindamycin: S <=0.25      -  Erythromycin: S <=0.25      -  Gentamicin: S <=1 Should not be used as monotherapy      -  Oxacillin: S <=0.25 Oxacillin predicts susceptibility for dicloxacillin, methicillin, and nafcillin      -  Penicillin: R 1      -  Rifampin: S <=1 Should not be used as monotherapy      -  Tetracycline: S <=1      -  Trimethoprim/Sulfamethoxazole: S <=0.5/9.5      -  Vancomycin: S 1  Organism: Blood Culture PCR (05-03-23 @ 13:09)      Method Type: PCR      -  Methicillin SENSITIVE Staphylococcus aureus (MSSA): Detec Any isolate of Staphylococcus aureus from a blood culture is NOT considered a contaminant.    RADIOLOGY:    < from: Xray Foot AP + Lateral + Oblique, Left (04.29.23 @ 15:15) >    IMPRESSION: No evidence of fracture or suspicious lesion. Ankle mortise is maintained. Calcaneal plantar spurs are noted. Hallux valgus deformity is seen. The periarticular erosive change of the medial margin of the first metatarsal head may be consistent with history of gout. Erosive deformity is seen at the head of the first proximal phalanx at the articular margin. There is minimal deformity of the medial margin of the head of the second proximal phalanx and a nondisplaced fracture at this level cannot be excluded.  Soft tissue infection with swelling and soft tissue air is seen at the level of the fifth digit. Vascular calcification is present.  If clinically warranted further assessment may include three-phase bone scan or MRI imaging to assess for early underlying osteomyelitis not yet apparent on plain film.    < from: [facility] Physiol Extremity Lower 3+ Level, BI (05.01.23 @ 16:33) >  IMPRESSION: Normal ABIs.    Dampened waveforms at the level of the metatarsals.      < from: MR Head w/wo IV Cont (05.01.23 @ 15:37) >  IMPRESSION:    MR orbits/sella:   1.8 cm AP by 1.6 cm CC by 2 cm TRV pituitary mass   consistent with an adenoma. There is mild extension into the LEFT   cavernous sinus. The RIGHT cavernous sinuses and para cavernous regions   appear intact.    The cavernous segments of the internal carotid arteries   demonstrate expected flow-void indicating patency. There is mild mass   effect on the optic chiasm, LEFT greater than RIGHT. The infundibulum is   slightly deviated to the LEFT.      MR brain: Minimal white matter ischemia at the bifrontal and LEFT   parietal subcortical white matter.    Moderate mucosal thickening/fluid   in the LEFT mastoid air cells.    ANTIBIOTICS:  ceFAZolin   IVPB    ceFAZolin   IVPB 2000 every 8 hours  ertapenem  IVPB 1000 every 24 hours      IV LINES: PICC line placed today, 5/8/34    IMPRESSION:  Patient was admitted on 4/29 with CC of LLE pain s/p mechanical fall while going to the bathroom.  73 yo male with PMH DM, HLD, HTN, Colon CA s/p hemicolon resection in 2011(in remission). Admitted for severe sepsis due to gas gangrene L foot. S/P partial ray 5th ray resection left on 4/30 with further resection and skin graft with wound vac placement on 5/5. PICC line placement today on 5/8/23.    Cultures and antibiotics - (vanco + zosyn 4/29) Blood cx 4/29 + MSSA high grade bacteremia. 4/30(polymicrobial growth) + Morganella Morganii, Proteus mirabilis and Staph Epi surgical swab. Wound cx 4/30 + MSSA and M morganii.  On Nafcillin + cefepime 5/1-5/3, currently on Cefazolin + ertapenem tolerating abx well  CT scan and MRI head + for pituitary mass, likely an adenoma    Of note - patient with PICC line placement and wound vac to left foot. Plans to go home upon discharge and manage antibiotic administration and wound vac changes outpatient      PLAN:  Continue Cefazolin 2g q8 hrs IV + Ertapenem 1g q24 hrs for now  Follow up pathology from 4/30  F/U discharge plan and outpatient management  DM control  Discussed with pt on the bedside  Discussed with medicine attending    Podiatry resident Veronica Lobo PGY1  Please reach out to attending physician

## 2023-05-08 NOTE — PROGRESS NOTE ADULT - ASSESSMENT
71 y/o M from home with a PMHx of DM, HTN, HLD and medication non-compliance admitted for sepsis 2/2 gas gangrene of left foot. Blood cx positive for MSSA. MR head showed 1.8 cm AP by 1.6 cm CC by 2 cm TRV pituitary mass consistent with an adenoma      Patient requested his eye drops be updated, provided pharmacy and eye doctor contact information however was unable to contact offices over the weekend, primary team to follow up regarding correct eye drops:  [facility], Phone Number: [phone]  Dr. Garcia [phone]  Dr. Fermin [phone] 73 y/o M from home with a PMHx of DM, HTN, HLD and medication non-compliance admitted for sepsis 2/2 gas gangrene of left foot. Blood cx positive for MSSA. MR head showed 1.8 cm AP by 1.6 cm CC by 2 cm TRV pituitary mass consistent with an adenoma

## 2023-05-08 NOTE — PROGRESS NOTE ADULT - PROBLEM SELECTOR PLAN 1
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   surgical path prelim result +ve for Morganella morgani, and Staph  Blood cultures: +ve MSSA   Vanc D/Leo  D/Leo Cefepim and Nafcillin for MSSA on blood cultures  c/w Ertapenem 1g qd and Cefazolin 2g q8   Repeat blood cx positive for Staph Aureus  Repeat culture on 5/3 : neg   S/P debridement and graft done 5/5   ambulated well with Physical therapy on 5/6  PICC line placement likely on Monday   Dr. Gao ID consulted c/o left foot pain, swelling, and erythema   XR for left foot/ ankle: suspicion of gas foci to 4th interspace and lateral 5th toe  aseptic appearing with leukocytosis WBC 16.95 and elevated lactate 2.2  Podiatry consulted-  s/p left partial 5th ray amputation due to gas gangrene.   surgical path prelim result +ve for Morganella morgani, and Staph  Blood cultures: +ve MSSA   Vanc D/Leo  D/Leo Cefepim and Nafcillin for MSSA on blood cultures  c/w Ertapenem 1g qd and Cefazolin 2g q8   Repeat blood cx positive for Staph Aureus  Repeat culture on 5/3 : neg   S/P debridement and graft done 5/5   ambulated well with Physical therapy on 5/6  PICC line placement  on Monday 5/8  Dr. Gao ID consulted  c/w abx on dc for a total of 6 weeks with weekly labs of cbc, cmp, esr,crp

## 2023-05-08 NOTE — PROGRESS NOTE ADULT - SUBJECTIVE AND OBJECTIVE BOX
Subjective:  Chart Notes, Work list Manager, and fingersticks reviewed.    Review of Systems:  Constitutional: No fever  Eyes: No blurry vision  Neuro: No tremors  HEENT: No pain  Cardiovascular: No chest pain, palpitations  Respiratory: No SOB, no cough  GI: No nausea, vomiting, abdominal pain  : No dysuria  Skin: no rash  Psych: no depression  Endocrine: no polyuria, polydipsia  Hem/lymph: no swelling  Osteoporosis: no fractures    ALL OTHER SYSTEMS REVIEWED AND NEGATIVE    UNABLE TO OBTAIN    MEDICATIONS  (STANDING):  brimonidine 0.2% Ophthalmic Solution 1 Drop(s) Left EYE every 12 hours  ceFAZolin   IVPB      ceFAZolin   IVPB 2000 milliGRAM(s) IV Intermittent every 8 hours  chlorhexidine 2% Cloths 1 Application(s) Topical <User Schedule>  enoxaparin Injectable 40 milliGRAM(s) SubCutaneous every 24 hours  ertapenem  IVPB 1000 milliGRAM(s) IV Intermittent every 24 hours  famotidine    Tablet 40 milliGRAM(s) Oral every 12 hours  gabapentin 300 milliGRAM(s) Oral every 8 hours  insulin glargine Injectable (LANTUS) 20 Unit(s) SubCutaneous at bedtime  insulin lispro (ADMELOG) corrective regimen sliding scale   SubCutaneous three times a day before meals  insulin lispro (ADMELOG) corrective regimen sliding scale   SubCutaneous at bedtime  insulin lispro Injectable (ADMELOG) 6 Unit(s) SubCutaneous three times a day before meals  lactated ringers. 1000 milliLiter(s) (100 mL/Hr) IV Continuous <Continuous>    MEDICATIONS  (PRN):  acetaminophen     Tablet .. 650 milliGRAM(s) Oral every 6 hours PRN Temp greater or equal to 38C (100.4F), Mild Pain (1 - 3)  ondansetron Injectable 4 milliGRAM(s) IV Push every 8 hours PRN Nausea and/or Vomiting      PHYSICAL EXAM:  VITALS: T(C): 36.9 (05-08-23 @ 05:14)  T(F): 98.4 (05-08-23 @ 05:14), Max: 98.4 (05-07-23 @ 13:31)  HR: 78 (05-08-23 @ 05:14) (78 - 83)  BP: 130/83 (05-08-23 @ 05:14) (130/83 - 148/85)  RR:  (17 - 18)  SpO2:  (96% - 98%)  Wt(kg): --  GENERAL: NAD,   HEENT:  Atraumatic, Normocephalic, drymucous membranes  THYROID: Normal size, no palpable nodules  RESPIRATORY: Clear to auscultation bilaterally; No rales, rhonchi, wheezing  CARDIOVASCULAR: Regular rate and rhythm; No murmurs; no peripheral edema  GI: Soft, nontender, non distended, +ve abdominal obesity  EXTREMITIES: +ve peripheral pulses, -ve pedal edema  SKIN: Dry, intact, No rashes or lesions  PSYCH: Alert and oriented x 3    CAPILLARY BLOOD GLUCOSE      POCT Blood Glucose.: 127 mg/dL (08 May 2023 07:31)  POCT Blood Glucose.: 120 mg/dL (08 May 2023 06:20)  POCT Blood Glucose.: 146 mg/dL (08 May 2023 00:08)  POCT Blood Glucose.: 177 mg/dL (07 May 2023 21:42)  POCT Blood Glucose.: 111 mg/dL (07 May 2023 16:43)  POCT Blood Glucose.: 162 mg/dL (07 May 2023 11:32)    05-07    138  |  106  |  11  ----------------------------<  126<H>  4.0   |  28  |  1.15    eGFR: 68    Ca    8.9      05-07  Mg     2.2     05-07  Phos  2.3     05-07    TPro  7.9  /  Alb  2.1<L>  /  TBili  0.2  /  DBili  x   /  AST  19  /  ALT  21  /  AlkPhos  82  05-07    Thyroid Function Tests:  05-03 @ 05:51 TSH 2.19 FreeT4 1.6 T3 -- Anti TPO -- Anti Thyroglobulin Ab -- TSI --  04-30 @ 08:00 TSH 1.13 FreeT4 -- T3 -- Anti TPO -- Anti Thyroglobulin Ab -- TSI --        Assessment and Plan:    1) Type 2 diabetes:      Recommendations:  - Basal Insulin:   Glargine  (Lantus) units once daily    - Nutritional Insulin:  Lispro (Admelog) units with breakfast, hold if NPO or eating <50% of meals  Lispro (Admelog) units with lunch, hold if NPO or eating <50% of meals  Lispro (Admelog) units with dinner, hold if NPO or eating <50% of meals    - Correctional (Sliding) Insulin:  Low Lispro (Admelog) sliding scale with meals and bedtime    - Oral Medications:  None in the hospital               Subjective:  Chart Notes, Work list Manager, and fingersticks reviewed. Patient denies any major complaints, reports of eating fine    Review of Systems:  Constitutional: No fever  Eyes: No blurry vision  Cardiovascular: No chest pain, palpitations  Respiratory: No SOB, no cough  GI: No nausea, vomiting, abdominal pain  : No dysuria  Skin: no rash  Psych: no depression  Endocrine: no polyuria, polydipsia    Medications (Standing):  brimonidine 0.2% Ophthalmic Solution 1 Drop(s) Left EYE every 12 hours  ceFAZolin   IVPB      ceFAZolin   IVPB 2000 milliGRAM(s) IV Intermittent every 8 hours  chlorhexidine 2% Cloths 1 Application(s) Topical <User Schedule>  enoxaparin Injectable 40 milliGRAM(s) SubCutaneous every 24 hours  ertapenem  IVPB 1000 milliGRAM(s) IV Intermittent every 24 hours  famotidine    Tablet 40 milliGRAM(s) Oral every 12 hours  gabapentin 300 milliGRAM(s) Oral every 8 hours  insulin glargine Injectable (LANTUS) 20 Unit(s) SubCutaneous at bedtime  insulin lispro (ADMELOG) corrective regimen sliding scale   SubCutaneous three times a day before meals  insulin lispro (ADMELOG) corrective regimen sliding scale   SubCutaneous at bedtime  insulin lispro Injectable (ADMELOG) 6 Unit(s) SubCutaneous three times a day before meals  lactated ringers. 1000 milliLiter(s) (100 mL/Hr) IV Continuous <Continuous>    Medications  (PRN):  acetaminophen     Tablet .. 650 milliGRAM(s) Oral every 6 hours PRN Temp greater or equal to 38C (100.4F), Mild Pain (1 - 3)  ondansetron Injectable 4 milliGRAM(s) IV Push every 8 hours PRN Nausea and/or Vomiting    Physical examination:  VITALS: T(C): 36.9 (05-08-23 @ 05:14)  T(F): 98.4 (05-08-23 @ 05:14), Max: 98.4 (05-07-23 @ 13:31)  HR: 78 (05-08-23 @ 05:14) (78 - 83)  BP: 130/83 (05-08-23 @ 05:14) (130/83 - 148/85)  RR:  (17 - 18)  SpO2:  (96% - 98%)    Constitutional: No acute distress, yes ill- appearing,   HEENT: Moist mucous membranes, Left side decreased peripheral vision, Right side peripheral vision intact, PERRL, EOMI  Neck:  No JVD, bruits or thyromegaly, No thyroid nodules palpable, no LAD  Respiratory:  Respiratory effort normal, lungs clear to ausculation, without rales or rhonchi  Cardiovascular:  Regular heart rate, normal S1 and S2 sounds, without murmur, rub or gallop.  Gastrointestinal: Soft, non tender without hepatosplenomegaly and masses, no abdominal obesity  Extremities: Sensation intact in Right foot, Left foot has wrapped bandage, no cyanosis, clubbing or edema, positive pedal pulses  Neurological:  Oriented to person, place and time, No gross sensory or motor  sx    Labs:   Capillary Blood Glucose:  POCT Blood Glucose.: 127 mg/dL (08 May 2023 07:31)  POCT Blood Glucose.: 120 mg/dL (08 May 2023 06:20)  POCT Blood Glucose.: 146 mg/dL (08 May 2023 00:08)  POCT Blood Glucose.: 177 mg/dL (07 May 2023 21:42)  POCT Blood Glucose.: 111 mg/dL (07 May 2023 16:43)  POCT Blood Glucose.: 162 mg/dL (07 May 2023 11:32)    05-07  138  |  106  |  11  ----------------------------<  126<H>  4.0   |  28  |  1.15  eGFR: 68  Ca    8.9      05-07  Mg     2.2     05-07  Phos  2.3     05-07  TPro  7.9  /  Alb  2.1<L>  /  TBili  0.2  /  DBili  x   /  AST  19  /  ALT  21  /  AlkPhos  82  05-07  Adrenocorticotropic Hormone, Serum: 11.9 pg/mL (04.30.23 @ 08:00)  Cortisol AM, Serum: 16.8 ug/dL (04.30.23 @ 08:00)  Prolactin, Serum: 12.4 ng/mL (05.03.23 @ 05:51)  Thyroid Stimulating Hormone, Serum: 1.13 uU/mL (04.30.23 @ 08:00)  Free Triiodothyronine, Serum: 1.46 pg/mL (04.30.23 @ 08:00)  A1C with Estimated Average Glucose Result: 12.2 % (05.01.23 @ 05:29)  Free Thyroxine, Serum: 1.6 ng/dL (05.03.23 @ 05:51)    MRI Brain 5/1/2023  IMPRESSION:    MR orbits/sella:   1.8 cm AP by 1.6 cm CC by 2 cm TRV pituitary mass   consistent with an adenoma. There is mild extension into the LEFT   cavernous sinus. The RIGHT cavernous sinuses and para cavernous regions   appear intact.    The cavernous segments of the internal carotid arteries   demonstrate expected flow-void indicating patency. There is mild mass   effect on the optic chiasm, LEFT greater than RIGHT. The infundibulum is   slightly deviated to the LEFT.    MR brain: Minimal white matter ischemia at the bifrontal and LEFT   parietal subcortical white matter.    Moderate mucosal thickening/fluid   in the LEFT mastoid air cells.    Assessment and Plan:    72 year old Male with PMHx T2DM, HLD, HTN, colon cancer s/p hemicolon resection in 2011 ( currently in remission) p/w complaints of lightheadedness after fall with LOC and head injury and left foot ulcer. Admitted for left toe gas gangrene. Hospital course complicated by MSSA bacteremia requiring IV antibiotics. On admission, patient was found to have serum BS of 389 and elevated A1C.  CT scan head shows pituitary mass. 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  Patient is not taking Humalog before the meals regularly.  Inpatient, patient's BG are better controlled on current insulin regimen, but few fingersticks are tightly controlled  Will continue the same regimen    Inpatient Recommendations:  Basal Insulin:   Continue Glargine ( Lantus) 20 units once daily    Nutritional Insulin:  Continue Lispro (Admelog) 6 units with meals, Hold if NPO or eating <50% of meals    Correctional Insulin:  Change the moderate lispro scale to low Lispro ( Admelog) correctional scale with meals and bedtime,     Oral Diabetes Medications:  None in the hospital    Check the POC glucose with meals and bedtime    4) Pituitary Macroadenoma:   5) Left cavernous sinus invasion:  6) Optic chiasm compression    MRI brain shows 2 cm pituitary macroadenoma compressing left side of optic chiasm and invading the left side of cavernous sinus mildly.  Most likely pituitary mass is nonfunctional as ACTH, cortisol, TSH, electrolytes and vitals are stable however we still need to test all pituitary hormones once patient is a stable and is discharged from the hospital.   Free T4, Prolactin levels are within normal limit .  Recommend to do ophthalmology consult inpatient or outpatient to evaluate for peripheral visual fields given patient has optic chiasm compression.    Explained previously regarding pituitary tumor and its optic chiasm compression.  Counseled to follow-up with endocrine as outpatient to evaluate whether the tumor is functional or not and then with neurosurgery.  Discussed with patient that as pituitary tumor is compressing the optic chiasm, the gold standard treatment is to do transsphenoidal transnasal pituitary tumor resection to prevent complications such as vision loss, pituitary hemorrhage or pituitary apoplexy.  Patient is agreeable to do the surgical intervention eventually.  Discussed with patient that after the pituitary surgery, there may be a chance that he may develop panhypopituitarism and therefore will be requiring hormone replacement.  Patient verbalized understanding.    Discussed the endocrine plan of care with patient and primary team    Please provide endocrine office address of [phone] [facility] on discharge paper. Patient needs close outpatient endocrine follow up.     Ewa Arreola MD   Endocrinology, Diabetes and Metabolism   Available on MS. teams

## 2023-05-08 NOTE — PROGRESS NOTE ADULT - SUBJECTIVE AND OBJECTIVE BOX
PGY-1 Progress Note discussed with attending    PAGER #: [[phone]] TILL 5:00 PM  PLEASE CONTACT ON CALL TEAM:  - On Call Team (Please refer to Marlena) FROM 5:00 PM - 8:30PM  - Nightfloat Team FROM 8:30 -7:30 AM    CHIEF COMPLAINT & BRIEF HOSPITAL COURSE:    INTERVAL HPI/OVERNIGHT EVENTS:       REVIEW OF SYSTEMS:  CONSTITUTIONAL: No fever, weight loss, or fatigue  RESPIRATORY: No cough, wheezing, chills or hemoptysis; No shortness of breath  CARDIOVASCULAR: No chest pain, palpitations, dizziness, or leg swelling  GASTROINTESTINAL: No abdominal pain. No nausea, vomiting, or hematemesis; No diarrhea or constipation. No melena or hematochezia.  GENITOURINARY: No dysuria or hematuria, urinary frequency  NEUROLOGICAL: No headaches, memory loss, loss of strength, numbness, or tremors  SKIN: No itching, burning, rashes, or lesions     Vital Signs Last 24 Hrs  T(C): 37.1 (08 May 2023 13:54), Max: 37.1 (08 May 2023 13:54)  T(F): 98.8 (08 May 2023 13:54), Max: 98.8 (08 May 2023 13:54)  HR: 84 (08 May 2023 13:54) (78 - 84)  BP: 149/98 (08 May 2023 13:54) (130/83 - 149/98)  BP(mean): --  RR: 17 (08 May 2023 13:54) (17 - 18)  SpO2: 99% (08 May 2023 13:54) (96% - 99%)    Parameters below as of 08 May 2023 13:54  Patient On (Oxygen Delivery Method): room air        PHYSICAL EXAMINATION:  GENERAL: NAD, well built  HEAD:  Atraumatic, Normocephalic  EYES:  conjunctiva and sclera clear  NECK: Supple, No JVD, Normal thyroid  CHEST/LUNG: Clear to auscultation. Clear to percussion bilaterally; No rales, rhonchi, wheezing, or rubs  HEART: Regular rate and rhythm; No murmurs, rubs, or gallops  ABDOMEN: Soft, Nontender, Nondistended; Bowel sounds present  NERVOUS SYSTEM:  Alert & Oriented X3,    EXTREMITIES:  2+ Peripheral Pulses, No clubbing, cyanosis, or edema  SKIN: warm dry                          11.7   8.35  )-----------( 361      ( 07 May 2023 07:42 )             36.6     05-07    138  |  106  |  11  ----------------------------<  126<H>  4.0   |  28  |  1.15    Ca    8.9      07 May 2023 07:42  Phos  2.3     05-07  Mg     2.2     05-07    TPro  7.9  /  Alb  2.1<L>  /  TBili  0.2  /  DBili  x   /  AST  19  /  ALT  21  /  AlkPhos  82  05-07    LIVER FUNCTIONS - ( 07 May 2023 07:42 )  Alb: 2.1 g/dL / Pro: 7.9 g/dL / ALK PHOS: 82 U/L / ALT: 21 U/L DA / AST: 19 U/L / GGT: x                   CAPILLARY BLOOD GLUCOSE      RADIOLOGY & ADDITIONAL TESTS:                   PGY-1 Progress Note discussed with attending    PAGER #: [[phone]] TILL 5:00 PM  PLEASE CONTACT ON CALL TEAM:  - On Call Team (Please refer to Marlena) FROM 5:00 PM - 8:30PM  - Nightfloat Team FROM 8:30 -7:30 AM    INTERVAL HPI/OVERNIGHT EVENTS:   no acute overnight events, pt. seen and examined at bedside, offers no complaints. Pt states that he feels good and denies any foot pain at this time.       REVIEW OF SYSTEMS:  CONSTITUTIONAL: No fever, weight loss, or fatigue  RESPIRATORY: No cough, wheezing, chills or hemoptysis; No shortness of breath  CARDIOVASCULAR: No chest pain, palpitations, dizziness, or leg swelling  GASTROINTESTINAL: No abdominal pain. No nausea, vomiting, or hematemesis; No diarrhea or constipation. No melena or hematochezia.  GENITOURINARY: No dysuria or hematuria, urinary frequency  NEUROLOGICAL: No headaches, memory loss, loss of strength, numbness, or tremors  SKIN: No itching, burning, rashes, or lesions     Vital Signs Last 24 Hrs  T(C): 37.1 (08 May 2023 13:54), Max: 37.1 (08 May 2023 13:54)  T(F): 98.8 (08 May 2023 13:54), Max: 98.8 (08 May 2023 13:54)  HR: 84 (08 May 2023 13:54) (78 - 84)  BP: 149/98 (08 May 2023 13:54) (130/83 - 149/98)  BP(mean): --  RR: 17 (08 May 2023 13:54) (17 - 18)  SpO2: 99% (08 May 2023 13:54) (96% - 99%)    Parameters below as of 08 May 2023 13:54  Patient On (Oxygen Delivery Method): room air        PHYSICAL EXAMINATION:  GENERAL: NAD, AAOx3  HEAD: AT/NC  EYES: conjunctiva and sclera clear  NECK: supple, No JVD noted  CHEST/LUNG: CTABL; no rales, rhonchi, wheezing, or rubs  HEART: regular rate and rhythm; no murmurs, rubs, or gallops  ABDOMEN: soft, nontender, nondistended; Bowel sounds present  EXTREMITIES:  2+ Peripheral Pulses, No clubbing, cyanosis, or edema, Wound Vac in place on the Left foot   SKIN: warm                         11.7   8.35  )-----------( 361      ( 07 May 2023 07:42 )             36.6     05-07    138  |  106  |  11  ----------------------------<  126<H>  4.0   |  28  |  1.15    Ca    8.9      07 May 2023 07:42  Phos  2.3     05-07  Mg     2.2     05-07    TPro  7.9  /  Alb  2.1<L>  /  TBili  0.2  /  DBili  x   /  AST  19  /  ALT  21  /  AlkPhos  82  05-07    LIVER FUNCTIONS - ( 07 May 2023 07:42 )  Alb: 2.1 g/dL / Pro: 7.9 g/dL / ALK PHOS: 82 U/L / ALT: 21 U/L DA / AST: 19 U/L / GGT: x                   CAPILLARY BLOOD GLUCOSE      RADIOLOGY & ADDITIONAL TESTS:

## 2023-05-09 LAB
ALBUMIN SERPL ELPH-MCNC: 2.2 G/DL — LOW (ref 3.5–5)
ALP SERPL-CCNC: 85 U/L — SIGNIFICANT CHANGE UP (ref 40–120)
ALT FLD-CCNC: 15 U/L DA — SIGNIFICANT CHANGE UP (ref 10–60)
ANION GAP SERPL CALC-SCNC: 4 MMOL/L — LOW (ref 5–17)
AST SERPL-CCNC: 21 U/L — SIGNIFICANT CHANGE UP (ref 10–40)
BASOPHILS # BLD AUTO: 0.03 K/UL — SIGNIFICANT CHANGE UP (ref 0–0.2)
BASOPHILS NFR BLD AUTO: 0.5 % — SIGNIFICANT CHANGE UP (ref 0–2)
BILIRUB SERPL-MCNC: 0.2 MG/DL — SIGNIFICANT CHANGE UP (ref 0.2–1.2)
BUN SERPL-MCNC: 10 MG/DL — SIGNIFICANT CHANGE UP (ref 7–18)
CALCIUM SERPL-MCNC: 9 MG/DL — SIGNIFICANT CHANGE UP (ref 8.4–10.5)
CHLORIDE SERPL-SCNC: 106 MMOL/L — SIGNIFICANT CHANGE UP (ref 96–108)
CO2 SERPL-SCNC: 28 MMOL/L — SIGNIFICANT CHANGE UP (ref 22–31)
CREAT SERPL-MCNC: 1.21 MG/DL — SIGNIFICANT CHANGE UP (ref 0.5–1.3)
EGFR: 64 ML/MIN/1.73M2 — SIGNIFICANT CHANGE UP
EOSINOPHIL # BLD AUTO: 0.07 K/UL — SIGNIFICANT CHANGE UP (ref 0–0.5)
EOSINOPHIL NFR BLD AUTO: 1.2 % — SIGNIFICANT CHANGE UP (ref 0–6)
GLUCOSE BLDC GLUCOMTR-MCNC: 107 MG/DL — HIGH (ref 70–99)
GLUCOSE BLDC GLUCOMTR-MCNC: 179 MG/DL — HIGH (ref 70–99)
GLUCOSE BLDC GLUCOMTR-MCNC: 185 MG/DL — HIGH (ref 70–99)
GLUCOSE BLDC GLUCOMTR-MCNC: 92 MG/DL — SIGNIFICANT CHANGE UP (ref 70–99)
GLUCOSE SERPL-MCNC: 119 MG/DL — HIGH (ref 70–99)
HCT VFR BLD CALC: 37.4 % — LOW (ref 39–50)
HGB BLD-MCNC: 11.7 G/DL — LOW (ref 13–17)
IMM GRANULOCYTES NFR BLD AUTO: 0.3 % — SIGNIFICANT CHANGE UP (ref 0–0.9)
LYMPHOCYTES # BLD AUTO: 2.03 K/UL — SIGNIFICANT CHANGE UP (ref 1–3.3)
LYMPHOCYTES # BLD AUTO: 34.5 % — SIGNIFICANT CHANGE UP (ref 13–44)
MAGNESIUM SERPL-MCNC: 2.2 MG/DL — SIGNIFICANT CHANGE UP (ref 1.6–2.6)
MCHC RBC-ENTMCNC: 26.1 PG — LOW (ref 27–34)
MCHC RBC-ENTMCNC: 31.3 GM/DL — LOW (ref 32–36)
MCV RBC AUTO: 83.3 FL — SIGNIFICANT CHANGE UP (ref 80–100)
MONOCYTES # BLD AUTO: 0.64 K/UL — SIGNIFICANT CHANGE UP (ref 0–0.9)
MONOCYTES NFR BLD AUTO: 10.9 % — SIGNIFICANT CHANGE UP (ref 2–14)
NEUTROPHILS # BLD AUTO: 3.09 K/UL — SIGNIFICANT CHANGE UP (ref 1.8–7.4)
NEUTROPHILS NFR BLD AUTO: 52.6 % — SIGNIFICANT CHANGE UP (ref 43–77)
NRBC # BLD: 0 /100 WBCS — SIGNIFICANT CHANGE UP (ref 0–0)
PHOSPHATE SERPL-MCNC: 3.1 MG/DL — SIGNIFICANT CHANGE UP (ref 2.5–4.5)
PLATELET # BLD AUTO: 383 K/UL — SIGNIFICANT CHANGE UP (ref 150–400)
POTASSIUM SERPL-MCNC: 3.9 MMOL/L — SIGNIFICANT CHANGE UP (ref 3.5–5.3)
POTASSIUM SERPL-SCNC: 3.9 MMOL/L — SIGNIFICANT CHANGE UP (ref 3.5–5.3)
PROT SERPL-MCNC: 8.1 G/DL — SIGNIFICANT CHANGE UP (ref 6–8.3)
RBC # BLD: 4.49 M/UL — SIGNIFICANT CHANGE UP (ref 4.2–5.8)
RBC # FLD: 13.9 % — SIGNIFICANT CHANGE UP (ref 10.3–14.5)
SODIUM SERPL-SCNC: 138 MMOL/L — SIGNIFICANT CHANGE UP (ref 135–145)
SURGICAL PATHOLOGY STUDY: SIGNIFICANT CHANGE UP
WBC # BLD: 5.88 K/UL — SIGNIFICANT CHANGE UP (ref 3.8–10.5)
WBC # FLD AUTO: 5.88 K/UL — SIGNIFICANT CHANGE UP (ref 3.8–10.5)

## 2023-05-09 PROCEDURE — 99232 SBSQ HOSP IP/OBS MODERATE 35: CPT | Mod: GC

## 2023-05-09 PROCEDURE — 99231 SBSQ HOSP IP/OBS SF/LOW 25: CPT

## 2023-05-09 RX ADMIN — Medication 6 UNIT(S): at 08:19

## 2023-05-09 RX ADMIN — FAMOTIDINE 40 MILLIGRAM(S): 10 INJECTION INTRAVENOUS at 17:19

## 2023-05-09 RX ADMIN — GABAPENTIN 300 MILLIGRAM(S): 400 CAPSULE ORAL at 06:04

## 2023-05-09 RX ADMIN — Medication 100 MILLIGRAM(S): at 14:23

## 2023-05-09 RX ADMIN — Medication 6 UNIT(S): at 17:18

## 2023-05-09 RX ADMIN — CHLORHEXIDINE GLUCONATE 1 APPLICATION(S): 213 SOLUTION TOPICAL at 06:05

## 2023-05-09 RX ADMIN — GABAPENTIN 300 MILLIGRAM(S): 400 CAPSULE ORAL at 17:20

## 2023-05-09 RX ADMIN — Medication 6 UNIT(S): at 11:34

## 2023-05-09 RX ADMIN — Medication 100 MILLIGRAM(S): at 22:20

## 2023-05-09 RX ADMIN — INSULIN GLARGINE 20 UNIT(S): 100 INJECTION, SOLUTION SUBCUTANEOUS at 22:20

## 2023-05-09 RX ADMIN — Medication 100 MILLIGRAM(S): at 06:04

## 2023-05-09 RX ADMIN — Medication 2: at 11:35

## 2023-05-09 RX ADMIN — ERTAPENEM SODIUM 120 MILLIGRAM(S): 1 INJECTION, POWDER, LYOPHILIZED, FOR SOLUTION INTRAMUSCULAR; INTRAVENOUS at 10:12

## 2023-05-09 RX ADMIN — GABAPENTIN 300 MILLIGRAM(S): 400 CAPSULE ORAL at 22:21

## 2023-05-09 RX ADMIN — FAMOTIDINE 40 MILLIGRAM(S): 10 INJECTION INTRAVENOUS at 06:04

## 2023-05-09 NOTE — CHART NOTE - NSCHARTNOTEFT_GEN_A_CORE
Notified by RN that patient's PICC line placed by IR is not flushing well. Ordered Cathflow (alteplase) to flush line. After 30 minutes, PICC line is flushing easily. Notified by RN that patient's PICC line placed by IR is not flushing well and is resistant. Ordered Cathflow (alteplase) to flush line. After 30 minutes, PICC line is flushing easily. Notified by RN that patient's PICC line placed by IR is not flushing well and has resistant flow. Ordered Cathflow (alteplase) to flush line. After 30 minutes, PICC line is flushing easily.

## 2023-05-09 NOTE — PROGRESS NOTE ADULT - PROBLEM SELECTOR PLAN 1
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   surgical path prelim result +ve for Morganella morgani, and Staph  Blood cultures: +ve MSSA   Vanc D/Leo  D/Leo Cefepim and Nafcillin for MSSA on blood cultures  c/w Ertapenem 1g qd and Cefazolin 2g q8   Repeat blood cx positive for Staph Aureus  Repeat culture on 5/3 : neg   S/P debridement and graft done 5/5   ambulated well with Physical therapy on 5/6  PICC line placement  on Monday 5/8  Dr. Gao ID consulted  c/w abx on dc for a total of 6 weeks with weekly labs of cbc, cmp, esr,crp

## 2023-05-09 NOTE — PROGRESS NOTE ADULT - ATTENDING COMMENTS
Patient seen and examined this afternoon     73 yo man presented with c/o pain and swelling of his left foot after a fall noted to have Gas gangrene s/p Debridement and graft early this morning. Patient also with incidental Adenoma noted on CT/ MRI; Hx Glaucoma and Retinal detachment    Patient doing well. No new complaints. No fever. denies SOB, palpitations, chest pain, nausea, vomiting, diarrhea, constipation, dizziness. No new complaints. Wound Vac changed this morning by Podiatry. PICC line placed. Podiatry advised ambulate with partial weight bearing to the left leg on heel. For PICC line tomorrow.     Vital Signs Last 24 Hrs  T(C): 36.8 (09 May 2023 12:35), Max: 36.8 (09 May 2023 12:35)  T(F): 98.3 (09 May 2023 12:35), Max: 98.3 (09 May 2023 12:35)  HR: 101 (09 May 2023 12:35) (72 - 101)  BP: 110/70 (09 May 2023 12:35) (110/70 - 139/71)  RR: 18 (09 May 2023 12:35) (18 - 18)  SpO2: 99% (09 May 2023 12:35) (95% - 100%) room air        P/E: as above; unchanged  Left foot dressing in place with wound vac draining;   chronic stasis dermatitis right lower leg    Labs: reviewed as below                        11.7   5.88  )-----------( 383      ( 09 May 2023 06:50 )             37.4   05-09    138  |  106  |  10  ----------------------------<  119<H>  3.9   |  28  |  1.21    Ca    9.0      09 May 2023 06:50  Phos  3.1     05-09  Mg     2.2     05-09    TPro  8.1  /  Alb  2.2<L>  /  TBili  0.2  /  DBili  x   /  AST  21  /  ALT  15  /  AlkPhos  85  05-09      Pituitary Hormones reviewed essentially WNL    Culture - Blood in AM (05.03.23 @ 06:30)   Specimen Source: .Blood Blood  Culture Results: No growth to date  Culture - Blood in AM (05.03.23 @ 05:51)   Specimen Source: .Blood Blood  Culture Results: No growth to date.    Culture - Blood (05.01.23 @ 12:25)   Culture Results: Growth in aerobic bottle: Staphylococcus aureus     D/D:  Gas gangrene with Left foot Osteomyelitis s/p debridement and Graft POD#3  MSSA bacteremia likely source foot  Pituitary adenoma, non-secreting, possibly compression on optic chiasm.    Poorly controlled DM    Plan:      Continue antibiotics with Ertapenem daily and Cefazolin q 8 hrs x 6 weeks total antibiotics from negative culture; ESR/CRP weekly    FABIAN showed no vegetations  Blood Cx from 5/1 was positive; so far Cx from 5/3 negative Day 4  Podiatry f/u, s/p repeat debridement graft and now Wound Vac; Podiatry follow up  As per podiatry weight bearing on heel left foot with surgical shoes; patient able to ambulate to bathroom;  PT eval appreciated  Sugars well controlled; Basal Insulin 20 units HS and Admelog 6 units ; titrate up if need to keep 120 to 160  Endocrine f/u; appear adenoma is non secretory; FSH, LH, prolactin, TSH,  cortisol, LH, FSH all WNL  Neurosurgery intervention once completed antibiotic course as adenoma with mild compression on Optic chiasm;   F/u outpatient for pituitary adenoma and refer to Neurosurgery.  possible transphenoidal resection after completion of six weeks of antibiotics for osteomyelitis with bacteremia.   DVT ppx;   Patient lives with wife Peri Pérez;  patient and wife to determine if could manage Antibiotics and Wound care at home; Patient reports plan to go home; D/C Home once arranged Home infusion services and wound care.  wife works 3PM to 11PM. Case mgmt follow up AM  Rest as per PGY1 above  Discussed with PGY1 Dr. Quiñones and RN  Discussed with patient all the above at length; Patient seen and examined this afternoon     71 yo man presented with c/o pain and swelling of his left foot after a fall noted to have Gas gangrene s/p Debridement and graft early this morning. Patient also with incidental Adenoma noted on CT/ MRI; Hx Glaucoma and Retinal detachment    Patient doing well. No new complaints. No fever. denies SOB, palpitations, chest pain, nausea, vomiting, diarrhea, constipation, dizziness. No new complaints. Wound Vac changed this morning by Podiatry. PICC line placed. Podiatry advised ambulate with partial weight bearing to the left leg on heel. For PICC line tomorrow.     Vital Signs Last 24 Hrs  T(C): 36.8 (09 May 2023 12:35), Max: 36.8 (09 May 2023 12:35)  T(F): 98.3 (09 May 2023 12:35), Max: 98.3 (09 May 2023 12:35)  HR: 101 (09 May 2023 12:35) (72 - 101)  BP: 110/70 (09 May 2023 12:35) (110/70 - 139/71)  RR: 18 (09 May 2023 12:35) (18 - 18)  SpO2: 99% (09 May 2023 12:35) (95% - 100%) room air        P/E: as above; unchanged  Left foot dressing in place with wound vac draining;   chronic stasis dermatitis right lower leg    Labs: reviewed as below                        11.7   5.88  )-----------( 383      ( 09 May 2023 06:50 )             37.4   05-09    138  |  106  |  10  ----------------------------<  119<H>  3.9   |  28  |  1.21    Ca    9.0      09 May 2023 06:50  Phos  3.1     05-09  Mg     2.2     05-09    TPro  8.1  /  Alb  2.2<L>  /  TBili  0.2  /  DBili  x   /  AST  21  /  ALT  15  /  AlkPhos  85  05-09    Pituitary Hormones reviewed essentially WNL    Culture - Blood in AM (05.03.23 @ 06:30)   Specimen Source: .Blood Blood  Culture Results: No growth to date  Culture - Blood in AM (05.03.23 @ 05:51)   Specimen Source: .Blood Blood  Culture Results: No growth to date.    Culture - Blood (05.01.23 @ 12:25)   Culture Results: Growth in aerobic bottle: Staphylococcus aureus     D/D:  Gas gangrene with Left foot Osteomyelitis s/p debridement and Graft POD#3  MSSA bacteremia likely source foot  Pituitary adenoma, non-secreting, possibly compression on optic chiasm.    Poorly controlled DM    Plan:      Continue antibiotics with Ertapenem daily and Cefazolin q 8 hrs x 6 weeks total antibiotics from negative culture; ESR/CRP weekly; last day June 10th  FABIAN showed no vegetations  Blood Cx from 5/1 was positive; so far Cx from 5/3 negative final  Podiatry f/u, s/p repeat debridement graft and now Wound Vac; Podiatry follow up noted  As per podiatry weight bearing on heel left foot with surgical shoes; patient able to ambulate to bathroom;  PT eval appreciated  Sugars well controlled; Basal Insulin 20 units HS and Admelog 6 units ; titrate up if need to keep 120 to 160  Endocrine f/u; appear adenoma is non secretory; FSH, LH, prolactin, TSH,  cortisol, LH, FSH all WNL  Neurosurgery intervention once completed antibiotic course as adenoma with mild compression on Optic chiasm;   Discussed with NeuroSx to consult in house to arrange outpt follow up to ensure compliance with follow up; d/w [facility]; spoke with Dr. Yan yesterday  DVT ppx;   Patient lives with wife Peri Pérez;    Patient now understands need for close monitoring and agrees with rehab; three choices given to  ; d/w [facility]  Rest as per PGY1 above  Discussed with PGY1 Dr. Quiñones and RN  Discussed with patient all the above at length; verbalized understanding and agree with the plan  Hospitalist Colleague/ Dr. Atkins  to assume care AM.

## 2023-05-09 NOTE — PROGRESS NOTE ADULT - SUBJECTIVE AND OBJECTIVE BOX
Subjective:  Chart Notes, Work list Manager, and fingersticks reviewed.    Review of Systems:  Constitutional: No fever  Eyes: No blurry vision  Neuro: No tremors  HEENT: No pain  Cardiovascular: No chest pain, palpitations  Respiratory: No SOB, no cough  GI: No nausea, vomiting, abdominal pain  : No dysuria  Skin: no rash  Psych: no depression  Endocrine: no polyuria, polydipsia  Hem/lymph: no swelling  Osteoporosis: no fractures    ALL OTHER SYSTEMS REVIEWED AND NEGATIVE    UNABLE TO OBTAIN    MEDICATIONS  (STANDING):  brimonidine 0.2% Ophthalmic Solution 1 Drop(s) Left EYE every 12 hours  ceFAZolin   IVPB      ceFAZolin   IVPB 2000 milliGRAM(s) IV Intermittent every 8 hours  chlorhexidine 2% Cloths 1 Application(s) Topical <User Schedule>  chlorhexidine 2% Cloths 1 Application(s) Topical <User Schedule>  enoxaparin Injectable 40 milliGRAM(s) SubCutaneous every 24 hours  ertapenem  IVPB 1000 milliGRAM(s) IV Intermittent every 24 hours  famotidine    Tablet 40 milliGRAM(s) Oral every 12 hours  gabapentin 300 milliGRAM(s) Oral every 8 hours  insulin glargine Injectable (LANTUS) 20 Unit(s) SubCutaneous at bedtime  insulin lispro (ADMELOG) corrective regimen sliding scale   SubCutaneous three times a day before meals  insulin lispro (ADMELOG) corrective regimen sliding scale   SubCutaneous at bedtime  insulin lispro Injectable (ADMELOG) 6 Unit(s) SubCutaneous three times a day before meals  lactated ringers. 1000 milliLiter(s) (100 mL/Hr) IV Continuous <Continuous>    MEDICATIONS  (PRN):  acetaminophen     Tablet .. 650 milliGRAM(s) Oral every 6 hours PRN Temp greater or equal to 38C (100.4F), Mild Pain (1 - 3)  ondansetron Injectable 4 milliGRAM(s) IV Push every 8 hours PRN Nausea and/or Vomiting  sodium chloride 0.9% lock flush 10 milliLiter(s) IV Push every 1 hour PRN Pre/post blood products, medications, blood draw, and to maintain line patency      PHYSICAL EXAM:  VITALS: T(C): 36.7 (05-09-23 @ 05:07)  T(F): 98.1 (05-09-23 @ 05:07), Max: 98.8 (05-08-23 @ 13:54)  HR: 83 (05-09-23 @ 05:07) (72 - 84)  BP: 129/76 (05-09-23 @ 05:07) (129/76 - 149/98)  RR:  (17 - 18)  SpO2:  (95% - 100%)  Wt(kg): --  GENERAL: NAD,   HEENT:  Atraumatic, Normocephalic, drymucous membranes  THYROID: Normal size, no palpable nodules  RESPIRATORY: Clear to auscultation bilaterally; No rales, rhonchi, wheezing  CARDIOVASCULAR: Regular rate and rhythm; No murmurs; no peripheral edema  GI: Soft, nontender, non distended, +ve abdominal obesity  EXTREMITIES: +ve peripheral pulses, -ve pedal edema  SKIN: Dry, intact, No rashes or lesions  PSYCH: Alert and oriented x 3    CAPILLARY BLOOD GLUCOSE      POCT Blood Glucose.: 179 mg/dL (09 May 2023 11:27)  POCT Blood Glucose.: 107 mg/dL (09 May 2023 07:31)  POCT Blood Glucose.: 102 mg/dL (08 May 2023 21:59)  POCT Blood Glucose.: 81 mg/dL (08 May 2023 16:44)  POCT Blood Glucose.: 72 mg/dL (08 May 2023 16:41)  POCT Blood Glucose.: 201 mg/dL (08 May 2023 12:08)    05-09    138  |  106  |  10  ----------------------------<  119<H>  3.9   |  28  |  1.21    eGFR: 64    Ca    9.0      05-09  Mg     2.2     05-09  Phos  3.1     05-09    TPro  8.1  /  Alb  2.2<L>  /  TBili  0.2  /  DBili  x   /  AST  21  /  ALT  15  /  AlkPhos  85  05-09    Thyroid Function Tests:  05-03 @ 05:51 TSH 2.19 FreeT4 1.6 T3 -- Anti TPO -- Anti Thyroglobulin Ab -- TSI --  04-30 @ 08:00 TSH 1.13 FreeT4 -- T3 -- Anti TPO -- Anti Thyroglobulin Ab -- TSI --        Assessment and Plan:    1) Type 2 diabetes:      Recommendations:  - Basal Insulin:   Glargine  (Lantus) units once daily    - Nutritional Insulin:  Lispro (Admelog) units with breakfast, hold if NPO or eating <50% of meals  Lispro (Admelog) units with lunch, hold if NPO or eating <50% of meals  Lispro (Admelog) units with dinner, hold if NPO or eating <50% of meals    - Correctional (Sliding) Insulin:  Low Lispro (Admelog) sliding scale with meals and bedtime    - Oral Medications:  None in the hospital               Subjective:  Chart Notes, Work list Manager, and fingersticks reviewed. Reports eating well, denies any major complaints    Review of Systems:  Review of Systems:  Constitutional: No fever  Eyes: No blurry vision  Cardiovascular: No chest pain, palpitations  Respiratory: No SOB, no cough  GI: No nausea, vomiting, abdominal pain  : No dysuria  Skin: no rash  Psych: no depression  Endocrine: no polyuria, polydipsia    Medications (Standing):  brimonidine 0.2% Ophthalmic Solution 1 Drop(s) Left EYE every 12 hours  ceFAZolin   IVPB      ceFAZolin   IVPB 2000 milliGRAM(s) IV Intermittent every 8 hours  chlorhexidine 2% Cloths 1 Application(s) Topical <User Schedule>  chlorhexidine 2% Cloths 1 Application(s) Topical <User Schedule>  enoxaparin Injectable 40 milliGRAM(s) SubCutaneous every 24 hours  ertapenem  IVPB 1000 milliGRAM(s) IV Intermittent every 24 hours  famotidine    Tablet 40 milliGRAM(s) Oral every 12 hours  gabapentin 300 milliGRAM(s) Oral every 8 hours  insulin glargine Injectable (LANTUS) 20 Unit(s) SubCutaneous at bedtime  insulin lispro (ADMELOG) corrective regimen sliding scale   SubCutaneous three times a day before meals  insulin lispro (ADMELOG) corrective regimen sliding scale   SubCutaneous at bedtime  insulin lispro Injectable (ADMELOG) 6 Unit(s) SubCutaneous three times a day before meals  lactated ringers. 1000 milliLiter(s) (100 mL/Hr) IV Continuous <Continuous>    Medications  (PRN):  acetaminophen     Tablet .. 650 milliGRAM(s) Oral every 6 hours PRN Temp greater or equal to 38C (100.4F), Mild Pain (1 - 3)  ondansetron Injectable 4 milliGRAM(s) IV Push every 8 hours PRN Nausea and/or Vomiting  sodium chloride 0.9% lock flush 10 milliLiter(s) IV Push every 1 hour PRN Pre/post blood products, medications, blood draw, and to maintain line patency    Physical examination:  VITALS: T(C): 36.7 (05-09-23 @ 05:07)  T(F): 98.1 (05-09-23 @ 05:07), Max: 98.8 (05-08-23 @ 13:54)  HR: 83 (05-09-23 @ 05:07) (72 - 84)  BP: 129/76 (05-09-23 @ 05:07) (129/76 - 149/98)  RR:  (17 - 18)  SpO2:  (95% - 100%)    Constitutional: No acute distress,   HEENT: Moist mucous membranes, Left side decreased peripheral vision, Right side peripheral vision intact,  Neck:  No JVD, bruits or thyromegaly, No thyroid nodules palpable, no LAD  Respiratory:  Respiratory effort normal, lungs clear to ausculation, without rales or rhonchi  Cardiovascular:  Regular heart rate, normal S1 and S2 sounds, without murmur, rub or gallop.  Gastrointestinal: Soft, non tender without hepatosplenomegaly and masses, no abdominal obesity  Extremities: Sensation intact in Right foot, Left foot has wrapped bandage, no cyanosis, clubbing or edema, positive pedal pulses  Neurological:  Oriented to person, place and time, No gross sensory or motor  sx      Labs:  Capillary Blood Glucose:  POCT Blood Glucose.: 179 mg/dL (09 May 2023 11:27)  POCT Blood Glucose.: 107 mg/dL (09 May 2023 07:31)  POCT Blood Glucose.: 102 mg/dL (08 May 2023 21:59)  POCT Blood Glucose.: 81 mg/dL (08 May 2023 16:44)  POCT Blood Glucose.: 72 mg/dL (08 May 2023 16:41)  POCT Blood Glucose.: 201 mg/dL (08 May 2023 12:08)    05-09    138  |  106  |  10  ----------------------------<  119<H>  3.9   |  28  |  1.21    eGFR: 64    Ca    9.0      05-09  Mg     2.2     05-09  Phos  3.1     05-09    TPro  8.1  /  Alb  2.2<L>  /  TBili  0.2  /  DBili  x   /  AST  21  /  ALT  15  /  AlkPhos  85  05-09    Adrenocorticotropic Hormone, Serum: 11.9 pg/mL (04.30.23 @ 08:00)  Cortisol AM, Serum: 16.8 ug/dL (04.30.23 @ 08:00)  Prolactin, Serum: 12.4 ng/mL (05.03.23 @ 05:51)  Thyroid Stimulating Hormone, Serum: 1.13 uU/mL (04.30.23 @ 08:00)  Free Triiodothyronine, Serum: 1.46 pg/mL (04.30.23 @ 08:00)  A1C with Estimated Average Glucose Result: 12.2 % (05.01.23 @ 05:29)  Free Thyroxine, Serum: 1.6 ng/dL (05.03.23 @ 05:51)    MRI Brain 5/1/2023  IMPRESSION:    MR orbits/sella:   1.8 cm AP by 1.6 cm CC by 2 cm TRV pituitary mass   consistent with an adenoma. There is mild extension into the LEFT   cavernous sinus. The RIGHT cavernous sinuses and para cavernous regions   appear intact.    The cavernous segments of the internal carotid arteries   demonstrate expected flow-void indicating patency. There is mild mass   effect on the optic chiasm, LEFT greater than RIGHT. The infundibulum is   slightly deviated to the LEFT.    MR brain: Minimal white matter ischemia at the bifrontal and LEFT   parietal subcortical white matter.    Moderate mucosal thickening/fluid   in the LEFT mastoid air cells.    Assessment and Plan:    72 year old Male with PMHx T2DM, HLD, HTN, colon cancer s/p hemicolon resection in 2011 ( currently in remission) p/w complaints of lightheadedness after fall with LOC and head injury and left foot ulcer. Admitted for left toe gas gangrene. Hospital course complicated by MSSA bacteremia requiring IV antibiotics. On admission, patient was found to have serum BS of 389 and elevated A1C.  CT scan head shows pituitary mass. 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  Patient is not taking Humalog before the meals regularly.  Inpatient, patient's BG are better controlled on current insulin regimen, but few fingersticks are tightly controlled  Will continue the same regimen    Inpatient Recommendations:  Basal Insulin:   Continue Glargine ( Lantus) 20 units once daily    Nutritional Insulin:  Continue Lispro (Admelog) 6 units with meals, Hold if NPO or eating <50% of meals    Correctional Insulin:  Change the moderate lispro scale to low Lispro ( Admelog) correctional scale with meals and bedtime,     Oral Diabetes Medications:  None in the hospital    Check the POC glucose with meals and bedtime    4) Pituitary Macroadenoma:   5) Left cavernous sinus invasion:  6) Optic chiasm compression    MRI brain shows 2 cm pituitary macroadenoma compressing left side of optic chiasm and invading the left side of cavernous sinus mildly.  Most likely pituitary mass is nonfunctional as ACTH, cortisol, TSH, electrolytes and vitals are stable however we still need to test all pituitary hormones once patient is a stable and is discharged from the hospital.   Free T4, Prolactin levels are within normal limit.  Recommend to do ophthalmology consult inpatient or outpatient to evaluate for peripheral visual fields given patient has optic chiasm compression.    Explained previously regarding pituitary tumor and its optic chiasm compression. Counseled to follow-up with endocrine as outpatient to evaluate whether the tumor is functional or not and then with neurosurgery.  Discussed with patient that as pituitary tumor is compressing the optic chiasm, the gold standard treatment is to do transsphenoidal transnasal pituitary tumor resection to prevent complications such as vision loss, pituitary hemorrhage or pituitary apoplexy.  Patient is agreeable to do the surgical intervention eventually.   Discussed with patient that after the pituitary surgery, there may be a chance that he may develop panhypopituitarism and therefore will be requiring hormone replacement.  Patient verbalized understanding.    Discussed the endocrine plan of care with patient and primary team    Please provide endocrine office address of [phone] [facility] on discharge paper. Patient needs close outpatient endocrine follow up.     Ewa Arreola MD   Endocrinology, Diabetes and Metabolism   Available on MS. teams

## 2023-05-09 NOTE — PROGRESS NOTE ADULT - SUBJECTIVE AND OBJECTIVE BOX
PGY-1 Progress Note discussed with attending    PAGER #: [[phone]] TILL 5:00 PM  PLEASE CONTACT ON CALL TEAM:  - On Call Team (Please refer to Marlena) FROM 5:00 PM - 8:30PM  - Nightfloat Team FROM 8:30 -7:30 AM    CHIEF COMPLAINT & BRIEF HOSPITAL COURSE:    INTERVAL HPI/OVERNIGHT EVENTS:   MEDICATIONS  (STANDING):  brimonidine 0.2% Ophthalmic Solution 1 Drop(s) Left EYE every 12 hours  ceFAZolin   IVPB      ceFAZolin   IVPB 2000 milliGRAM(s) IV Intermittent every 8 hours  chlorhexidine 2% Cloths 1 Application(s) Topical <User Schedule>  chlorhexidine 2% Cloths 1 Application(s) Topical <User Schedule>  enoxaparin Injectable 40 milliGRAM(s) SubCutaneous every 24 hours  ertapenem  IVPB 1000 milliGRAM(s) IV Intermittent every 24 hours  famotidine    Tablet 40 milliGRAM(s) Oral every 12 hours  gabapentin 300 milliGRAM(s) Oral every 8 hours  insulin glargine Injectable (LANTUS) 20 Unit(s) SubCutaneous at bedtime  insulin lispro (ADMELOG) corrective regimen sliding scale   SubCutaneous three times a day before meals  insulin lispro (ADMELOG) corrective regimen sliding scale   SubCutaneous at bedtime  insulin lispro Injectable (ADMELOG) 6 Unit(s) SubCutaneous three times a day before meals  lactated ringers. 1000 milliLiter(s) (100 mL/Hr) IV Continuous <Continuous>    MEDICATIONS  (PRN):  acetaminophen     Tablet .. 650 milliGRAM(s) Oral every 6 hours PRN Temp greater or equal to 38C (100.4F), Mild Pain (1 - 3)  ondansetron Injectable 4 milliGRAM(s) IV Push every 8 hours PRN Nausea and/or Vomiting  sodium chloride 0.9% lock flush 10 milliLiter(s) IV Push every 1 hour PRN Pre/post blood products, medications, blood draw, and to maintain line patency      REVIEW OF SYSTEMS:  CONSTITUTIONAL: No fever, weight loss, or fatigue  RESPIRATORY: No cough, wheezing, chills or hemoptysis; No shortness of breath  CARDIOVASCULAR: No chest pain, palpitations, dizziness, or leg swelling  GASTROINTESTINAL: No abdominal pain. No nausea, vomiting, or diarrhea.  GENITOURINARY: No dysuria or hematuria, urinary frequency  NEUROLOGICAL: No headaches, memory loss, loss of strength, numbness, or tremors  SKIN: No itching, burning, rashes, or lesions     Vital Signs Last 24 Hrs  T(C): 36.8 (09 May 2023 12:35), Max: 37.1 (08 May 2023 13:54)  T(F): 98.3 (09 May 2023 12:35), Max: 98.8 (08 May 2023 13:54)  HR: 101 (09 May 2023 12:35) (72 - 101)  BP: 110/70 (09 May 2023 12:35) (110/70 - 149/98)  BP(mean): --  RR: 18 (09 May 2023 12:35) (17 - 18)  SpO2: 99% (09 May 2023 12:35) (95% - 100%)    Parameters below as of 09 May 2023 12:35  Patient On (Oxygen Delivery Method): room air        PHYSICAL EXAMINATION:  GENERAL: NAD, well built  HEAD:  Atraumatic, Normocephalic  EYES:  conjunctiva and sclera clear  NECK: Supple, No JVD, Normal thyroid  CHEST/LUNG: Clear to auscultation. No rales, rhonchi, wheezing, or rubs  HEART: Regular rate and rhythm; No murmurs, rubs, or gallops  ABDOMEN: Soft, Nontender, Nondistended; Bowel sounds present  NERVOUS SYSTEM:  Alert & Oriented X3,    EXTREMITIES:  2+ Peripheral Pulses, No clubbing, cyanosis, or edema  SKIN: warm dry                          11.7   5.88  )-----------( 383      ( 09 May 2023 06:50 )             37.4     05-09    138  |  106  |  10  ----------------------------<  119<H>  3.9   |  28  |  1.21    Ca    9.0      09 May 2023 06:50  Phos  3.1     05-09  Mg     2.2     05-09    TPro  8.1  /  Alb  2.2<L>  /  TBili  0.2  /  DBili  x   /  AST  21  /  ALT  15  /  AlkPhos  85  05-09    LIVER FUNCTIONS - ( 09 May 2023 06:50 )  Alb: 2.2 g/dL / Pro: 8.1 g/dL / ALK PHOS: 85 U/L / ALT: 15 U/L DA / AST: 21 U/L / GGT: x                   CAPILLARY BLOOD GLUCOSE      RADIOLOGY & ADDITIONAL TESTS:                   PGY-1 Progress Note discussed with attending    PAGER #: [[phone]] TILL 5:00 PM  PLEASE CONTACT ON CALL TEAM:  - On Call Team (Please refer to Marlena) FROM 5:00 PM - 8:30PM  - Nightfloat Team FROM 8:30 -7:30 AM    CHIEF COMPLAINT & BRIEF HOSPITAL COURSE:    INTERVAL HPI/OVERNIGHT EVENTS:   MEDICATIONS  (STANDING):  brimonidine 0.2% Ophthalmic Solution 1 Drop(s) Left EYE every 12 hours  ceFAZolin   IVPB      ceFAZolin   IVPB 2000 milliGRAM(s) IV Intermittent every 8 hours  chlorhexidine 2% Cloths 1 Application(s) Topical <User Schedule>  chlorhexidine 2% Cloths 1 Application(s) Topical <User Schedule>  enoxaparin Injectable 40 milliGRAM(s) SubCutaneous every 24 hours  ertapenem  IVPB 1000 milliGRAM(s) IV Intermittent every 24 hours  famotidine    Tablet 40 milliGRAM(s) Oral every 12 hours  gabapentin 300 milliGRAM(s) Oral every 8 hours  insulin glargine Injectable (LANTUS) 20 Unit(s) SubCutaneous at bedtime  insulin lispro (ADMELOG) corrective regimen sliding scale   SubCutaneous three times a day before meals  insulin lispro (ADMELOG) corrective regimen sliding scale   SubCutaneous at bedtime  insulin lispro Injectable (ADMELOG) 6 Unit(s) SubCutaneous three times a day before meals  lactated ringers. 1000 milliLiter(s) (100 mL/Hr) IV Continuous <Continuous>    MEDICATIONS  (PRN):  acetaminophen     Tablet .. 650 milliGRAM(s) Oral every 6 hours PRN Temp greater or equal to 38C (100.4F), Mild Pain (1 - 3)  ondansetron Injectable 4 milliGRAM(s) IV Push every 8 hours PRN Nausea and/or Vomiting  sodium chloride 0.9% lock flush 10 milliLiter(s) IV Push every 1 hour PRN Pre/post blood products, medications, blood draw, and to maintain line patency      REVIEW OF SYSTEMS:  CONSTITUTIONAL: No fever,  RESPIRATORY: No cough,  CARDIOVASCULAR: No chest pain  GASTROINTESTINAL: No abdominal pain  GENITOURINARY: No dysuria   NEUROLOGICAL: No headaches,  SKIN: Right foot rapped; no new erythema    Vital Signs Last 24 Hrs  T(C): 36.8 (09 May 2023 12:35), Max: 37.1 (08 May 2023 13:54)  T(F): 98.3 (09 May 2023 12:35), Max: 98.8 (08 May 2023 13:54)  HR: 101 (09 May 2023 12:35) (72 - 101)  BP: 110/70 (09 May 2023 12:35) (110/70 - 149/98)  BP(mean): --  RR: 18 (09 May 2023 12:35) (17 - 18)  SpO2: 99% (09 May 2023 12:35) (95% - 100%)    Parameters below as of 09 May 2023 12:35  Patient On (Oxygen Delivery Method): room air        PHYSICAL EXAMINATION:  GENERAL: NAD, well nourished  CHEST/LUNG: Clear to auscultation.   HEART: Regular rate and rhythm;  ABDOMEN: Soft, Nontender, Nondistended; Bowel sounds present  NERVOUS SYSTEM:  Alert & Oriented X3,    EXTREMITIES:  Wound wrapped intact  SKIN: warm dry                          11.7   5.88  )-----------( 383      ( 09 May 2023 06:50 )             37.4     05-09    138  |  106  |  10  ----------------------------<  119<H>  3.9   |  28  |  1.21    Ca    9.0      09 May 2023 06:50  Phos  3.1     05-09  Mg     2.2     05-09    TPro  8.1  /  Alb  2.2<L>  /  TBili  0.2  /  DBili  x   /  AST  21  /  ALT  15  /  AlkPhos  85  05-09    LIVER FUNCTIONS - ( 09 May 2023 06:50 )  Alb: 2.2 g/dL / Pro: 8.1 g/dL / ALK PHOS: 85 U/L / ALT: 15 U/L DA / AST: 21 U/L / GGT: x                   CAPILLARY BLOOD GLUCOSE      RADIOLOGY & ADDITIONAL TESTS:

## 2023-05-09 NOTE — PROGRESS NOTE ADULT - TIME BILLING
- Review of records, vital signs and daily labs, microbiology and other Infectious Diseases related work up  - General physical examination.  - Generation of Infectious Diseases treatment plan.  - Coordination of care with the multidisciplinary team.  -Counseling of the patient and/or family members.
50 minutes spent the time noted on the day of this patient encounter preparing for, reviewing records/charts/labs, interview and physical examination, coordination of care with patient and primary team, providing and documenting the above E/M service and 50% of time spent face to face counseling and education provided to patient on disease course, and treatment/management. All questions and concerns were answered and addressed in detail.
25 minutes spent the time noted on the day of this patient encounter preparing for, reviewing records/charts/labs, interview and physical examination, coordination of care with patient and primary team, providing and documenting the above E/M service and 50% of time spent face to face counseling and education provided to patient on disease course, and treatment/management. All questions and concerns were answered and addressed in detail.
35 minutes spent the time noted on the day of this patient encounter preparing for, reviewing records/charts/labs, interview and physical examination, coordination of care with patient and primary team, providing and documenting the above E/M service and 50% of time spent face to face counseling and education provided to patient on disease course, and treatment/management. All questions and concerns were answered and addressed in detail.
50 minutes spent the time noted on the day of this patient encounter preparing for, reviewing records/charts/labs, interview and physical examination, coordination of care with patient and primary team, providing and documenting the above E/M service and 50% of time spent face to face counseling and education provided to patient on disease course, and treatment/management. All questions and concerns were answered and addressed in detail.
- Review of records, vital signs and daily labs, microbiology and other Infectious Diseases related work up  - General physical examination.  - Generation of Infectious Diseases treatment plan.  - Coordination of care with the multidisciplinary team.  -Counseling of the patient and/or family members.
- Review of records, vital signs and daily labs, microbiology and other Infectious Diseases related work up  - General physical examination.  - Generation of Infectious Diseases treatment plan.  - Coordination of care with the multidisciplinary team.  -Counseling of the patient and/or family members.

## 2023-05-10 LAB
ANION GAP SERPL CALC-SCNC: 2 MMOL/L — LOW (ref 5–17)
BUN SERPL-MCNC: 11 MG/DL — SIGNIFICANT CHANGE UP (ref 7–18)
CALCIUM SERPL-MCNC: 9.1 MG/DL — SIGNIFICANT CHANGE UP (ref 8.4–10.5)
CHLORIDE SERPL-SCNC: 107 MMOL/L — SIGNIFICANT CHANGE UP (ref 96–108)
CO2 SERPL-SCNC: 30 MMOL/L — SIGNIFICANT CHANGE UP (ref 22–31)
CREAT SERPL-MCNC: 1.26 MG/DL — SIGNIFICANT CHANGE UP (ref 0.5–1.3)
EGFR: 61 ML/MIN/1.73M2 — SIGNIFICANT CHANGE UP
GHBP SERPL-SCNC: 17 NG/ML — SIGNIFICANT CHANGE UP
GLUCOSE BLDC GLUCOMTR-MCNC: 108 MG/DL — HIGH (ref 70–99)
GLUCOSE BLDC GLUCOMTR-MCNC: 126 MG/DL — HIGH (ref 70–99)
GLUCOSE BLDC GLUCOMTR-MCNC: 163 MG/DL — HIGH (ref 70–99)
GLUCOSE BLDC GLUCOMTR-MCNC: 99 MG/DL — SIGNIFICANT CHANGE UP (ref 70–99)
GLUCOSE SERPL-MCNC: 108 MG/DL — HIGH (ref 70–99)
HCT VFR BLD CALC: 35.6 % — LOW (ref 39–50)
HGB BLD-MCNC: 11.2 G/DL — LOW (ref 13–17)
MAGNESIUM SERPL-MCNC: 4.5 MG/DL — HIGH (ref 1.6–2.6)
MCHC RBC-ENTMCNC: 26.1 PG — LOW (ref 27–34)
MCHC RBC-ENTMCNC: 31.5 GM/DL — LOW (ref 32–36)
MCV RBC AUTO: 83 FL — SIGNIFICANT CHANGE UP (ref 80–100)
NRBC # BLD: 0 /100 WBCS — SIGNIFICANT CHANGE UP (ref 0–0)
PHOSPHATE SERPL-MCNC: 2.9 MG/DL — SIGNIFICANT CHANGE UP (ref 2.5–4.5)
PLATELET # BLD AUTO: 399 K/UL — SIGNIFICANT CHANGE UP (ref 150–400)
POTASSIUM SERPL-MCNC: 3.8 MMOL/L — SIGNIFICANT CHANGE UP (ref 3.5–5.3)
POTASSIUM SERPL-SCNC: 3.8 MMOL/L — SIGNIFICANT CHANGE UP (ref 3.5–5.3)
RBC # BLD: 4.29 M/UL — SIGNIFICANT CHANGE UP (ref 4.2–5.8)
RBC # FLD: 14 % — SIGNIFICANT CHANGE UP (ref 10.3–14.5)
SARS-COV-2 RNA SPEC QL NAA+PROBE: SIGNIFICANT CHANGE UP
SODIUM SERPL-SCNC: 139 MMOL/L — SIGNIFICANT CHANGE UP (ref 135–145)
WBC # BLD: 6.18 K/UL — SIGNIFICANT CHANGE UP (ref 3.8–10.5)
WBC # FLD AUTO: 6.18 K/UL — SIGNIFICANT CHANGE UP (ref 3.8–10.5)

## 2023-05-10 PROCEDURE — 99233 SBSQ HOSP IP/OBS HIGH 50: CPT | Mod: GC

## 2023-05-10 PROCEDURE — 99222 1ST HOSP IP/OBS MODERATE 55: CPT

## 2023-05-10 RX ORDER — FAMOTIDINE 10 MG/ML
0 INJECTION INTRAVENOUS
Qty: 0 | Refills: 4 | DISCHARGE

## 2023-05-10 RX ORDER — INSULIN LISPRO 100/ML
20 VIAL (ML) SUBCUTANEOUS
Qty: 0 | Refills: 2 | DISCHARGE

## 2023-05-10 RX ORDER — BRIMONIDINE TARTRATE 2 MG/MG
0 SOLUTION/ DROPS OPHTHALMIC
Qty: 0 | Refills: 2 | DISCHARGE

## 2023-05-10 RX ORDER — INSULIN GLARGINE 100 [IU]/ML
0 INJECTION, SOLUTION SUBCUTANEOUS
Qty: 0 | Refills: 2 | DISCHARGE

## 2023-05-10 RX ORDER — ERTAPENEM SODIUM 1 G/1
1000 INJECTION, POWDER, LYOPHILIZED, FOR SOLUTION INTRAMUSCULAR; INTRAVENOUS EVERY 24 HOURS
Refills: 0 | Status: DISCONTINUED | OUTPATIENT
Start: 2023-05-10 | End: 2023-05-11

## 2023-05-10 RX ORDER — GABAPENTIN 400 MG/1
300 CAPSULE ORAL
Qty: 0 | Refills: 2 | DISCHARGE

## 2023-05-10 RX ADMIN — Medication 100 MILLIGRAM(S): at 05:57

## 2023-05-10 RX ADMIN — Medication 100 MILLIGRAM(S): at 13:02

## 2023-05-10 RX ADMIN — INSULIN GLARGINE 20 UNIT(S): 100 INJECTION, SOLUTION SUBCUTANEOUS at 21:48

## 2023-05-10 RX ADMIN — FAMOTIDINE 40 MILLIGRAM(S): 10 INJECTION INTRAVENOUS at 05:56

## 2023-05-10 RX ADMIN — Medication 6 UNIT(S): at 17:15

## 2023-05-10 RX ADMIN — GABAPENTIN 300 MILLIGRAM(S): 400 CAPSULE ORAL at 13:02

## 2023-05-10 RX ADMIN — Medication 6 UNIT(S): at 08:32

## 2023-05-10 RX ADMIN — GABAPENTIN 300 MILLIGRAM(S): 400 CAPSULE ORAL at 05:57

## 2023-05-10 RX ADMIN — Medication 6 UNIT(S): at 11:54

## 2023-05-10 RX ADMIN — FAMOTIDINE 40 MILLIGRAM(S): 10 INJECTION INTRAVENOUS at 17:14

## 2023-05-10 RX ADMIN — GABAPENTIN 300 MILLIGRAM(S): 400 CAPSULE ORAL at 21:48

## 2023-05-10 RX ADMIN — ERTAPENEM SODIUM 120 MILLIGRAM(S): 1 INJECTION, POWDER, LYOPHILIZED, FOR SOLUTION INTRAMUSCULAR; INTRAVENOUS at 11:54

## 2023-05-10 NOTE — PROGRESS NOTE ADULT - ATTENDING COMMENTS
Podiatry has removed wound vac and found left fourth toe "dusky."  Podiatry asks for vascular re-evaluation.     Alert, cooperative man in NAD  Vital Signs Last 24 Hrs  T(C): 36.8 (10 May 2023 13:07), Max: 37 (10 May 2023 05:09)  T(F): 98.2 (10 May 2023 13:07), Max: 98.6 (10 May 2023 05:09)  HR: 96 (10 May 2023 13:07) (68 - 96)  BP: 135/71 (10 May 2023 13:07) (107/71 - 135/71)  BP(mean): --  RR: 18 (10 May 2023 13:07) (17 - 18)  SpO2: 98% (10 May 2023 13:07) (97% - 99%)    Parameters below as of 10 May 2023 13:07  Patient On (Oxygen Delivery Method): room air    Lungs, clear  Cor, RRR  Abdomen, soft  Ext--examined with vac on, only  Neurological, non-focal (stocking hypesthesia)    IMP:  Necrosis of toe, s/p amputation          possible ischemia of 4th digit          MSSA bacteremia          Pituiatary mass, compressing optic chiasm.  Visual fields, unchanged.     Plan:   Discussed with Podiatry,. Dr. Tidwell, Vascular Surgery, [facility].  Vascular Surgery will re-evaluate.  Discharge is held.  NPO for possible procedure.    Patient will need f/u with Neurosurgery after completion of six weeks of antibiotics for staph bacteremia, likely source is gangrenous necrosis and infection of the toe, which has been removed.  I have discussed f/u with Dr. Yan and LALA Shah.  Dr. Yan's office will contact patient for an appointment in June.

## 2023-05-10 NOTE — PROGRESS NOTE ADULT - PROBLEM SELECTOR PLAN 1
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   surgical path prelim result +ve for Morganella morgani, and Staph  Blood cultures: +ve MSSA   Vanc D/Leo  D/Leo Cefepim and Nafcillin for MSSA on blood cultures  c/w Ertapenem 1g qd and Cefazolin 2g q8   Repeat blood cx positive for Staph Aureus  Repeat culture on 5/3 : neg   S/P debridement and graft done 5/5   ambulated well with Physical therapy on 5/6  PICC line placement  on Monday 5/8  Dr. 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   surgical path prelim result +ve for Morganella morgani, and Staph  Blood cultures: +ve MSSA   Vanc D/Leo  D/Leo Cefepim and Nafcillin for MSSA on blood cultures  c/w Ertapenem 1g qd and Cefazolin 2g q8   Repeat blood cx positive for Staph Aureus  Repeat culture on 5/3 : neg   S/P debridement and graft done 5/5   ambulated well with Physical therapy on 5/6  PICC line placement  on Monday 5/8  Dr. Gao ID consulted  c/w abx on dc for a total of 6 weeks with weekly labs of cbc, cmp, esr,crp  Continue Cefazolin 2g q8 hrs IV + Ertapenem 1g q24 hrs IV combination for 7 days after negative blood cultures and once pt is clinically ready for discharge can d/c cefazolin and continue the Ertapenem 1g q24 hrs IV for total 6 weeks form negative blood cx(5/3-6/14).

## 2023-05-10 NOTE — PROGRESS NOTE ADULT - SUBJECTIVE AND OBJECTIVE BOX
Podiatry Interval: patient is seen resting in bed, no acute distress. Pt is s/p 5/5 L foot debridement and graft application with wound vac intact. Denies nausea, vomiting, fever, chills, diarrhea, constipation. Denies overnight acute events.     Podiatry HPI  72-year-old male with history of NIDDM,  Patient claims he slipped and fell on Tuesday night in the bedroom and hit his head and had LOC for a few minutes. Patient denies any injuries at the time, but complaining of feeling lightheaded after the fall, confused on Wednesday.  Patient works as a  and on Thursday he noticed with left foot pain, swelling, difficulty ambulating after excessively ambulating by his work in tight work shoes.  Patient took nothing for his pain. Presents to ED with progressive pain to Left foot and increased warmth with concern for infection. Follow OP Podiatrist Dr. Spain who he last saw two weeks ago. States in 2017 had a bone infection in his Right foot which was resected. Cannot recall exact procedure,  Denies fever, chills.    Patient admits to  (-) Fevers, (-) Chills, (-) Nausea, (-) Vomiting, (-) Shortness of Breath (-) calf pain (-) chest pain     Medications acetaminophen     Tablet .. 650 milliGRAM(s) Oral every 6 hours PRN  brimonidine 0.2% Ophthalmic Solution 1 Drop(s) Left EYE every 12 hours  chlorhexidine 2% Cloths 1 Application(s) Topical <User Schedule>  chlorhexidine 2% Cloths 1 Application(s) Topical <User Schedule>  enoxaparin Injectable 40 milliGRAM(s) SubCutaneous every 24 hours  ertapenem  IVPB 1000 milliGRAM(s) IV Intermittent every 24 hours  famotidine    Tablet 40 milliGRAM(s) Oral every 12 hours  gabapentin 300 milliGRAM(s) Oral every 8 hours  insulin glargine Injectable (LANTUS) 20 Unit(s) SubCutaneous at bedtime  insulin lispro (ADMELOG) corrective regimen sliding scale   SubCutaneous at bedtime  insulin lispro (ADMELOG) corrective regimen sliding scale   SubCutaneous three times a day before meals  insulin lispro Injectable (ADMELOG) 6 Unit(s) SubCutaneous three times a day before meals  lactated ringers. 1000 milliLiter(s) IV Continuous <Continuous>  ondansetron Injectable 4 milliGRAM(s) IV Push every 8 hours PRN  sodium chloride 0.9% lock flush 10 milliLiter(s) IV Push every 1 hour PRN    PMH  DM (diabetes mellitus)  Colon cancer  HTN (hypertension)  Hyperlipidemia     PSH  S/P inguinal hernia repair  History of colectomy  S/P arthroscopic knee surgery  History of repair of hiatal hernia    Labs                          11.2   6.18  )-----------( 399      ( 10 May 2023 05:16 )             35.6      05-10    139  |  107  |  11  ----------------------------<  108<H>  3.8   |  30  |  1.26    Ca    9.1      10 May 2023 05:16  Phos  2.9     05-10  Mg     4.5     05-10    TPro  8.1  /  Alb  2.2<L>  /  TBili  0.2  /  DBili  x   /  AST  21  /  ALT  15  /  AlkPhos  85  05-09     Vital Signs Last 24 Hrs  T(C): 36.8 (10 May 2023 13:07), Max: 37 (10 May 2023 05:09)  T(F): 98.2 (10 May 2023 13:07), Max: 98.6 (10 May 2023 05:09)  HR: 96 (10 May 2023 13:07) (68 - 96)  BP: 135/71 (10 May 2023 13:07) (107/71 - 135/71)  RR: 18 (10 May 2023 13:07) (17 - 18)  SpO2: 98% (10 May 2023 13:07) (97% - 99%)    Parameters below as of 10 May 2023 13:07  Patient On (Oxygen Delivery Method): room air      Sedimentation Rate, Erythrocyte: 97 mm/Hr (05-07-23 @ 07:42)  Sedimentation Rate, Erythrocyte: 90 mm/Hr (05-05-23 @ 06:38)         C-Reactive Protein, Serum: 95 mg/L (05-04-23 @ 10:25)  C-Reactive Protein, Serum: 230 mg/L (05-01-23 @ 05:29)   WBC Count: 6.18 K/uL (05-10-23 @ 05:16)    PHYSICAL EXAM  LE Focused:    Vasc:  DP and PT faintly palpable b/l. No pedal hair growth noted. Diffuse edema noted distal to Ankle joint LLE  Derm: graft intact to left lateral foot wound with staples intact, skin well coapted, no signs of dehiscence or underlying hematoma/seroma. No malodor, no purulence upon manual compression, improving erythema and edema. No fluctuance noted. Dusky skin changes noted to left 4th digit; concerning for ischemic changes.   Neuro: Protective sensation grossly diminished  MSK: able to ambulate with pain, no TTP to dorsolateral surgical site     IMAGING: ?xray  Noted gas foci on CXR foot to 4th interspace and lateral 5th left foot    < from: [facility] Physiol Extremity Lower 3+ Level, BI (05.01.23 @ 16:33) >  FINDINGS: There are biphasic pulse volume recordings bilaterally,   dampened at the level of the metatarsals. Segmental pressures were not   obtained at the thighs.    Right RIVER = 1.15  Left RIVER = 1.11    IMPRESSION: Normal ABIs.    Dampened waveforms at the level of the metatarsals.    < end of copied text >    A:  Left foot gas gangrene  s/p 5/5 L foot debridement/graft    P:   Patient evaluated and Chart reviewed   Discussed diagnosis and treatment with patient  Previous X-rays noted to left foot with suspicion of gas foci to 4th interspace and lateral 5th toe (pre-operative)  Post-operative xrays taken and reviewed   S/p left partial 5th ray open amputation on 4/30/23  s/p left lateral foot graft application and wound debridement 5/5/23  WC: Set wound vac to 125mmHG. Cut black granulo-foam to size of wound base and adhere to wound with adhesive tegaderm strips. Cut dime sized whole in adhesive overlying black granulo-foam and apply suction tubing. Ensure seal is air tight with seal check.  Due to RIVER/PVR and concern for ischemic changes to adjacent digits recommending vascular consult   Recc IV antibiotics As Per ID; currently on ertapenem and ancef   Reviewed RIVER/PVR  NWB to LLE  Discussed importance of daily foot examinations and proper shoe gear and to importance of lower Fasting Blood Glucose levels.   Podiatry to follow while in house  Discussed with Attending Dr. Grant LOYDO:  Wound vaccuum instructions for discharge: Every other day: Cleanse  wound with normal saline, pat dry with sterile guaze, apply betadine to edges of wound if macerated, Cut black granulo-foam to size of wound base and adhere to wound with adhesive strips. Cut dime sized whole in adhesive overlying black granulo-foam and apply suction tubing. Ensure seal is air tight with seal check. Wound vaccuum setting should be 125mmhg with continuous suction. Cover with kerlix and light ACE bandage.

## 2023-05-10 NOTE — CONSULT NOTE ADULT - CONSULT REQUESTED DATE/TIME
01-May-2023 11:44
01-May-2023 14:18
10-May-2023 11:36
10-May-2023 18:23
29-Apr-2023 18:35
29-Apr-2023 18:50

## 2023-05-10 NOTE — PROGRESS NOTE ADULT - SUBJECTIVE AND OBJECTIVE BOX
PGY-1 Progress Note discussed with attending    PAGER #: [[phone]] TILL 5:00 PM  PLEASE CONTACT ON CALL TEAM:  - On Call Team (Please refer to Marlena) FROM 5:00 PM - 8:30PM  - Nightfloat Team FROM 8:30 -7:30 AM    CHIEF COMPLAINT & BRIEF HOSPITAL COURSE:    INTERVAL HPI/OVERNIGHT EVENTS:   MEDICATIONS  (STANDING):  brimonidine 0.2% Ophthalmic Solution 1 Drop(s) Left EYE every 12 hours  ceFAZolin   IVPB 2000 milliGRAM(s) IV Intermittent every 8 hours  ceFAZolin   IVPB      chlorhexidine 2% Cloths 1 Application(s) Topical <User Schedule>  chlorhexidine 2% Cloths 1 Application(s) Topical <User Schedule>  enoxaparin Injectable 40 milliGRAM(s) SubCutaneous every 24 hours  ertapenem  IVPB 1000 milliGRAM(s) IV Intermittent every 24 hours  famotidine    Tablet 40 milliGRAM(s) Oral every 12 hours  gabapentin 300 milliGRAM(s) Oral every 8 hours  insulin glargine Injectable (LANTUS) 20 Unit(s) SubCutaneous at bedtime  insulin lispro (ADMELOG) corrective regimen sliding scale   SubCutaneous three times a day before meals  insulin lispro (ADMELOG) corrective regimen sliding scale   SubCutaneous at bedtime  insulin lispro Injectable (ADMELOG) 6 Unit(s) SubCutaneous three times a day before meals  lactated ringers. 1000 milliLiter(s) (100 mL/Hr) IV Continuous <Continuous>    MEDICATIONS  (PRN):  acetaminophen     Tablet .. 650 milliGRAM(s) Oral every 6 hours PRN Temp greater or equal to 38C (100.4F), Mild Pain (1 - 3)  ondansetron Injectable 4 milliGRAM(s) IV Push every 8 hours PRN Nausea and/or Vomiting  sodium chloride 0.9% lock flush 10 milliLiter(s) IV Push every 1 hour PRN Pre/post blood products, medications, blood draw, and to maintain line patency      REVIEW OF SYSTEMS:  CONSTITUTIONAL: No fever, weight loss, or fatigue  RESPIRATORY: No cough, wheezing, chills or hemoptysis; No shortness of breath  CARDIOVASCULAR: No chest pain, palpitations, dizziness, or leg swelling  GASTROINTESTINAL: No abdominal pain. No nausea, vomiting, or diarrhea.  GENITOURINARY: No dysuria or hematuria, urinary frequency  NEUROLOGICAL: No headaches, memory loss, loss of strength, numbness, or tremors  SKIN: No itching, burning, rashes, or lesions     Vital Signs Last 24 Hrs  T(C): 37 (10 May 2023 05:09), Max: 37 (10 May 2023 05:09)  T(F): 98.6 (10 May 2023 05:09), Max: 98.6 (10 May 2023 05:09)  HR: 68 (10 May 2023 05:09) (68 - 101)  BP: 107/71 (10 May 2023 05:09) (107/71 - 125/77)  BP(mean): --  RR: 17 (10 May 2023 05:09) (17 - 18)  SpO2: 97% (10 May 2023 05:09) (97% - 99%)    Parameters below as of 10 May 2023 05:09  Patient On (Oxygen Delivery Method): room air        PHYSICAL EXAMINATION:  GENERAL: NAD, well built  HEAD:  Atraumatic, Normocephalic  EYES:  conjunctiva and sclera clear  NECK: Supple, No JVD, Normal thyroid  CHEST/LUNG: Clear to auscultation. No rales, rhonchi, wheezing, or rubs  HEART: Regular rate and rhythm; No murmurs, rubs, or gallops  ABDOMEN: Soft, Nontender, Nondistended; Bowel sounds present  NERVOUS SYSTEM:  Alert & Oriented X3,    EXTREMITIES:  2+ Peripheral Pulses, No clubbing, cyanosis, or edema  SKIN: warm dry                          11.2   6.18  )-----------( 399      ( 10 May 2023 05:16 )             35.6     05-10    139  |  107  |  11  ----------------------------<  108<H>  3.8   |  30  |  1.26    Ca    9.1      10 May 2023 05:16  Phos  2.9     05-10  Mg     4.5     05-10    TPro  8.1  /  Alb  2.2<L>  /  TBili  0.2  /  DBili  x   /  AST  21  /  ALT  15  /  AlkPhos  85  05-09    LIVER FUNCTIONS - ( 09 May 2023 06:50 )  Alb: 2.2 g/dL / Pro: 8.1 g/dL / ALK PHOS: 85 U/L / ALT: 15 U/L DA / AST: 21 U/L / GGT: x                   CAPILLARY BLOOD GLUCOSE      RADIOLOGY & ADDITIONAL TESTS:                   PGY-1 Progress Note discussed with attending    PAGER #: [[phone]] TILL 5:00 PM  PLEASE CONTACT ON CALL TEAM:  - On Call Team (Please refer to Marlena) FROM 5:00 PM - 8:30PM  - Nightfloat Team FROM 8:30 -7:30 AM    INTERVAL HPI/OVERNIGHT EVENTS: No acute events overnight  MEDICATIONS  (STANDING):  brimonidine 0.2% Ophthalmic Solution 1 Drop(s) Left EYE every 12 hours  ceFAZolin   IVPB 2000 milliGRAM(s) IV Intermittent every 8 hours  ceFAZolin   IVPB      chlorhexidine 2% Cloths 1 Application(s) Topical <User Schedule>  chlorhexidine 2% Cloths 1 Application(s) Topical <User Schedule>  enoxaparin Injectable 40 milliGRAM(s) SubCutaneous every 24 hours  ertapenem  IVPB 1000 milliGRAM(s) IV Intermittent every 24 hours  famotidine    Tablet 40 milliGRAM(s) Oral every 12 hours  gabapentin 300 milliGRAM(s) Oral every 8 hours  insulin glargine Injectable (LANTUS) 20 Unit(s) SubCutaneous at bedtime  insulin lispro (ADMELOG) corrective regimen sliding scale   SubCutaneous three times a day before meals  insulin lispro (ADMELOG) corrective regimen sliding scale   SubCutaneous at bedtime  insulin lispro Injectable (ADMELOG) 6 Unit(s) SubCutaneous three times a day before meals  lactated ringers. 1000 milliLiter(s) (100 mL/Hr) IV Continuous <Continuous>    MEDICATIONS  (PRN):  acetaminophen     Tablet .. 650 milliGRAM(s) Oral every 6 hours PRN Temp greater or equal to 38C (100.4F), Mild Pain (1 - 3)  ondansetron Injectable 4 milliGRAM(s) IV Push every 8 hours PRN Nausea and/or Vomiting  sodium chloride 0.9% lock flush 10 milliLiter(s) IV Push every 1 hour PRN Pre/post blood products, medications, blood draw, and to maintain line patency      REVIEW OF SYSTEMS:  CONSTITUTIONAL: No fever  RESPIRATORY:  No shortness of breath  CARDIOVASCULAR: No chest pain,  GASTROINTESTINAL: No abdominal pain  GENITOURINARY: No dysuria  NEUROLOGICAL: No headaches  SKIN: No itching, burning, rashes, or lesions     Vital Signs Last 24 Hrs  T(C): 37 (10 May 2023 05:09), Max: 37 (10 May 2023 05:09)  T(F): 98.6 (10 May 2023 05:09), Max: 98.6 (10 May 2023 05:09)  HR: 68 (10 May 2023 05:09) (68 - 101)  BP: 107/71 (10 May 2023 05:09) (107/71 - 125/77)  BP(mean): --  RR: 17 (10 May 2023 05:09) (17 - 18)  SpO2: 97% (10 May 2023 05:09) (97% - 99%)    Parameters below as of 10 May 2023 05:09  Patient On (Oxygen Delivery Method): room air        PHYSICAL EXAMINATION:  GENERAL: NAD, well nourished  EYES:  conjunctiva and sclera clear  CHEST/LUNG: Clear to auscultation.   HEART: Regular rate and rhythm;   ABDOMEN: Soft, Nontender, Nondistended; Bowel sounds present  NERVOUS SYSTEM:  Alert & Oriented X3,    EXTREMITIES: Left lower extremity wound vac in place                          11.2   6.18  )-----------( 399      ( 10 May 2023 05:16 )             35.6     05-10    139  |  107  |  11  ----------------------------<  108<H>  3.8   |  30  |  1.26    Ca    9.1      10 May 2023 05:16  Phos  2.9     05-10  Mg     4.5     05-10    TPro  8.1  /  Alb  2.2<L>  /  TBili  0.2  /  DBili  x   /  AST  21  /  ALT  15  /  AlkPhos  85  05-09    LIVER FUNCTIONS - ( 09 May 2023 06:50 )  Alb: 2.2 g/dL / Pro: 8.1 g/dL / ALK PHOS: 85 U/L / ALT: 15 U/L DA / AST: 21 U/L / GGT: x                   CAPILLARY BLOOD GLUCOSE      RADIOLOGY & ADDITIONAL TESTS:

## 2023-05-10 NOTE — CONSULT NOTE ADULT - SUBJECTIVE AND OBJECTIVE BOX
patient seen   note pending  Vascular surgery called to see and evaluate 72 year old male.   Patient states that he came in on 4/29 for left foot pain and skin changes over the course of a few days. I+D done at bedside at 4th interspace on 4/29 by podiatry.   Patient was taken to the OR by podiatry for left 5th ray open amputation for xray findings suspicious of gas foci to 4th and lateral 5th toe.   Patient was taken back to the OR on 5/5 for right foot debridement, excision of 5th metatarsal and graft placement of left foot. Wound vac removed today and podiatry called for concerns of the 4th digit.   He currently denies any numbness/ tingling or pain to the LE.   States that he ambulates at home with the assistance of a walker.   Patient denies any history of smoking.   Denies any fever, chills or any other constitutional symptoms.     As per chart review:   5/1: blood culture grew gram +cocci  5/3: negative blood culture x2  patient on 6 weeks of Invanz     2022- Patient had ablation of right saphenous vein and phlebectomy with Dr. Gandara       PAST MEDICAL & SURGICAL HISTORY:  DM (diabetes mellitus)  Colon cancer  HTN (hypertension)  Hyperlipidemia  S/P inguinal hernia repair  History of colectomy  S/P arthroscopic knee surgery  History of repair of hiatal hernia      MEDICATIONS  (STANDING):  ertapenem  IVPB 1000 milliGRAM(s) IV Intermittent every 24 hours    Allergies    Percocet 5/325 (Other)    Intolerances      Vital Signs Last 24 Hrs  T(C): 36.8 (10 May 2023 13:07), Max: 37 (10 May 2023 05:09)  T(F): 98.2 (10 May 2023 13:07), Max: 98.6 (10 May 2023 05:09)  HR: 96 (10 May 2023 13:07) (68 - 96)  BP: 135/71 (10 May 2023 13:07) (107/71 - 135/71)  BP(mean): --  RR: 18 (10 May 2023 13:07) (17 - 18)  SpO2: 98% (10 May 2023 13:07) (97% - 99%)    Parameters below as of 10 May 2023 13:07  Patient On (Oxygen Delivery Method): room air        Physical:  Gen: A&Ox3. NAD  Respirations: nonlabored  Abd: Soft, nondistended, nontender, no pulsatile mass   RLE: warm, sensation intact, ROM intact at digits, + palpable femoral pulse, + dopplerable popliteal and dp pulse, chronic venous stasis skin changes   LLE: warm, sensation intact, ROM intact at digits, + palpable femoral pulse, + dopplerable popliteal and dp pulse, nontender  graft intact to lateral foot, no crepitus noted, no drainag        LABS:                        11.2   6.18  )-----------( 399      ( 10 May 2023 05:16 )             35.6              05-10    139  |  107  |  11  ----------------------------<  108<H>  3.8   |  30  |  1.26    Ca    9.1      10 May 2023 05:16  Phos  2.9     05-10  Mg     4.5     05-10    TPro  8.1  /  Alb  2.2<L>  /  TBili  0.2  /  DBili  x   /  AST  21  /  ALT  15  /  AlkPhos  85  05-09

## 2023-05-10 NOTE — CONSULT NOTE ADULT - ASSESSMENT
72 y.o. Male s/p  I+D left foot 4/29, left 5th ray open amputation 4/30, right foot debridement, excision of 5th metatarsal and graft placement of left foot 5/5, being seen for wound healing   afebrile, wbc wnl; on ertapenem      - continue iv abx, as per ID  - local wound care as per podiatry   - vascular attending to see and evaluate patient in AM  - remainder of care as per primary team   - discussed and agreed with Dr. Gandara

## 2023-05-10 NOTE — CONSULT NOTE ADULT - SUBJECTIVE AND OBJECTIVE BOX
SUBJECTIVE:  Mr. Delgado is a 71 y/o, male. with a hx of T2DM, HLD, HTN, colon cancer s/p hemicolon resection in 2011 ( currently in remission) L retinal reattachment with repair (f/b outpatient optho, who initially presented to hospital with LOC and head injury, along with L foot ulcer. Pt was admitted for L toe gangrene and during work up found to have MSSA bacteremia, now with PICC in LUE on IV antibiotics. CT head performed which illustrated pituitary lesion. MRI orbits/ sella, brain illustrated 1.8 cm AP by 1.6 cm CC by 2 cm TRV pituitary mass   consistent with an adenoma. There is mild extension into the LEFT cavernous sinus and mild mass effect on the optic chiasm, LEFT greater than RIGHT. He was evaluated by endocrine, pituitary labs performed with values wnl. He denies any diplopia, loss of vision, HA, abnormal weight loss, seizures, n/v, SOB, CP.     Vital Signs Last 24 Hrs  T(C): 37 (10 May 2023 05:09), Max: 37 (10 May 2023 05:09)  T(F): 98.6 (10 May 2023 05:09), Max: 98.6 (10 May 2023 05:09)  HR: 68 (10 May 2023 05:09) (68 - 101)  BP: 107/71 (10 May 2023 05:09) (107/71 - 125/77)  BP(mean): --  RR: 17 (10 May 2023 05:09) (17 - 18)  SpO2: 97% (10 May 2023 05:09) (97% - 99%)    Parameters below as of 10 May 2023 05:09  Patient On (Oxygen Delivery Method): room air      PHYSICAL EXAM:    Constitutional: No Acute Distress     Neurological: AOx3, EOMI. Following Commands, Moving all Extremities                                                                Sensation: [x] intact to light touch  [] decreased:     Pulmonary: Clear to Auscultation, No rales, No rhonchi, No wheezes     Cardiovascular: S1, S2, Regular rate and rhythm     Gastrointestinal: Soft, Non-tender, Non-distended     Extremities: No calf tenderness     Incision:   LABS:                        11.2   6.18  )-----------( 399      ( 10 May 2023 05:16 )             35.6    05-10    139  |  107  |  11  ----------------------------<  108<H>  3.8   |  30  |  1.26    Ca    9.1      10 May 2023 05:16  Phos  2.9     05-10  Mg     4.5     05-10    TPro  8.1  /  Alb  2.2<L>  /  TBili  0.2  /  DBili  x   /  AST  21  /  ALT  15  /  AlkPhos  85  05-09 05-09 @ 07:01  -  05-10 @ 07:00  --------------------------------------------------------  IN: 0 mL / OUT: 750 mL / NET: -750 mL          MEDICATIONS:  Anticoagulation:   enoxaparin Injectable 40 milliGRAM(s) SubCutaneous every 24 hours    Antibiotics:  ceFAZolin   IVPB 2000 milliGRAM(s) IV Intermittent every 8 hours  ceFAZolin   IVPB      ertapenem  IVPB 1000 milliGRAM(s) IV Intermittent every 24 hours    Endo:  insulin glargine Injectable (LANTUS) 20 Unit(s) SubCutaneous at bedtime  insulin lispro (ADMELOG) corrective regimen sliding scale   SubCutaneous three times a day before meals  insulin lispro (ADMELOG) corrective regimen sliding scale   SubCutaneous at bedtime  insulin lispro Injectable (ADMELOG) 6 Unit(s) SubCutaneous three times a day before meals    Neuro:  acetaminophen     Tablet .. 650 milliGRAM(s) Oral every 6 hours PRN Temp greater or equal to 38C (100.4F), Mild Pain (1 - 3)  gabapentin 300 milliGRAM(s) Oral every 8 hours  ondansetron Injectable 4 milliGRAM(s) IV Push every 8 hours PRN Nausea and/or Vomiting    Cardiac:    Pulm:    GI/:  famotidine    Tablet 40 milliGRAM(s) Oral every 12 hours    Other:   brimonidine 0.2% Ophthalmic Solution 1 Drop(s) Left EYE every 12 hours  chlorhexidine 2% Cloths 1 Application(s) Topical <User Schedule>  chlorhexidine 2% Cloths 1 Application(s) Topical <User Schedule>  lactated ringers. 1000 milliLiter(s) IV Continuous <Continuous>  sodium chloride 0.9% lock flush 10 milliLiter(s) IV Push every 1 hour PRN Pre/post blood products, medications, blood draw, and to maintain line patency      IMAGING:   < from: MR Head w/wo IV Cont (05.01.23 @ 15:37) >  IMPRESSION:    MR orbits/sella:   1.8 cm AP by 1.6 cm CC by 2 cm TRV pituitary mass   consistent with an adenoma. There is mild extension into the LEFT   cavernous sinus. The RIGHT cavernous sinuses and para cavernous regions   appear intact.    The cavernous segments of the internal carotid arteries   demonstrate expected flow-void indicating patency. There is mild mass   effect on the optic chiasm, LEFT greater than RIGHT. The infundibulum is   slightly deviated to the LEFT.      MR brain: Minimal white matter ischemia at the bifrontal and LEFT   parietal subcortical white matter.    Moderate mucosal thickening/fluid   in the LEFT mastoid air cells.    --- End of Report ---      < end of copied text >

## 2023-05-10 NOTE — CONSULT NOTE ADULT - ASSESSMENT
Mr. Delgado is a 71 y/o, male. with a hx of T2DM, HLD, HTN, colon cancer s/p hemicolon resection in 2011 ( currently in remission) L retinal reattachment with repair (f/b outpatient optho, who initially presented to hospital with LOC and head injury, along with L foot ulcer. Pt was admitted for L toe gangrene and during work up found to have MSSA bacteremia, now with PICC in LUE on IV antibiotics. MRI orbits/ sella, brain illustrated 1.8 cm AP by 1.6 cm CC by 2 cm TRV pituitary adenom with mild extension into the LEFT cavernous sinus and mild mass effect on the optic chiasm, LEFT greater than RIGHT. Pituitary labs performed with values wnl, endocrine following.     Plan:   Neurosurgery:   Neuro checks   Pt to f/u outpatient in [facility] Neurosurgery office with Dr. Yan for management and possible surgical intervention consisting of transphenoidal resection  Recommend pt be evaluated by ophthalmologist prior to evaluate compression of optic chiasm   Continue following with endocrinologist, outpatient repeat labs   In the meantime pt to continue with AB therapy via PICC for bacteremia and recover from LLE gangrene   Plan of care discussed with Dr. Benji Yan, Chief of Neurosurgery

## 2023-05-10 NOTE — CONSULT NOTE ADULT - CONSULT REASON
Fall; abnormal head CT
left fourth digit healing
Diabetes, Diabetic foot infection, Pituitary mass
Left foot Gas gangrene
Pituitary adenoma
antibiotic advice

## 2023-05-10 NOTE — PROGRESS NOTE ADULT - PROBLEM SELECTOR PLAN 2
Blood cultures: +ve MSSA  Vanc D/Leo  D/Leo Cefepim and Nafcillin for MSSA on blood cultures  c/w  Ertapenem 1g qd and Cefazolin 2g q8   TTE unremarkable  Repeat blood cx positive for Staph Aureus  FABIAN unremarkable  Dr. Gao ID consulted Blood cultures: +ve MSSA  Vanc D/Leo  D/Leo Cefepim and Nafcillin for MSSA on blood cultures  c/w  Ertapenem 1g qd and Cefazolin 2g q8   TTE unremarkable  Repeat blood cx positive for Staph Aureus  FABIAN unremarkable  Dr. Gao ID consulted  Continue Cefazolin 2g q8 hrs IV + Ertapenem 1g q24 hrs IV combination for 7 days after negative blood cultures and once pt is clinically ready for discharge can d/c cefazolin and continue the Ertapenem 1g q24 hrs IV for total 6 weeks form negative blood cx(5/3-6/14).

## 2023-05-10 NOTE — PROGRESS NOTE ADULT - ASSESSMENT
71 y/o M from home with a PMHx of DM, HTN, HLD and medication non-compliance admitted for sepsis 2/2 gas gangrene of left foot. Blood cx positive for MSSA. MR head showed 1.8 cm AP by 1.6 cm CC by 2 cm TRV pituitary mass consistent with an adenoma 71 y/o M from home with a PMHx of DM, HTN, HLD and medication non-compliance admitted for sepsis 2/2 gas gangrene of left foot s/p amputation. Blood cx positive for MSSA. MR head showed 1.8 cm AP by 1.6 cm CC by 2 cm TRV pituitary mass consistent with an adenoma. Pending MO placement and wound vac

## 2023-05-10 NOTE — CONSULT NOTE ADULT - REASON FOR ADMISSION
Fall, Gangrenous foot
Gangrene left foot
Fall, Gangrenous foot

## 2023-05-10 NOTE — CHART NOTE - NSCHARTNOTEFT_GEN_A_CORE
Assessment:     Factors impacting intake: [ ] none [ ] nausea  [ ] vomiting [ ] diarrhea [ ] constipation  [ ]chewing problems [ ] swallowing issues  [ ] other:     Diet Presciption: Diet, DASH/TLC:   Sodium & Cholesterol Restricted  Consistent Carbohydrate {Evening Snacks} (05-06-23 @ 12:40)    Intake:     Daily   % Weight Change    Pertinent Medications: MEDICATIONS  (STANDING):  brimonidine 0.2% Ophthalmic Solution 1 Drop(s) Left EYE every 12 hours  ceFAZolin   IVPB      ceFAZolin   IVPB 2000 milliGRAM(s) IV Intermittent every 8 hours  chlorhexidine 2% Cloths 1 Application(s) Topical <User Schedule>  chlorhexidine 2% Cloths 1 Application(s) Topical <User Schedule>  enoxaparin Injectable 40 milliGRAM(s) SubCutaneous every 24 hours  ertapenem  IVPB 1000 milliGRAM(s) IV Intermittent every 24 hours  famotidine    Tablet 40 milliGRAM(s) Oral every 12 hours  gabapentin 300 milliGRAM(s) Oral every 8 hours  insulin glargine Injectable (LANTUS) 20 Unit(s) SubCutaneous at bedtime  insulin lispro (ADMELOG) corrective regimen sliding scale   SubCutaneous at bedtime  insulin lispro (ADMELOG) corrective regimen sliding scale   SubCutaneous three times a day before meals  insulin lispro Injectable (ADMELOG) 6 Unit(s) SubCutaneous three times a day before meals  lactated ringers. 1000 milliLiter(s) (100 mL/Hr) IV Continuous <Continuous>    MEDICATIONS  (PRN):  acetaminophen     Tablet .. 650 milliGRAM(s) Oral every 6 hours PRN Temp greater or equal to 38C (100.4F), Mild Pain (1 - 3)  ondansetron Injectable 4 milliGRAM(s) IV Push every 8 hours PRN Nausea and/or Vomiting  sodium chloride 0.9% lock flush 10 milliLiter(s) IV Push every 1 hour PRN Pre/post blood products, medications, blood draw, and to maintain line patency    Pertinent Labs: 05-10 Na139 mmol/L Glu 108 mg/dL<H> K+ 3.8 mmol/L Cr  1.26 mg/dL BUN 11 mg/dL 05-10 Phos 2.9 mg/dL 05-09 Alb 2.2 g/dL<L> 04-30 Chol 147 mg/dL LDL --    HDL 33 mg/dL<L> Trig 107 mg/dL     CAPILLARY BLOOD GLUCOSE      POCT Blood Glucose.: 99 mg/dL (10 May 2023 07:51)  POCT Blood Glucose.: 185 mg/dL (09 May 2023 21:49)  POCT Blood Glucose.: 92 mg/dL (09 May 2023 17:08)  POCT Blood Glucose.: 179 mg/dL (09 May 2023 11:27)      Skin:     Estimated Needs:   [ ] no change since previous assessment  [ ] recalculated:     Previous Nutrition Diagnosis:   [ ] Inadequate Energy Intake [ ]Inadequate Oral Intake [ ] Excessive Energy Intake   [ ] Underweight [ ] Increased Nutrient Needs [ ] Overweight/Obesity  [ ] Swallowing Difficult   [ ] Altered GI Function [ ] Unintended Weight Loss [ ] Food & Nutrition Related Knowledge Deficit [ ] Malnutrition   [ ] Not Ready for Diet/Life Style Changes     Nutrition Diagnosis is [ ] ongoing  [ ] Improving   [ ] resolved [ ] not applicable     New Nutrition Diagnosis: [ ] not applicable       Interventions:   Recommend  [ ] Change Diet To:  [ ] Nutrition Supplement  [ ] Nutrition Support  [ ] Other:     Monitoring and Evaluation:   [ ] PO intake [ x ] Tolerance to diet prescription [ x ] weights [ x ] labs[ x ] follow up per protocol  [ ] other: Assessment:      Nutrition consult requested verbally by RN as pt wants to see RD. Pt visited, alert, oriented, well-engaged; Diet Rx explained, food/nutrition related concerns discussed, nutrition information on DM Heart Healthy with written copy given, RD business card attached for contact as needed, pt very receptive, verbalized underbracing; Pt assisted by Diet Technician for food preferences/daily menu selection; Pending discharge planning to skilled nursing facility for rehab when medically ready     Factors impacting intake: [ X ] other: Oriental orthodox/ethnic/cultural/personal food preferences; acute on chronic comorbidities including gas gangrene of foot, uncontrolled DM ( NdvY2j=88.2 5/1/23)    Diet Prescription:   Diet, DASH/TLC:   Sodium & Cholesterol Restricted  Consistent Carbohydrate {Evening Snacks} (05-06-23 @ 12:40)    Intake: appetite good, tolerating meals well     Daily % Weight Change    Pertinent Medications: MEDICATIONS  (STANDING):  brimonidine 0.2% Ophthalmic Solution 1 Drop(s) Left EYE every 12 hours  ceFAZolin   IVPB      ceFAZolin   IVPB 2000 milliGRAM(s) IV Intermittent every 8 hours  chlorhexidine 2% Cloths 1 Application(s) Topical <User Schedule>  chlorhexidine 2% Cloths 1 Application(s) Topical <User Schedule>  enoxaparin Injectable 40 milliGRAM(s) SubCutaneous every 24 hours  ertapenem  IVPB 1000 milliGRAM(s) IV Intermittent every 24 hours  famotidine    Tablet 40 milliGRAM(s) Oral every 12 hours  gabapentin 300 milliGRAM(s) Oral every 8 hours  insulin glargine Injectable (LANTUS) 20 Unit(s) SubCutaneous at bedtime  insulin lispro (ADMELOG) corrective regimen sliding scale   SubCutaneous at bedtime  insulin lispro (ADMELOG) corrective regimen sliding scale   SubCutaneous three times a day before meals  insulin lispro Injectable (ADMELOG) 6 Unit(s) SubCutaneous three times a day before meals  lactated ringers. 1000 milliLiter(s) (100 mL/Hr) IV Continuous <Continuous>    MEDICATIONS  (PRN):  acetaminophen     Tablet .. 650 milliGRAM(s) Oral every 6 hours PRN Temp greater or equal to 38C (100.4F), Mild Pain (1 - 3)  ondansetron Injectable 4 milliGRAM(s) IV Push every 8 hours PRN Nausea and/or Vomiting  sodium chloride 0.9% lock flush 10 milliLiter(s) IV Push every 1 hour PRN Pre/post blood products, medications, blood draw, and to maintain line patency    Pertinent Labs: 05-10 Na139 mmol/L Glu 108 mg/dL<H> K+ 3.8 mmol/L Cr  1.26 mg/dL BUN 11 mg/dL 05-10 Phos 2.9 mg/dL 05-09 Alb 2.2 g/dL<L> 04-30 Chol 147 mg/dL LDL --    HDL 33 mg/dL<L> Trig 107 mg/dL     CAPILLARY BLOOD GLUCOSE    POCT Blood Glucose.: 99 mg/dL (10 May 2023 07:51)  POCT Blood Glucose.: 185 mg/dL (09 May 2023 21:49)  POCT Blood Glucose.: 92 mg/dL (09 May 2023 17:08)  POCT Blood Glucose.: 179 mg/dL (09 May 2023 11:27)    Skin: wounds     Estimated Needs:   [ X ] no change since previous assessment  [ ] recalculated:     Previous Nutrition Diagnosis:   [ X ] Altered Nutrition Related Laboratory Values     Nutrition Diagnosis is [ X ] ongoing  [ X ] Improving   [ ] resolved [ ] not applicable     New Nutrition Diagnosis: [ X ] not applicable     Interventions:   Recommend  [ X ] Continue diet Rx as ordered   [ ] Nutrition Supplement  [ ] Nutrition Support  [ X ] Other: Diet education given ( see above )                    Provide food choices within diet Rx as available/updated     Monitoring and Evaluation:   [ X ] PO intake [ x ] Tolerance to diet prescription [ x ] weights [ x ] labs[ x ] follow up per protocol  [ ] other: Assessment:      Nutrition consult requested verbally by RN as pt wants to see RD. Pt visited, alert, oriented, well-engaged; Diet Rx explained, food/nutrition related concerns discussed, nutrition information on DM Heart Healthy with written copy given, RD business card attached for contact as needed, pt very receptive, verbalized underbracing; Pt assisted by Diet Technician for food preferences/daily menu selection; Pending discharge planning to skilled nursing facility for rehab when medically ready     Factors impacting intake: [ X ] other: Restoration/ethnic/cultural/personal food preferences; acute on chronic comorbidities including gas gangrene of foot, uncontrolled DM ( IqdK0f=41.2 5/1/23)    Diet Prescription:   Diet, DASH/TLC:   Sodium & Cholesterol Restricted  Consistent Carbohydrate {Evening Snacks} (05-06-23 @ 12:40)    Intake: appetite good, tolerating meals well     Daily % Weight Change    Pertinent Medications: MEDICATIONS  (STANDING):  brimonidine 0.2% Ophthalmic Solution 1 Drop(s) Left EYE every 12 hours  ceFAZolin   IVPB      ceFAZolin   IVPB 2000 milliGRAM(s) IV Intermittent every 8 hours  chlorhexidine 2% Cloths 1 Application(s) Topical <User Schedule>  chlorhexidine 2% Cloths 1 Application(s) Topical <User Schedule>  enoxaparin Injectable 40 milliGRAM(s) SubCutaneous every 24 hours  ertapenem  IVPB 1000 milliGRAM(s) IV Intermittent every 24 hours  famotidine    Tablet 40 milliGRAM(s) Oral every 12 hours  gabapentin 300 milliGRAM(s) Oral every 8 hours  insulin glargine Injectable (LANTUS) 20 Unit(s) SubCutaneous at bedtime  insulin lispro (ADMELOG) corrective regimen sliding scale   SubCutaneous at bedtime  insulin lispro (ADMELOG) corrective regimen sliding scale   SubCutaneous three times a day before meals  insulin lispro Injectable (ADMELOG) 6 Unit(s) SubCutaneous three times a day before meals  lactated ringers. 1000 milliLiter(s) (100 mL/Hr) IV Continuous <Continuous>    MEDICATIONS  (PRN):  acetaminophen     Tablet .. 650 milliGRAM(s) Oral every 6 hours PRN Temp greater or equal to 38C (100.4F), Mild Pain (1 - 3)  ondansetron Injectable 4 milliGRAM(s) IV Push every 8 hours PRN Nausea and/or Vomiting  sodium chloride 0.9% lock flush 10 milliLiter(s) IV Push every 1 hour PRN Pre/post blood products, medications, blood draw, and to maintain line patency    Pertinent Labs: 05-10 Na139 mmol/L Glu 108 mg/dL<H> K+ 3.8 mmol/L Cr  1.26 mg/dL BUN 11 mg/dL 05-10 Phos 2.9 mg/dL 05-09 Alb 2.2 g/dL<L> 04-30 Chol 147 mg/dL LDL --    HDL 33 mg/dL<L> Trig 107 mg/dL     CAPILLARY BLOOD GLUCOSE    POCT Blood Glucose.: 99 mg/dL (10 May 2023 07:51)  POCT Blood Glucose.: 185 mg/dL (09 May 2023 21:49)  POCT Blood Glucose.: 92 mg/dL (09 May 2023 17:08)  POCT Blood Glucose.: 179 mg/dL (09 May 2023 11:27)    Skin: wounds     Estimated Needs:   [ X ] no change since previous assessment  [ ] recalculated:     Previous Nutrition Diagnosis:   [ X ] Altered Nutrition Related Laboratory Values     Nutrition Diagnosis is [ X ] ongoing  [ X ] Improving   [ ] resolved [ ] not applicable     New Nutrition Diagnosis: [ X ] not applicable     Interventions:   Recommend  [ X ] Continue diet Rx as ordered   [ ] Nutrition Supplement  [ ] Nutrition Support  [ X ] Other: Diet education given ( see above )                    Discussed with MD/RN                    Provide food choices within diet Rx as available/updated     Monitoring and Evaluation:   [ X ] PO intake [ x ] Tolerance to diet prescription [ x ] weights [ x ] labs[ x ] follow up per protocol  [ ] other:

## 2023-05-11 ENCOUNTER — TRANSCRIPTION ENCOUNTER (OUTPATIENT)
Age: 73
End: 2023-05-11

## 2023-05-11 VITALS
OXYGEN SATURATION: 99 % | DIASTOLIC BLOOD PRESSURE: 85 MMHG | HEART RATE: 90 BPM | RESPIRATION RATE: 18 BRPM | SYSTOLIC BLOOD PRESSURE: 135 MMHG

## 2023-05-11 PROBLEM — E78.5 HYPERLIPIDEMIA, UNSPECIFIED: Chronic | Status: ACTIVE | Noted: 2023-04-29

## 2023-05-11 PROBLEM — I10 ESSENTIAL (PRIMARY) HYPERTENSION: Chronic | Status: ACTIVE | Noted: 2023-04-29

## 2023-05-11 LAB
ALPHA SUBUNIT SERPL-MCNC: <0.15 NG/ML — SIGNIFICANT CHANGE UP
ANION GAP SERPL CALC-SCNC: 4 MMOL/L — LOW (ref 5–17)
APTT BLD: 29.3 SEC — SIGNIFICANT CHANGE UP (ref 27.5–35.5)
BLD GP AB SCN SERPL QL: SIGNIFICANT CHANGE UP
BUN SERPL-MCNC: 12 MG/DL — SIGNIFICANT CHANGE UP (ref 7–18)
CALCIUM SERPL-MCNC: 8.9 MG/DL — SIGNIFICANT CHANGE UP (ref 8.4–10.5)
CHLORIDE SERPL-SCNC: 107 MMOL/L — SIGNIFICANT CHANGE UP (ref 96–108)
CO2 SERPL-SCNC: 27 MMOL/L — SIGNIFICANT CHANGE UP (ref 22–31)
CREAT SERPL-MCNC: 1.21 MG/DL — SIGNIFICANT CHANGE UP (ref 0.5–1.3)
EGFR: 64 ML/MIN/1.73M2 — SIGNIFICANT CHANGE UP
GLUCOSE BLDC GLUCOMTR-MCNC: 108 MG/DL — HIGH (ref 70–99)
GLUCOSE BLDC GLUCOMTR-MCNC: 121 MG/DL — HIGH (ref 70–99)
GLUCOSE BLDC GLUCOMTR-MCNC: 143 MG/DL — HIGH (ref 70–99)
GLUCOSE BLDC GLUCOMTR-MCNC: 143 MG/DL — HIGH (ref 70–99)
GLUCOSE SERPL-MCNC: 123 MG/DL — HIGH (ref 70–99)
HCT VFR BLD CALC: 35.8 % — LOW (ref 39–50)
HGB BLD-MCNC: 11.1 G/DL — LOW (ref 13–17)
INR BLD: 1.25 RATIO — HIGH (ref 0.88–1.16)
MAGNESIUM SERPL-MCNC: 2 MG/DL — SIGNIFICANT CHANGE UP (ref 1.6–2.6)
MCHC RBC-ENTMCNC: 25.9 PG — LOW (ref 27–34)
MCHC RBC-ENTMCNC: 31 GM/DL — LOW (ref 32–36)
MCV RBC AUTO: 83.4 FL — SIGNIFICANT CHANGE UP (ref 80–100)
NRBC # BLD: 0 /100 WBCS — SIGNIFICANT CHANGE UP (ref 0–0)
PHOSPHATE SERPL-MCNC: 2.9 MG/DL — SIGNIFICANT CHANGE UP (ref 2.5–4.5)
PLATELET # BLD AUTO: 402 K/UL — HIGH (ref 150–400)
POTASSIUM SERPL-MCNC: 4 MMOL/L — SIGNIFICANT CHANGE UP (ref 3.5–5.3)
POTASSIUM SERPL-SCNC: 4 MMOL/L — SIGNIFICANT CHANGE UP (ref 3.5–5.3)
PROTHROM AB SERPL-ACNC: 14.9 SEC — HIGH (ref 10.5–13.4)
RBC # BLD: 4.29 M/UL — SIGNIFICANT CHANGE UP (ref 4.2–5.8)
RBC # FLD: 13.9 % — SIGNIFICANT CHANGE UP (ref 10.3–14.5)
SODIUM SERPL-SCNC: 138 MMOL/L — SIGNIFICANT CHANGE UP (ref 135–145)
WBC # BLD: 6.27 K/UL — SIGNIFICANT CHANGE UP (ref 3.8–10.5)
WBC # FLD AUTO: 6.27 K/UL — SIGNIFICANT CHANGE UP (ref 3.8–10.5)

## 2023-05-11 PROCEDURE — 87077 CULTURE AEROBIC IDENTIFY: CPT

## 2023-05-11 PROCEDURE — 83036 HEMOGLOBIN GLYCOSYLATED A1C: CPT

## 2023-05-11 PROCEDURE — 80202 ASSAY OF VANCOMYCIN: CPT

## 2023-05-11 PROCEDURE — 97116 GAIT TRAINING THERAPY: CPT

## 2023-05-11 PROCEDURE — A9585: CPT

## 2023-05-11 PROCEDURE — 70450 CT HEAD/BRAIN W/O DYE: CPT | Mod: MA

## 2023-05-11 PROCEDURE — 73610 X-RAY EXAM OF ANKLE: CPT

## 2023-05-11 PROCEDURE — 36573 INSJ PICC RS&I 5 YR+: CPT

## 2023-05-11 PROCEDURE — 85610 PROTHROMBIN TIME: CPT

## 2023-05-11 PROCEDURE — 99285 EMERGENCY DEPT VISIT HI MDM: CPT

## 2023-05-11 PROCEDURE — 82533 TOTAL CORTISOL: CPT

## 2023-05-11 PROCEDURE — 83519 RIA NONANTIBODY: CPT

## 2023-05-11 PROCEDURE — 82962 GLUCOSE BLOOD TEST: CPT

## 2023-05-11 PROCEDURE — 96374 THER/PROPH/DIAG INJ IV PUSH: CPT

## 2023-05-11 PROCEDURE — 84100 ASSAY OF PHOSPHORUS: CPT

## 2023-05-11 PROCEDURE — 83880 ASSAY OF NATRIURETIC PEPTIDE: CPT

## 2023-05-11 PROCEDURE — 83935 ASSAY OF URINE OSMOLALITY: CPT

## 2023-05-11 PROCEDURE — 93325 DOPPLER ECHO COLOR FLOW MAPG: CPT

## 2023-05-11 PROCEDURE — 90471 IMMUNIZATION ADMIN: CPT

## 2023-05-11 PROCEDURE — 86140 C-REACTIVE PROTEIN: CPT

## 2023-05-11 PROCEDURE — 84439 ASSAY OF FREE THYROXINE: CPT

## 2023-05-11 PROCEDURE — 85730 THROMBOPLASTIN TIME PARTIAL: CPT

## 2023-05-11 PROCEDURE — 83520 IMMUNOASSAY QUANT NOS NONAB: CPT

## 2023-05-11 PROCEDURE — 73630 X-RAY EXAM OF FOOT: CPT

## 2023-05-11 PROCEDURE — 81001 URINALYSIS AUTO W/SCOPE: CPT

## 2023-05-11 PROCEDURE — 93005 ELECTROCARDIOGRAM TRACING: CPT

## 2023-05-11 PROCEDURE — 84443 ASSAY THYROID STIM HORMONE: CPT

## 2023-05-11 PROCEDURE — 86900 BLOOD TYPING SEROLOGIC ABO: CPT

## 2023-05-11 PROCEDURE — 80048 BASIC METABOLIC PNL TOTAL CA: CPT

## 2023-05-11 PROCEDURE — 77001 FLUOROGUIDE FOR VEIN DEVICE: CPT

## 2023-05-11 PROCEDURE — 71045 X-RAY EXAM CHEST 1 VIEW: CPT

## 2023-05-11 PROCEDURE — 86703 HIV-1/HIV-2 1 RESULT ANTBDY: CPT

## 2023-05-11 PROCEDURE — C1751: CPT

## 2023-05-11 PROCEDURE — 87186 SC STD MICRODIL/AGAR DIL: CPT

## 2023-05-11 PROCEDURE — 87070 CULTURE OTHR SPECIMN AEROBIC: CPT

## 2023-05-11 PROCEDURE — 99239 HOSP IP/OBS DSCHRG MGMT >30: CPT | Mod: GC

## 2023-05-11 PROCEDURE — 84305 ASSAY OF SOMATOMEDIN: CPT

## 2023-05-11 PROCEDURE — 87640 STAPH A DNA AMP PROBE: CPT

## 2023-05-11 PROCEDURE — 82024 ASSAY OF ACTH: CPT

## 2023-05-11 PROCEDURE — 70553 MRI BRAIN STEM W/O & W/DYE: CPT

## 2023-05-11 PROCEDURE — 84146 ASSAY OF PROLACTIN: CPT

## 2023-05-11 PROCEDURE — 87150 DNA/RNA AMPLIFIED PROBE: CPT

## 2023-05-11 PROCEDURE — 80061 LIPID PANEL: CPT

## 2023-05-11 PROCEDURE — 87040 BLOOD CULTURE FOR BACTERIA: CPT

## 2023-05-11 PROCEDURE — 93320 DOPPLER ECHO COMPLETE: CPT

## 2023-05-11 PROCEDURE — 36415 COLL VENOUS BLD VENIPUNCTURE: CPT

## 2023-05-11 PROCEDURE — 83605 ASSAY OF LACTIC ACID: CPT

## 2023-05-11 PROCEDURE — 83003 ASSAY GROWTH HORMONE (HGH): CPT

## 2023-05-11 PROCEDURE — 83002 ASSAY OF GONADOTROPIN (LH): CPT

## 2023-05-11 PROCEDURE — 80053 COMPREHEN METABOLIC PANEL: CPT

## 2023-05-11 PROCEDURE — 86901 BLOOD TYPING SEROLOGIC RH(D): CPT

## 2023-05-11 PROCEDURE — 84481 FREE ASSAY (FT-3): CPT

## 2023-05-11 PROCEDURE — 85027 COMPLETE CBC AUTOMATED: CPT

## 2023-05-11 PROCEDURE — 97162 PT EVAL MOD COMPLEX 30 MIN: CPT

## 2023-05-11 PROCEDURE — 85652 RBC SED RATE AUTOMATED: CPT

## 2023-05-11 PROCEDURE — 88305 TISSUE EXAM BY PATHOLOGIST: CPT

## 2023-05-11 PROCEDURE — 96375 TX/PRO/DX INJ NEW DRUG ADDON: CPT

## 2023-05-11 PROCEDURE — 87635 SARS-COV-2 COVID-19 AMP PRB: CPT

## 2023-05-11 PROCEDURE — 85025 COMPLETE CBC W/AUTO DIFF WBC: CPT

## 2023-05-11 PROCEDURE — 83735 ASSAY OF MAGNESIUM: CPT

## 2023-05-11 PROCEDURE — 86803 HEPATITIS C AB TEST: CPT

## 2023-05-11 PROCEDURE — 93312 ECHO TRANSESOPHAGEAL: CPT

## 2023-05-11 PROCEDURE — 87086 URINE CULTURE/COLONY COUNT: CPT

## 2023-05-11 PROCEDURE — 76937 US GUIDE VASCULAR ACCESS: CPT

## 2023-05-11 PROCEDURE — 93923 UPR/LXTR ART STDY 3+ LVLS: CPT

## 2023-05-11 PROCEDURE — 83001 ASSAY OF GONADOTROPIN (FSH): CPT

## 2023-05-11 PROCEDURE — 70543 MRI ORBT/FAC/NCK W/O &W/DYE: CPT

## 2023-05-11 PROCEDURE — 86850 RBC ANTIBODY SCREEN: CPT

## 2023-05-11 PROCEDURE — 90715 TDAP VACCINE 7 YRS/> IM: CPT

## 2023-05-11 PROCEDURE — 82550 ASSAY OF CK (CPK): CPT

## 2023-05-11 PROCEDURE — 93306 TTE W/DOPPLER COMPLETE: CPT

## 2023-05-11 PROCEDURE — 87641 MR-STAPH DNA AMP PROBE: CPT

## 2023-05-11 RX ORDER — PREDNISOLONE SODIUM PHOSPHATE 1 %
1 DROPS OPHTHALMIC (EYE)
Refills: 0 | Status: DISCONTINUED | OUTPATIENT
Start: 2023-05-11 | End: 2023-05-11

## 2023-05-11 RX ORDER — DORZOLAMIDE HYDROCHLORIDE TIMOLOL MALEATE 20; 5 MG/ML; MG/ML
1 SOLUTION/ DROPS OPHTHALMIC
Refills: 0 | Status: DISCONTINUED | OUTPATIENT
Start: 2023-05-11 | End: 2023-05-11

## 2023-05-11 RX ORDER — ERTAPENEM SODIUM 1 G/1
1 INJECTION, POWDER, LYOPHILIZED, FOR SOLUTION INTRAMUSCULAR; INTRAVENOUS
Qty: 37 | Refills: 0
Start: 2023-05-11 | End: 2023-06-16

## 2023-05-11 RX ADMIN — Medication 6 UNIT(S): at 18:36

## 2023-05-11 RX ADMIN — GABAPENTIN 300 MILLIGRAM(S): 400 CAPSULE ORAL at 13:16

## 2023-05-11 RX ADMIN — BRIMONIDINE TARTRATE 1 DROP(S): 2 SOLUTION/ DROPS OPHTHALMIC at 18:25

## 2023-05-11 RX ADMIN — CHLORHEXIDINE GLUCONATE 1 APPLICATION(S): 213 SOLUTION TOPICAL at 05:29

## 2023-05-11 RX ADMIN — DORZOLAMIDE HYDROCHLORIDE TIMOLOL MALEATE 1 DROP(S): 20; 5 SOLUTION/ DROPS OPHTHALMIC at 06:29

## 2023-05-11 RX ADMIN — GABAPENTIN 300 MILLIGRAM(S): 400 CAPSULE ORAL at 05:29

## 2023-05-11 RX ADMIN — DORZOLAMIDE HYDROCHLORIDE TIMOLOL MALEATE 1 DROP(S): 20; 5 SOLUTION/ DROPS OPHTHALMIC at 18:24

## 2023-05-11 RX ADMIN — FAMOTIDINE 40 MILLIGRAM(S): 10 INJECTION INTRAVENOUS at 18:24

## 2023-05-11 RX ADMIN — Medication 1 DROP(S): at 18:25

## 2023-05-11 RX ADMIN — Medication 6 UNIT(S): at 12:16

## 2023-05-11 RX ADMIN — FAMOTIDINE 40 MILLIGRAM(S): 10 INJECTION INTRAVENOUS at 05:29

## 2023-05-11 RX ADMIN — ERTAPENEM SODIUM 120 MILLIGRAM(S): 1 INJECTION, POWDER, LYOPHILIZED, FOR SOLUTION INTRAMUSCULAR; INTRAVENOUS at 13:15

## 2023-05-11 RX ADMIN — Medication 1 DROP(S): at 06:29

## 2023-05-11 NOTE — PROGRESS NOTE ADULT - PROBLEM SELECTOR PROBLEM 3
Diabetic infection of left foot
Uncontrolled diabetes mellitus with hyperglycemia
Diabetic infection of left foot
Uncontrolled diabetes mellitus with hyperglycemia
Diabetic infection of left foot
Uncontrolled diabetes mellitus with hyperglycemia

## 2023-05-11 NOTE — DISCHARGE NOTE NURSING/CASE MANAGEMENT/SOCIAL WORK - PATIENT PORTAL LINK FT
You can access the FollowMyHealth Patient Portal offered by [facility] by registering at the following website: [email or web address]. By joining [employer]’s FollowMyHealth portal, you will also be able to view your health information using other applications (apps) compatible with our system.

## 2023-05-11 NOTE — PROGRESS NOTE ADULT - PROBLEM SELECTOR PROBLEM 1
Gas gangrene of foot

## 2023-05-11 NOTE — PROGRESS NOTE ADULT - ATTENDING COMMENTS
Podiatry has removed wound vac and found left fourth toe "dusky."  Podiatry asks for vascular re-evaluation.   Vascular note, seen and appreciated.     Alert, cooperative man in NAD  Vital Signs Last 24 Hrs  T(C): 36.4 (11 May 2023 13:32), Max: 37 (10 May 2023 21:25)  T(F): 97.5 (11 May 2023 13:32), Max: 98.6 (10 May 2023 21:25)  HR: 89 (11 May 2023 13:32) (77 - 89)  BP: 122/78 (11 May 2023 13:32) (122/78 - 146/79)  BP(mean): --  RR: 18 (11 May 2023 13:32) (18 - 18)  SpO2: 99% (11 May 2023 13:32) (97% - 99%)    Parameters below as of 11 May 2023 13:32  Patient On (Oxygen Delivery Method): room air    Lungs, clear  Cor, RRR  Abdomen, soft  Ext--left foot, s/p removal of 5th digit and 5th metatarsal head.  No exudate.  4th digit is dark in color.   Neurological, non-focal (stocking hypesthesia)    IMP:  Necrosis of toe, s/p amputation          possible ischemia of 4th digit          MSSA bacteremia          Pituiatary mass, compressing optic chiasm.  Visual fields, unchanged.     Plan:   Podiatry f/u regarding surgical management of "dusky" left 4th toe.   Patient will need f/u with Neurosurgery after completion of six weeks of antibiotics for staph bacteremia, likely source is gangrenous necrosis and infection of the toe, which has been removed.  I have discussed f/u with Dr. Yan and LALA Shah.  Dr. Yan's office will contact patient for an appointment in June.

## 2023-05-11 NOTE — DISCHARGE NOTE NURSING/CASE MANAGEMENT/SOCIAL WORK - NSDCVIVACCINE_GEN_ALL_CORE_FT
Tdap; 29-Apr-2023 15:39; Mackenzie Russo (RN); [employer]; [id number] (Exp. Date: 15-Feb-2025); IntraMuscular; Deltoid Right.; 0.5 milliLiter(s); VIS (VIS Published: 09-May-2013, VIS Presented: 29-Apr-2023);

## 2023-05-11 NOTE — PROGRESS NOTE ADULT - PROBLEM SELECTOR PLAN 2
Blood cultures: +ve MSSA  Vanc D/Leo  D/Leo Cefepim and Nafcillin for MSSA on blood cultures  c/w  Ertapenem 1g qd and Cefazolin 2g q8   TTE unremarkable  Repeat blood cx positive for Staph Aureus  FABIAN unremarkable  Dr. Gao ID consulted  Continue Cefazolin 2g q8 hrs IV + Ertapenem 1g q24 hrs IV combination for 7 days after negative blood cultures and once pt is clinically ready for discharge can d/c cefazolin and continue the Ertapenem 1g q24 hrs IV for total 6 weeks form negative blood cx(5/3-6/14).

## 2023-05-11 NOTE — CHART NOTE - NSCHARTNOTEFT_GEN_A_CORE
72 y.o. Male w/ PVD; hx of I+D left foot 4/29, left 5th ray open amputation 4/30, right foot debridement, excision of 5th metatarsal and graft placement of left foot 5/5    -Pt seen and examined at bedside w/ Dr. Gandara   -No acute vascular surgery indicated at present time. Pt may follow up with Dr. Gandara as outpatient.    -Local wound care as per podiatry   -Abx as per ID   -D/w Dr. Gandara and agrees   -Reconsult PRN

## 2023-05-11 NOTE — PROGRESS NOTE ADULT - SUBJECTIVE AND OBJECTIVE BOX
Podiatry Interval: patient is seen resting in bed, no acute distress. Pt is s/p 5/5 L foot debridement and graft application with wound vac intact. Denies nausea, vomiting, fever, chills, diarrhea, constipation. Admits will f/o with vascular team per consultation earlier this morning. Denies overnight acute events.     Podiatry HPI  72-year-old male with history of NIDDM,  Patient claims he slipped and fell on Tuesday night in the bedroom and hit his head and had LOC for a few minutes. Patient denies any injuries at the time, but complaining of feeling lightheaded after the fall, confused on Wednesday.  Patient works as a  and on Thursday he noticed with left foot pain, swelling, difficulty ambulating after excessively ambulating by his work in tight work shoes.  Patient took nothing for his pain. Presents to ED with progressive pain to Left foot and increased warmth with concern for infection. Follow OP Podiatrist Dr. Spain who he last saw two weeks ago. States in 2017 had a bone infection in his Right foot which was resected. Cannot recall exact procedure,  Denies fever, chills.    Patient admits to  (-) Fevers, (-) Chills, (-) Nausea, (-) Vomiting, (-) Shortness of Breath (-) calf pain (-) chest pain     Medications acetaminophen     Tablet .. 650 milliGRAM(s) Oral every 6 hours PRN  brimonidine 0.2% Ophthalmic Solution 1 Drop(s) Left EYE every 12 hours  chlorhexidine 2% Cloths 1 Application(s) Topical <User Schedule>  chlorhexidine 2% Cloths 1 Application(s) Topical <User Schedule>  dorzolamide 2%/timolol 0.5% Ophthalmic Solution 1 Drop(s) Both EYES two times a day  enoxaparin Injectable 40 milliGRAM(s) SubCutaneous every 24 hours  ertapenem  IVPB 1000 milliGRAM(s) IV Intermittent every 24 hours  famotidine    Tablet 40 milliGRAM(s) Oral every 12 hours  gabapentin 300 milliGRAM(s) Oral every 8 hours  insulin glargine Injectable (LANTUS) 20 Unit(s) SubCutaneous at bedtime  insulin lispro (ADMELOG) corrective regimen sliding scale   SubCutaneous at bedtime  insulin lispro (ADMELOG) corrective regimen sliding scale   SubCutaneous three times a day before meals  insulin lispro Injectable (ADMELOG) 6 Unit(s) SubCutaneous three times a day before meals  lactated ringers. 1000 milliLiter(s) IV Continuous <Continuous>  ondansetron Injectable 4 milliGRAM(s) IV Push every 8 hours PRN  prednisoLONE acetate 1% Suspension 1 Drop(s) Left EYE two times a day  sodium chloride 0.9% lock flush 10 milliLiter(s) IV Push every 1 hour PRN    FHNo pertinent family history in first degree relatives    ,   PMHDM (diabetes mellitus)    Colon cancer    HTN (hypertension)    Hyperlipidemia       PSHNo significant past surgical history    S/P inguinal hernia repair    History of colectomy    S/P arthroscopic knee surgery    History of repair of hiatal hernia        Labs                          11.1   6.27  )-----------( 402      ( 11 May 2023 05:29 )             35.8      05-11    138  |  107  |  12  ----------------------------<  123<H>  4.0   |  27  |  1.21    Ca    8.9      11 May 2023 05:29  Phos  2.9     05-11  Mg     2.0     05-11       Vital Signs Last 24 Hrs  T(C): 36.4 (11 May 2023 13:32), Max: 37 (10 May 2023 21:25)  T(F): 97.5 (11 May 2023 13:32), Max: 98.6 (10 May 2023 21:25)  HR: 89 (11 May 2023 13:32) (77 - 89)  BP: 122/78 (11 May 2023 13:32) (122/78 - 146/79)  BP(mean): --  RR: 18 (11 May 2023 13:32) (18 - 18)  SpO2: 99% (11 May 2023 13:32) (97% - 99%)    Parameters below as of 11 May 2023 13:32  Patient On (Oxygen Delivery Method): room air      Sedimentation Rate, Erythrocyte: 97 mm/Hr (05-07-23 @ 07:42)  Sedimentation Rate, Erythrocyte: 90 mm/Hr (05-05-23 @ 06:38)         C-Reactive Protein, Serum: 95 mg/L (05-04-23 @ 10:25)  C-Reactive Protein, Serum: 230 mg/L (05-01-23 @ 05:29)   WBC Count: 6.27 K/uL (05-11-23 @ 05:29)      PHYSICAL EXAM  LE Focused:    Vasc:  DP and PT faintly palpable b/l. No pedal hair growth noted. Diffuse edema noted distal to Ankle joint LLE  Derm: graft intact to left lateral foot wound with staples intact, skin well coapted, no signs of dehiscence or underlying hematoma/seroma. No malodor, no purulence upon manual compression, improving erythema and edema. No fluctuance noted. Dusky skin changes noted to left 4th digit; concerning for ischemic changes.   Neuro: Protective sensation grossly diminished  MSK: able to ambulate with pain, no TTP to dorsolateral surgical site     IMAGING: ?xray  Noted gas foci on CXR foot to 4th interspace and lateral 5th left foot    < from: [facility] Physiol Extremity Lower 3+ Level, BI (05.01.23 @ 16:33) >  FINDINGS: There are biphasic pulse volume recordings bilaterally,   dampened at the level of the metatarsals. Segmental pressures were not   obtained at the thighs.    Right RIVER = 1.15  Left RIVER = 1.11    IMPRESSION: Normal ABIs.    Dampened waveforms at the level of the metatarsals.    < end of copied text >    A:  Left foot gas gangrene  s/p 5/5 L foot debridement/graft    P:   Patient evaluated and Chart reviewed   Discussed diagnosis and treatment with patient  Previous X-rays noted to left foot with suspicion of gas foci to 4th interspace and lateral 5th toe (pre-operative)   Post-operative xrays taken and reviewed   S/p left partial 5th ray open amputation on 4/30/23  s/p left lateral foot graft application and wound debridement 5/5/23  WCO See Below   Dressed L. foot wound with saline flush, adaptic, 4x4 gauze soaked with saline, ABD, Mauro, light ACE  Appreciate vascular consultation   Recc IV antibiotics As Per ID; currently on ertapenem and ancef   Reviewed RIVER/PVR   NWB to LLE  Discussed importance of daily foot examinations and proper shoe gear and to importance of lower Fasting Blood Glucose levels.   Podiatry to follow while in house  Discussed with Attending Dr. Burns     WCO:  Wound vaccuum instructions for discharge: Every other day: Cleanse wound with normal saline, pat dry with sterile guaze, apply betadine to edges of wound if macerated, place adaptic over graft and open wound, Cut black granulo-foam to size of wound base and adhere to wound with adhesive strips. Cut dime sized whole in adhesive overlying black granulo-foam and apply suction tubing. Ensure seal is air tight with seal check. Wound vaccuum setting should be 125mmhg with continuous suction. Cover with kerlix and light ACE bandage.     Patient to f/o with attending Dr. Burns at [phone] [place] [phone] within 1 week of d/c

## 2023-05-11 NOTE — PROGRESS NOTE ADULT - REASON FOR ADMISSION
Fall, Gangrenous foot

## 2023-05-11 NOTE — DISCHARGE NOTE NURSING/CASE MANAGEMENT/SOCIAL WORK - NSDCPEFALRISK_GEN_ALL_CORE
For information on Fall & Injury Prevention, visit: [email or web address] OR  [email or web address] OR  https://www.cdc.gov/steadi/patient.html

## 2023-05-11 NOTE — PROGRESS NOTE ADULT - ASSESSMENT
73 y/o M from home with a PMHx of DM, HTN, HLD and medication non-compliance admitted for sepsis 2/2 gas gangrene of left foot s/p amputation. Blood cx positive for MSSA. MR head showed 1.8 cm AP by 1.6 cm CC by 2 cm TRV pituitary mass consistent with an adenoma. Pending MO placement and wound vac

## 2023-05-11 NOTE — PROGRESS NOTE ADULT - PROBLEM SELECTOR PROBLEM 4
Foot osteomyelitis, left
Fall with injury
Foot osteomyelitis, left
Fall with injury
Foot osteomyelitis, left
Fall with injury

## 2023-05-11 NOTE — CHART NOTE - NSCHARTNOTESELECT_GEN_ALL_CORE
Event Note
Event Note
Vascular/Event Note
Event Note
Event Note
FABIAN Procedure note/Event Note
Nutrition Services
Nutrition Services

## 2023-05-11 NOTE — PROGRESS NOTE ADULT - SUBJECTIVE AND OBJECTIVE BOX
PGY-1 Progress Note discussed with attending    PAGER #: [[phone]] TILL 5:00 PM  PLEASE CONTACT ON CALL TEAM:  - On Call Team (Please refer to Marlena) FROM 5:00 PM - 8:30PM  - Nightfloat Team FROM 8:30 -7:30 AM    CHIEF COMPLAINT & BRIEF HOSPITAL COURSE:    INTERVAL HPI/OVERNIGHT EVENTS:   MEDICATIONS  (STANDING):  brimonidine 0.2% Ophthalmic Solution 1 Drop(s) Left EYE every 12 hours  chlorhexidine 2% Cloths 1 Application(s) Topical <User Schedule>  chlorhexidine 2% Cloths 1 Application(s) Topical <User Schedule>  dorzolamide 2%/timolol 0.5% Ophthalmic Solution 1 Drop(s) Both EYES two times a day  enoxaparin Injectable 40 milliGRAM(s) SubCutaneous every 24 hours  ertapenem  IVPB 1000 milliGRAM(s) IV Intermittent every 24 hours  famotidine    Tablet 40 milliGRAM(s) Oral every 12 hours  gabapentin 300 milliGRAM(s) Oral every 8 hours  insulin glargine Injectable (LANTUS) 20 Unit(s) SubCutaneous at bedtime  insulin lispro (ADMELOG) corrective regimen sliding scale   SubCutaneous at bedtime  insulin lispro (ADMELOG) corrective regimen sliding scale   SubCutaneous three times a day before meals  insulin lispro Injectable (ADMELOG) 6 Unit(s) SubCutaneous three times a day before meals  lactated ringers. 1000 milliLiter(s) (100 mL/Hr) IV Continuous <Continuous>  prednisoLONE acetate 1% Suspension 1 Drop(s) Left EYE two times a day    MEDICATIONS  (PRN):  acetaminophen     Tablet .. 650 milliGRAM(s) Oral every 6 hours PRN Temp greater or equal to 38C (100.4F), Mild Pain (1 - 3)  ondansetron Injectable 4 milliGRAM(s) IV Push every 8 hours PRN Nausea and/or Vomiting  sodium chloride 0.9% lock flush 10 milliLiter(s) IV Push every 1 hour PRN Pre/post blood products, medications, blood draw, and to maintain line patency      REVIEW OF SYSTEMS:  CONSTITUTIONAL: No fever, weight loss, or fatigue  RESPIRATORY: No cough, wheezing, chills or hemoptysis; No shortness of breath  CARDIOVASCULAR: No chest pain, palpitations, dizziness, or leg swelling  GASTROINTESTINAL: No abdominal pain. No nausea, vomiting, or diarrhea.  GENITOURINARY: No dysuria or hematuria, urinary frequency  NEUROLOGICAL: No headaches, memory loss, loss of strength, numbness, or tremors  SKIN: No itching, burning, rashes, or lesions     Vital Signs Last 24 Hrs  T(C): 36.8 (11 May 2023 05:03), Max: 37 (10 May 2023 21:25)  T(F): 98.2 (11 May 2023 05:03), Max: 98.6 (10 May 2023 21:25)  HR: 78 (11 May 2023 05:03) (77 - 78)  BP: 146/79 (11 May 2023 05:03) (131/83 - 146/79)  BP(mean): --  RR: 18 (11 May 2023 05:03) (18 - 18)  SpO2: 98% (11 May 2023 05:03) (97% - 98%)    Parameters below as of 11 May 2023 05:03  Patient On (Oxygen Delivery Method): room air        PHYSICAL EXAMINATION:  GENERAL: NAD, well built  HEAD:  Atraumatic, Normocephalic  EYES:  conjunctiva and sclera clear  NECK: Supple, No JVD, Normal thyroid  CHEST/LUNG: Clear to auscultation. No rales, rhonchi, wheezing, or rubs  HEART: Regular rate and rhythm; No murmurs, rubs, or gallops  ABDOMEN: Soft, Nontender, Nondistended; Bowel sounds present  NERVOUS SYSTEM:  Alert & Oriented X3,    EXTREMITIES:  2+ Peripheral Pulses, No clubbing, cyanosis, or edema  SKIN: warm dry                          11.1   6.27  )-----------( 402      ( 11 May 2023 05:29 )             35.8     05-11    138  |  107  |  12  ----------------------------<  123<H>  4.0   |  27  |  1.21    Ca    8.9      11 May 2023 05:29  Phos  2.9     05-11  Mg     2.0     05-11            PT/INR - ( 11 May 2023 05:29 )   PT: 14.9 sec;   INR: 1.25 ratio         PTT - ( 11 May 2023 05:29 )  PTT:29.3 sec    CAPILLARY BLOOD GLUCOSE      RADIOLOGY & ADDITIONAL TESTS:

## 2023-05-11 NOTE — PROGRESS NOTE ADULT - PROBLEM SELECTOR PLAN 1
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   surgical path prelim result +ve for Morganella morgani, and Staph  Blood cultures: +ve MSSA   Vanc D/Leo  D/Leo Cefepim and Nafcillin for MSSA on blood cultures  c/w Ertapenem 1g qd and Cefazolin 2g q8   Repeat blood cx positive for Staph Aureus  Repeat culture on 5/3 : neg   S/P debridement and graft done 5/5   ambulated well with Physical therapy on 5/6  PICC line placement  on Monday 5/8  Dr. Gao ID consulted  c/w abx on dc for a total of 6 weeks with weekly labs of cbc, cmp, esr,crp  Continue Cefazolin 2g q8 hrs IV + Ertapenem 1g q24 hrs IV combination for 7 days after negative blood cultures and once pt is clinically ready for discharge can d/c cefazolin and continue the Ertapenem 1g q24 hrs IV for total 6 weeks form negative blood cx(5/3-6/14).

## 2023-05-11 NOTE — PROGRESS NOTE ADULT - PROVIDER SPECIALTY LIST ADULT
Endocrinology
Podiatry
Podiatry
Endocrinology
Endocrinology
Infectious Disease
Podiatry
Podiatry
Endocrinology
Infectious Disease
Infectious Disease
Internal Medicine
Podiatry
Internal Medicine
Infectious Disease
Internal Medicine

## 2023-05-11 NOTE — PROGRESS NOTE ADULT - PROBLEM SELECTOR PLAN 7
h/o HTN but admits to medication noncompliance, unable to recall BP meds supposed to be on    currently normotensive   primary team to confirm meds in the AM   NPO after midnight for OR tomorrow   monitor BP  Patient education
h/o HTN but admits to medication noncompliance, unable to recall BP meds supposed to be on    currently normotensive   monitor BP  Patient education
h/o HTN but admits to medication noncompliance, unable to recall BP meds supposed to be on    currently normotensive   primary team to confirm meds in the AM   NPO after midnight for OR tomorrow   monitor BP  Patient education
h/o HTN but admits to medication noncompliance, unable to recall BP meds supposed to be on    currently normotensive   primary team to confirm meds in the AM   NPO after midnight for OR tomorrow   monitor BP  Patient education

## 2023-05-11 NOTE — PROGRESS NOTE ADULT - PROBLEM SELECTOR PROBLEM 6
SOB (shortness of breath)
Sepsis due to methicillin susceptible Staphylococcus aureus
SOB (shortness of breath)
SOB (shortness of breath)
Sepsis due to methicillin susceptible Staphylococcus aureus
SOB (shortness of breath)
SOB (shortness of breath)
Sepsis due to methicillin susceptible Staphylococcus aureus
SOB (shortness of breath)
SOB (shortness of breath)

## 2023-05-11 NOTE — PROGRESS NOTE ADULT - PROBLEM SELECTOR PROBLEM 5
Sellar or suprasellar mass
MSSA bacteremia
Sellar or suprasellar mass
MSSA bacteremia
Sellar or suprasellar mass
MSSA bacteremia
Sellar or suprasellar mass

## 2023-05-13 LAB
CULTURE RESULTS: SIGNIFICANT CHANGE UP
ORGANISM # SPEC MICROSCOPIC CNT: SIGNIFICANT CHANGE UP
SPECIMEN SOURCE: SIGNIFICANT CHANGE UP

## 2023-05-22 ENCOUNTER — EMERGENCY (EMERGENCY)
Facility: HOSPITAL | Age: 73
LOS: 1 days | Discharge: HOME | End: 2023-05-22
Attending: STUDENT IN AN ORGANIZED HEALTH CARE EDUCATION/TRAINING PROGRAM
Payer: COMMERCIAL

## 2023-05-22 VITALS
HEIGHT: 74 IN | SYSTOLIC BLOOD PRESSURE: 106 MMHG | HEART RATE: 97 BPM | TEMPERATURE: 98 F | DIASTOLIC BLOOD PRESSURE: 72 MMHG | RESPIRATION RATE: 19 BRPM | OXYGEN SATURATION: 98 %

## 2023-05-22 DIAGNOSIS — Z98.890 OTHER SPECIFIED POSTPROCEDURAL STATES: Chronic | ICD-10-CM

## 2023-05-22 LAB
ALBUMIN SERPL ELPH-MCNC: 2.8 G/DL — LOW (ref 3.5–5)
ALP SERPL-CCNC: 128 U/L — HIGH (ref 40–120)
ALT FLD-CCNC: 28 U/L DA — SIGNIFICANT CHANGE UP (ref 10–60)
ANION GAP SERPL CALC-SCNC: 1 MMOL/L — LOW (ref 5–17)
APTT BLD: 28.7 SEC — SIGNIFICANT CHANGE UP (ref 27.5–35.5)
AST SERPL-CCNC: 37 U/L — SIGNIFICANT CHANGE UP (ref 10–40)
BASOPHILS # BLD AUTO: 0.03 K/UL — SIGNIFICANT CHANGE UP (ref 0–0.2)
BASOPHILS NFR BLD AUTO: 0.5 % — SIGNIFICANT CHANGE UP (ref 0–2)
BILIRUB SERPL-MCNC: 0.2 MG/DL — SIGNIFICANT CHANGE UP (ref 0.2–1.2)
BLD GP AB SCN SERPL QL: SIGNIFICANT CHANGE UP
BUN SERPL-MCNC: 16 MG/DL — SIGNIFICANT CHANGE UP (ref 7–18)
CALCIUM SERPL-MCNC: 9.7 MG/DL — SIGNIFICANT CHANGE UP (ref 8.4–10.5)
CHLORIDE SERPL-SCNC: 110 MMOL/L — HIGH (ref 96–108)
CO2 SERPL-SCNC: 27 MMOL/L — SIGNIFICANT CHANGE UP (ref 22–31)
CREAT SERPL-MCNC: 1.24 MG/DL — SIGNIFICANT CHANGE UP (ref 0.5–1.3)
EGFR: 62 ML/MIN/1.73M2 — SIGNIFICANT CHANGE UP
EOSINOPHIL # BLD AUTO: 0.2 K/UL — SIGNIFICANT CHANGE UP (ref 0–0.5)
EOSINOPHIL NFR BLD AUTO: 3 % — SIGNIFICANT CHANGE UP (ref 0–6)
ERYTHROCYTE [SEDIMENTATION RATE] IN BLOOD: 67 MM/HR — HIGH (ref 0–20)
GLUCOSE SERPL-MCNC: 123 MG/DL — HIGH (ref 70–99)
HCT VFR BLD CALC: 38.9 % — LOW (ref 39–50)
HGB BLD-MCNC: 12.3 G/DL — LOW (ref 13–17)
IMM GRANULOCYTES NFR BLD AUTO: 0.2 % — SIGNIFICANT CHANGE UP (ref 0–0.9)
INR BLD: 1.23 RATIO — HIGH (ref 0.88–1.16)
LYMPHOCYTES # BLD AUTO: 1.69 K/UL — SIGNIFICANT CHANGE UP (ref 1–3.3)
LYMPHOCYTES # BLD AUTO: 25.7 % — SIGNIFICANT CHANGE UP (ref 13–44)
MAGNESIUM SERPL-MCNC: 2 MG/DL — SIGNIFICANT CHANGE UP (ref 1.6–2.6)
MCHC RBC-ENTMCNC: 25.8 PG — LOW (ref 27–34)
MCHC RBC-ENTMCNC: 31.6 GM/DL — LOW (ref 32–36)
MCV RBC AUTO: 81.7 FL — SIGNIFICANT CHANGE UP (ref 80–100)
MONOCYTES # BLD AUTO: 0.55 K/UL — SIGNIFICANT CHANGE UP (ref 0–0.9)
MONOCYTES NFR BLD AUTO: 8.4 % — SIGNIFICANT CHANGE UP (ref 2–14)
NEUTROPHILS # BLD AUTO: 4.09 K/UL — SIGNIFICANT CHANGE UP (ref 1.8–7.4)
NEUTROPHILS NFR BLD AUTO: 62.2 % — SIGNIFICANT CHANGE UP (ref 43–77)
NRBC # BLD: 0 /100 WBCS — SIGNIFICANT CHANGE UP (ref 0–0)
PLATELET # BLD AUTO: 224 K/UL — SIGNIFICANT CHANGE UP (ref 150–400)
POTASSIUM SERPL-MCNC: 4.3 MMOL/L — SIGNIFICANT CHANGE UP (ref 3.5–5.3)
POTASSIUM SERPL-SCNC: 4.3 MMOL/L — SIGNIFICANT CHANGE UP (ref 3.5–5.3)
PROT SERPL-MCNC: 8.5 G/DL — HIGH (ref 6–8.3)
PROTHROM AB SERPL-ACNC: 14.7 SEC — HIGH (ref 10.5–13.4)
RBC # BLD: 4.76 M/UL — SIGNIFICANT CHANGE UP (ref 4.2–5.8)
RBC # FLD: 14.3 % — SIGNIFICANT CHANGE UP (ref 10.3–14.5)
SODIUM SERPL-SCNC: 138 MMOL/L — SIGNIFICANT CHANGE UP (ref 135–145)
WBC # BLD: 6.57 K/UL — SIGNIFICANT CHANGE UP (ref 3.8–10.5)
WBC # FLD AUTO: 6.57 K/UL — SIGNIFICANT CHANGE UP (ref 3.8–10.5)

## 2023-05-22 PROCEDURE — 80053 COMPREHEN METABOLIC PANEL: CPT

## 2023-05-22 PROCEDURE — 85025 COMPLETE CBC W/AUTO DIFF WBC: CPT

## 2023-05-22 PROCEDURE — 86900 BLOOD TYPING SEROLOGIC ABO: CPT

## 2023-05-22 PROCEDURE — 99285 EMERGENCY DEPT VISIT HI MDM: CPT

## 2023-05-22 PROCEDURE — 73630 X-RAY EXAM OF FOOT: CPT

## 2023-05-22 PROCEDURE — 85652 RBC SED RATE AUTOMATED: CPT

## 2023-05-22 PROCEDURE — 85610 PROTHROMBIN TIME: CPT

## 2023-05-22 PROCEDURE — 86850 RBC ANTIBODY SCREEN: CPT

## 2023-05-22 PROCEDURE — 73630 X-RAY EXAM OF FOOT: CPT | Mod: 26,LT

## 2023-05-22 PROCEDURE — 99284 EMERGENCY DEPT VISIT MOD MDM: CPT | Mod: 25

## 2023-05-22 PROCEDURE — 85730 THROMBOPLASTIN TIME PARTIAL: CPT

## 2023-05-22 PROCEDURE — 86140 C-REACTIVE PROTEIN: CPT

## 2023-05-22 PROCEDURE — 83735 ASSAY OF MAGNESIUM: CPT

## 2023-05-22 PROCEDURE — 86901 BLOOD TYPING SEROLOGIC RH(D): CPT

## 2023-05-22 PROCEDURE — 36415 COLL VENOUS BLD VENIPUNCTURE: CPT

## 2023-05-22 NOTE — ED ADULT NURSE NOTE - ED STAT RN HANDOFF DETAILS
Pt AOx3, vss, no signs or symptoms of acute distress noted. Pt denies any pain, sob, nor chest pain. Ambulette pick-up 11:02pm.

## 2023-05-22 NOTE — ED PROVIDER NOTE - PHYSICAL EXAMINATION
General: well appearing male, no acute distress   HEENT: normocephalic, atraumatic   Respiratory: normal work of breathing  MSK: digits of LLE dark in color, non-tender   Skin: warm, dry   Neuro: A&Ox3  Psych: appropriate affect

## 2023-05-22 NOTE — ED ADULT NURSE NOTE - OBJECTIVE STATEMENT
Pt arrived from Rehab center for arterial doppler to Lt 4th toe , for r/o necrosis   He had recently 5th toe amputated

## 2023-05-22 NOTE — CONSULT NOTE ADULT - SUBJECTIVE AND OBJECTIVE BOX
S: Patient is a 72M PMH DM presenting from [facility] for concern for L 4th digit necrosis. Of note, pt was recently discharged from the hospital to [facility] 2 weeks ago s/p L 5th metatarsal partial amputation 2/2 diabetic foot wound - vascular was consulted at this time for L 4th digit skin changes and recommended local wound care per podiatry and abx per ID. Patient currently denies any complaints and denies any pain to the L 4th digit. Wound vac was in place at [facility] but was dc'ed today. Denies any fever at [facility]. Pt was seen in the ED by podiatry today who performed sharp debridement of superficial non-viable tissue to L 4th MT.     O:  Vital Signs Last 24 Hrs  T(C): 36.8 (22 May 2023 15:19), Max: 36.8 (22 May 2023 15:19)  T(F): 98.2 (22 May 2023 15:19), Max: 98.2 (22 May 2023 15:19)  HR: 97 (22 May 2023 15:19) (97 - 97)  BP: 106/72 (22 May 2023 15:19) (106/72 - 106/72)  BP(mean): --  RR: 19 (22 May 2023 15:19) (19 - 19)  SpO2: 98% (22 May 2023 15:19) (98% - 98%)    Parameters below as of 22 May 2023 15:19  Patient On (Oxygen Delivery Method): room air    Physical Examination:  Gen: Awake, alert, oriented x3 and in no acute distress  LLE: L 5th MT partial amp noted w/wound bed healing well. L 4th digit s/p superficial sharp debridement w/healthy, pink tissue, no purulence, no surrounding erythema. DP pulse w/palpable doppler signal. Strength and sensation intact and symmetric.    A: 72M s/p L 5th MT partial amp 2 weeks ago now w/concern for L 4th digit ischemia w/no necrotic changes noted on examination    P:  - Pt w/good doppler signal to L DP and strength and sensation intact w/no signs of local necrosis  - No acute vascular surgery intervention currently indicated   - Please continue w/abx as outpatient per podiatry and ID recs  - Please have pt f/u outpatient w/Dr. Gandara within 2 weeks of discharge

## 2023-05-22 NOTE — ED PROVIDER NOTE - CLINICAL SUMMARY MEDICAL DECISION MAKING FREE TEXT BOX
72M presenting with concern for left toe necrosis/gangrene. podiatry consulted. will get labs and xray.

## 2023-05-22 NOTE — ED ADULT NURSE NOTE - NSFALLRISKINTERV_ED_ALL_ED

## 2023-05-22 NOTE — ED PROVIDER NOTE - OBJECTIVE STATEMENT
72M, pmh of  DM, HLD, presenting with concern for necrosis of left 4th toe. patient recently had left 5th digit amputated. reports 4th toe has been darkening, but has not pain. has normal sensation.

## 2023-05-22 NOTE — ED ADULT NURSE NOTE - CAS ELECT INFOMATION PROVIDED
Hide Include Location In Plan Question?: No Detail Level: Generalized Include Location In Plan?: Yes Detail Level: Simple DC instructions
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
## 2023-05-22 NOTE — ED PROVIDER NOTE - NSFOLLOWUPINSTRUCTIONS_ED_ALL_ED_FT
You were seen in the emergency department for concern for toe necrosis/gangrene and were cleared for outpatient follow-up by both podiatry and vascular surgery.     Please follow-up with podiatry.   Dr. Burns  this Wednesday 5/24 in Dr. Burns's office located at [address] [place] [phone]    Please follow-up with Dr. Gandara (Vascular Surgery) within the next 2 weeks.     If you have any worsening symptoms, severe foot pain, swelling or have purulent drainage, please return to the emergency department.

## 2023-05-22 NOTE — CONSULT NOTE ADULT - ATTENDING COMMENTS
73 yo M with PAD  CLTI of LLE, Marcelo 5  s/p L 5th Ray amp  now with macerated skin to L 4th digit  debrided at bedside by podiatry  continue scheduled follow up with Dr. Gandara  continue best medical therapy for PAD  no acute operative intervention

## 2023-05-22 NOTE — CONSULT NOTE ADULT - ASSESSMENT
Podiatry HPI:  72 year old male pmhx of NIDMM S/p left partial 5th ray open amputation on 4/30/23 and s/p left lateral foot graft application and wound debridement 5/5/23 presents to ED from [facility] for concern of left 4th toe necrosis. Patient was seen by vascular nurse at rehab center for concern of ischemic changes to left 4th toe. Patient is to follow up with OP vascular and OP podiatry (Dr. Burns). Patient states he has a wound vac applied to his left lateral foot post operatively which has been changed MWF (Currently off). No other pedal complaints. No n/v/f/sob    PMH:DM (diabetes mellitus)    Colon cancer    HTN (hypertension)    Hyperlipidemia      Allergies: Percocet 5/325 (Other)    Medications:   FH:No pertinent family history in first degree relatives      PSX: No significant past surgical history    S/P inguinal hernia repair    History of colectomy    S/P arthroscopic knee surgery    History of repair of hiatal hernia      SH:     Vital Signs Last 24 Hrs  T(C): 36.8 (22 May 2023 15:19), Max: 36.8 (22 May 2023 15:19)  T(F): 98.2 (22 May 2023 15:19), Max: 98.2 (22 May 2023 15:19)  HR: 97 (22 May 2023 15:19) (97 - 97)  BP: 106/72 (22 May 2023 15:19) (106/72 - 106/72)  BP(mean): --  RR: 19 (22 May 2023 15:19) (19 - 19)  SpO2: 98% (22 May 2023 15:19) (98% - 98%)    Parameters below as of 22 May 2023 15:19  Patient On (Oxygen Delivery Method): room air        LABS                        12.3   6.57  )-----------( 224      ( 22 May 2023 18:15 )             38.9                 ROS: unremarkable outside HPI    PHYSICAL EXAM  LE Focused:    Vasc:  DP and PT faintly palpable b/l. No pedal hair growth noted.   Derm: Dusky skin changes noted to left 4th digit with wet superficial macerated skin surrounding 4th toe; concerning for ischemic changes.  graft intact to left lateral foot wound with staples intact, skin well coapted to surgical wound site, no signs of dehiscence or underlying hematoma/seroma. No malodor, no purulence upon manual compression, improving erythema and edema. No fluctuance noted.   Neuro: Protective sensation grossly diminished  MSK: able to ambulate with pain, no TTP to dorsolateral surgical site      IMAGING: ?xray    CULTURES:     A:  Left 4th Ischemic Changes   s/p 5/5 L foot debridement/graft    P:   Patient evaluated and Chart reviewed   Discussed diagnosis and treatment with patient  Excisional debridement with 15 blade of left foot superficial macerated skin surrounding 4th toe. No purulent drainage noted  Applied betadine to L 4th toe, painted lateral foot wound with betadine+ applied adapatic overlying lateral foot graft  X-rays evaluated, no ST emphysema noted  Continue with IV antibiotics OP  recc vascular evaluate  Patient stable for DC per podiatry and TF with Dr. Burns outpt this wednesday 5/24  Discussed importance of daily foot examinations and proper shoe gear and to importance of lower Fasting Blood Glucose levels.   Discussed with Attending Dr. Burns   Podiatry HPI:  72 year old male pmhx of NIDMM S/p left partial 5th ray open amputation on 4/30/23 and s/p left lateral foot graft application and wound debridement 5/5/23 presents to ED from [facility] for concern of left 4th toe necrosis. Patient was seen by vascular nurse at rehab center for concern of ischemic changes to left 4th toe. Patient is to follow up with OP vascular and OP podiatry (Dr. Burns). Patient states he has a wound vac applied to his left lateral foot post operatively which has been changed MWF (Currently off). No other pedal complaints. No n/v/f/sob    PMH:DM (diabetes mellitus)    Colon cancer    HTN (hypertension)    Hyperlipidemia      Allergies: Percocet 5/325 (Other)    Medications:   FH:No pertinent family history in first degree relatives      PSX: No significant past surgical history    S/P inguinal hernia repair    History of colectomy    S/P arthroscopic knee surgery    History of repair of hiatal hernia      SH:     Vital Signs Last 24 Hrs  T(C): 36.8 (22 May 2023 15:19), Max: 36.8 (22 May 2023 15:19)  T(F): 98.2 (22 May 2023 15:19), Max: 98.2 (22 May 2023 15:19)  HR: 97 (22 May 2023 15:19) (97 - 97)  BP: 106/72 (22 May 2023 15:19) (106/72 - 106/72)  BP(mean): --  RR: 19 (22 May 2023 15:19) (19 - 19)  SpO2: 98% (22 May 2023 15:19) (98% - 98%)    Parameters below as of 22 May 2023 15:19  Patient On (Oxygen Delivery Method): room air        LABS                        12.3   6.57  )-----------( 224      ( 22 May 2023 18:15 )             38.9                 ROS: unremarkable outside HPI    PHYSICAL EXAM  LE Focused:    Vasc:  DP and PT faintly palpable b/l. No pedal hair growth noted.   Derm: Dusky skin changes noted to left 4th digit with wet superficial macerated skin surrounding 4th toe; concerning for ischemic changes.  graft intact to left lateral foot wound with staples intact, skin well coapted to surgical wound site, no signs of dehiscence or underlying hematoma/seroma. No malodor, no purulence upon manual compression, improving erythema and edema. No fluctuance noted.   Neuro: Protective sensation grossly diminished  MSK: able to ambulate with pain, no TTP to dorsolateral surgical site      IMAGING: ?xray    CULTURES:     A:  Left 4th Ischemic Changes   s/p 5/5 L foot debridement/graft    P:   Patient evaluated and Chart reviewed   Discussed diagnosis and treatment with patient  Excisional debridement with 15 blade of left foot superficial macerated skin surrounding 4th toe. No purulent drainage noted  Applied betadine to L 4th toe, painted lateral foot wound with betadine+ applied adapatic overlying lateral foot graft  X-rays evaluated, no ST emphysema noted  Continue with IV antibiotics OP  recc vascular to evaluate for left 4th ischemic changes  Patient stable for DC per podiatry and TF with Dr. Burns outpt this wednesday 5/24  Discussed importance of daily foot examinations and proper shoe gear and to importance of lower Fasting Blood Glucose levels.   Discussed with Attending Dr. Burns   Podiatry HPI:  72 year old male pmhx of NIDMM S/p left partial 5th ray open amputation on 4/30/23 and s/p left lateral foot graft application and wound debridement 5/5/23 presents to ED from [facility] for concern of left 4th toe necrosis. Patient was seen by vascular nurse at rehab center for concern of ischemic changes to left 4th toe. Patient is to follow up with OP vascular and OP podiatry (Dr. Burns). Patient states he has a wound vac applied to his left lateral foot post operatively which has been changed MWF (Currently off). No other pedal complaints. No n/v/f/sob    PMH:DM (diabetes mellitus)    Colon cancer    HTN (hypertension)    Hyperlipidemia      Allergies: Percocet 5/325 (Other)    Medications:   FH:No pertinent family history in first degree relatives      PSX: No significant past surgical history    S/P inguinal hernia repair    History of colectomy    S/P arthroscopic knee surgery    History of repair of hiatal hernia      SH:     Vital Signs Last 24 Hrs  T(C): 36.8 (22 May 2023 15:19), Max: 36.8 (22 May 2023 15:19)  T(F): 98.2 (22 May 2023 15:19), Max: 98.2 (22 May 2023 15:19)  HR: 97 (22 May 2023 15:19) (97 - 97)  BP: 106/72 (22 May 2023 15:19) (106/72 - 106/72)  RR: 19 (22 May 2023 15:19) (19 - 19)  SpO2: 98% (22 May 2023 15:19) (98% - 98%)    Parameters below as of 22 May 2023 15:19  Patient On (Oxygen Delivery Method): room air        LABS                        12.3   6.57  )-----------( 224      ( 22 May 2023 18:15 )             38.9                 ROS: unremarkable outside HPI    PHYSICAL EXAM  LE Focused:    Vasc:  DP and PT faintly palpable b/l. No pedal hair growth noted.   Derm: Dusky skin changes noted to left 4th digit with wet superficial macerated skin surrounding 4th toe; concerning for ischemic changes. Maceration noted to L. 4th digit with mild serous drainage, no purulent drainage noted. Sloughing of outer layer of skin due to maceration without underlying wounds.   graft intact to left lateral foot wound with staples intact, skin well coapted to surgical wound site, no signs of dehiscence or underlying hematoma/seroma. No malodor, no purulence upon manual compression, improving erythema and edema. No fluctuance noted.   Neuro: Protective sensation grossly diminished  MSK: able to ambulate with pain, no TTP to dorsolateral surgical site      IMAGING: pending final read     A:  Left 4th Ischemic Changes   s/p 5/5 L foot debridement/graft    P:   Patient evaluated and Chart reviewed   Discussed diagnosis and treatment with patient  No leukocytosis noted; no left shift noted; no clinical signs of infection noted   Excisional debridement with 15 blade of left foot superficial macerated skin surrounding 4th toe. No purulent drainage noted  Applied betadine to L 4th toe, painted lateral foot wound with betadine+ applied adapatic overlying lateral foot graft  X-rays evaluated, no ST emphysema noted  Continue with IV antibiotics OP from previous admission instructions and ID recc's  Continue wound vac changes at [facility]   rec vascular to evaluate for left 4th ischemic changes  Patient stable for DC per podiatry and TF with Dr. Burns outpt this wednesday 5/24 in Dr. Burns's office located at [address] [place], [place] [address] [phone]  Discussed importance of daily foot examinations and proper shoe gear and to importance of lower Fasting Blood Glucose levels.   Discussed with Attending Dr. Burns

## 2023-05-22 NOTE — ED PROVIDER NOTE - CARE PROVIDER_API CALL
Agusto Gandara)  Vascular Surgery  [address] [facility], [address]  [place] [address]  Phone: [phone]  Fax: [phone]  Follow Up Time:

## 2023-05-22 NOTE — ED PROVIDER NOTE - PROGRESS NOTE DETAILS
patient cleared by podiatry. vascular surgery consulted. Collin Horner patient cleared by vascular surgery. Collin Horner

## 2023-05-22 NOTE — ED PROVIDER NOTE - PATIENT PORTAL LINK FT
You can access the FollowMyHealth Patient Portal offered by [facility] by registering at the following website: [email or web address]. By joining [employer]’s FollowMyHealth portal, you will also be able to view your health information using other applications (apps) compatible with our system.

## 2023-05-22 NOTE — CONSULT NOTE ADULT - CONSULT REASON
Concern for L 4th digit necrosis Flap Thinning Complex Repair Preamble Text (Leave Blank If You Do Not Want): An incision was made along the previous flap suture line. Undermining was performed beneath the flap and redundant tissue was removed to restore the normal contour of the skin.

## 2023-05-23 LAB — CRP SERPL-MCNC: 12 MG/L — HIGH

## 2023-05-25 ENCOUNTER — EMERGENCY (EMERGENCY)
Facility: HOSPITAL | Age: 73
LOS: 1 days | Discharge: HOME | End: 2023-05-25
Attending: EMERGENCY MEDICINE
Payer: COMMERCIAL

## 2023-05-25 VITALS
RESPIRATION RATE: 18 BRPM | HEIGHT: 74 IN | TEMPERATURE: 98 F | OXYGEN SATURATION: 100 % | WEIGHT: 240.08 LBS | DIASTOLIC BLOOD PRESSURE: 72 MMHG | HEART RATE: 90 BPM | SYSTOLIC BLOOD PRESSURE: 105 MMHG

## 2023-05-25 VITALS
SYSTOLIC BLOOD PRESSURE: 118 MMHG | TEMPERATURE: 97 F | RESPIRATION RATE: 18 BRPM | OXYGEN SATURATION: 100 % | HEART RATE: 86 BPM | DIASTOLIC BLOOD PRESSURE: 69 MMHG

## 2023-05-25 DIAGNOSIS — Z98.890 OTHER SPECIFIED POSTPROCEDURAL STATES: Chronic | ICD-10-CM

## 2023-05-25 LAB
ALBUMIN SERPL ELPH-MCNC: 2.7 G/DL — LOW (ref 3.5–5)
ALP SERPL-CCNC: 117 U/L — SIGNIFICANT CHANGE UP (ref 40–120)
ALT FLD-CCNC: 25 U/L DA — SIGNIFICANT CHANGE UP (ref 10–60)
ANION GAP SERPL CALC-SCNC: 5 MMOL/L — SIGNIFICANT CHANGE UP (ref 5–17)
AST SERPL-CCNC: 23 U/L — SIGNIFICANT CHANGE UP (ref 10–40)
BASOPHILS # BLD AUTO: 0.04 K/UL — SIGNIFICANT CHANGE UP (ref 0–0.2)
BASOPHILS NFR BLD AUTO: 0.7 % — SIGNIFICANT CHANGE UP (ref 0–2)
BILIRUB SERPL-MCNC: 0.4 MG/DL — SIGNIFICANT CHANGE UP (ref 0.2–1.2)
BUN SERPL-MCNC: 13 MG/DL — SIGNIFICANT CHANGE UP (ref 7–18)
CALCIUM SERPL-MCNC: 8.9 MG/DL — SIGNIFICANT CHANGE UP (ref 8.4–10.5)
CHLORIDE SERPL-SCNC: 109 MMOL/L — HIGH (ref 96–108)
CO2 SERPL-SCNC: 25 MMOL/L — SIGNIFICANT CHANGE UP (ref 22–31)
CREAT SERPL-MCNC: 1 MG/DL — SIGNIFICANT CHANGE UP (ref 0.5–1.3)
EGFR: 80 ML/MIN/1.73M2 — SIGNIFICANT CHANGE UP
EOSINOPHIL # BLD AUTO: 0.17 K/UL — SIGNIFICANT CHANGE UP (ref 0–0.5)
EOSINOPHIL NFR BLD AUTO: 3.1 % — SIGNIFICANT CHANGE UP (ref 0–6)
ERYTHROCYTE [SEDIMENTATION RATE] IN BLOOD: 67 MM/HR — HIGH (ref 0–20)
GLUCOSE SERPL-MCNC: 137 MG/DL — HIGH (ref 70–99)
HCT VFR BLD CALC: 37.2 % — LOW (ref 39–50)
HGB BLD-MCNC: 11.7 G/DL — LOW (ref 13–17)
IMM GRANULOCYTES NFR BLD AUTO: 0.2 % — SIGNIFICANT CHANGE UP (ref 0–0.9)
LIDOCAIN IGE QN: 239 U/L — SIGNIFICANT CHANGE UP (ref 73–393)
LYMPHOCYTES # BLD AUTO: 1.9 K/UL — SIGNIFICANT CHANGE UP (ref 1–3.3)
LYMPHOCYTES # BLD AUTO: 34.5 % — SIGNIFICANT CHANGE UP (ref 13–44)
MCHC RBC-ENTMCNC: 26.1 PG — LOW (ref 27–34)
MCHC RBC-ENTMCNC: 31.5 GM/DL — LOW (ref 32–36)
MCV RBC AUTO: 83 FL — SIGNIFICANT CHANGE UP (ref 80–100)
MONOCYTES # BLD AUTO: 0.58 K/UL — SIGNIFICANT CHANGE UP (ref 0–0.9)
MONOCYTES NFR BLD AUTO: 10.5 % — SIGNIFICANT CHANGE UP (ref 2–14)
NEUTROPHILS # BLD AUTO: 2.81 K/UL — SIGNIFICANT CHANGE UP (ref 1.8–7.4)
NEUTROPHILS NFR BLD AUTO: 51 % — SIGNIFICANT CHANGE UP (ref 43–77)
NRBC # BLD: 0 /100 WBCS — SIGNIFICANT CHANGE UP (ref 0–0)
PLATELET # BLD AUTO: 199 K/UL — SIGNIFICANT CHANGE UP (ref 150–400)
POTASSIUM SERPL-MCNC: 3.8 MMOL/L — SIGNIFICANT CHANGE UP (ref 3.5–5.3)
POTASSIUM SERPL-SCNC: 3.8 MMOL/L — SIGNIFICANT CHANGE UP (ref 3.5–5.3)
PROT SERPL-MCNC: 7.8 G/DL — SIGNIFICANT CHANGE UP (ref 6–8.3)
RBC # BLD: 4.48 M/UL — SIGNIFICANT CHANGE UP (ref 4.2–5.8)
RBC # FLD: 14.1 % — SIGNIFICANT CHANGE UP (ref 10.3–14.5)
SODIUM SERPL-SCNC: 139 MMOL/L — SIGNIFICANT CHANGE UP (ref 135–145)
WBC # BLD: 5.51 K/UL — SIGNIFICANT CHANGE UP (ref 3.8–10.5)
WBC # FLD AUTO: 5.51 K/UL — SIGNIFICANT CHANGE UP (ref 3.8–10.5)

## 2023-05-25 PROCEDURE — 80053 COMPREHEN METABOLIC PANEL: CPT

## 2023-05-25 PROCEDURE — 73630 X-RAY EXAM OF FOOT: CPT | Mod: 26,LT

## 2023-05-25 PROCEDURE — 73630 X-RAY EXAM OF FOOT: CPT

## 2023-05-25 PROCEDURE — 99285 EMERGENCY DEPT VISIT HI MDM: CPT

## 2023-05-25 PROCEDURE — 85025 COMPLETE CBC W/AUTO DIFF WBC: CPT

## 2023-05-25 PROCEDURE — 99283 EMERGENCY DEPT VISIT LOW MDM: CPT | Mod: 25

## 2023-05-25 PROCEDURE — 99213 OFFICE O/P EST LOW 20 MIN: CPT

## 2023-05-25 PROCEDURE — 85652 RBC SED RATE AUTOMATED: CPT

## 2023-05-25 PROCEDURE — 36415 COLL VENOUS BLD VENIPUNCTURE: CPT

## 2023-05-25 PROCEDURE — 83690 ASSAY OF LIPASE: CPT

## 2023-05-25 PROCEDURE — 99284 EMERGENCY DEPT VISIT MOD MDM: CPT

## 2023-05-25 NOTE — ED PROVIDER NOTE - PATIENT PORTAL LINK FT
You can access the FollowMyHealth Patient Portal offered by [facility] by registering at the following website: [email or web address]. By joining [employer]’s FollowMyHealth portal, you will also be able to view your health information using other applications (apps) compatible with our system.

## 2023-05-25 NOTE — CONSULT NOTE ADULT - ASSESSMENT
Podiatry HPI:  72 year old male pmhx of NIDMM S/p left partial 5th ray open amputation on 4/30/23 and s/p left lateral foot graft application and wound debridement 5/5/23 presents to ED from [facility] for same reason as his ED visit from 5/22/23. patient is having trouble following up with specialist due to insurance issues and keeps returning to ED for evaluation since cannot be taken to OP podiatrist Dr. Burns office. Patient was seen by vascular during his last ED visit who recc OP f/u with Dr. Gandara and no acute vascular intervention. During last ED visit 3 days ago, his left 4th toe superficial macerated skin bedside by podiatry. No new changes since  Patient is to follow up with OP vascular and OP podiatry (Dr. Burns). Patient states he has a wound vac applied to his left lateral foot post operatively which has been changed MWF at rehab center (Currently off). No other pedal complaints. No n/v/f/sob    Patient admits to  (-) Fevers, (-) Chills, (-) Nausea, (-) Vomiting, (-) Shortness of Breath (-) calf pain (-) chest pain     Medications   FHNo pertinent family history in first degree relatives    ,   PMHDM (diabetes mellitus)    Colon cancer    HTN (hypertension)    Hyperlipidemia       PSHNo significant past surgical history    S/P inguinal hernia repair    History of colectomy    S/P arthroscopic knee surgery    History of repair of hiatal hernia        Labs                          11.7   5.51  )-----------( 199      ( 25 May 2023 11:55 )             37.2      05-25    139  |  109<H>  |  13  ----------------------------<  137<H>  3.8   |  25  |  1.00    Ca    8.9      25 May 2023 11:55    TPro  7.8  /  Alb  2.7<L>  /  TBili  0.4  /  DBili  x   /  AST  23  /  ALT  25  /  AlkPhos  117  05-25     Vital Signs Last 24 Hrs  T(C): 36.3 (25 May 2023 15:29), Max: 36.7 (25 May 2023 10:43)  T(F): 97.3 (25 May 2023 15:29), Max: 98.1 (25 May 2023 10:43)  HR: 86 (25 May 2023 15:29) (86 - 90)  BP: 118/69 (25 May 2023 15:29) (105/72 - 118/69)  BP(mean): --  RR: 18 (25 May 2023 15:29) (18 - 18)  SpO2: 100% (25 May 2023 15:29) (100% - 100%)    Parameters below as of 25 May 2023 15:29  Patient On (Oxygen Delivery Method): room air      Sedimentation Rate, Erythrocyte: 67 mm/Hr (05-25-23 @ 11:55)  Sedimentation Rate, Erythrocyte: 67 mm/Hr (05-22-23 @ 18:15)         C-Reactive Protein, Serum: 12 mg/L (05-22-23 @ 18:15)  C-Reactive Protein, Serum: 95 mg/L (05-04-23 @ 10:25)  C-Reactive Protein, Serum: 230 mg/L (05-01-23 @ 05:29)   WBC Count: 5.51 K/uL (05-25-23 @ 11:55)      ROS: Unremarkable outside HPI      PHYSICAL EXAM  LE Focused:    Vasc:  DP and PT faintly palpable b/l. No pedal hair growth noted.   Derm: Dusky skin changes noted to left 4th digit concerning for ischemic changes. Maceration noted to L. 4th digit with mild serous drainage, no purulent drainage noted. Sloughing of outer layer of skin due to maceration without underlying wounds.   graft intact to left lateral foot wound with staples intact, skin well coapted to surgical wound site, no signs of dehiscence or underlying hematoma/seroma. No malodor, no purulence upon manual compression, improving erythema and edema. No fluctuance noted.   Neuro: Protective sensation grossly diminished  MSK: able to ambulate with pain, no TTP to dorsolateral surgical site      IMAGING: pending final read     A:  Left 4th Ischemic Changes   s/p 5/5 L foot debridement/graft    P:   Patient evaluated and Chart reviewed   Discussed diagnosis and treatment with patient  No leukocytosis noted; no left shift noted; no clinical signs of infection noted   Staples to left lateral foot wound removed without incidence using a staple remover   Applied betadine to L 4th toe, painted lateral foot wound with betadine+ applied adapatic overlying lateral foot graft  X-rays evaluated, no ST emphysema noted  Continue with IV antibiotics OP from previous admission instructions and ID recc's  Continue wound vac changes at [facility]   recc vascular to evaluate for left 4th ischemic changes  Patient stable for DC per podiatry and TF with Dr. Burns outpt this wednesday 5/24 in Dr. Burns's office located at [address] [place], [place] [address] [phone]  Discussed importance of daily foot examinations and proper shoe gear and to importance of lower Fasting Blood Glucose levels.   Discussed Seen and Reviewed bedside by Dr. Burns       WCO: santyl to left lateral foot wound, betadine to 4th toe and 4th interspace with Adaptic, 4X4 gauze, ayesha and loose ace; discontinue wound vac

## 2023-05-25 NOTE — ED PROVIDER NOTE - PROGRESS NOTE DETAILS
Patient seen by podiatry team Dr. Johns who agrees that postoperative changes are stable.  Patient is cleared for discharge back to nursing home.  Spoke with Cynthia from case management to determine whether patient  could warrant podiatry followup in office or at the nursing facility.

## 2023-05-25 NOTE — ED ADULT NURSE NOTE - NSHOSCREENINGQ1_ED_ALL_ED
Assessment:   Corazon Hester is a 83 year old y.o. female with past medical history significant for  GERD, history of duodenal ulcer hypertension, tobacco abuse who presents today for follow-up regarding severe pain radiating from the left buttock into the left lower extremity with associated numbness, tingling, weakness.  Symptoms are most consistent with left L4 radiculopathy.  My review of an MRI lumbar spine shows severe left lateral recess stenosis at L3-4 where I believe she is trapping the left L4 nerve root.  There is also moderate to severe left foraminal stenosis at L4-5.  On exam today patient had an absent left patellar reflex.  She had slight weakness left knee extensors.       Plan:     A shared decision making plan was used.  The patient's values and choices were respected.  The following represents what was discussed and decided upon by the physician assistant and the patient.      1.  DIAGNOSTIC TESTS: I reviewed the MRI lumbar spine.  No further diagnostic tests were ordered.    2.  PHYSICAL THERAPY: Patient is currently in physical therapy.  She has had 1 session so far.  Encouraged her to continue with physical therapy and the home exercises.    3.  MEDICATIONS:    -Patient completed a Medrol Dosepak.  -Patient can continue cyclobenzaprine 3 times daily as needed.  -Patient is currently taking gabapentin 200 mg in the morning, 200 mg in the afternoon, and 300 mg in the evening.  She will continue to titrate her dose up to 300 mg 3 times daily.  -I refilled the patient's hydrocodone/acetaminophen 5/325 mg 1 tab twice daily as needed, #14 with no refills.  I checked the [place] prescription monitoring database.  She is deemed to be low risk.  Risks reviewed.  I will not provide this medication long-term.  I will not provide telephone refills.    4.  INTERVENTIONS:    -I offer the patient a left L4-5 transforaminal epidural steroid injection.  Patient indicated she would like to proceed and an  order was placed.  - If this does not help, we could try left L3-4 transforaminal epidural steroid injection.    5.  PATIENT EDUCATION:  - Patient is in agreement the above plan.  All questions were answered.  - If patient fails to improve with conservative treatment I would recommend a referral to neurosurgery.    6.  FOLLOW-UP: Patient can follow-up with me 2 weeks after her left L4-5 transforaminal epidural steroid injection.  If she has questions or concerns in the meantime, she should not hesitate to call.    Subjective:     Corazon Hester is a 83 year old female who presents today for follow-up regarding severe pain radiating from the left buttock down the left lower extremity.  I saw the patient in consultation October 31, 2022.  I ordered an MRI lumbar spine which will be described low.  I also prescribed a Medrol Dosepak and a limited quantity of hydrocodone/acetaminophen.  I also increased her gabapentin and refilled her cyclobenzaprine.  Patient reports slight improvement in her pain.    Patient complains of left-sided buttock pain.  Patient describes a burning pain that radiates down the posterior lateral thigh.  She then experiences significant pain in the left anterior knee.  She has intermittent numbness left anterior shin.  She states the numbness and tingling is stable.  She feels the weakness in her leg is somewhat better but she does have to walk with a cane for stability because her left leg sometimes gives way.  She rates her pain today as a 3 out of 10.  At its worst it is a 7 out of 10.  At its best it is a 0 out of 10.  Pain is worse in the middle of the night.  It makes it difficult for her to sleep.  She has more pain getting up in the morning and with increased activity during the day.  Pain is alleviated with applying ice.    Patient is currently in physical therapy.  She has had 1 session so far.  She feel like this is helpful.  She is taking gabapentin 200 mg in morning, 200 mg in  the afternoon, and 300 mg at bedtime, cyclobenzaprine 10 mg 3 times daily.  She also takes Advil as needed.  These medications are helpful.  She ran out of hydrocodone which was very helpful, especially for sleeping at night.  She completed her Medrol Dosepak.    Review of Systems:  Positive for numbness/tingling, weakness, pain much worse at night, trip/stumble/falls, difficulty with hand skills.  Negative for loss of bowel/bladder control, inability to urinate, headache, difficulty swallowing, fevers, unintentional weight loss.     Objective:   CONSTITUTIONAL:  Vital signs as above.  No acute distress.  The patient is well nourished and well groomed.    PSYCHIATRIC:  The patient is awake, alert, oriented to person, place and time.  The patient is answering questions appropriately with clear speech.  Normal affect.  HEENT: Normocephalic, atraumatic.  Sclera clear.    SKIN:  Skin over the face, posterior torso, bilateral upper and lower extremities is clean, dry, intact without rashes.  VASCULAR: No significant lower extremity edema.  MUSCULOSKELETAL:  Gait is antalgic, favoring the left.     The patient has 4/5 strength left knee extensors, otherwise 5/5 strength for the bilateral hip flexors, knee flexors, right knee extensors, bilateral ankle dorsiflexors/plantar flexors.  No tenderness to palpation about the left knee.  NEUROLOGICAL: 2+ right and absent left patellar, 1+ bilateral achilles reflexes.  No ankle clonus bilaterally.  Sensation to light touch is intact in the bilateral L4, L5, and S1 dermatomes.       RESULTS: I reviewed the MRI lumbar spine from [facility] dated November 1, 2022.  This shows thoracolumbar levoscoliosis with moderate to severe spondylosis.  There is degenerative spondylolisthesis L4-5.  At L2-3 there is mild to moderate right foraminal stenosis.  At L3-4 there is severe left lateral recess stenosis and mild spinal canal stenosis with moderate to severe left and mild right foraminal  stenosis.  At L4-5 there is moderate to severe left foraminal stenosis.  At L5-S1 there is mild bilateral foraminal stenosis.       No within normal limits

## 2023-05-25 NOTE — ED PROVIDER NOTE - CLINICAL SUMMARY MEDICAL DECISION MAKING FREE TEXT BOX
72-year-old male history of diabetes status post left fifth toe amputation, lateral foot graft and wound debridement presenting here for evaluation.  Patient exam appears stable.  No obvious signs of acute infection.  Plan to send labs and perform x-ray with podiatry consultation

## 2023-05-25 NOTE — ED ADULT NURSE NOTE - NSFALLRISKINTERV_ED_ALL_ED

## 2023-05-25 NOTE — ED PROVIDER NOTE - CARE PROVIDER_API CALL
Rose Mary Burns  Podiatric Medicine  [address] [place], [place] [address]  Phone: [phone]  Fax: [phone]  Follow Up Time: 4-6 Days

## 2023-05-25 NOTE — ED PROVIDER NOTE - OBJECTIVE STATEMENT
72-year-old male history of diabetes status post left partial fifth toe open amputation on April 30 of this year with left lateral foot graft application and wound debridement on May 5 presents for podiatry evaluation.  Patient states his doctor sent him to see podiatrist who did surgery on him.  Patient was seen 3 days prior and has no acute complaints.  States that he presented to hospital since his insurance does not cover clinic visits.  Reports he is not ambulatory after the surgery due to precautions sent after surgery.  Currently using wound VAC at nursing home.  Denies any fever, malodor, discharge from wound

## 2023-05-25 NOTE — ED PROVIDER NOTE - PHYSICAL EXAMINATION
graft intact to left lateral foot wound with staples  left 4th toe with dusky changes, sensation intact

## 2023-05-30 PROBLEM — E11.9 TYPE 2 DIABETES MELLITUS: Status: ACTIVE | Noted: 2023-05-30

## 2023-05-31 ENCOUNTER — APPOINTMENT (OUTPATIENT)
Dept: ENDOCRINOLOGY | Facility: CLINIC | Age: 73
End: 2023-05-31

## 2023-05-31 DIAGNOSIS — E11.9 TYPE 2 DIABETES MELLITUS W/OUT COMPLICATIONS: ICD-10-CM

## 2023-06-06 ENCOUNTER — NON-APPOINTMENT (OUTPATIENT)
Age: 73
End: 2023-06-06

## 2023-06-13 ENCOUNTER — APPOINTMENT (OUTPATIENT)
Dept: NEUROSURGERY | Facility: CLINIC | Age: 73
End: 2023-06-13

## 2023-07-27 NOTE — ED ADULT NURSE NOTE - NSFALLASSESSNEED_ED_ALL_ED
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 Pt states his amputation was uncomplicated until he developed this wound in April. He was diagnosed with osteomyelitis and started on 1.5 months of antibiotics, clear on imaging at the end of May. He recently moved to [place] from [place] and has been followed by a podiatrist here who started him on 5 days of bactrim, which he completed 1 week ago. Last night, he noted his right lower leg to be painful and warm, prompting his visit to the ED. Denies fevers/chills, N/V/D, SOB, abdominal pain, HA, drainage, or bleeding from the wound.
yes

## 2023-08-16 NOTE — ED ADULT NURSE NOTE - NSICDXPASTSURGICALHX_GEN_ALL_CORE_FT
PAST SURGICAL HISTORY:  History of colectomy     History of repair of hiatal hernia     S/P arthroscopic knee surgery     S/P inguinal hernia repair     
PAST MEDICAL HISTORY:  No pertinent past medical history

## 2023-12-22 ENCOUNTER — APPOINTMENT (OUTPATIENT)
Dept: VASCULAR SURGERY | Facility: CLINIC | Age: 73
End: 2023-12-22
Payer: MEDICARE

## 2023-12-22 VITALS
HEART RATE: 87 BPM | HEIGHT: 74 IN | SYSTOLIC BLOOD PRESSURE: 154 MMHG | DIASTOLIC BLOOD PRESSURE: 83 MMHG | OXYGEN SATURATION: 98 % | BODY MASS INDEX: 26.56 KG/M2 | WEIGHT: 207 LBS

## 2023-12-22 PROCEDURE — 93970 EXTREMITY STUDY: CPT

## 2023-12-22 PROCEDURE — 99214 OFFICE O/P EST MOD 30 MIN: CPT

## 2023-12-22 NOTE — ASSESSMENT
[FreeTextEntry1] : Patient with severe venous insufficiency with slowly healing left foot wound.  Patient is a candidate for ablation of left greater saphenous vein.  Would like to obtain PVRs and toe pressures to confirm arterial circulation is adequate prior to ablation.

## 2023-12-22 NOTE — PHYSICAL EXAM
[Normal Breath Sounds] : Normal breath sounds [Normal Heart Sounds] : normal heart sounds [2+] : left 2+ [Ankle Swelling (On Exam)] : present [Varicose Veins Of Lower Extremities] : present [Varicose Veins Of The Left Leg] : of the left leg [Ankle Swelling On The Left] : moderate [] : bilaterally [Ankle Swelling Bilaterally] : severe [Skin Ulcer] : ulcer [JVD] : no jugular venous distention  [Abdomen Masses] : No abdominal masses

## 2023-12-22 NOTE — HISTORY OF PRESENT ILLNESS
[FreeTextEntry1] : Patient with severe venous insufficiency, status post right lower extremity venous ablation.  Patient with recent hospitalization secondary to extensive infection of the left foot requiring midfoot resection of toes.  Wounds are slowly healing.  No fevers or chills.  Patient complains of significant left lower extremity swelling and heaviness and tiredness.  Patient would like ablation of the left saphenous vein.  Patient with history of right femoral DVT which resolved after ablation of the right leg.  No chest pain or shortness of breath.  No complaint of rest pain or claudication symptoms.

## 2023-12-29 NOTE — PATIENT PROFILE ADULT - FUNCTIONAL ASSESSMENT - BASIC MOBILITY 4.
Shift assessment completed. Routine vitals obtained and stable. Scheduled medications given. Patient is awake, alert and oriented. Respirations are easy and unlabored. Patient does not appear to be in distress, resting comfortably in the chair at this time. Call light within reach. 4 = No assist / stand by assistance

## 2023-12-31 PROBLEM — Z86.19 HISTORY OF SEXUALLY TRANSMITTED DISEASE: Status: RESOLVED | Noted: 2017-11-27 | Resolved: 2023-12-31

## 2024-01-10 ENCOUNTER — APPOINTMENT (OUTPATIENT)
Dept: VASCULAR SURGERY | Facility: CLINIC | Age: 74
End: 2024-01-10
Payer: COMMERCIAL

## 2024-01-10 PROCEDURE — 93923 UPR/LXTR ART STDY 3+ LVLS: CPT

## 2024-01-10 PROCEDURE — 99213 OFFICE O/P EST LOW 20 MIN: CPT

## 2024-01-10 NOTE — ASSESSMENT
[FreeTextEntry1] : 74-year-old male with slow healing left foot wound.  In the office today, patient underwent PVRs which shows severe peripheral arterial disease in the distal  left lower extremity.  Given these findings in addition to current left foot wound, we will plan for intervention.  Recommend aspirin daily. Continue simvastatin.  Patient to continue local wound care in the interim. [Arterial/Venous Disease] : arterial/venous disease [Medication Management] : medication management

## 2024-01-10 NOTE — PHYSICAL EXAM
[Normal Breath Sounds] : Normal breath sounds [Normal Heart Sounds] : normal heart sounds [2+] : left 2+ [Ankle Swelling (On Exam)] : present [Varicose Veins Of Lower Extremities] : present [Varicose Veins Of The Left Leg] : of the left leg [Ankle Swelling On The Left] : moderate [] : bilaterally [Ankle Swelling Bilaterally] : severe [Skin Ulcer] : ulcer [Alert] : alert [Calm] : calm [JVD] : no jugular venous distention  [Abdomen Masses] : No abdominal masses [de-identified] : Plantar surface left foot

## 2024-01-24 NOTE — ED PROVIDER NOTE - WET READ LAUNCH FT
October 26, 2023       No Recipients    Patient: Stephany Nicole   YOB: 2004   Date of Visit: 10/26/2023       Dear Dr. Beatrice Kirkland Recipients: Thank you for referring Anusha Hurst to me for evaluation. Below are my notes for this consultation. If you have questions, please do not hesitate to call me. I look forward to following your patient along with you. Sincerely,        Abraham Roman MD        CC:   No Recipients    Abraham Roman MD  10/26/2023  2:32 PM  Incomplete  OB/GYN  PN Visit  Stephany Nicole  [id number]  10/26/2023  10:47 AM  Dr. Abraham Roman MD    S: 26 y.o. Kobi Laureano 28w1d here for PN visit. She no contractions. She no leakage of fluid and vaginal bleeding. She does have good fetal movement. Her pregnancy is complicated Factor II . Pt has positive Favtor V leiden. Has a hemetalogy and onc appointment scheduled in January after delivery. NO head ache, no leg pain, no back pain. Chill, fever, nausea, vomiting. O:  There were no vitals filed for this visit.   Physical Exam  Fundal height: 28 cm  FHT: 154    A/P:  1. 28w1d GESTATION  - Continue PNV yes  - Labor precautions reviewed  - Fetal kick counts reviewed  - Ultrasounds: Single live intrauterine gestation at 10 weeks 2 days (6/23/23)  - COVID shot: No  - Tdap at 28 weeks: Yes  - Flu Shot: No, patient refused  - Delivery: Vaginal   - Contraception: Nexplanon  - RTO in 2 week    Problem List       Dermatitis    ADHD (attention deficit hyperactivity disorder), combined type    Ptosis of eyelid    Oppositional defiant disorder    Encounter for supervision of low-risk pregnancy in second trimester    16 weeks gestation of pregnancy    Hyperemesis    High risk teen pregnancy, antepartum    Family history of thromboembolic disease    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 16-18 weeks: sequential screening, level II ultrasound order, flu vaccine, COVID  20-24 weeks: Order 28 week labs, COVID  28 weeks: *LONG VISIT* 28 week labs (CBC, RPR, 1hr GTT), Rh status/rhogam, FKC, flu vaccine, MA31, Delivery Counseling, COVID  28-32 weeks: tdap, flu vaccine, COVID, birth plan, kick counts. 32 weeks: tdap, flu vaccine, check in card, rediscuss contraception, birth plan  36 weeks: collect GBS/PCN allergy?, flu vaccine  37 weeks: review perineal massage/health calls. 38 weeks: IOL  39 weeks: Strip membranes. 40 weeks: NST or baby time    Kat Lopez MD  10/26/2023  10:47 Jose Armando Francis MD  10/26/2023  2:08 PM  Sign when Signing Visit  OB/GYN  PN Visit  Becca Pulse  [id number]  10/26/2023  10:47 AM  Dr. Kat Lopez MD    S: 26 y.o. Velantwon Holly 28w1d here for PN visit. She no contractions. She no leakage of fluid and vaginal bleeding. She does have good fetal movement. Her pregnancy is complicated Factor II . Pt has positive Favtor V leiden. Has a hemetalogy and onc appointment scheduled in January after delivery. NO head ache, no leg pain, no back pain. Chill, fever, nausea, vomiting. O:  There were no vitals filed for this visit.   Physical Exam  Fundal height: 28 cm  FHT: 154    A/P:  1. 28w1d GESTATION  - Continue PNV yes  - Labor precautions reviewed  - Fetal kick counts reviewed  - Ultrasounds: Single live intrauterine gestation at 10 weeks 2 days (23)  - COVID shot: No  - Tdap at 28 weeks: Yes  - Flu Shot: No, patient refused  - Delivery: ****  - Contraception: Nexplanon  - RTO in 4 week    Problem List       Dermatitis    ADHD (attention deficit hyperactivity disorder), combined type    Ptosis of eyelid    Oppositional defiant disorder    Encounter for supervision of low-risk pregnancy in second trimester    16 weeks gestation of pregnancy    Hyperemesis    High risk teen pregnancy, antepartum    Family history of thromboembolic disease    Heterozygous for prothrombin [id number] mutation [facility])         Future Appointments   Date Time Provider [address]   10/26/2023  1:15 PM Arash Garay MD COV  Practice-Com   10/27/2023 10:00 AM  US 2 [address] [place] [address]   2023  1:15 PM Arash Garay MD COV FP Practice-Com   1/3/2024  3:40 PM David Lawton MD HEM ONC [facility] Practice-Onc         12-14 weeks: COVID vax, genetic screening, PAP smear? 16-18 weeks: sequential screening, level II ultrasound order, flu vaccine, COVID  20-24 weeks: Order 28 week labs, COVID  28 weeks: *LONG VISIT* 28 week labs (CBC, RPR, 1hr GTT), Rh status/rhogam, FKC, flu vaccine, MA31, Delivery Counseling, COVID  28-32 weeks: tdap, flu vaccine, COVID, birth plan, kick counts. 32 weeks: tdap, flu vaccine, check in card, rediscuss contraception, birth plan  36 weeks: collect GBS/PCN allergy?, flu vaccine  37 weeks: review perineal massage/health calls. 38 weeks: IOL  39 weeks: Strip membranes.   40 weeks: NST or baby time    Arash Garay MD  10/26/2023  10:47 AM There are no Wet Read(s) to document. 2

## 2024-02-02 ENCOUNTER — LABORATORY RESULT (OUTPATIENT)
Age: 74
End: 2024-02-02

## 2024-02-06 ENCOUNTER — APPOINTMENT (OUTPATIENT)
Dept: ENDOVASCULAR SURGERY | Facility: CLINIC | Age: 74
End: 2024-02-06
Payer: COMMERCIAL

## 2024-02-06 ENCOUNTER — RESULT REVIEW (OUTPATIENT)
Age: 74
End: 2024-02-06

## 2024-02-06 VITALS
HEIGHT: 74 IN | TEMPERATURE: 98.2 F | OXYGEN SATURATION: 100 % | SYSTOLIC BLOOD PRESSURE: 176 MMHG | DIASTOLIC BLOOD PRESSURE: 81 MMHG | HEART RATE: 55 BPM | WEIGHT: 212 LBS | RESPIRATION RATE: 20 BRPM | BODY MASS INDEX: 27.21 KG/M2

## 2024-02-06 PROCEDURE — 75625 CONTRAST EXAM ABDOMINL AORTA: CPT

## 2024-02-06 PROCEDURE — 37229Z: CUSTOM | Mod: LT

## 2024-02-06 PROCEDURE — 37229Z: CUSTOM | Mod: 82,LT

## 2024-02-06 RX ORDER — METFORMIN HYDROCHLORIDE 625 MG/1
TABLET ORAL
Refills: 0 | Status: DISCONTINUED | COMMUNITY
End: 2024-02-06

## 2024-02-06 RX ORDER — METFORMIN ER 500 MG 500 MG/1
500 TABLET ORAL
Qty: 360 | Refills: 0 | Status: DISCONTINUED | COMMUNITY
Start: 2020-11-09 | End: 2024-02-06

## 2024-02-09 NOTE — ASSESSMENT
[Other: _____] : [unfilled] [FreeTextEntry1] : severe venous insuffiency, left foot infection - plan for angiogram and possible treatment

## 2024-02-09 NOTE — PAST MEDICAL HISTORY
[Increasing age ( >40 years old)] : Increasing age ( >40 years old) [No therapy indicated for cases scheduled for less than one hour] : No therapy indicated for cases scheduled for less than one hour. [Previous history of DVT or PE] : Previous history of DVT or PE [FreeTextEntry1] : Malignant Hyperthermia Screening Tool and Risk of Bleeding Assessment Mr. SYDNI REBOLLEDO denies family history of unexpected death following Anesthesia or Exercise. Denies Family history of Malignant Hyperthermia, Muscle or Neuromuscular disorder and High Temperature following exercise.  Mr. SYDNI REBOLLEDO denies history of Muscle Spasm, Dark or Chocolate - Colored urine and Unanticipated fever immediately following anesthesia or serious exercise.  Mr. REBOLLEDO also denies bleeding tendencies/ Risks of Bleeding.

## 2024-02-09 NOTE — HISTORY OF PRESENT ILLNESS
[FreeTextEntry1] : alert and oriented accompanied by wife BS dexcom reading @ 08:47 = 124  has not  been on insulin the last few months took BP meds this AM refusing Baby ASA r/t hx of ulcer [FreeTextEntry5] : 7pm last night [FreeTextEntry6] : Dr Cantor

## 2024-02-09 NOTE — PROCEDURE
[Groin check for bleeding/hematoma, vitals checked, and Doppler/pulse checked on admission, then every 15 minutes for an hour and every 30 minutes for 3 hours thereafter.] : Groin check for bleeding/hematoma, vitals checked, and Doppler/pulse checked on admission, then every 15 minutes for an hour and every 30 minutes for 3 hours thereafter.  [Resume Diet] : resume diet [Discharge at _____] : Discharge at [unfilled]. [Asprin 81mg] : Aspirin 81mg [Other: _____] : [unfilled] [FreeTextEntry1] : aortagram, left leg angiogram/angioplasty

## 2024-02-12 LAB
ALBUMIN SERPL ELPH-MCNC: 4.3 G/DL
ALP BLD-CCNC: 98 U/L
ALT SERPL-CCNC: 12 U/L
ANION GAP SERPL CALC-SCNC: 7 MMOL/L
APTT BLD: 29.2 SEC
AST SERPL-CCNC: 14 U/L
BILIRUB SERPL-MCNC: 0.3 MG/DL
BUN SERPL-MCNC: 11 MG/DL
CALCIUM SERPL-MCNC: 9.4 MG/DL
CHLORIDE SERPL-SCNC: 103 MMOL/L
CO2 SERPL-SCNC: 30 MMOL/L
CREAT SERPL-MCNC: 0.99 MG/DL
EGFR: 81 ML/MIN/1.73M2
GLUCOSE SERPL-MCNC: 148 MG/DL
HCT VFR BLD CALC: 40.1 %
HGB BLD-MCNC: 12.4 G/DL
INR PPP: 0.96 RATIO
MCHC RBC-ENTMCNC: 26.1 PG
MCHC RBC-ENTMCNC: 30.9 GM/DL
MCV RBC AUTO: 84.4 FL
PLATELET # BLD AUTO: 208 K/UL
POTASSIUM SERPL-SCNC: 4.5 MMOL/L
PROT SERPL-MCNC: 6.8 G/DL
PT BLD: 10.9 SEC
RBC # BLD: 4.75 M/UL
RBC # FLD: 15.4 %
SODIUM SERPL-SCNC: 140 MMOL/L
WBC # FLD AUTO: 4.29 K/UL

## 2024-02-20 ENCOUNTER — APPOINTMENT (OUTPATIENT)
Dept: ENDOVASCULAR SURGERY | Facility: CLINIC | Age: 74
End: 2024-02-20
Payer: COMMERCIAL

## 2024-02-20 ENCOUNTER — RESULT REVIEW (OUTPATIENT)
Age: 74
End: 2024-02-20

## 2024-02-20 VITALS
RESPIRATION RATE: 18 BRPM | SYSTOLIC BLOOD PRESSURE: 206 MMHG | DIASTOLIC BLOOD PRESSURE: 84 MMHG | TEMPERATURE: 98 F | BODY MASS INDEX: 27.46 KG/M2 | HEART RATE: 52 BPM | OXYGEN SATURATION: 100 % | WEIGHT: 214 LBS | HEIGHT: 74 IN

## 2024-02-20 PROCEDURE — 76937 US GUIDE VASCULAR ACCESS: CPT

## 2024-02-20 PROCEDURE — 37197Z: CUSTOM | Mod: 59,LT

## 2024-02-20 PROCEDURE — 37229Z: CUSTOM | Mod: 59,LT

## 2024-02-20 PROCEDURE — 37225Z: CUSTOM | Mod: LT

## 2024-02-20 PROCEDURE — 37229Z: CUSTOM | Mod: 82,59,LT

## 2024-02-20 PROCEDURE — 37225Z: CUSTOM | Mod: 82,LT

## 2024-02-20 RX ORDER — INSULIN ASPART 100 [IU]/ML
INJECTION, SOLUTION INTRAVENOUS; SUBCUTANEOUS
Refills: 0 | Status: DISCONTINUED | COMMUNITY
End: 2024-02-20

## 2024-02-20 RX ORDER — CEPHALEXIN 500 MG/1
500 TABLET ORAL
Qty: 21 | Refills: 0 | Status: DISCONTINUED | COMMUNITY
Start: 2021-11-08 | End: 2024-02-20

## 2024-02-20 NOTE — HISTORY OF PRESENT ILLNESS
[FreeTextEntry1] : alert and oriented accompanied by wife Sapna [phone]  patient states he no longer takes insulin since 12/23 no medication taken this morning  refusing Baby ASA r/t hx of ulcer - gave plavix at 845  Cr 0.99 2/12/2024 [FreeTextEntry5] : yesterday 7pm  [FreeTextEntry6] : Dr Lebron

## 2024-02-20 NOTE — PROCEDURE
[Groin check for bleeding/hematoma, vitals checked, and Doppler/pulse checked on admission, then every 15 minutes for an hour and every 30 minutes for 3 hours thereafter.] : Groin check for bleeding/hematoma, vitals checked, and Doppler/pulse checked on admission, then every 15 minutes for an hour and every 30 minutes for 3 hours thereafter.  [Resume Diet] : resume diet [Discharge at _____] : Discharge at [unfilled]. [D/C IV on discharge] : D/C IV on discharge [Resume diet] : resume diet [FreeTextEntry1] : aortagram, left leg angiogram/angioplasty and athrectomy

## 2024-02-20 NOTE — PAST MEDICAL HISTORY
[Increasing age ( >40 years old)] : Increasing age ( >40 years old) [Previous history of DVT or PE] : Previous history of DVT or PE [No therapy indicated for cases scheduled for less than one hour] : No therapy indicated for cases scheduled for less than one hour. [FreeTextEntry1] : Malignant Hyperthermia Screening Tool and Risk of Bleeding Assessment Mr. SYDNI REBOLLEDO denies family history of unexpected death following Anesthesia or Exercise. Denies Family history of Malignant Hyperthermia, Muscle or Neuromuscular disorder and High Temperature following exercise.  Mr. SYDNI REBOLLEDO denies history of Muscle Spasm, Dark or Chocolate - Colored urine and Unanticipated fever immediately following anesthesia or serious exercise.  Mr. REBOLLEDO also denies bleeding tendencies/ Risks of Bleeding.

## 2024-02-20 NOTE — ASSESSMENT
[Other: _____] : [unfilled] [FreeTextEntry1] : pt with PAD, left foot wound - plan for angiogram and possible intervention

## 2024-04-15 ENCOUNTER — APPOINTMENT (OUTPATIENT)
Dept: VASCULAR SURGERY | Facility: CLINIC | Age: 74
End: 2024-04-15
Payer: COMMERCIAL

## 2024-04-15 DIAGNOSIS — R09.89 OTHER SPECIFIED SYMPTOMS AND SIGNS INVOLVING THE CIRCULATORY AND RESPIRATORY SYSTEMS: ICD-10-CM

## 2024-04-15 DIAGNOSIS — I73.9 PERIPHERAL VASCULAR DISEASE, UNSPECIFIED: ICD-10-CM

## 2024-04-15 PROCEDURE — 93926 LOWER EXTREMITY STUDY: CPT

## 2024-04-15 PROCEDURE — 99213 OFFICE O/P EST LOW 20 MIN: CPT

## 2024-04-15 NOTE — ASSESSMENT
[Arterial/Venous Disease] : arterial/venous disease [Medication Management] : medication management [FreeTextEntry1] : 73-year-old male with peripheral arterial disease s/p midfoot resection of left fourth and fifth toes s/p left lower extremity intervention  Left foot wound has now resolved.  Patient to continue conservative management with aspirin and simvastatin.  Follow-up 3 months with arterial duplex.

## 2024-04-15 NOTE — PHYSICAL EXAM
[Normal Breath Sounds] : Normal breath sounds [Normal Heart Sounds] : normal heart sounds [2+] : left 2+ [Ankle Swelling (On Exam)] : present [Varicose Veins Of Lower Extremities] : present [Varicose Veins Of The Left Leg] : of the left leg [Ankle Swelling On The Left] : moderate [] : bilaterally [Ankle Swelling Bilaterally] : severe [Alert] : alert [Calm] : calm [JVD] : no jugular venous distention  [Abdomen Masses] : No abdominal masses [No Rash or Lesion] : No rash or lesion [Skin Ulcer] : no ulcer [de-identified] : Appears well, no acute distress noted [de-identified] : Intact

## 2024-04-15 NOTE — HISTORY OF PRESENT ILLNESS
[FreeTextEntry1] : Patient with history of severe venous insufficiency and right femoral DVT status post right lower extremity venous ablation. Patient would like ablation of the left saphenous vein.  Patient with history of extensive infection of the left foot s/p midfoot resection of toes status post left lower extremity intervention.    Denies chest pain or shortness of breath.  Denies rest pain or claudication symptoms.  Denies present tissue loss.

## 2024-07-22 ENCOUNTER — APPOINTMENT (OUTPATIENT)
Dept: VASCULAR SURGERY | Facility: CLINIC | Age: 74
End: 2024-07-22
Payer: COMMERCIAL

## 2024-07-22 PROCEDURE — 99214 OFFICE O/P EST MOD 30 MIN: CPT | Mod: 25

## 2024-07-22 PROCEDURE — 93926 LOWER EXTREMITY STUDY: CPT | Mod: LT

## 2024-07-22 PROCEDURE — G2211 COMPLEX E/M VISIT ADD ON: CPT | Mod: NC

## 2024-07-22 NOTE — ASSESSMENT
[Arterial/Venous Disease] : arterial/venous disease [FreeTextEntry1] : 73-year-old male with peripheral arterial disease s/p midfoot resection of left fourth and fifth toes s/p left lower extremity intervention  Left foot wound has now resolved.  Patient to continue conservative management with aspirin for PVD and simvastatin for hyperlipidemia.  Follow-up 3 months with arterial duplex. [Medication Management] : medication management

## 2024-07-22 NOTE — PHYSICAL EXAM
[JVD] : no jugular venous distention  [Normal Breath Sounds] : Normal breath sounds [Normal Heart Sounds] : normal heart sounds [2+] : left 2+ [Ankle Swelling (On Exam)] : present [Varicose Veins Of Lower Extremities] : present [Varicose Veins Of The Left Leg] : of the left leg [Ankle Swelling On The Left] : moderate [] : bilaterally [Ankle Swelling Bilaterally] : severe [Abdomen Masses] : No abdominal masses [No Rash or Lesion] : No rash or lesion [Skin Ulcer] : no ulcer [Alert] : alert [Calm] : calm [de-identified] : Appears well, no acute distress noted [de-identified] : Intact

## 2024-08-04 ENCOUNTER — NON-APPOINTMENT (OUTPATIENT)
Age: 74
End: 2024-08-04

## 2024-08-05 ENCOUNTER — APPOINTMENT (OUTPATIENT)
Dept: NEUROSURGERY | Facility: CLINIC | Age: 74
End: 2024-08-05

## 2024-08-05 PROCEDURE — 99442: CPT

## 2024-08-05 NOTE — ASSESSMENT
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 He completed MRI pituitary outpatient after hospitalization on 9/29/23 @ [facility] with report of enlarged pituitary gland with suprasellar extension resulting in mass effect on the optic chiasm c/w a pituitary macroadenoma. Presented today for evaluation and neurosurgical consultation. He noted to have seen a few different providers prior to today, but none that deal with the pituitary.   Will order him updated pituitary imaging/ labs with plan for him to see Dr. D'Amico for evaluation once completed.   A total of 15 minutes was spent reviewing the patient's history, any available imaging, and physical examination of the patient. The patient's questions were answered.  The patient demonstrated an excellent understanding of todays discussion and next steps in treatment plan.

## 2024-08-05 NOTE — DATA REVIEWED
[de-identified] : 	 Exam requested by: KALYN SANTORO -[address] [facility], [address] [facility] [address] SITE PERFORMED: [place] S[facility] PHONE: [phone] Patient: SYDNI REBOLLEDO YOB: 1950 Phone: [phone] MRN: [id number] Acc: [id number] Date of Exam: 09-   EXAM:  MRI BRAIN AND PITUITARY WITHOUT CONTRAST  HISTORY:  Headache. History of pituitary adenoma.  TECHNIQUE: Thin section high resolution imaging of the pituitary gland was performed at 3T with T1 and T2-weighted sagittal and coronal images without intravenous contrast. Axial T2, T1, susceptibility weighted, diffusion sequence were performed through the brain as well as sagittal FLAIR sequences reconstructed in the axial coronal planes. Intravenous contrast was withheld due to a prior unspecified allergy history.  COMPARISON:  No priors are available for comparison  FINDINGS:  The pituitary gland is enlarged with suprasellar extension and measures 1.3 x 1.6 x 1.6 cm and causes mass effect on the optic chiasm. A separate pituitary gland is not identified and this is most consistent with a pituitary macroadenoma. The infundibulum is not well visualized due to the suprasellar extension. The cavernous sinuses appear symmetric.  Limited evaluation of the brain including axial FLAIR images demonstrates normal-sized ventricles and sulci. There is patchy T2 and FLAIR hyperintensity in the periventricular and subcortical white matter most consistent chronic microvascular ischemic disease given patient's age. No acute infarct or hemorrhage is identified. No extra-axial collection or midline shift is identified.  The major flow voids are preserved bilaterally.  There appears to be exophthalmos bilaterally. Clinical correlation is suggested.  IMPRESSION:  1.  The pituitary gland is enlarged with suprasellar extension resulting in mass effect on the optic chiasm. This most consistent with a pituitary macroadenoma. Comparison to priors is suggested. 2.  No acute infarct or hemorrhage is identified. 3.  There scattered foci of T2 and FLAIR hyperintensity in the periventricular and subcortical white matter which given patient's age most consistent chronic microvascular ischemic disease. 4.  There appears to be exophthalmos bilaterally.   Thank you for the opportunity to participate in the care of this patient.     SERVANDO MONSIVAIS MD  - Electronically Signed: 10- 4:40 PM  Physician to Physician Direct Line is: [phone]

## 2024-08-08 ENCOUNTER — RESULT REVIEW (OUTPATIENT)
Age: 74
End: 2024-08-08

## 2024-08-12 ENCOUNTER — APPOINTMENT (OUTPATIENT)
Dept: MRI IMAGING | Facility: CLINIC | Age: 74
End: 2024-08-12
Payer: COMMERCIAL

## 2024-08-12 PROCEDURE — 70553 MRI BRAIN STEM W/O & W/DYE: CPT | Mod: 26

## 2024-08-13 ENCOUNTER — LABORATORY RESULT (OUTPATIENT)
Age: 74
End: 2024-08-13

## 2024-08-15 PROBLEM — D35.2 PITUITARY MACROADENOMA: Status: ACTIVE | Noted: 2024-08-05

## 2024-08-19 ENCOUNTER — APPOINTMENT (OUTPATIENT)
Dept: NEUROSURGERY | Facility: CLINIC | Age: 74
End: 2024-08-19
Payer: COMMERCIAL

## 2024-08-19 VITALS
SYSTOLIC BLOOD PRESSURE: 148 MMHG | OXYGEN SATURATION: 96 % | TEMPERATURE: 97.9 F | DIASTOLIC BLOOD PRESSURE: 86 MMHG | WEIGHT: 228 LBS | HEART RATE: 68 BPM | HEIGHT: 74 IN | BODY MASS INDEX: 29.26 KG/M2

## 2024-08-19 DIAGNOSIS — Z86.69 PERSONAL HISTORY OF OTHER DISEASES OF THE NERVOUS SYSTEM AND SENSE ORGANS: ICD-10-CM

## 2024-08-19 PROBLEM — H54.7 VISION LOSS: Status: ACTIVE | Noted: 2024-08-19

## 2024-08-19 PROCEDURE — 99204 OFFICE O/P NEW MOD 45 MIN: CPT

## 2024-08-19 NOTE — PHYSICAL EXAM
[Oriented To Time, Place, And Person] : oriented to person, place, and time [Impaired Insight] : insight and judgment were intact [Affect] : the affect was normal [Memory Recent] : recent memory was not impaired [Sclera] : the sclera and conjunctiva were normal [Neck Appearance] : the appearance of the neck was normal [] : no respiratory distress [Respiration, Rhythm And Depth] : normal respiratory rhythm and effort [Abnormal Walk] : normal gait [Skin Color & Pigmentation] : normal skin color and pigmentation

## 2024-08-20 NOTE — HISTORY OF PRESENT ILLNESS
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  - Pt endorses last fall 2023 he was hospitalized for toe amputation x2 and during hospitalization he had infectious/sepsis workup which included brain imaging revealing pituitary mass. - He followed up outpatient and completed MRI Pituitary 9/29/23 @ [facility] with report of enlarged pituitary gland with suprasellar extension resulting in mass effect on the optic chiasm c/w a pituitary macroadenoma; chronic microvascular ischemic disease; exophthalmos bilaterally.  Presents today for evaluation. He endorses he has seen a few different neurosurgeons but none that manage pituitary adenomas.  - 8/12 MRI showing pituitary macroadenoma  - Recent endo labs from 8/13: WNL except Free T4 low 0.8 (range:0.9-1.8)  He continues to have left vision loss since retinal detachment but denies worsening of vision. Follows with Dr. Dee.  Also with bilateral cataract surgery that he follows with Dr. Fermin for. Endorses his right eye does not have any deficit following cataract surgery.  Pt endorses vision has been stable to his knowledge over the past year or so, denies worsening of vision.  Denies headaches, dizziness.  Opth: Dr. Lisa Dee @ [facility] [phone] [address] [place] [address]    Opth: Dr. Doug Fermin @ [facility] [phone] [address] [address]  PCP: Dr. Roberto Walkre  [phone] [address] [address] [place] [id number]

## 2024-08-20 NOTE — HISTORY OF PRESENT ILLNESS
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  - Pt endorses last fall 2023 he was hospitalized for toe amputation x2 and during hospitalization he had infectious/sepsis workup which included brain imaging revealing pituitary mass. - He followed up outpatient and completed MRI Pituitary 9/29/23 @ [facility] with report of enlarged pituitary gland with suprasellar extension resulting in mass effect on the optic chiasm c/w a pituitary macroadenoma; chronic microvascular ischemic disease; exophthalmos bilaterally.  Presents today for evaluation. He endorses he has seen a few different neurosurgeons but none that manage pituitary adenomas.  - 8/12 MRI showing pituitary macroadenoma  - Recent endo labs from 8/13: WNL except Free T4 low 0.8 (range:0.9-1.8)  He continues to have left vision loss since retinal detachment but denies worsening of vision. Follows with Dr. Dee.  Also with bilateral cataract surgery that he follows with Dr. Fermin for. Endorses his right eye does not have any deficit following cataract surgery.  Pt endorses vision has been stable to his knowledge over the past year or so, denies worsening of vision.  Denies headaches, dizziness.  Opth: Dr. Lisa Dee @ [facility] [phone] [address] [place] [address]    Opth: Dr. Doug Fermin @ [facility] [phone] [address] [address]  PCP: Dr. Roberto Walker  [phone] [address] [address] [place] [address]

## 2024-08-20 NOTE — HISTORY OF PRESENT ILLNESS
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  - Pt endorses last fall 2023 he was hospitalized for toe amputation x2 and during hospitalization he had infectious/sepsis workup which included brain imaging revealing pituitary mass. - He followed up outpatient and completed MRI Pituitary 9/29/23 @ [facility] with report of enlarged pituitary gland with suprasellar extension resulting in mass effect on the optic chiasm c/w a pituitary macroadenoma; chronic microvascular ischemic disease; exophthalmos bilaterally.  Presents today for evaluation. He endorses he has seen a few different neurosurgeons but none that manage pituitary adenomas.  - 8/12 MRI showing pituitary macroadenoma  - Recent endo labs from 8/13: WNL except Free T4 low 0.8 (range:0.9-1.8)  He continues to have left vision loss since retinal detachment but denies worsening of vision. Follows with Dr. Dee.  Also with bilateral cataract surgery that he follows with Dr. Fermin for. Endorses his right eye does not have any deficit following cataract surgery.  Pt endorses vision has been stable to his knowledge over the past year or so, denies worsening of vision.  Denies headaches, dizziness.  Opth: Dr. Lisa Dee @ [facility] [phone] [address] [place] [address]    Opth: Dr. Doug Fermin @ [facility] [phone] [address] [address]  PCP: Dr. Roberto Walker  [phone] [id number] [address] [place] [id number]

## 2024-08-20 NOTE — ASSESSMENT
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 The tumor is causing visual disturbances with intermittent blurring of vision. MRI shows the mass elevating the optic chiasm with some volume loss, likely contributing to the patient's bilateral glaucoma. Endocrine labs were mostly normal except for a slightly low T4 level of 0.8 (reference range 0.9-1.8). We discussed that there is no urgent indication for surgery, but that it is possible the tumor will grow during this lifetime and threaten the residual good vision he has in his left and right eye. He agreed to intervention.   After detailed imaging review and patient examination, Dr. D'Amico discussed surgical intervention with patient. Potential surgical risks vs benefits and alternative of continued surveillance with serial imaging discussed with time allotted for questions and answers given.  Patient verbalizes understanding and wishes to proceed with surgical intervention.  Date of surgery: 9/16/24 Pre-op surgical packet and medical clearance packets reviewed and given to patient. Will need pre- op medical clearance.    PLAN: - Obtain baseline visual field testing from opth - ENT referral to Dr. Triplett - CT max/face for pre- op planning    Patient verbalizes understanding of today's discussion and next steps in treatment plan.   A total of 45 minutes was spent reviewing the labs, imaging and physical examination of the patient. We discussed the diagnosis, and the plan. The patient's questions were answered. The patient demonstrated an excellent understanding of the plan.

## 2024-08-20 NOTE — DATA REVIEWED
[de-identified] : EXAM: [id number] - MR SELLA ONLY WAW IC  - ORDERED BY: AILEEN BASS   PROCEDURE DATE:  08/12/2024    INTERPRETATION:  CLINICAL INDICATION: Follow macroadenoma.  TECHNIQUE: Multi-planar multi-sequential MR imaging of the brain with special attention to the pituitary was performed before and after the intravenous administration of 10 ml of Gadavist, including dynamic imaging.  COMPARISON: MR from 9/29/2023.  FINDINGS:  Expanded sella. Sellar and suprasellar hypoenhancing mass pituitary gland measuring about 1.6 x 1.6 x 1.4 cm (AP by transverse by craniocaudal.) Normal pituitary gland appears elevated and compressed. Normal infundibulum is deviated to the left. Optic and prechiasmatic optic nerves appear small suggesting chronic atrophy. The mass approaches the undersurface of the optic chiasm without compression evident at this time. Minimal involvement of the cavernous sinuses and no effect upon the internal carotid artery flow voids.  No acute infarction or intracranial hemorrhage. There is no intraparenchymal mass or abnormal intraparenchymal enhancement.  The ventricles are normal without evidence of hydrocephalus. There are no extra-axial fluid collections.  The imaged portions of the paranasal sinuses are clear. The mastoid air cells are clear. The visualized soft tissues and other osseous structures appear normal.  IMPRESSION:   1. Stability or very minimal decrease in size of pituitary macroadenoma.  2. Atrophy of the optic nerves and optic chiasm which appears similar to the prior study. The suprasellar mass component elevates and bows the optic chiasm, but some of the volume loss may be secondary to this patient's known severe bilateral glaucoma.  --- End of Report ---

## 2024-08-20 NOTE — DATA REVIEWED
[de-identified] : EXAM: [id number] - MR SELLA ONLY WAW IC  - ORDERED BY: AILEEN BASS   PROCEDURE DATE:  08/12/2024    INTERPRETATION:  CLINICAL INDICATION: Follow macroadenoma.  TECHNIQUE: Multi-planar multi-sequential MR imaging of the brain with special attention to the pituitary was performed before and after the intravenous administration of 10 ml of Gadavist, including dynamic imaging.  COMPARISON: MR from 9/29/2023.  FINDINGS:  Expanded sella. Sellar and suprasellar hypoenhancing mass pituitary gland measuring about 1.6 x 1.6 x 1.4 cm (AP by transverse by craniocaudal.) Normal pituitary gland appears elevated and compressed. Normal infundibulum is deviated to the left. Optic and prechiasmatic optic nerves appear small suggesting chronic atrophy. The mass approaches the undersurface of the optic chiasm without compression evident at this time. Minimal involvement of the cavernous sinuses and no effect upon the internal carotid artery flow voids.  No acute infarction or intracranial hemorrhage. There is no intraparenchymal mass or abnormal intraparenchymal enhancement.  The ventricles are normal without evidence of hydrocephalus. There are no extra-axial fluid collections.  The imaged portions of the paranasal sinuses are clear. The mastoid air cells are clear. The visualized soft tissues and other osseous structures appear normal.  IMPRESSION:   1. Stability or very minimal decrease in size of pituitary macroadenoma.  2. Atrophy of the optic nerves and optic chiasm which appears similar to the prior study. The suprasellar mass component elevates and bows the optic chiasm, but some of the volume loss may be secondary to this patient's known severe bilateral glaucoma.  --- End of Report ---

## 2024-08-20 NOTE — REVIEW OF SYSTEMS
[Eyesight Problems] : eyesight problems [As noted in HPI] : as noted in HPI [As Noted in HPI] : as noted in HPI [Fever] : no fever [Chills] : no chills [Chest Pain] : no chest pain [Palpitations] : no palpitations [Shortness Of Breath] : no shortness of breath

## 2024-08-20 NOTE — DATA REVIEWED
[de-identified] : EXAM: [id number] - MR SELLA ONLY WAW IC  - ORDERED BY: AILEEN BASS   PROCEDURE DATE:  08/12/2024    INTERPRETATION:  CLINICAL INDICATION: Follow macroadenoma.  TECHNIQUE: Multi-planar multi-sequential MR imaging of the brain with special attention to the pituitary was performed before and after the intravenous administration of 10 ml of Gadavist, including dynamic imaging.  COMPARISON: MR from 9/29/2023.  FINDINGS:  Expanded sella. Sellar and suprasellar hypoenhancing mass pituitary gland measuring about 1.6 x 1.6 x 1.4 cm (AP by transverse by craniocaudal.) Normal pituitary gland appears elevated and compressed. Normal infundibulum is deviated to the left. Optic and prechiasmatic optic nerves appear small suggesting chronic atrophy. The mass approaches the undersurface of the optic chiasm without compression evident at this time. Minimal involvement of the cavernous sinuses and no effect upon the internal carotid artery flow voids.  No acute infarction or intracranial hemorrhage. There is no intraparenchymal mass or abnormal intraparenchymal enhancement.  The ventricles are normal without evidence of hydrocephalus. There are no extra-axial fluid collections.  The imaged portions of the paranasal sinuses are clear. The mastoid air cells are clear. The visualized soft tissues and other osseous structures appear normal.  IMPRESSION:   1. Stability or very minimal decrease in size of pituitary macroadenoma.  2. Atrophy of the optic nerves and optic chiasm which appears similar to the prior study. The suprasellar mass component elevates and bows the optic chiasm, but some of the volume loss may be secondary to this patient's known severe bilateral glaucoma.  --- End of Report ---

## 2024-08-21 ENCOUNTER — APPOINTMENT (OUTPATIENT)
Dept: NEUROSURGERY | Facility: CLINIC | Age: 74
End: 2024-08-21
Payer: COMMERCIAL

## 2024-08-26 ENCOUNTER — APPOINTMENT (OUTPATIENT)
Dept: CT IMAGING | Facility: CLINIC | Age: 74
End: 2024-08-26
Payer: COMMERCIAL

## 2024-08-26 ENCOUNTER — OUTPATIENT (OUTPATIENT)
Dept: OUTPATIENT SERVICES | Facility: HOSPITAL | Age: 74
LOS: 1 days | End: 2024-08-26

## 2024-08-26 DIAGNOSIS — Z98.890 OTHER SPECIFIED POSTPROCEDURAL STATES: Chronic | ICD-10-CM

## 2024-08-26 PROCEDURE — 70486 CT MAXILLOFACIAL W/O DYE: CPT | Mod: 26

## 2024-08-27 ENCOUNTER — APPOINTMENT (OUTPATIENT)
Dept: OTOLARYNGOLOGY | Facility: CLINIC | Age: 74
End: 2024-08-27

## 2024-08-27 VITALS — WEIGHT: 230 LBS | HEIGHT: 73 IN | BODY MASS INDEX: 30.48 KG/M2

## 2024-08-27 DIAGNOSIS — D35.2 BENIGN NEOPLASM OF PITUITARY GLAND: ICD-10-CM

## 2024-08-27 DIAGNOSIS — H54.7 UNSPECIFIED VISUAL LOSS: ICD-10-CM

## 2024-08-27 DIAGNOSIS — Z82.3 FAMILY HISTORY OF STROKE: ICD-10-CM

## 2024-08-27 DIAGNOSIS — J34.2 DEVIATED NASAL SEPTUM: ICD-10-CM

## 2024-08-27 DIAGNOSIS — Z83.3 FAMILY HISTORY OF DIABETES MELLITUS: ICD-10-CM

## 2024-08-27 DIAGNOSIS — Z82.49 FAMILY HISTORY OF ISCHEMIC HEART DISEASE AND OTHER DISEASES OF THE CIRCULATORY SYSTEM: ICD-10-CM

## 2024-08-27 DIAGNOSIS — E11.9 TYPE 2 DIABETES MELLITUS W/OUT COMPLICATIONS: ICD-10-CM

## 2024-08-27 PROCEDURE — 99204 OFFICE O/P NEW MOD 45 MIN: CPT | Mod: 25

## 2024-08-27 PROCEDURE — 31231 NASAL ENDOSCOPY DX: CPT

## 2024-08-27 NOTE — HISTORY OF PRESENT ILLNESS
[de-identified] : CC: pituitary macroadenoma    HISTORY OF PRESENT ILLNESS: Gallo Delgado is a 72 y/o male who presents today with pituitary macroadenoma He was referred by Neurosurgeon Dr. Randy Damico for complexity of the case Per Dr. Damico's note on 8/19:  - Pt endorses last fall 2023 he was hospitalized for toe amputation x2 and during hospitalization he had infectious/sepsis workup which included brain imaging revealing pituitary mass. - He followed up outpatient and completed MRI Pituitary 9/29/23 @ [facility] with report of enlarged pituitary gland with suprasellar extension resulting in mass effect on the optic chiasm c/w a pituitary macroadenoma; chronic microvascular ischemic disease; exophthalmos bilaterally.   - 8/12 MRI showing pituitary macroadenoma - Recent endo labs from 8/13: WNL except Free T4 low 0.8 (range:0.9-1.8)  He continues to have left vision loss since retinal detachment but denies worsening of vision. Follows with Dr. Dee. Also with bilateral cataract surgery that he follows with Dr. Fermin for. Endorses his right eye does not have any deficit following cataract surgery. Pt endorses vision has been stable to his knowledge over the past year or so, denies worsening of vision. Denies headaches, dizziness.  REVIEW OF SYSTEMS: General ROS: negative for - chills, fatigue or fever Psychological ROS: negative for - anxiety or depression Ophthalmic ROS: negative for - blurry vision, decreased vision or double vision ENT ROS: negative except as noted from HPI Allergy and Immunology ROS: negative except as noted from HPI Hematological and Lymphatic ROS: negative for - bleeding problems Endocrine ROS: negative for - malaise/lethargy Respiratory ROS: negative for - stridor Cardiovascular ROS: negative for - chest pain Gastrointestinal ROS: negative for - appetite loss or nausea/vomiting Genitourinary ROS: negative for - incontinence Musculoskeletal ROS: negative for - gait disturbance Neurological ROS: negative for - behavioral changes Dermatological ROS: negative for - nail changes   Physical Exam:   GENERAL APPEARANCE: Well-developed and No Acute Distress. COMMUNICATION: Able to Communicate. Strong Voice.   HEAD AND FACE Eyes: Testing of ocular motility including primary gaze alignment normal. Inspection and Appearance: No evidence of lesions or masses Palpation: Palpation of the face reveals no sinus tenderness Salivary Glands: Symmetric without masses Facial Strength: Symmetric without evidence of facial paralysis   EAR, NOSE, MOUTH, and THROAT: Ear Canals and Tympanic Membranes, Bilateral: No evidence of inflammation or lesions. Thresholds: Clinical speech reception thresholds normal. External, Nose and Auricle: No lesions or masses. Nasal, Mucosa, Septum, and Turbinates: See endoscopy.   NECK: Evaluation: No evidence of masses or crepitus. The neck is symmetric and the trachea is in the midline position. Thyroid: No evidence of enlargement, tenderness or mass. Neck Lymph Nodes: WNL. Respiratory: Inspection of the chest including symmetry, expansion and/or assessment of respiratory effort normal. Cardiovascular: Evaluation of peripheral vascular system by observation and palpation of capillary refill, normal. Neurological/Psychiatric: Alert, Oriented, Mood, and Affect Normal.   IMPRESSION:   PLAN:     King Triplett MD [facility] Rhinology and Skull Base Surgery Department of Otolaryngology- Head and Neck Surgery [facility]

## 2024-08-27 NOTE — HISTORY OF PRESENT ILLNESS
[de-identified] : CC: pituitary macroadenoma    HISTORY OF PRESENT ILLNESS: Gallo Delgado is a 74 y/o male who presents today with pituitary macroadenoma He was referred by Neurosurgeon Dr. Randy Damico for complexity of the case Per Dr. Damico's note on 8/19:  - Pt endorses last fall 2023 he was hospitalized for toe amputation x2 and during hospitalization he had infectious/sepsis workup which included brain imaging revealing pituitary mass. - He followed up outpatient and completed MRI Pituitary 9/29/23 @ [facility] with report of enlarged pituitary gland with suprasellar extension resulting in mass effect on the optic chiasm c/w a pituitary macroadenoma; chronic microvascular ischemic disease; exophthalmos bilaterally.   - 8/12 MRI showing pituitary macroadenoma - Recent endo labs from 8/13: WNL except Free T4 low 0.8 (range:0.9-1.8)  He continues to have left vision loss since retinal detachment but denies worsening of vision. Follows with Dr. Dee. Also with bilateral cataract surgery that he follows with Dr. Fermin for. Endorses his right eye does not have any deficit following cataract surgery. Pt endorses vision has been stable to his knowledge over the past year or so, denies worsening of vision. Denies headaches, dizziness.  REVIEW OF SYSTEMS: General ROS: negative for - chills, fatigue or fever Psychological ROS: negative for - anxiety or depression Ophthalmic ROS: negative for - blurry vision, decreased vision or double vision ENT ROS: negative except as noted from HPI Allergy and Immunology ROS: negative except as noted from HPI Hematological and Lymphatic ROS: negative for - bleeding problems Endocrine ROS: negative for - malaise/lethargy Respiratory ROS: negative for - stridor Cardiovascular ROS: negative for - chest pain Gastrointestinal ROS: negative for - appetite loss or nausea/vomiting Genitourinary ROS: negative for - incontinence Musculoskeletal ROS: negative for - gait disturbance Neurological ROS: negative for - behavioral changes Dermatological ROS: negative for - nail changes   Physical Exam:   GENERAL APPEARANCE: Well-developed and No Acute Distress. COMMUNICATION: Able to Communicate. Strong Voice.   HEAD AND FACE Eyes: Testing of ocular motility including primary gaze alignment normal. Inspection and Appearance: No evidence of lesions or masses Palpation: Palpation of the face reveals no sinus tenderness Salivary Glands: Symmetric without masses Facial Strength: Symmetric without evidence of facial paralysis   EAR, NOSE, MOUTH, and THROAT: Ear Canals and Tympanic Membranes, Bilateral: No evidence of inflammation or lesions. Thresholds: Clinical speech reception thresholds normal. External, Nose and Auricle: No lesions or masses. Nasal, Mucosa, Septum, and Turbinates: See endoscopy.   NECK: Evaluation: No evidence of masses or crepitus. The neck is symmetric and the trachea is in the midline position. Thyroid: No evidence of enlargement, tenderness or mass. Neck Lymph Nodes: WNL. Respiratory: Inspection of the chest including symmetry, expansion and/or assessment of respiratory effort normal. Cardiovascular: Evaluation of peripheral vascular system by observation and palpation of capillary refill, normal. Neurological/Psychiatric: Alert, Oriented, Mood, and Affect Normal.   IMPRESSION:   PLAN:     King Triplett MD [facility] Rhinology and Skull Base Surgery Department of Otolaryngology- Head and Neck Surgery [facility]

## 2024-09-02 PROBLEM — J34.2 DNS (DEVIATED NASAL SEPTUM): Status: ACTIVE | Noted: 2024-09-02

## 2024-09-07 ENCOUNTER — EMERGENCY (EMERGENCY)
Facility: HOSPITAL | Age: 74
LOS: 1 days | Discharge: HOME | End: 2024-09-07
Attending: EMERGENCY MEDICINE | Admitting: EMERGENCY MEDICINE
Payer: COMMERCIAL

## 2024-09-07 VITALS
OXYGEN SATURATION: 98 % | HEART RATE: 70 BPM | WEIGHT: 229.94 LBS | DIASTOLIC BLOOD PRESSURE: 79 MMHG | TEMPERATURE: 98 F | HEIGHT: 74 IN | RESPIRATION RATE: 17 BRPM | SYSTOLIC BLOOD PRESSURE: 176 MMHG

## 2024-09-07 DIAGNOSIS — E11.51 TYPE 2 DIABETES MELLITUS WITH DIABETIC PERIPHERAL ANGIOPATHY WITHOUT GANGRENE: ICD-10-CM

## 2024-09-07 DIAGNOSIS — H40.9 UNSPECIFIED GLAUCOMA: ICD-10-CM

## 2024-09-07 DIAGNOSIS — Z98.890 OTHER SPECIFIED POSTPROCEDURAL STATES: Chronic | ICD-10-CM

## 2024-09-07 DIAGNOSIS — D35.2 BENIGN NEOPLASM OF PITUITARY GLAND: ICD-10-CM

## 2024-09-07 DIAGNOSIS — Z88.5 ALLERGY STATUS TO NARCOTIC AGENT: ICD-10-CM

## 2024-09-07 DIAGNOSIS — Z01.818 ENCOUNTER FOR OTHER PREPROCEDURAL EXAMINATION: ICD-10-CM

## 2024-09-07 DIAGNOSIS — I10 ESSENTIAL (PRIMARY) HYPERTENSION: ICD-10-CM

## 2024-09-07 LAB
ALBUMIN SERPL ELPH-MCNC: 3.9 G/DL — SIGNIFICANT CHANGE UP (ref 3.3–5)
ALP SERPL-CCNC: 89 U/L — SIGNIFICANT CHANGE UP (ref 40–120)
ALT FLD-CCNC: 12 U/L — SIGNIFICANT CHANGE UP (ref 10–45)
ANION GAP SERPL CALC-SCNC: 6 MMOL/L — SIGNIFICANT CHANGE UP (ref 5–17)
APTT BLD: 28.9 SEC — SIGNIFICANT CHANGE UP (ref 24.5–35.6)
AST SERPL-CCNC: 20 U/L — SIGNIFICANT CHANGE UP (ref 10–40)
BASOPHILS # BLD AUTO: 0.02 K/UL — SIGNIFICANT CHANGE UP (ref 0–0.2)
BASOPHILS NFR BLD AUTO: 0.5 % — SIGNIFICANT CHANGE UP (ref 0–2)
BILIRUB SERPL-MCNC: 0.4 MG/DL — SIGNIFICANT CHANGE UP (ref 0.2–1.2)
BLD GP AB SCN SERPL QL: NEGATIVE — SIGNIFICANT CHANGE UP
BUN SERPL-MCNC: 10 MG/DL — SIGNIFICANT CHANGE UP (ref 7–23)
CALCIUM SERPL-MCNC: 8.7 MG/DL — SIGNIFICANT CHANGE UP (ref 8.4–10.5)
CHLORIDE SERPL-SCNC: 104 MMOL/L — SIGNIFICANT CHANGE UP (ref 96–108)
CO2 SERPL-SCNC: 27 MMOL/L — SIGNIFICANT CHANGE UP (ref 22–31)
CREAT SERPL-MCNC: 1.08 MG/DL — SIGNIFICANT CHANGE UP (ref 0.5–1.3)
EGFR: 73 ML/MIN/1.73M2 — SIGNIFICANT CHANGE UP
EOSINOPHIL # BLD AUTO: 0.11 K/UL — SIGNIFICANT CHANGE UP (ref 0–0.5)
EOSINOPHIL NFR BLD AUTO: 2.6 % — SIGNIFICANT CHANGE UP (ref 0–6)
GLUCOSE SERPL-MCNC: 112 MG/DL — HIGH (ref 70–99)
HCT VFR BLD CALC: 37.7 % — LOW (ref 39–50)
HGB BLD-MCNC: 12.4 G/DL — LOW (ref 13–17)
IMM GRANULOCYTES NFR BLD AUTO: 0 % — SIGNIFICANT CHANGE UP (ref 0–0.9)
INR BLD: 1.01 — SIGNIFICANT CHANGE UP (ref 0.85–1.18)
LYMPHOCYTES # BLD AUTO: 1.89 K/UL — SIGNIFICANT CHANGE UP (ref 1–3.3)
LYMPHOCYTES # BLD AUTO: 45.1 % — HIGH (ref 13–44)
MCHC RBC-ENTMCNC: 26.9 PG — LOW (ref 27–34)
MCHC RBC-ENTMCNC: 32.9 GM/DL — SIGNIFICANT CHANGE UP (ref 32–36)
MCV RBC AUTO: 81.8 FL — SIGNIFICANT CHANGE UP (ref 80–100)
MONOCYTES # BLD AUTO: 0.41 K/UL — SIGNIFICANT CHANGE UP (ref 0–0.9)
MONOCYTES NFR BLD AUTO: 9.8 % — SIGNIFICANT CHANGE UP (ref 2–14)
NEUTROPHILS # BLD AUTO: 1.76 K/UL — LOW (ref 1.8–7.4)
NEUTROPHILS NFR BLD AUTO: 42 % — LOW (ref 43–77)
NRBC # BLD: 0 /100 WBCS — SIGNIFICANT CHANGE UP (ref 0–0)
PLATELET # BLD AUTO: 169 K/UL — SIGNIFICANT CHANGE UP (ref 150–400)
POTASSIUM SERPL-MCNC: 4.1 MMOL/L — SIGNIFICANT CHANGE UP (ref 3.5–5.3)
POTASSIUM SERPL-SCNC: 4.1 MMOL/L — SIGNIFICANT CHANGE UP (ref 3.5–5.3)
PROT SERPL-MCNC: 7.3 G/DL — SIGNIFICANT CHANGE UP (ref 6–8.3)
PROTHROM AB SERPL-ACNC: 11.5 SEC — SIGNIFICANT CHANGE UP (ref 9.5–13)
RBC # BLD: 4.61 M/UL — SIGNIFICANT CHANGE UP (ref 4.2–5.8)
RBC # FLD: 13.3 % — SIGNIFICANT CHANGE UP (ref 10.3–14.5)
RH IG SCN BLD-IMP: NEGATIVE — SIGNIFICANT CHANGE UP
SODIUM SERPL-SCNC: 137 MMOL/L — SIGNIFICANT CHANGE UP (ref 135–145)
WBC # BLD: 4.19 K/UL — SIGNIFICANT CHANGE UP (ref 3.8–10.5)
WBC # FLD AUTO: 4.19 K/UL — SIGNIFICANT CHANGE UP (ref 3.8–10.5)

## 2024-09-07 PROCEDURE — 80053 COMPREHEN METABOLIC PANEL: CPT

## 2024-09-07 PROCEDURE — 86900 BLOOD TYPING SEROLOGIC ABO: CPT

## 2024-09-07 PROCEDURE — 85025 COMPLETE CBC W/AUTO DIFF WBC: CPT

## 2024-09-07 PROCEDURE — 86901 BLOOD TYPING SEROLOGIC RH(D): CPT

## 2024-09-07 PROCEDURE — 36415 COLL VENOUS BLD VENIPUNCTURE: CPT

## 2024-09-07 PROCEDURE — 99285 EMERGENCY DEPT VISIT HI MDM: CPT

## 2024-09-07 PROCEDURE — 85610 PROTHROMBIN TIME: CPT

## 2024-09-07 PROCEDURE — 85730 THROMBOPLASTIN TIME PARTIAL: CPT

## 2024-09-07 PROCEDURE — 86850 RBC ANTIBODY SCREEN: CPT

## 2024-09-07 PROCEDURE — 99283 EMERGENCY DEPT VISIT LOW MDM: CPT

## 2024-09-07 NOTE — ED ADULT TRIAGE NOTE - CHIEF COMPLAINT QUOTE
"I am suppose to have surgery on 9/13 for my pituitary and I need clearance done so I need to have it here". Patient states "I will not be able to get clearance because of no places to go to so the doctor told me to come to ER."

## 2024-09-07 NOTE — ED ADULT NURSE NOTE - OBJECTIVE STATEMENT
73 yr old male pt alert and oriented x 3 presented to ED for lab work stated he has a scheduled" procedure 09/13 to remove some type of growth in his pituitary gland". Pt currently stable no other complaints or distress noted.

## 2024-09-07 NOTE — ED PROVIDER NOTE - OBJECTIVE STATEMENT
72 y/o male with PMHx of DM, HTN, PAD, colon CA s/p colectomy 2011, left retinal detachment 2017 with left residual vision loss, glaucoma, bilateral cataract surgery, planned for pituitary macroadenoma resection on 9/13, here for preop testing. Not sure where to go and was directed to the ED. Pt does not have any paperwork.

## 2024-09-07 NOTE — ED ADULT TRIAGE NOTE - BSA (M2)
Physical Therapy Visit    Visit Type: Daily Treatment Note  Visit: 5  Referring Provider: Jimmy Carvajal MD  Medical Diagnosis (from order): Diagnosis Information    Diagnosis  845.09 (ICD-9-CM) - S93.491A (ICD-10-CM) - Sprain of anterior talofibular ligament of right ankle, initial encounter  824.8 (ICD-9-CM) - S82.839A (ICD-10-CM) - Avulsion fracture of distal end of fibula       Patient alert and oriented X3.  Chart reviewed at time of initial evaluation (relevant co-morbidities, allergies, tests and medications listed):   Past Medical History:  No date: ADHD  No date: Allergy  No date: Asthma  No date: Nasal fracture  No date: RAD (reactive airway disease)  Past Surgical History:  No date: Nasal scopy,open maxill sinus  ALLERGIES:   -- Dairy Enzyme Formula   (Food Or Med) -- DIARRHEA   -- Penicillins -- RASH   -- Pollen Extract -- Other (See Comments)  Current Outpatient Medications:  Dextromethorphan HBr (ROBITUSSIN MAXIMUM STRENGTH PO),   HYDROcodone-acetaminophen (NORCO) 5-325 MG per tablet, Take 1 tablet by mouth every 6 hours as needed for Pain. Indications: Pain  Flovent  MCG/ACT inhaler, Inhale 1 puff into the lungs as needed.  ibuprofen (MOTRIN) 200 MG tablet, Take 200 mg by mouth every 6 hours as needed for Pain.  acetaminophen (TYLENOL) 325 MG tablet, Take 650 mg by mouth every 4 hours as needed for Pain.  albuterol 108 (90 Base) MCG/ACT inhaler, Inhale 2 puffs into the lungs every 6 hours as needed.    Current Facility-Administered Medications:  etonogestrel (NEXPLANON) implant 68 mg          SUBJECTIVE                                                                                                               3/7/23: Pt reports that she has been feeling good overall. Pt was able to perform round-offs and handsprings on trampoline without issue. She has not attempted tucks yet.     2/28/23: Pt reports that she was able to do short jog without discomfort. Pt notices improvement with her overall  motion.     2/25/2023 Patient reports no pain today, no new complaints.    2/18/23: Patient reports no pain currently; states exercises are going well. Definitely improved and able to walk around the house more without need for as many rest breaks. No pain with driving.     02/11/23 Initial eval: Pt reports to PT with complaints of (R) ankle pain.  patient was ice skating and fell end of Dec 2022.  She had help to get up and unable to bear weight on it and needed help to walk.  That night was crawling around house.  Went to [facility] next day.  Was put into soft cast and then into boot when went to ortho doctor.  Crutches for 1 month with no weight on it then slowly just the boot.  Out of the boot early Feb 2023 and now just in brace for 3-4 weeks per ortho.  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[employer], does cheer outside of school, needed to quit due to ankle will start in April if everything is good, elevator pass, out of gym due to ankle injury for this semester    Pain / Symptoms  - Pain rating (out of 10): Current: 0       OBJECTIVE                                                                                                                     Range of Motion (ROM)   (degrees unless noted; active unless noted; norms in ( ); negative=lacking to 0, positive=beyond 0)  Ankle:   - Dorsiflexion (20):      • Right:  15     - Plantar Flexion (45-50):      • Right:  55    - Inversion (30-35):     • Right: pain 32   - Eversion (15-20):     • Right: 20                       Treatment     Therapeutic Exercise  PERFORMED:  Bike for warm-up, 3 min  Leg Press, 8 Cords  BOSU Level SLS  BOSU Mini squats  Trampoline mini jumps, SLS hops  Component motions on mat, roundoff landing  Agility activities, low speed and short distances    NOT PERFORMED:  Single leg balance on foam + ball toss  Squat to Heel Raise      Manual Therapy   RIGHT Calcaneal Rocking, Grade 4  RIGHT Anterior-Posterior talus mobilization, Grade 4    Gait Training  Jogging,  steady state - no issues or major deviations noted    Skilled input: verbal instruction/cues and tactile instruction/cues    Writer verbally educated and received verbal consent for hand placement, positioning of patient, and techniques to be performed today from patient for therapist position for techniques and hand placement and palpation for techniques as described above and how they are pertinent to the patient's plan of care.    Home Exercise Program  Access Code: [email or web address]  URL: [email or web address]  Date: 02/25/2023  Prepared by: John Ruvalcaba    Exercises   Long Sitting Calf Stretch with Strap - 2 x daily - 7 x weekly - 1 sets - 3-5 reps - 15-20 hold   Ankle Inversion with Resistance - 2 x daily - 7 x weekly - 1 sets - 10-20 reps   Ankle Eversion with Resistance - 1 x daily - 7 x weekly - 1-2 sets - 10 reps - 5 hold   Single Leg Balance with Opposite Leg Star Reach - 1 x daily - 7 x weekly - 5-10 reps   Side Stepping with Resistance at Ankles - 1 x daily - 7 x weekly - 5-10 reps   Band Walks - 1 x daily - 7 x weekly - 5-10 reps   Hip Abduction with Resistance Loop - 1 x daily - 7 x weekly - 2-3 sets - 10 reps   Standing Eccentric Heel Raise - 1 x daily - 7 x weekly - 1 sets - 10 reps  LOW SQUAT WITH UE SUPPORT for DF stretch      ASSESSMENT                                                                                                            3/7/23: Pt continues to improve and has very minor pain with very short lingering. Will follow-up one more visits and if that lateral ankle pain is not present with impact activities, will discharge to independence.     2/28/23: Pt continues to progress her AROM and overall stability with RIGHT ankle. Pt encouraged to start weaning from brace during day but to try light tumbling with brace. Will follow-up with pt next week to assess if further therapy is necessary or if she can released to independence with progressive HEP.    2/25/2023  Patient is progressing with R ankle DF AROM. Session focused on ankle strengthening and stability training to promote return to ambulation over compliant and non-compliant surfaces. Patient continues to benefit from skilled PT services to address remaining deficits and promote completion of ADLs at prior level of function.     2/18/23: Patient tolerated treatment well without notably increased pain/discomfort during or post-session. She responded favorably to upgraded HEP to include CKC stabilization. Patient demonstrates progressed range of motion and ankle stability and is overall advancing well throughout course of rehab. Continues to benefit from skilled physical therapy to address strength, range of motion and stability/balance deficits for increased ease performing all aspects of functional mobility.     2/11/23: Evaluation.  Patient presents to therapy with signs and symptoms consistent with diagnosis of (R)  Avulsion fracture of distal end of fibula and  sprain of anterior talofibular ligament of right ankle.  Key indicators:  Decreased (R) ankle active range of motion for dorsiflexion and eversion  Decreased (R) ankle strength  Difficulty walking  lower extremity functional scale 47  Skilled therapy is required because patient is having difficulty with ambulating after ankle injury  and subsequent immobilization.  Progress should be made with therapy interventions increasing her independence and functional activities and reducing the degree of disability from this injury.  Education:   - Present and ready to learn: patient and patient's parent(s)  - Results of above outlined education: Verbalizes understanding and Demonstrates understanding    PLAN                                                                                                                           Suggestions for next session as indicated: Reassess      Goals  Long Term Goals: to be met by end of plan of care  1. Patient will  demonstrate ability to negotiate level and unlevel surfaces at variable velocities, including change of direction without increased pain or instability to return to age appropriate and community activities at prior level of function.  2. Increase strength of (R) ankle muscles to 5/5 so she has more stability in ankle to tolerate prolonged walking and standing.  3. Increase ankle active dorsiflexion (R) to 10 degrees so she can ambulate with normal pattern.  4. Lower Extremity Functional Scale: Patient will complete form to reflect an improved raw score to greater than or equal to 64/80 to indicate patient reported improvement in function/disability/impairment (minimal detectable change: 9 points).  5. Patient will be independent with progressed and modified home exercise program.      Therapy procedure time and total treatment time can be found documented on the Time Entry flowsheet     2.31

## 2024-09-07 NOTE — ED ADULT TRIAGE NOTE - BEFAST SPEECH PHRASE
Group Topic: Problem Solving   Group Date: 1/25/2024  Start Time: 1530  End Time: 1620  Facilitators: TRACI SalgueroS   Department: [facility] REHAB THERAPY VIRTUAL    Number of Participants: 6   Group Focus: problem solving  Treatment Modality: Recreational Therapy  Interventions utilized were problem solving  Purpose: insight or knowledge    Name: Moris Conklin YOB: 1996   MR: [id number]      Facilitator: Recreational Therapist  Level of Participation: did not attend  Progress: None  Comments: Patients participated in a dynamic and thought provoking recreational therapy session incorporating cognitive stimulation, creativity, and problem solving. This session promoted effective communication skills, inclusion, and overall well-being of participants through shared experiences and meaningful social interactions. Patient declined invitation to group activity at this time. Patient will continue to be provided with opportunities to enhance leisure skills and/or coping mechanisms.  Plan: continue with services       Yes

## 2024-09-07 NOTE — ED ADULT TRIAGE NOTE - GLASGOW COMA SCALE: BEST VERBAL RESPONSE, MLM
Patient did not have insurance for [facility] PT and now does. She needs a new script for Jose Alberto. (V5) oriented

## 2024-09-07 NOTE — ED PROVIDER NOTE - PATIENT PORTAL LINK FT
You can access the FollowMyHealth Patient Portal offered by [facility] by registering at the following website: [email or web address]. By joining [facility]’s FollowMyHealth portal, you will also be able to view your health information using other applications (apps) compatible with our system.

## 2024-09-07 NOTE — ED PROVIDER NOTE - NSFOLLOWUPINSTRUCTIONS_ED_ALL_ED_FT
We sent preop labs on your behalf today. Please follow up closely with your surgeons for the rest of the plan.

## 2024-09-11 DIAGNOSIS — D35.2 BENIGN NEOPLASM OF PITUITARY GLAND: ICD-10-CM

## 2024-09-11 DIAGNOSIS — J34.2 DEVIATED NASAL SEPTUM: ICD-10-CM

## 2024-09-11 RX ORDER — TRAMADOL HYDROCHLORIDE 50 MG/1
50 TABLET, COATED ORAL 4 TIMES DAILY
Qty: 32 | Refills: 0 | Status: ACTIVE | COMMUNITY
Start: 2024-09-11 | End: 1900-01-01

## 2024-09-11 RX ORDER — SODIUM CHLORIDE 0.65 %
0.65 AEROSOL, SPRAY (ML) NASAL EVERY 4 HOURS
Qty: 2 | Refills: 3 | Status: ACTIVE | COMMUNITY
Start: 2024-09-11 | End: 1900-01-01

## 2024-09-11 RX ORDER — AMOXICILLIN AND CLAVULANATE POTASSIUM 875; 125 MG/1; MG/1
875-125 TABLET, COATED ORAL
Qty: 20 | Refills: 0 | Status: ACTIVE | COMMUNITY
Start: 2024-09-11 | End: 1900-01-01

## 2024-09-12 ENCOUNTER — TRANSCRIPTION ENCOUNTER (OUTPATIENT)
Age: 74
End: 2024-09-12

## 2024-09-12 VITALS
HEIGHT: 74 IN | TEMPERATURE: 96 F | OXYGEN SATURATION: 98 % | SYSTOLIC BLOOD PRESSURE: 195 MMHG | DIASTOLIC BLOOD PRESSURE: 75 MMHG | WEIGHT: 239.42 LBS | HEART RATE: 48 BPM | RESPIRATION RATE: 16 BRPM

## 2024-09-12 RX ORDER — FLU VACCINE TS 2012-2013(5YR+) 45MCG/.5ML
0.5 VIAL (ML) INTRAMUSCULAR ONCE
Refills: 0 | Status: DISCONTINUED | OUTPATIENT
Start: 2024-09-13 | End: 2024-09-16

## 2024-09-12 NOTE — PATIENT PROFILE ADULT - STATED REASON FOR ADMISSION
Endoscopic, Endonasal Approach for Resection of Pituitary Tumor  Neuroendoscopy with Excision of Pituitary with [place] of Fat Graft

## 2024-09-12 NOTE — PATIENT PROFILE ADULT - FALL HARM RISK - UNIVERSAL INTERVENTIONS
Bed in lowest position, wheels locked, appropriate side rails in place/Call bell, personal items and telephone in reach/Instruct patient to call for assistance before getting out of bed or chair/Non-slip footwear when patient is out of bed/[place] to call system/Physically safe environment - no spills, clutter or unnecessary equipment/Purposeful Proactive Rounding/Room/bathroom lighting operational, light cord in reach

## 2024-09-12 NOTE — PATIENT PROFILE ADULT - FUNCTIONAL SCREEN CURRENT LEVEL: COMMUNICATION, MLM
0 = understands/communicates without difficulty Area L Indication Text: Tumors in this location are included in Area L (trunk and extremities).  Mohs surgery is indicated for larger tumors, 2 cm or larger, in these anatomic locations.

## 2024-09-12 NOTE — PATIENT PROFILE ADULT - FUNCTIONAL ASSESSMENT - DAILY ACTIVITY 6.
87M s/p R Aubrey JAMES w/ post-op course c/b COPD exacerbation. Patient with increased oxygen demand requiring HFNC, SICU consulted for close respiratory monitoring.    PLAN:  NEUROLOGY:    RESPIRATORY:    CARDIOVASCULAR:    GASTROINTESTINAL/NUTRITION:    /RENAL:    HEMATOLOGIC:    INFECTIOUS DISEASE:    ENDOCRINE:    LINES:    DISPO:  - SICU  - Full code    87M s/p R Aubrey JAMES w/ post-op course c/b COPD exacerbation. Patient with increased oxygen demand requiring HFNC, SICU consulted for close respiratory monitoring.    PLAN:  - Patient reevaluated by SICU team, patient found to be more awake and interactive.  - ABG obtained while on HFNC at 40/40%  - Will give IV lasix 20mg x1 in hopes of improving respiratory status due to concerns of some component of fluid overload.  - SICU team to reevaluate patient in 3 hours and determine if patient meets ICU admission criteria.   4 = No assist / stand by assistance

## 2024-09-12 NOTE — PRE-OP CHECKLIST - 3.
ARAVIND Sánchez BS - 130mg/dL as per patient glucose montior noted to the left shoulder, refusing fingerstick

## 2024-09-13 ENCOUNTER — APPOINTMENT (OUTPATIENT)
Dept: OTOLARYNGOLOGY | Facility: HOSPITAL | Age: 74
End: 2024-09-13

## 2024-09-13 ENCOUNTER — APPOINTMENT (OUTPATIENT)
Dept: NEUROSURGERY | Facility: HOSPITAL | Age: 74
End: 2024-09-13

## 2024-09-13 ENCOUNTER — INPATIENT (INPATIENT)
Facility: HOSPITAL | Age: 74
LOS: 2 days | Discharge: HOME | End: 2024-09-16
Attending: NEUROLOGICAL SURGERY | Admitting: NEUROLOGICAL SURGERY
Payer: COMMERCIAL

## 2024-09-13 ENCOUNTER — RESULT REVIEW (OUTPATIENT)
Age: 74
End: 2024-09-13

## 2024-09-13 DIAGNOSIS — Z98.890 OTHER SPECIFIED POSTPROCEDURAL STATES: Chronic | ICD-10-CM

## 2024-09-13 DIAGNOSIS — D35.2 BENIGN NEOPLASM OF PITUITARY GLAND: ICD-10-CM

## 2024-09-13 LAB
ADD ON TEST-SPECIMEN IN LAB: SIGNIFICANT CHANGE UP
ANION GAP SERPL CALC-SCNC: 9 MMOL/L — SIGNIFICANT CHANGE UP (ref 5–17)
APTT BLD: 21.3 SEC — LOW (ref 24.5–35.6)
BLD GP AB SCN SERPL QL: NEGATIVE — SIGNIFICANT CHANGE UP
BUN SERPL-MCNC: 11 MG/DL — SIGNIFICANT CHANGE UP (ref 7–23)
CALCIUM SERPL-MCNC: 8.2 MG/DL — LOW (ref 8.4–10.5)
CHLORIDE SERPL-SCNC: 104 MMOL/L — SIGNIFICANT CHANGE UP (ref 96–108)
CK MB CFR SERPL CALC: 3.6 NG/ML — SIGNIFICANT CHANGE UP (ref 0–6.7)
CK SERPL-CCNC: 145 U/L — SIGNIFICANT CHANGE UP (ref 30–200)
CO2 SERPL-SCNC: 25 MMOL/L — SIGNIFICANT CHANGE UP (ref 22–31)
CREAT ?TM UR-MCNC: 64 MG/DL — SIGNIFICANT CHANGE UP
CREAT SERPL-MCNC: 0.89 MG/DL — SIGNIFICANT CHANGE UP (ref 0.5–1.3)
EGFR: 91 ML/MIN/1.73M2 — SIGNIFICANT CHANGE UP
FSH SERPL-MCNC: 5.81 IU/L — SIGNIFICANT CHANGE UP
GLUCOSE BLDC GLUCOMTR-MCNC: 174 MG/DL — HIGH (ref 70–99)
GLUCOSE BLDC GLUCOMTR-MCNC: 191 MG/DL — HIGH (ref 70–99)
GLUCOSE BLDC GLUCOMTR-MCNC: 215 MG/DL — HIGH (ref 70–99)
GLUCOSE BLDC GLUCOMTR-MCNC: 218 MG/DL — HIGH (ref 70–99)
GLUCOSE SERPL-MCNC: 187 MG/DL — HIGH (ref 70–99)
HCT VFR BLD CALC: 35.5 % — LOW (ref 39–50)
HGB BLD-MCNC: 11.6 G/DL — LOW (ref 13–17)
INR BLD: 1.06 — SIGNIFICANT CHANGE UP (ref 0.85–1.18)
LH SERPL-ACNC: 5.11 IU/L — SIGNIFICANT CHANGE UP
MAGNESIUM SERPL-MCNC: 1.7 MG/DL — SIGNIFICANT CHANGE UP (ref 1.6–2.6)
MCHC RBC-ENTMCNC: 27.3 PG — SIGNIFICANT CHANGE UP (ref 27–34)
MCHC RBC-ENTMCNC: 32.7 GM/DL — SIGNIFICANT CHANGE UP (ref 32–36)
MCV RBC AUTO: 83.5 FL — SIGNIFICANT CHANGE UP (ref 80–100)
NRBC # BLD: 0 /100 WBCS — SIGNIFICANT CHANGE UP (ref 0–0)
OSMOLALITY UR: 372 MOSM/KG — SIGNIFICANT CHANGE UP (ref 50–1200)
PHOSPHATE SERPL-MCNC: 2.7 MG/DL — SIGNIFICANT CHANGE UP (ref 2.5–4.5)
PLATELET # BLD AUTO: 152 K/UL — SIGNIFICANT CHANGE UP (ref 150–400)
POTASSIUM SERPL-MCNC: 3.8 MMOL/L — SIGNIFICANT CHANGE UP (ref 3.5–5.3)
POTASSIUM SERPL-SCNC: 3.8 MMOL/L — SIGNIFICANT CHANGE UP (ref 3.5–5.3)
PROLACTIN SERPL-MCNC: 17.09 NG/ML — SIGNIFICANT CHANGE UP (ref 3.4–24.1)
PROTHROM AB SERPL-ACNC: 12.1 SEC — SIGNIFICANT CHANGE UP (ref 9.5–13)
RBC # BLD: 4.25 M/UL — SIGNIFICANT CHANGE UP (ref 4.2–5.8)
RBC # FLD: 13.5 % — SIGNIFICANT CHANGE UP (ref 10.3–14.5)
RH IG SCN BLD-IMP: NEGATIVE — SIGNIFICANT CHANGE UP
SODIUM SERPL-SCNC: 138 MMOL/L — SIGNIFICANT CHANGE UP (ref 135–145)
SODIUM UR-SCNC: 86 MMOL/L — SIGNIFICANT CHANGE UP
T4 AB SER-ACNC: 5.22 UG/DL — SIGNIFICANT CHANGE UP (ref 4.5–11.7)
T4 FREE SERPL-MCNC: 0.77 NG/DL — LOW (ref 0.93–1.7)
TROPONIN T, HIGH SENSITIVITY RESULT: 28 NG/L — SIGNIFICANT CHANGE UP (ref 0–51)
TSH SERPL-MCNC: 7.19 UIU/ML — HIGH (ref 0.27–4.2)
WBC # BLD: 5 K/UL — SIGNIFICANT CHANGE UP (ref 3.8–10.5)
WBC # FLD AUTO: 5 K/UL — SIGNIFICANT CHANGE UP (ref 3.8–10.5)

## 2024-09-13 PROCEDURE — 88341 IMHCHEM/IMCYTCHM EA ADD ANTB: CPT | Mod: 26,59

## 2024-09-13 PROCEDURE — 88334 PATH CONSLTJ SURG CYTO XM EA: CPT | Mod: 26,59

## 2024-09-13 PROCEDURE — 88360 TUMOR IMMUNOHISTOCHEM/MANUAL: CPT | Mod: 26,59

## 2024-09-13 PROCEDURE — 62165 REMOVE PITUIT TUMOR W/SCOPE: CPT | Mod: 62

## 2024-09-13 PROCEDURE — 61782 SCAN PROC CRANIAL EXTRA: CPT

## 2024-09-13 PROCEDURE — 71045 X-RAY EXAM CHEST 1 VIEW: CPT | Mod: 26

## 2024-09-13 PROCEDURE — 88342 IMHCHEM/IMCYTCHM 1ST ANTB: CPT | Mod: 26,59

## 2024-09-13 PROCEDURE — 93306 TTE W/DOPPLER COMPLETE: CPT | Mod: 26

## 2024-09-13 PROCEDURE — 99292 CRITICAL CARE ADDL 30 MIN: CPT

## 2024-09-13 PROCEDURE — 61781 SCAN PROC CRANIAL INTRA: CPT

## 2024-09-13 PROCEDURE — 99291 CRITICAL CARE FIRST HOUR: CPT

## 2024-09-13 PROCEDURE — 15769 GRFG AUTOL SOFT TISS DIR EXC: CPT

## 2024-09-13 PROCEDURE — 88305 TISSUE EXAM BY PATHOLOGIST: CPT | Mod: 26

## 2024-09-13 PROCEDURE — 88331 PATH CONSLTJ SURG 1 BLK 1SPC: CPT | Mod: 26

## 2024-09-13 DEVICE — SURGIFOAM PAD 8CM X 12.5CM X 10MM (100): Type: IMPLANTABLE DEVICE | Status: FUNCTIONAL

## 2024-09-13 DEVICE — SURGIFLO HEMOSTATIC MATRIX KIT: Type: IMPLANTABLE DEVICE | Status: FUNCTIONAL

## 2024-09-13 RX ORDER — POTASSIUM CHLORIDE 10 MEQ
40 TABLET, EXT RELEASE, PARTICLES/CRYSTALS ORAL ONCE
Refills: 0 | Status: COMPLETED | OUTPATIENT
Start: 2024-09-13 | End: 2024-09-13

## 2024-09-13 RX ORDER — METOCLOPRAMIDE HCL 5 MG
10 TABLET ORAL ONCE
Refills: 0 | Status: DISCONTINUED | OUTPATIENT
Start: 2024-09-13 | End: 2024-09-13

## 2024-09-13 RX ORDER — LOSARTAN POTASSIUM 50 MG/1
50 TABLET ORAL DAILY
Refills: 0 | Status: DISCONTINUED | OUTPATIENT
Start: 2024-09-13 | End: 2024-09-13

## 2024-09-13 RX ORDER — APREPITANT 40 MG/1
40 CAPSULE ORAL ONCE
Refills: 0 | Status: COMPLETED | OUTPATIENT
Start: 2024-09-13 | End: 2024-09-13

## 2024-09-13 RX ORDER — LOSARTAN POTASSIUM 50 MG/1
50 TABLET ORAL DAILY
Refills: 0 | Status: DISCONTINUED | OUTPATIENT
Start: 2024-09-13 | End: 2024-09-16

## 2024-09-13 RX ORDER — SODIUM CHLORIDE 9 MG/ML
1000 INJECTION INTRAMUSCULAR; INTRAVENOUS; SUBCUTANEOUS
Refills: 0 | Status: DISCONTINUED | OUTPATIENT
Start: 2024-09-13 | End: 2024-09-13

## 2024-09-13 RX ORDER — FAMOTIDINE 10 MG/ML
40 INJECTION INTRAVENOUS
Refills: 0 | Status: DISCONTINUED | OUTPATIENT
Start: 2024-09-13 | End: 2024-09-16

## 2024-09-13 RX ORDER — SODIUM PHOSPHATE, DIBASIC, ANHYDROUS, POTASSIUM PHOSPHATE, MONOBASIC, AND SODIUM PHOSPHATE, MONOBASIC, MONOHYDRATE 852; 155; 130 MG/1; MG/1; MG/1
1 TABLET, COATED ORAL ONCE
Refills: 0 | Status: COMPLETED | OUTPATIENT
Start: 2024-09-13 | End: 2024-09-13

## 2024-09-13 RX ORDER — FAMOTIDINE 10 MG/ML
20 INJECTION INTRAVENOUS EVERY 12 HOURS
Refills: 0 | Status: DISCONTINUED | OUTPATIENT
Start: 2024-09-13 | End: 2024-09-13

## 2024-09-13 RX ORDER — POLYETHYLENE GLYCOL 3350 17 G/17G
17 POWDER, FOR SOLUTION ORAL DAILY
Refills: 0 | Status: DISCONTINUED | OUTPATIENT
Start: 2024-09-13 | End: 2024-09-16

## 2024-09-13 RX ORDER — GABAPENTIN 100 MG
300 CAPSULE ORAL THREE TIMES A DAY
Refills: 0 | Status: DISCONTINUED | OUTPATIENT
Start: 2024-09-13 | End: 2024-09-13

## 2024-09-13 RX ORDER — INSULIN GLARGINE 100 [IU]/ML
45 INJECTION, SOLUTION SUBCUTANEOUS EVERY MORNING
Refills: 0 | Status: DISCONTINUED | OUTPATIENT
Start: 2024-09-13 | End: 2024-09-14

## 2024-09-13 RX ORDER — HYDRALAZINE HCL 50 MG
10 TABLET ORAL
Refills: 0 | Status: DISCONTINUED | OUTPATIENT
Start: 2024-09-13 | End: 2024-09-13

## 2024-09-13 RX ORDER — METOPROLOL TARTRATE 100 MG/1
50 TABLET ORAL DAILY
Refills: 0 | Status: DISCONTINUED | OUTPATIENT
Start: 2024-09-13 | End: 2024-09-16

## 2024-09-13 RX ORDER — SODIUM CHLORIDE 0.65 %
1 AEROSOL, SPRAY (ML) NASAL EVERY 6 HOURS
Refills: 0 | Status: DISCONTINUED | OUTPATIENT
Start: 2024-09-14 | End: 2024-09-16

## 2024-09-13 RX ORDER — POVIDONE-IODINE 10 %
1 SOLUTION, NON-ORAL TOPICAL ONCE
Refills: 0 | Status: DISCONTINUED | OUTPATIENT
Start: 2024-09-13 | End: 2024-09-13

## 2024-09-13 RX ORDER — ONDANSETRON 2 MG/ML
4 INJECTION, SOLUTION INTRAMUSCULAR; INTRAVENOUS EVERY 6 HOURS
Refills: 0 | Status: COMPLETED | OUTPATIENT
Start: 2024-09-13 | End: 2024-09-15

## 2024-09-13 RX ORDER — HYDRALAZINE HCL 50 MG
10 TABLET ORAL EVERY 4 HOURS
Refills: 0 | Status: DISCONTINUED | OUTPATIENT
Start: 2024-09-13 | End: 2024-09-13

## 2024-09-13 RX ORDER — METOPROLOL TARTRATE 100 MG/1
1 TABLET ORAL
Refills: 0 | DISCHARGE

## 2024-09-13 RX ORDER — BRIMONIDINE TARTRATE 2 MG/ML
1 SOLUTION/ DROPS OPHTHALMIC
Refills: 0 | Status: DISCONTINUED | OUTPATIENT
Start: 2024-09-13 | End: 2024-09-16

## 2024-09-13 RX ORDER — ACETAMINOPHEN 325 MG/1
1000 TABLET ORAL EVERY 8 HOURS
Refills: 0 | Status: DISCONTINUED | OUTPATIENT
Start: 2024-09-13 | End: 2024-09-16

## 2024-09-13 RX ORDER — CEFAZOLIN SODIUM 2 G/100ML
2000 INJECTION, SOLUTION INTRAVENOUS EVERY 8 HOURS
Refills: 0 | Status: DISCONTINUED | OUTPATIENT
Start: 2024-09-13 | End: 2024-09-13

## 2024-09-13 RX ORDER — SENNA 187 MG
2 TABLET ORAL AT BEDTIME
Refills: 0 | Status: DISCONTINUED | OUTPATIENT
Start: 2024-09-13 | End: 2024-09-16

## 2024-09-13 RX ORDER — METOPROLOL TARTRATE 100 MG/1
25 TABLET ORAL DAILY
Refills: 0 | Status: DISCONTINUED | OUTPATIENT
Start: 2024-09-13 | End: 2024-09-13

## 2024-09-13 RX ORDER — CHLORHEXIDINE GLUCONATE 40 MG/ML
1 SOLUTION TOPICAL EVERY 12 HOURS
Refills: 0 | Status: COMPLETED | OUTPATIENT
Start: 2024-09-13 | End: 2024-09-14

## 2024-09-13 RX ORDER — EPINEPHRINE HCL 1 MG/ML
1 SOLUTION, NON-ORAL NASAL ONCE
Refills: 0 | Status: DISCONTINUED | OUTPATIENT
Start: 2024-09-13 | End: 2024-09-13

## 2024-09-13 RX ORDER — PANTOPRAZOLE SODIUM 40 MG
40 TABLET, DELAYED RELEASE (ENTERIC COATED) ORAL DAILY
Refills: 0 | Status: DISCONTINUED | OUTPATIENT
Start: 2024-09-13 | End: 2024-09-14

## 2024-09-13 RX ORDER — NICARDIPINE HCL 20 MG
5 CAPSULE ORAL
Qty: 40 | Refills: 0 | Status: DISCONTINUED | OUTPATIENT
Start: 2024-09-13 | End: 2024-09-13

## 2024-09-13 RX ORDER — SIMVASTATIN 10 MG
1 TABLET ORAL
Refills: 0 | DISCHARGE

## 2024-09-13 RX ADMIN — Medication 25 MG/HR: at 17:25

## 2024-09-13 RX ADMIN — Medication 40 MILLIGRAM(S): at 20:44

## 2024-09-13 RX ADMIN — SODIUM PHOSPHATE, DIBASIC, ANHYDROUS, POTASSIUM PHOSPHATE, MONOBASIC, AND SODIUM PHOSPHATE, MONOBASIC, MONOHYDRATE 1 PACKET(S): 852; 155; 130 TABLET, COATED ORAL at 12:52

## 2024-09-13 RX ADMIN — Medication 25 MG/HR: at 10:44

## 2024-09-13 RX ADMIN — APREPITANT 40 MILLIGRAM(S): 40 CAPSULE ORAL at 07:20

## 2024-09-13 RX ADMIN — CHLORHEXIDINE GLUCONATE 1 APPLICATION(S): 40 SOLUTION TOPICAL at 17:34

## 2024-09-13 RX ADMIN — FAMOTIDINE 40 MILLIGRAM(S): 10 INJECTION INTRAVENOUS at 17:25

## 2024-09-13 RX ADMIN — LOSARTAN POTASSIUM 50 MILLIGRAM(S): 50 TABLET ORAL at 13:56

## 2024-09-13 RX ADMIN — METOPROLOL TARTRATE 25 MILLIGRAM(S): 100 TABLET ORAL at 15:52

## 2024-09-13 RX ADMIN — ACETAMINOPHEN 1000 MILLIGRAM(S): 325 TABLET ORAL at 22:09

## 2024-09-13 RX ADMIN — Medication 40 MILLIEQUIVALENT(S): at 12:12

## 2024-09-13 RX ADMIN — Medication 10 MILLIGRAM(S): at 11:59

## 2024-09-13 RX ADMIN — SODIUM CHLORIDE 75 MILLILITER(S): 9 INJECTION INTRAMUSCULAR; INTRAVENOUS; SUBCUTANEOUS at 15:06

## 2024-09-13 RX ADMIN — Medication 4: at 15:55

## 2024-09-13 RX ADMIN — POLYETHYLENE GLYCOL 3350 17 GRAM(S): 17 POWDER, FOR SOLUTION ORAL at 15:52

## 2024-09-13 RX ADMIN — Medication 100 MILLIGRAM(S): at 19:27

## 2024-09-13 RX ADMIN — ACETAMINOPHEN 1000 MILLIGRAM(S): 325 TABLET ORAL at 22:45

## 2024-09-13 RX ADMIN — Medication 25 GRAM(S): at 12:13

## 2024-09-13 RX ADMIN — Medication 25 MG/HR: at 15:09

## 2024-09-13 RX ADMIN — Medication 2 TABLET(S): at 22:09

## 2024-09-13 RX ADMIN — Medication 20 MILLIGRAM(S): at 22:09

## 2024-09-13 RX ADMIN — Medication 2: at 22:07

## 2024-09-13 RX ADMIN — Medication 2: at 12:03

## 2024-09-13 RX ADMIN — ONDANSETRON 4 MILLIGRAM(S): 2 INJECTION, SOLUTION INTRAMUSCULAR; INTRAVENOUS at 17:25

## 2024-09-13 RX ADMIN — BRIMONIDINE TARTRATE 1 DROP(S): 2 SOLUTION/ DROPS OPHTHALMIC at 19:51

## 2024-09-13 NOTE — PROGRESS NOTE ADULT - SUBJECTIVE AND OBJECTIVE BOX
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 He has a history of diabetes, hypertension, peripheral arterial disease, colon cancer (status post-colectomy in 2011), left retinal detachment with residual vision loss, glaucoma, and bilateral cataract surgery. Last fall, he was hospitalized for toe amputations, during which brain imaging revealed a pituitary mass. An MRI on September 29, 2023, confirmed an enlarged pituitary gland with suprasellar extension and mass effect on the optic chiasm, consistent with a pituitary macroadenoma ( 1.6 x 1.6 x 1.4 cm (AP by transverse by craniocaudal)). Additionally, the imaging revealed chronic microvascular ischemic disease and bilateral exophthalmos.  Mr. Vanegas reports no worsening of vision since his retinal detachment but continues to experience left vision loss. He denies any new symptoms such as headaches or dizziness. His recent endocrinology labs showed a low Free T4 level, though other values were normal.  Physical examination and recent imaging suggest the pituitary mass is stable or has minimally decreased in size, with some atrophy of the optic nerves and optic chiasm. The suprasellar mass component elevates and bows the optic chiasm, with some volume loss possibly related to his severe bilateral glaucoma.  After discussing surgical intervention with Dr. D'Amico, including risks, benefits, and alternatives, Mr. Vanegas agreed to proceed with surgery. The patient was informed of the treatment plan and next steps, with all questions addressed. He demonstrated a clear understanding of the plan and agreed to proceed with the recommended interventions.  Presented in OR for TSP pituitary macroadenoma removal.   (13 Sep 2024 07:28)    On admission, the patient was:  GCS:  Draper-Arriola:  modified Bonds:  ICH score:  NIHSS:    *** HIGH RISK OF DVT PRESENT ON ADMISSION ***      ICU Vital Signs Last 24 Hrs  T(C): 36.4 (13 Sep 2024 14:14), Max: 36.4 (13 Sep 2024 14:14)  T(F): 97.6 (13 Sep 2024 14:14), Max: 97.6 (13 Sep 2024 14:14)  HR: 67 (13 Sep 2024 18:00) (48 - 79)  BP: 142/69 (13 Sep 2024 15:15) (134/64 - 195/75)  BP(mean): 99 (13 Sep 2024 15:15) (91 - 124)  ABP: 132/53 (13 Sep 2024 18:00) (132/53 - 184/62)  ABP(mean): 80 (13 Sep 2024 18:00) (80 - 114)  RR: 17 (13 Sep 2024 18:00) (12 - 112)  SpO2: 96% (13 Sep 2024 18:00) (96% - 100%)      09-13-24 @ 07:01  -  09-13-24 @ 19:13  --------------------------------------------------------  IN: 1075 mL / OUT: 2050 mL / NET: -975 mL      EXAMINATION:  General:   HEENT:  MMM  Neuro:   Cards:  RRR  Respiratory: Clear, no wheeze  Abdomen: Soft  Extremities: No edema  Skin: Warm/dry      MEDICATIONS:  acetaminophen     Tablet .. 1000 milliGRAM(s) Oral every 8 hours  atorvastatin 20 milliGRAM(s) Oral at bedtime  brimonidine 0.2% Ophthalmic Solution 1 Drop(s) Left EYE two times a day  cefTRIAXone   IVPB 1000 milliGRAM(s) IV Intermittent every 24 hours  chlorhexidine 2% Cloths 1 Application(s) Topical every 12 hours  famotidine    Tablet 40 milliGRAM(s) Oral two times a day  influenza  Vaccine (HIGH DOSE) 0.5 milliLiter(s) IntraMuscular once  insulin glargine Injectable (LANTUS) 45 Unit(s) SubCutaneous every morning  insulin lispro (ADMELOG) corrective regimen sliding scale   SubCutaneous Before meals and at bedtime  losartan 50 milliGRAM(s) Oral daily  metoprolol succinate ER 50 milliGRAM(s) Oral daily  niCARdipine Infusion 5 mG/Hr (25 mL/Hr) IV Continuous <Continuous>  ondansetron   Disintegrating Tablet 4 milliGRAM(s) Oral every 6 hours  polyethylene glycol 3350 17 Gram(s) Oral daily  senna 2 Tablet(s) Oral at bedtime  sodium chloride 0.9%. 1000 milliLiter(s) (75 mL/Hr) IV Continuous <Continuous>      LABS:                    11.6   5.00  )-----------( 152      ( 13 Sep 2024 10:39 )             35.5     09-13    138  |  104  |  11  ----------------------------<  187<H>  3.8   |  25  |  0.89    Ca    8.2<L>      13 Sep 2024 10:39  Phos  2.7     09-13  Mg     1.7     09-13        ASSESSMENT:      PLAN:  NEURO:  Activity: [] mobilize as tolerated [] Bedrest [] PT [] OT [] PMNR    PULM:    CV:  SBP goal    RENAL:  Fluids:    GI:  Diet:  GI prophylaxis [] not indicated [] PPI [] other:  Bowel regimen [] colace [] senna [] other:    ENDO:   Goal euglycemia (-180)    HEME/ONC:  VTE prophylaxis: [] SCDs [] chemoprophylaxis [] hold chemoprophylaxis due to: [] high risk of DVT/PE on admission due to:    ID:    MISC:    SOCIAL/FAMILY:  [] awaiting [] updated at bedside [] family meeting    CODE STATUS:  [] Full Code [] DNR [] DNI [] Palliative/Comfort Care    DISPOSITION:  [] ICU [] Stroke Unit [] Floor [] EMU [] RCU [] PCU      Time spent: ___ [] critical care minutes               NSCU ATTENDING -- ADDITIONAL PROGRESS NOTE    Nighttime rounds were performed     HPI:   73-year-old male presenting for 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 He has a history of diabetes, hypertension, peripheral arterial disease, colon cancer (status post-colectomy in 2011), left retinal detachment with residual vision loss, glaucoma, and bilateral cataract surgery. Last fall, he was hospitalized for toe amputations, during which brain imaging revealed a pituitary mass. An MRI on September 29, 2023, confirmed an enlarged pituitary gland with suprasellar extension and mass effect on the optic chiasm, consistent with a pituitary macroadenoma ( 1.6 x 1.6 x 1.4 cm (AP by transverse by craniocaudal)). Additionally, the imaging revealed chronic microvascular ischemic disease and bilateral exophthalmos.  Mr. Vanegas reports no worsening of vision since his retinal detachment but continues to experience left vision loss. He denies any new symptoms such as headaches or dizziness. His recent endocrinology labs showed a low Free T4 level, though other values were normal.  Physical examination and recent imaging suggest the pituitary mass is stable or has minimally decreased in size, with some atrophy of the optic nerves and optic chiasm. The suprasellar mass component elevates and bows the optic chiasm, with some volume loss possibly related to his severe bilateral glaucoma.  After discussing surgical intervention with Dr. D'Amico, including risks, benefits, and alternatives, Mr. Vanegas agreed to proceed with surgery. The patient was informed of the treatment plan and next steps, with all questions addressed. He demonstrated a clear understanding of the plan and agreed to proceed with the recommended interventions.  Presented in OR for TSP pituitary macroadenoma removal.   (13 Sep 2024 07:28)    On admission, the patient was:  GCS:  Draper-Arriola:  modified Bonds:  ICH score:  NIHSS:    *** HIGH RISK OF DVT PRESENT ON ADMISSION ***      ICU Vital Signs Last 24 Hrs  T(C): 36.4 (13 Sep 2024 14:14), Max: 36.4 (13 Sep 2024 14:14)  T(F): 97.6 (13 Sep 2024 14:14), Max: 97.6 (13 Sep 2024 14:14)  HR: 67 (13 Sep 2024 18:00) (48 - 79)  BP: 142/69 (13 Sep 2024 15:15) (134/64 - 195/75)  BP(mean): 99 (13 Sep 2024 15:15) (91 - 124)  ABP: 132/53 (13 Sep 2024 18:00) (132/53 - 184/62)  ABP(mean): 80 (13 Sep 2024 18:00) (80 - 114)  RR: 17 (13 Sep 2024 18:00) (12 - 112)  SpO2: 96% (13 Sep 2024 18:00) (96% - 100%)      09-13-24 @ 07:01  -  09-13-24 @ 19:13  --------------------------------------------------------  IN: 1075 mL / OUT: 2050 mL / NET: -975 mL      EXAMINATION:  General: Calm  HEENT:  MMM, moustache dressing  Neuro:   Cards:  RRR  Respiratory: Clear, no wheeze  Abdomen: Soft  Extremities: No edema  Skin: Warm/dry      MEDICATIONS:  acetaminophen     Tablet .. 1000 milliGRAM(s) Oral every 8 hours  atorvastatin 20 milliGRAM(s) Oral at bedtime  brimonidine 0.2% Ophthalmic Solution 1 Drop(s) Left EYE two times a day  cefTRIAXone   IVPB 1000 milliGRAM(s) IV Intermittent every 24 hours  chlorhexidine 2% Cloths 1 Application(s) Topical every 12 hours  famotidine    Tablet 40 milliGRAM(s) Oral two times a day  influenza  Vaccine (HIGH DOSE) 0.5 milliLiter(s) IntraMuscular once  insulin glargine Injectable (LANTUS) 45 Unit(s) SubCutaneous every morning  insulin lispro (ADMELOG) corrective regimen sliding scale   SubCutaneous Before meals and at bedtime  losartan 50 milliGRAM(s) Oral daily  metoprolol succinate ER 50 milliGRAM(s) Oral daily  niCARdipine Infusion 5 mG/Hr (25 mL/Hr) IV Continuous <Continuous>  ondansetron   Disintegrating Tablet 4 milliGRAM(s) Oral every 6 hours  polyethylene glycol 3350 17 Gram(s) Oral daily  senna 2 Tablet(s) Oral at bedtime  sodium chloride 0.9%. 1000 milliLiter(s) (75 mL/Hr) IV Continuous <Continuous>      LABS:                    11.6   5.00  )-----------( 152      ( 13 Sep 2024 10:39 )             35.5     09-13    138  |  104  |  11  ----------------------------<  187<H>  3.8   |  25  |  0.89    Ca    8.2<L>      13 Sep 2024 10:39  Phos  2.7     09-13  Mg     1.7     09-13      ASSESSMENT: TSP resection of pituitary adenoma, POD 1    PLAN:   NEURO:  Q1 neurochecks  MRI post-op  Analgesia prn  Activity: [] OOB as tolerated [] Bedrest [] PT [] OT [] PMNR    PULM:  Pituitary precautions (no incentive spirometry, no straws, no BIPAP)    CV:  -140mmHg, d/c a-line in AM if hemodynamically stable  Weaned off cardene  Continue antihypertensives per home regimen: losartan 50mg daily, metoprolol 50mg succinate daily  Continue lipitor    RENAL:  Courtney for strict I+Os  IVL   Drink to thirst    GI: PUD; monitor for any GI bleed. Patient states he often has episodic coffee ground emesis  Need GI follow up for EGD  Diet: Dysphagia screen and then advance diet as tolerated  GI prophylaxis [] not indicated [x] PPI [] other: and pepcid bid  Bowel regimen [] colace [] senna [] other:    ENDO:   Goal euglycemia (-180)  Pituitary labs  Monitor for DI  Monitor ISS    HEME/ONC:  VTE prophylaxis: [x] SCDs [] chemoprophylaxis   [x] hold chemoprophylaxis due to: fresh post op    ID:  Licha-op antibiotics; CTX    MISC:    SOCIAL/FAMILY:  [x] awaiting [] updated at bedside [] family meeting    CODE STATUS:  [x] Full Code [] DNR [] DNI [] Palliative/Comfort Care    DISPOSITION:  [x] ICU [] Stroke Unit [] Floor [] EMU [] RCU [] PCU    Additional 45 critical care minutes

## 2024-09-13 NOTE — H&P ADULT - HISTORY OF PRESENT ILLNESS
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 He has a history of diabetes, hypertension, peripheral arterial disease, colon cancer (status post-colectomy in 2011), left retinal detachment with residual vision loss, glaucoma, and bilateral cataract surgery. Last fall, he was hospitalized for toe amputations, during which brain imaging revealed a pituitary mass. An MRI on September 29, 2023, confirmed an enlarged pituitary gland with suprasellar extension and mass effect on the optic chiasm, consistent with a pituitary macroadenoma. Additionally, the imaging revealed chronic microvascular ischemic disease and bilateral exophthalmos.  Mr. Vanegas reports no worsening of vision since his retinal detachment but continues to experience left vision loss. He denies any new symptoms such as headaches or dizziness. His recent endocrinology labs showed a low Free T4 level, though other values were normal.  Physical examination and recent imaging suggest the pituitary mass is stable or has minimally decreased in size, with some atrophy of the optic nerves and optic chiasm. The suprasellar mass component elevates and bows the optic chiasm, with some volume loss possibly related to his severe bilateral glaucoma.  After discussing surgical intervention with Dr. D'Amico, including risks, benefits, and alternatives, Mr. Vanegas agreed to proceed with surgery. The patient was informed of the treatment plan and next steps, with all questions addressed. He demonstrated a clear understanding of the plan and agreed to proceed with the recommended interventions.    Presented in OR for TSP pituitary macroadenoma removal.    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 He has a history of diabetes, hypertension, peripheral arterial disease, colon cancer (status post-colectomy in 2011), left retinal detachment with residual vision loss, glaucoma, and bilateral cataract surgery. Last fall, he was hospitalized for toe amputations, during which brain imaging revealed a pituitary mass. An MRI on September 29, 2023, confirmed an enlarged pituitary gland with suprasellar extension and mass effect on the optic chiasm, consistent with a pituitary macroadenoma ( 1.6 x 1.6 x 1.4 cm (AP by transverse by craniocaudal)). Additionally, the imaging revealed chronic microvascular ischemic disease and bilateral exophthalmos.  Mr. Vanegas reports no worsening of vision since his retinal detachment but continues to experience left vision loss. He denies any new symptoms such as headaches or dizziness. His recent endocrinology labs showed a low Free T4 level, though other values were normal.  Physical examination and recent imaging suggest the pituitary mass is stable or has minimally decreased in size, with some atrophy of the optic nerves and optic chiasm. The suprasellar mass component elevates and bows the optic chiasm, with some volume loss possibly related to his severe bilateral glaucoma.  After discussing surgical intervention with Dr. D'Amico, including risks, benefits, and alternatives, Mr. Vanegas agreed to proceed with surgery. The patient was informed of the treatment plan and next steps, with all questions addressed. He demonstrated a clear understanding of the plan and agreed to proceed with the recommended interventions.    Presented in OR for TSP pituitary macroadenoma removal.

## 2024-09-13 NOTE — PROGRESS NOTE ADULT - SUBJECTIVE AND OBJECTIVE BOX
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 He has a history of diabetes, hypertension, peripheral arterial disease, colon cancer (status post-colectomy in 2011), left retinal detachment with residual vision loss, glaucoma, and bilateral cataract surgery. Last fall, he was hospitalized for toe amputations, during which brain imaging revealed a pituitary mass. An MRI on September 29, 2023, confirmed an enlarged pituitary gland with suprasellar extension and mass effect on the optic chiasm, consistent with a pituitary macroadenoma ( 1.6 x 1.6 x 1.4 cm (AP by transverse by craniocaudal)). Additionally, the imaging revealed chronic microvascular ischemic disease and bilateral exophthalmos.  Mr. Vanegas reports no worsening of vision since his retinal detachment but continues to experience left vision loss. He denies any new symptoms such as headaches or dizziness. His recent endocrinology labs showed a low Free T4 level, though other values were normal.  Physical examination and recent imaging suggest the pituitary mass is stable or has minimally decreased in size, with some atrophy of the optic nerves and optic chiasm. The suprasellar mass component elevates and bows the optic chiasm, with some volume loss possibly related to his severe bilateral glaucoma.  After discussing surgical intervention with Dr. D'Amico, including risks, benefits, and alternatives, Mr. Vanegas agreed to proceed with surgery. The patient was informed of the treatment plan and next steps, with all questions addressed. He demonstrated a clear understanding of the plan and agreed to proceed with the recommended interventions.    Presented in OR for TSP pituitary macroadenoma removal.   (13 Sep 2024 07:28)      MEDICATIONS  (STANDING):  acetaminophen     Tablet .. 1000 milliGRAM(s) Oral every 8 hours  atorvastatin 20 milliGRAM(s) Oral at bedtime  brimonidine 0.2% Ophthalmic Solution 1 Drop(s) Left EYE two times a day  cefTRIAXone   IVPB 1000 milliGRAM(s) IV Intermittent every 24 hours  chlorhexidine 2% Cloths 1 Application(s) Topical every 12 hours  famotidine    Tablet 40 milliGRAM(s) Oral two times a day  influenza  Vaccine (HIGH DOSE) 0.5 milliLiter(s) IntraMuscular once  insulin glargine Injectable (LANTUS) 45 Unit(s) SubCutaneous every morning  insulin lispro (ADMELOG) corrective regimen sliding scale   SubCutaneous Before meals and at bedtime  losartan 50 milliGRAM(s) Oral daily  metoprolol succinate ER 50 milliGRAM(s) Oral daily  niCARdipine Infusion 5 mG/Hr (25 mL/Hr) IV Continuous <Continuous>  ondansetron   Disintegrating Tablet 4 milliGRAM(s) Oral every 6 hours  polyethylene glycol 3350 17 Gram(s) Oral daily  senna 2 Tablet(s) Oral at bedtime  sodium chloride 0.9%. 1000 milliLiter(s) (75 mL/Hr) IV Continuous <Continuous>    MEDICATIONS  (PRN):      Drug Dosing Weight  Height (cm): 188 (13 Sep 2024 07:38)  Weight (kg): 108.6 (13 Sep 2024 07:38)  BMI (kg/m2): 30.7 (13 Sep 2024 07:38)  BSA (m2): 2.35 (13 Sep 2024 07:38)    PAST MEDICAL & SURGICAL HISTORY:  DM (diabetes mellitus)  Colon cancer  HTN (hypertnsion)  Hyperlipidemia  Peptic ulcer  S/P bunionectomy  S/P inguinal hernia repair  History of colectomy  S/P arthroscopic knee surgery  History of repair of hiatal hernia      REVIEW OF SYSTEMS: [ ] Unable to Assess due to neurologic exam   [ ] All ROS addressed below are non-contributory, except:  Neuro: [ ] Headache [ ] Back pain [ ] Numbness [ ] Weakness [ ] Ataxia [ ] Dizziness [ ] Aphasia [ ] Dysarthria [ ] Visual disturbance  Resp: [ ] Shortness of breath/dyspnea, [ ] Orthopnea [ ] Cough  CV: [ ] Chest pain [ ] Palpitation [ ] Lightheadedness [ ] Syncope  Renal: [ ] Thirst [ ] Edema  GI: [ ] Nausea [ ] Emesis [ ] Abdominal pain [ ] Constipation [ ] Diarrhea  Hem: [ ] Hematemesis [ ] bright red blood per rectum  ID: [ ] Fever [ ] Chills [ ] Dysuria  ENT: [ ] Rhinorrhea    PHYSICAL EXAM:    General: No Acute Distress   Neurological: Awake, alert oriented to person, place and time, Following Commands, PERRL, EOMI, Face Symmetrical, Speech Fluent, Moving all extremities, Muscle Strength normal in all four extremities, No Drift, Sensation to Light Touch Intact, left eye reduced visual acuity   Pulmonary: Clear to Auscultation, No Rales, No Rhonchi, No Wheezes   Cardiovascular: S1, S2, Regular Rate and Rhythm  Gastrointestinal: Soft, Nontender, Nondistended   Extremities: No calf tenderness     ICU Vital Signs Last 24 Hrs  T(C): 36.4 (13 Sep 2024 14:14), Max: 36.4 (13 Sep 2024 14:14)  T(F): 97.6 (13 Sep 2024 14:14), Max: 97.6 (13 Sep 2024 14:14)  HR: 59 (13 Sep 2024 19:00) (48 - 79)  BP: 142/69 (13 Sep 2024 15:15) (134/64 - 195/75)  BP(mean): 99 (13 Sep 2024 15:15) (91 - 124)  ABP: 131/54 (13 Sep 2024 19:00) (131/54 - 184/62)  ABP(mean): 80 (13 Sep 2024 19:00) (80 - 114)  RR: 16 (13 Sep 2024 19:00) (12 - 112)  SpO2: 97% (13 Sep 2024 19:00) (96% - 100%)    O2 Parameters below as of 13 Sep 2024 19:00  Patient On (Oxygen Delivery Method): room air            I&O's Detail    13 Sep 2024 07:01  -  13 Sep 2024 19:39  --------------------------------------------------------  IN:    IV PiggyBack: 50 mL    NiCARdipine: 375 mL    sodium chloride 0.9%: 675 mL  Total IN: 1100 mL    OUT:    Indwelling Catheter - Urethral (mL): 2050 mL  Total OUT: 2050 mL    Total NET: -950 mL              LABS:  CBC Full  -  ( 13 Sep 2024 10:39 )  WBC Count : 5.00 K/uL  RBC Count : 4.25 M/uL  Hemoglobin : 11.6 g/dL  Hematocrit : 35.5 %  Platelet Count - Automated : 152 K/uL  Mean Cell Volume : 83.5 fl  Mean Cell Hemoglobin : 27.3 pg  Mean Cell Hemoglobin Concentration : 32.7 gm/dL  Auto Neutrophil # : x  Auto Lymphocyte # : x  Auto Monocyte # : x  Auto Eosinophil # : x  Auto Basophil # : x  Auto Neutrophil % : x  Auto Lymphocyte % : x  Auto Monocyte % : x  Auto Eosinophil % : x  Auto Basophil % : x    09-13    138  |  104  |  11  ----------------------------<  187<H>  3.8   |  25  |  0.89    Ca    8.2<L>      13 Sep 2024 10:39  Phos  2.7     09-13  Mg     1.7     09-13      PT/INR - ( 13 Sep 2024 10:39 )   PT: 12.1 sec;   INR: 1.06          PTT - ( 13 Sep 2024 10:39 )  PTT:21.3 sec  Urinalysis Basic - ( 13 Sep 2024 10:39 )    Color: x / Appearance: x / SG: x / pH: x  Gluc: 187 mg/dL / Ketone: x  / Bili: x / Urobili: x   Blood: x / Protein: x / Nitrite: x   Leuk Esterase: x / RBC: x / WBC x   Sq Epi: x / Non Sq Epi: x / Bacteria: x        RADIOLOGY & ADDITIONAL STUDIES:

## 2024-09-13 NOTE — BRIEF OPERATIVE NOTE - OPERATION/FINDINGS
Transphenoidal approach to resection of pituitary tumor  No CSF leak  Repaired with free mucosal graft from R middle turb and surgicel  Naqvi splints 2x
-TSH apparoached perfromed by ENT  -under Doppler control turcica sella of the dura opened and tumor removed   -complete hemostasis  -no CSF leak, no bleeding

## 2024-09-13 NOTE — H&P ADULT - ASSESSMENT
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 He has a history of diabetes, hypertension, peripheral arterial disease, colon cancer (status post-colectomy in 2011), left retinal detachment with residual vision loss, glaucoma, and bilateral cataract surgery. An MRI on September 29, 2023, confirmed an enlarged pituitary gland with suprasellar extension and mass effect on the optic chiasm, consistent with a pituitary macroadenoma.    Presented in OR for TSP pituitary macroadenoma removal.

## 2024-09-13 NOTE — BRIEF OPERATIVE NOTE - NSICDXBRIEFPROCEDURE_GEN_ALL_CORE_FT
PROCEDURES:  Endoscopic transphenoidal or transnasal resection of pituitary tumor 13-Sep-2024 09:54:47  Shant Clay  
PROCEDURES:  Endoscopic transphenoidal or transnasal resection of pituitary tumor 13-Sep-2024 09:54:47  Shant Clay

## 2024-09-13 NOTE — H&P ADULT - NSHPREVIEWOFSYSTEMS_GEN_ALL_CORE
Constitutional: 72 y/o male, awake, alert in no acute distress.  Eyes:  Sclera anicteric, conjunctiva noninjected.  ENMT: Oropharyngeal mucosa moist, pink. Tongue midline.    Neck: Neck supple, FROM.  No appreciable lymphadenopathy.  Back:  No pain to palpation/percussion of low back. No CVA tenderness.  Respiratory: Clear to auscultation bilaterally.  No rales, rhonchi, wheezes.  Cardiovascular: Regular rate and rhythm.  S1, S2 heard.  Gastrointestinal:  Soft, nontender, nondistended.  +BS.  Genitourinary:  Deferred.  Rectal: Deferred.  Vascular: Extremities warm, no ulcers, no discoloration of skin.   Neurological: Gen: AA&O x 3, conversant, appropriate.      CN II-XII grossly intact.    Motor:  5/5 throughout UE/LE.    Sens: Sensation intact to light touch throughout.    DTRs: 2+ symmetric throughout.    Hoffmans negative bilaterally.  Plantar downgoing bilaterally.  No clonus.      No pronator drift, no dysmetria.  Skin: Warm, dry, no erythema.

## 2024-09-13 NOTE — H&P ADULT - NSICDXPASTMEDICALHX_GEN_ALL_CORE_FT
PAST MEDICAL HISTORY:  Colon cancer     DM (diabetes mellitus)     HTN (hypertension)     Hyperlipidemia     Peptic ulcer     S/P bunionectomy

## 2024-09-13 NOTE — H&P ADULT - NSHPPHYSICALEXAM_GEN_ALL_CORE
ENMT: Oropharyngeal mucosa moist, pink. Tongue midline.    Neck: Neck supple, FROM.  No appreciable lymphadenopathy.  Back:  No pain to palpation/percussion of low back. No CVA tenderness.  Respiratory: Clear to auscultation bilaterally.  No rales, rhonchi, wheezes.  Cardiovascular: Regular rate and rhythm.  S1, S2 heard.  Gastrointestinal:  Soft, nontender, nondistended.  +BS.  Genitourinary:  Deferred.  Rectal: Deferred.  Vascular: Extremities warm, no ulcers, no discoloration of skin.   Neurological: Gen: AA&O x 3, conversant, appropriate.      CN II-XII grossly intact.    Motor:  5/5 throughout UE/LE.    Sens: Sensation intact to light touch throughout.    DTRs: 2+ symmetric throughout.    Hoffmans negative bilaterally.  Plantar downgoing bilaterally.  No clonus.      No pronator drift, no dysmetria.  Skin: Warm, dry, no erythema.

## 2024-09-13 NOTE — PROGRESS NOTE ADULT - SUBJECTIVE AND OBJECTIVE BOX
NEUROSURGERY POST OP NOTE:    POD# 0 S/P transsphenoidal resection of pituitary adenoma    S: Patient seen and examined at bedside in PACU. Patient reports 1/10 nasal pain, no other complaints.      T(C): 36.1 (09-13-24 @ 10:14), Max: 36.1 (09-13-24 @ 10:14)  HR: 68 (09-13-24 @ 12:29) (48 - 68)  BP: 136/63 (09-13-24 @ 12:29) (134/64 - 195/75)  RR: 22 (09-13-24 @ 12:29) (14 - 22)  SpO2: 98% (09-13-24 @ 12:29) (98% - 100%)      09-13-24 @ 07:01  -  09-13-24 @ 14:48  --------------------------------------------------------  IN: 375 mL / OUT: 1075 mL / NET: -700 mL        atorvastatin 20 milliGRAM(s) Oral at bedtime  cefTRIAXone   IVPB 1000 milliGRAM(s) IV Intermittent every 24 hours  chlorhexidine 2% Cloths 1 Application(s) Topical every 12 hours  famotidine    Tablet 40 milliGRAM(s) Oral two times a day  hydrALAZINE Injectable 10 milliGRAM(s) IV Push every 4 hours PRN  influenza  Vaccine (HIGH DOSE) 0.5 milliLiter(s) IntraMuscular once  insulin glargine Injectable (LANTUS) 45 Unit(s) SubCutaneous every morning  insulin lispro (ADMELOG) corrective regimen sliding scale   SubCutaneous Before meals and at bedtime  losartan 50 milliGRAM(s) Oral daily  metoprolol tartrate 25 milliGRAM(s) Oral daily  niCARdipine Infusion 5 mG/Hr IV Continuous <Continuous>  ondansetron   Disintegrating Tablet 4 milliGRAM(s) Oral every 6 hours  senna 2 Tablet(s) Oral at bedtime  sodium chloride 0.9%. 1000 milliLiter(s) IV Continuous <Continuous>      RADIOLOGY:     Exam:  General: NAD, pt is comfortably lying fowlers in bed, A&O x3, on RA  HEENT: CN II-XII grossly intact, PERRL, EOMI b/l, face symmetric, tongue midline  Cardiovascular: RRR, normal S1 and S2   Respiratory: lungs CTAB, no wheezing, rhonchi, or crackles   GI: abd soft, non-tender  Neuro: no aphasia, speech clear, no dysmetria, no pronator drift  strength 5/5 throughout all 4 extremities  sensation intact to light touch throughout   Extremities: radial pulses 2+, pedal pulses 1+  Wound/incision: clean gauze bandage below nose      Assessment: 73 y Male      Plan:      Assessment:  Present when checked    []  GCS  E   V  M     Heart Failure: []Acute, [] acute on chronic , []chronic  Heart Failure:  [] Diastolic (HFpEF), [] Systolic (HFrEF), []Combined (HFpEF and HFrEF), [] RHF, [] Pulm HTN, [] Other    [] FINA, [] ATN, [] AIN, [] other  [] CKD1, [] CKD2, [] CKD 3, [] CKD 4, [] CKD 5, []ESRD    Encephalopathy: [] Metabolic, [] Hepatic, [] toxic, [] Neurological, [] Other    Abnormal Nurtitional Status: [] malnurtition (see nutrition note), [ ]underweight: BMI < 19, [] morbid obesity: BMI >40, [] Cachexia    [] Sepsis  [] hypovolemic shock,[] cardiogenic shock, [] hemorrhagic shock, [] neuogenic shock  [] Acute Respiratory Failure  []Cerebral edema, [] Brain compression/ herniation,   [] Functional quadriplegia  [] Acute blood loss anemia     NEUROSURGERY POST OP NOTE:    POD# 0 S/P transsphenoidal resection of pituitary adenoma    S: Patient seen and examined at bedside in PACU. Patient reports 1/10 nasal pain, no other complaints.      T(C): 36.1 (09-13-24 @ 10:14), Max: 36.1 (09-13-24 @ 10:14)  HR: 68 (09-13-24 @ 12:29) (48 - 68)  BP: 136/63 (09-13-24 @ 12:29) (134/64 - 195/75)  RR: 22 (09-13-24 @ 12:29) (14 - 22)  SpO2: 98% (09-13-24 @ 12:29) (98% - 100%)      09-13-24 @ 07:01  -  09-13-24 @ 14:48  --------------------------------------------------------  IN: 375 mL / OUT: 1075 mL / NET: -700 mL        atorvastatin 20 milliGRAM(s) Oral at bedtime  cefTRIAXone   IVPB 1000 milliGRAM(s) IV Intermittent every 24 hours  chlorhexidine 2% Cloths 1 Application(s) Topical every 12 hours  famotidine    Tablet 40 milliGRAM(s) Oral two times a day  hydrALAZINE Injectable 10 milliGRAM(s) IV Push every 4 hours PRN  influenza  Vaccine (HIGH DOSE) 0.5 milliLiter(s) IntraMuscular once  insulin glargine Injectable (LANTUS) 45 Unit(s) SubCutaneous every morning  insulin lispro (ADMELOG) corrective regimen sliding scale   SubCutaneous Before meals and at bedtime  losartan 50 milliGRAM(s) Oral daily  metoprolol tartrate 25 milliGRAM(s) Oral daily  niCARdipine Infusion 5 mG/Hr IV Continuous <Continuous>  ondansetron   Disintegrating Tablet 4 milliGRAM(s) Oral every 6 hours  senna 2 Tablet(s) Oral at bedtime  sodium chloride 0.9%. 1000 milliLiter(s) IV Continuous <Continuous>      RADIOLOGY:     Exam:  General: NAD, pt is comfortably lying fowlers in bed, A&O x3, on RA  HEENT: R eye vision intact, L eye s-XII grossly intact, PERRL, EOMI b/l, face symmetric, tongue midline  Cardiovascular: RRR, normal S1 and S2   Respiratory: lungs CTAB, no wheezing, rhonchi, or crackles   GI: abd soft, non-tender  Neuro: no aphasia, speech clear, no dysmetria, no pronator drift  strength 5/5 throughout all 4 extremities  sensation intact to light touch throughout   Extremities: radial pulses 2+, pedal pulses 1+  Wound/incision: clean gauze bandage below nose      Assessment:   72 yo M PMH DM, HTN, PAD, colon CA s/p colectomy 2011, left retinal detachment with residual vision loss, glaucoma, b/l cataract surgery, 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 Now s/p TSP for pituitary macoadenoma 9/13/24.     Plan:   Neuro:   - neuro/vitals q1h  - pain control: cranial ERAS  - pending post op MRI sella  - skull base precautions: no straws, sneeze/cough with mouth open, no incentive spirometry, no bending over, no nasal cannula    ENT:  - saline sprays q6h to start 9/14  - shen splints b/l    Cardio:   - SBP <160  - cardene gtt  - TTE: 9/13: EF 55-60% aortic scerosis w/o stenosis    Pulm:   - face tent, wean to RA  - no IS due to skull base precautions    GI:  - ADAT  - bowel regimen     Endo:  - ISS, f/u A1C  - DI watch  - lantus 45 u in AM qd (home med)    Renal:   - IVF while advancing diet   - eason until POD1 (unless concern for DI)    Heme:  - SCDs for DVT ppx   - no SQL, fresh post-op    ID:  - afebrile   - CTX while inpatient per ENT    Dispo: NSICU, full code, pending PT/OT    Discussed w/ Dr. D'Amico and Dr. Osman    Assessment:  Present when checked    []  GCS  E   V  M     Heart Failure: []Acute, [] acute on chronic , []chronic  Heart Failure:  [] Diastolic (HFpEF), [] Systolic (HFrEF), []Combined (HFpEF and HFrEF), [] RHF, [] Pulm HTN, [] Other    [] FINA, [] ATN, [] AIN, [] other  [] CKD1, [] CKD2, [] CKD 3, [] CKD 4, [] CKD 5, []ESRD    Encephalopathy: [] Metabolic, [] Hepatic, [] toxic, [] Neurological, [] Other    Abnormal Nurtitional Status: [] malnurtition (see nutrition note), [ ]underweight: BMI < 19, [] morbid obesity: BMI >40, [] Cachexia    [] Sepsis  [] hypovolemic shock,[] cardiogenic shock, [] hemorrhagic shock, [] neuogenic shock  [] Acute Respiratory Failure  []Cerebral edema, [] Brain compression/ herniation,   [] Functional quadriplegia  [] Acute blood loss anemia     NEUROSURGERY POST OP NOTE:    POD# 0 S/P transsphenoidal resection of pituitary adenoma    S: Patient seen and examined at bedside in PACU. Patient reports 1/10 nasal pain, no other complaints.      T(C): 36.1 (09-13-24 @ 10:14), Max: 36.1 (09-13-24 @ 10:14)  HR: 68 (09-13-24 @ 12:29) (48 - 68)  BP: 136/63 (09-13-24 @ 12:29) (134/64 - 195/75)  RR: 22 (09-13-24 @ 12:29) (14 - 22)  SpO2: 98% (09-13-24 @ 12:29) (98% - 100%)      09-13-24 @ 07:01  -  09-13-24 @ 14:48  --------------------------------------------------------  IN: 375 mL / OUT: 1075 mL / NET: -700 mL        atorvastatin 20 milliGRAM(s) Oral at bedtime  cefTRIAXone   IVPB 1000 milliGRAM(s) IV Intermittent every 24 hours  chlorhexidine 2% Cloths 1 Application(s) Topical every 12 hours  famotidine    Tablet 40 milliGRAM(s) Oral two times a day  hydrALAZINE Injectable 10 milliGRAM(s) IV Push every 4 hours PRN  influenza  Vaccine (HIGH DOSE) 0.5 milliLiter(s) IntraMuscular once  insulin glargine Injectable (LANTUS) 45 Unit(s) SubCutaneous every morning  insulin lispro (ADMELOG) corrective regimen sliding scale   SubCutaneous Before meals and at bedtime  losartan 50 milliGRAM(s) Oral daily  metoprolol tartrate 25 milliGRAM(s) Oral daily  niCARdipine Infusion 5 mG/Hr IV Continuous <Continuous>  ondansetron   Disintegrating Tablet 4 milliGRAM(s) Oral every 6 hours  senna 2 Tablet(s) Oral at bedtime  sodium chloride 0.9%. 1000 milliLiter(s) IV Continuous <Continuous>      RADIOLOGY:     Exam:  General: NAD, pt is comfortably lying fowlers in bed, A&O x3, on RA  HEENT: R eye vision intact, L eye s-XII grossly intact, PERRL, EOMI b/l, face symmetric, tongue midline  Cardiovascular: RRR, normal S1 and S2   Respiratory: lungs CTAB, no wheezing, rhonchi, or crackles   GI: abd soft, non-tender  Neuro: no aphasia, speech clear, no dysmetria, no pronator drift  strength 5/5 throughout all 4 extremities  sensation intact to light touch throughout   Extremities: radial pulses 2+, pedal pulses 1+  Wound/incision: clean gauze bandage below nose      Assessment:   74 yo M PMH DM, HTN, PAD, colon CA s/p colectomy 2011, left retinal detachment with residual vision loss, glaucoma, b/l cataract surgery, 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 Now s/p TSP for pituitary macoadenoma 9/13/24.     Plan:   Neuro:   - neuro/vitals q1h  - pain control: cranial ERAS  - acetaminophen 1g q8  - pending post op MRI sella  - skull base precautions: no straws, sneeze/cough with mouth open, no incentive spirometry, no bending over, no nasal cannula    ENT:  - saline sprays q6h to start 9/14  - shen splints b/l    Cardio:   - SBP <160  - cardene gtt  - atorvastatin 20 mg qd at bedtime (home med)  - TTE: 9/13: EF 55-60% aortic scerosis w/o stenosis    Pulm:   - face tent, wean to RA  - no IS due to skull base precautions    GI:  - ADAT  - bowel regimen     Endo:  - ISS, f/u A1C  - DI watch  - lantus 45 u in AM qd (home med)    Renal:   - IVF while advancing diet   - eason until POD1 (unless concern for DI)    Heme:  - SCDs for DVT ppx   - no SQL, fresh post-op    ID:  - afebrile   - CTX while inpatient per ENT    Dispo: NSICU, full code, pending PT/OT    Discussed w/ Dr. D'Amico and Dr. Osman    Assessment:  Present when checked    []  GCS  E   V  M     Heart Failure: []Acute, [] acute on chronic , []chronic  Heart Failure:  [] Diastolic (HFpEF), [] Systolic (HFrEF), []Combined (HFpEF and HFrEF), [] RHF, [] Pulm HTN, [] Other    [] FINA, [] ATN, [] AIN, [] other  [] CKD1, [] CKD2, [] CKD 3, [] CKD 4, [] CKD 5, []ESRD    Encephalopathy: [] Metabolic, [] Hepatic, [] toxic, [] Neurological, [] Other    Abnormal Nurtitional Status: [] malnurtition (see nutrition note), [ ]underweight: BMI < 19, [] morbid obesity: BMI >40, [] Cachexia    [] Sepsis  [] hypovolemic shock,[] cardiogenic shock, [] hemorrhagic shock, [] neuogenic shock  [] Acute Respiratory Failure  []Cerebral edema, [] Brain compression/ herniation,   [] Functional quadriplegia  [] Acute blood loss anemia     NEUROSURGERY POST OP NOTE:    POD# 0 S/P transsphenoidal resection of pituitary adenoma    S: Patient seen and examined at bedside in PACU. Patient reports 1/10 nasal pain, no other complaints.      T(C): 36.1 (09-13-24 @ 10:14), Max: 36.1 (09-13-24 @ 10:14)  HR: 68 (09-13-24 @ 12:29) (48 - 68)  BP: 136/63 (09-13-24 @ 12:29) (134/64 - 195/75)  RR: 22 (09-13-24 @ 12:29) (14 - 22)  SpO2: 98% (09-13-24 @ 12:29) (98% - 100%)      09-13-24 @ 07:01  -  09-13-24 @ 14:48  --------------------------------------------------------  IN: 375 mL / OUT: 1075 mL / NET: -700 mL        atorvastatin 20 milliGRAM(s) Oral at bedtime  cefTRIAXone   IVPB 1000 milliGRAM(s) IV Intermittent every 24 hours  chlorhexidine 2% Cloths 1 Application(s) Topical every 12 hours  famotidine    Tablet 40 milliGRAM(s) Oral two times a day  hydrALAZINE Injectable 10 milliGRAM(s) IV Push every 4 hours PRN  influenza  Vaccine (HIGH DOSE) 0.5 milliLiter(s) IntraMuscular once  insulin glargine Injectable (LANTUS) 45 Unit(s) SubCutaneous every morning  insulin lispro (ADMELOG) corrective regimen sliding scale   SubCutaneous Before meals and at bedtime  losartan 50 milliGRAM(s) Oral daily  metoprolol tartrate 25 milliGRAM(s) Oral daily  niCARdipine Infusion 5 mG/Hr IV Continuous <Continuous>  ondansetron   Disintegrating Tablet 4 milliGRAM(s) Oral every 6 hours  senna 2 Tablet(s) Oral at bedtime  sodium chloride 0.9%. 1000 milliLiter(s) IV Continuous <Continuous>      RADIOLOGY:     Exam:  General: NAD, pt is comfortably lying fowlers in bed, A&O x3, on RA  HEENT: R visual field intact, R visual field diminished (pt baseline from retinal detachment), CNs III-XII grossly intact, PERRL, EOMI b/l, face symmetric, tongue midline  Cardiovascular: RRR, normal S1 and S2   Respiratory: lungs CTAB, no wheezing, rhonchi, or crackles   GI: abd soft, non-tender  Neuro: no aphasia, speech clear, no dysmetria, no pronator drift  strength 5/5 throughout all 4 extremities  sensation intact to light touch throughout   Extremities: radial pulses 2+, pedal pulses 1+  Wound/incision: clean gauze bandage secured below nose      Assessment:   72 yo M PMH DM, HTN, PAD, colon CA s/p colectomy 2011, left retinal detachment with residual vision loss, glaucoma, b/l cataract surgery, 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 Now s/p TSP for pituitary macoadenoma 9/13/24.     Plan:   Neuro:   - neuro/vitals q1h  - pain control: cranial ERAS  - acetaminophen 1g q8  - pending post op MRI sella  - skull base precautions: no straws, sneeze/cough with mouth open, no incentive spirometry, no bending over, no nasal cannula    ENT:  - saline sprays q6h to start 9/14  - shen splints b/l    Cardio:   - SBP <160  - cardene gtt  - atorvastatin 20 mg qd at bedtime (home med)  - losartan 50 mg PO qd (home med)  - metoprolol tartrate 25 mg PO qd (home med)  - TTE: 9/13: EF 55-60% aortic sclerosis w/o stenosis    Pulm:   - face tent, wean to RA  - no IS due to skull base precautions    GI:  - ADAT  - bowel regimen   - pepcid 40 mg PO BID (home med)    Endo:  - ISS, f/u A1C  - DI watch  - lantus 45u qd in morning (home med)    Renal:   - IVF while advancing diet   - eason until POD1 (unless concern for DI)    Heme:  - SCDs for DVT ppx   - no SQL, fresh post-op    ID:  - afebrile   - CTX while inpatient per ENT    Dispo: NSICU, full code, pending PT/OT    Discussed w/ Dr. D'Amico and Dr. Osman   NEUROSURGERY POST OP NOTE:    POD# 0 S/P transsphenoidal resection of pituitary adenoma    S: Patient seen and examined at bedside in PACU. patient reports 1/10 nasal pain, no other complaints.      T(C): 36.1 (09-13-24 @ 10:14), Max: 36.1 (09-13-24 @ 10:14)  HR: 68 (09-13-24 @ 12:29) (48 - 68)  BP: 136/63 (09-13-24 @ 12:29) (134/64 - 195/75)  RR: 22 (09-13-24 @ 12:29) (14 - 22)  SpO2: 98% (09-13-24 @ 12:29) (98% - 100%)      09-13-24 @ 07:01  -  09-13-24 @ 14:48  --------------------------------------------------------  IN: 375 mL / OUT: 1075 mL / NET: -700 mL      Labs:             11.6   5.00  )-----------( 152      ( 13 Sep 2024 10:39 )             35.5   09-13    138  |  104  |  11  ----------------------------<  187<H>  3.8   |  25  |  0.89    Ca    8.2<L>      13 Sep 2024 10:39  Phos  2.7     09-13  Mg     1.7     09-13            atorvastatin 20 milliGRAM(s) Oral at bedtime  cefTRIAXone   IVPB 1000 milliGRAM(s) IV Intermittent every 24 hours  chlorhexidine 2% Cloths 1 Application(s) Topical every 12 hours  famotidine    Tablet 40 milliGRAM(s) Oral two times a day  hydrALAZINE Injectable 10 milliGRAM(s) IV Push every 4 hours PRN  influenza  Vaccine (HIGH DOSE) 0.5 milliLiter(s) IntraMuscular once  insulin glargine Injectable (LANTUS) 45 Unit(s) SubCutaneous every morning  insulin lispro (ADMELOG) corrective regimen sliding scale   SubCutaneous Before meals and at bedtime  losartan 50 milliGRAM(s) Oral daily  metoprolol tartrate 25 milliGRAM(s) Oral daily  niCARdipine Infusion 5 mG/Hr IV Continuous <Continuous>  ondansetron   Disintegrating Tablet 4 milliGRAM(s) Oral every 6 hours  senna 2 Tablet(s) Oral at bedtime  sodium chloride 0.9%. 1000 milliLiter(s) IV Continuous <Continuous>      Exam:  General: NAD, pt is comfortably lying in bed, AA&O x3  HEENT: R visual field intact, left visual field diminished (baseline from retinal detachment), PERRL, EOMI b/l.  Cardiovascular: RRR, normal S1 and S2 1  Respiratory: lungs CTAB, no wheezing, rhonchi, or crackles   GI: soft, non-tender  Neuro: CNs II-XII intact. Speech clear, no dysmetria, no pronator drift  strength 5/5 throughout all 4 extremities.  sensation intact to light touch throughout.  Extremities: radial pulses 2+, pedal pulses 1+  Wound/incision: clean gauze bandage secured below nose.      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 Now s/p TSP for pituitary macroadenoma 9/13/24.     Plan:   Neuro:   - neuro/vitals q1h  - pain control: cranial ERAS, acetaminophen 1g q8  - pending post op MRI sella  - skull base precautions: no straws, sneeze/cough with mouth open, no incentive spirometry, no bending over, no nasal cannula    ENT:  - saline sprays q6h to start 9/14  - shen splints b/l, ENT to follow.    Cardio:   - SBP <160, Cardene gtt PRN  - HLD: Atorvastatin 20 mg qd at bedtime  - HTN: losartan 50 mg PO qd, metoprolol tartrate 25 mg PO qd   - TTE: 9/13: EF 55-60% aortic sclerosis w/o stenosis    Pulm:   - face tent, wean to RA  - no IS due to skull base precautions    GI:  - ADAT  - bowel regimen  - GERD: Pepcid 40 mg PO BID    Endo:  - ISS, f/u A1C  - DI watch: monitor urine output, I&Os  - Lantus 45u qd in morning, but will hold Humalog and Metformin at this time and monitor.    Renal:   - IVF while advancing diet   - Courtney until POD1 (unless concern for DI)    Heme:  - SCDs for DVT ppx   - no SQL at this time    ID:  - afebrile   - Cetriaxone while inpatient per ENT    Dispo:  - NSICU, full code, pending PT/OT  - Discussed w/ Dr. D'Amico and Dr. Osman

## 2024-09-14 LAB
A1C WITH ESTIMATED AVERAGE GLUCOSE RESULT: 7.4 % — HIGH (ref 4–5.6)
ADD ON TEST-SPECIMEN IN LAB: SIGNIFICANT CHANGE UP
ANION GAP SERPL CALC-SCNC: 7 MMOL/L — SIGNIFICANT CHANGE UP (ref 5–17)
ANION GAP SERPL CALC-SCNC: 8 MMOL/L — SIGNIFICANT CHANGE UP (ref 5–17)
BUN SERPL-MCNC: 11 MG/DL — SIGNIFICANT CHANGE UP (ref 7–23)
BUN SERPL-MCNC: 14 MG/DL — SIGNIFICANT CHANGE UP (ref 7–23)
CALCIUM SERPL-MCNC: 8.2 MG/DL — LOW (ref 8.4–10.5)
CALCIUM SERPL-MCNC: 8.4 MG/DL — SIGNIFICANT CHANGE UP (ref 8.4–10.5)
CHLORIDE SERPL-SCNC: 103 MMOL/L — SIGNIFICANT CHANGE UP (ref 96–108)
CHLORIDE SERPL-SCNC: 106 MMOL/L — SIGNIFICANT CHANGE UP (ref 96–108)
CO2 SERPL-SCNC: 23 MMOL/L — SIGNIFICANT CHANGE UP (ref 22–31)
CO2 SERPL-SCNC: 24 MMOL/L — SIGNIFICANT CHANGE UP (ref 22–31)
CORTIS AM PEAK SERPL-MCNC: 18.72 UG/DL — HIGH (ref 6.02–18.4)
CREAT SERPL-MCNC: 0.94 MG/DL — SIGNIFICANT CHANGE UP (ref 0.5–1.3)
CREAT SERPL-MCNC: 0.98 MG/DL — SIGNIFICANT CHANGE UP (ref 0.5–1.3)
EGFR: 82 ML/MIN/1.73M2 — SIGNIFICANT CHANGE UP
EGFR: 86 ML/MIN/1.73M2 — SIGNIFICANT CHANGE UP
ESTIMATED AVERAGE GLUCOSE: 166 MG/DL — HIGH (ref 68–114)
GLUCOSE BLDC GLUCOMTR-MCNC: 148 MG/DL — HIGH (ref 70–99)
GLUCOSE BLDC GLUCOMTR-MCNC: 153 MG/DL — HIGH (ref 70–99)
GLUCOSE BLDC GLUCOMTR-MCNC: 189 MG/DL — HIGH (ref 70–99)
GLUCOSE BLDC GLUCOMTR-MCNC: 201 MG/DL — HIGH (ref 70–99)
GLUCOSE SERPL-MCNC: 156 MG/DL — HIGH (ref 70–99)
GLUCOSE SERPL-MCNC: 167 MG/DL — HIGH (ref 70–99)
HCT VFR BLD CALC: 37 % — LOW (ref 39–50)
HGB BLD-MCNC: 12.3 G/DL — LOW (ref 13–17)
INSULIN-LIKE GROWTH FACTOR 1 INTERPRETATION: SIGNIFICANT CHANGE UP
INSULIN-LIKE GROWTH FACTOR 1: 63 NG/ML — SIGNIFICANT CHANGE UP (ref 25–242)
MAGNESIUM SERPL-MCNC: 2.1 MG/DL — SIGNIFICANT CHANGE UP (ref 1.6–2.6)
MCHC RBC-ENTMCNC: 27.5 PG — SIGNIFICANT CHANGE UP (ref 27–34)
MCHC RBC-ENTMCNC: 33.2 GM/DL — SIGNIFICANT CHANGE UP (ref 32–36)
MCV RBC AUTO: 82.6 FL — SIGNIFICANT CHANGE UP (ref 80–100)
NRBC # BLD: 0 /100 WBCS — SIGNIFICANT CHANGE UP (ref 0–0)
OSMOLALITY SERPL: 287 MOSMOL/KG — SIGNIFICANT CHANGE UP (ref 280–301)
PHOSPHATE SERPL-MCNC: 2.7 MG/DL — SIGNIFICANT CHANGE UP (ref 2.5–4.5)
PLATELET # BLD AUTO: 177 K/UL — SIGNIFICANT CHANGE UP (ref 150–400)
POTASSIUM SERPL-MCNC: 4.1 MMOL/L — SIGNIFICANT CHANGE UP (ref 3.5–5.3)
POTASSIUM SERPL-MCNC: 4.2 MMOL/L — SIGNIFICANT CHANGE UP (ref 3.5–5.3)
POTASSIUM SERPL-SCNC: 4.1 MMOL/L — SIGNIFICANT CHANGE UP (ref 3.5–5.3)
POTASSIUM SERPL-SCNC: 4.2 MMOL/L — SIGNIFICANT CHANGE UP (ref 3.5–5.3)
RBC # BLD: 4.48 M/UL — SIGNIFICANT CHANGE UP (ref 4.2–5.8)
RBC # FLD: 13.3 % — SIGNIFICANT CHANGE UP (ref 10.3–14.5)
SODIUM SERPL-SCNC: 134 MMOL/L — LOW (ref 135–145)
SODIUM SERPL-SCNC: 137 MMOL/L — SIGNIFICANT CHANGE UP (ref 135–145)
T4 FREE SERPL-MCNC: 0.73 NG/DL — LOW (ref 0.93–1.7)
WBC # BLD: 9.32 K/UL — SIGNIFICANT CHANGE UP (ref 3.8–10.5)
WBC # FLD AUTO: 9.32 K/UL — SIGNIFICANT CHANGE UP (ref 3.8–10.5)

## 2024-09-14 PROCEDURE — 99232 SBSQ HOSP IP/OBS MODERATE 35: CPT

## 2024-09-14 PROCEDURE — 70553 MRI BRAIN STEM W/O & W/DYE: CPT | Mod: 26

## 2024-09-14 RX ORDER — INSULIN GLARGINE 100 [IU]/ML
8 INJECTION, SOLUTION SUBCUTANEOUS AT BEDTIME
Refills: 0 | Status: DISCONTINUED | OUTPATIENT
Start: 2024-09-14 | End: 2024-09-16

## 2024-09-14 RX ORDER — GABAPENTIN 100 MG
300 CAPSULE ORAL THREE TIMES A DAY
Refills: 0 | Status: DISCONTINUED | OUTPATIENT
Start: 2024-09-14 | End: 2024-09-16

## 2024-09-14 RX ORDER — INSULIN GLARGINE 100 [IU]/ML
10 INJECTION, SOLUTION SUBCUTANEOUS AT BEDTIME
Refills: 0 | Status: DISCONTINUED | OUTPATIENT
Start: 2024-09-14 | End: 2024-09-14

## 2024-09-14 RX ORDER — POTASSIUM CHLORIDE 10 MEQ
20 TABLET, EXT RELEASE, PARTICLES/CRYSTALS ORAL ONCE
Refills: 0 | Status: COMPLETED | OUTPATIENT
Start: 2024-09-14 | End: 2024-09-14

## 2024-09-14 RX ORDER — ENOXAPARIN SODIUM 100 MG/ML
40 INJECTION SUBCUTANEOUS
Refills: 0 | Status: DISCONTINUED | OUTPATIENT
Start: 2024-09-14 | End: 2024-09-14

## 2024-09-14 RX ORDER — PANTOPRAZOLE SODIUM 40 MG
40 TABLET, DELAYED RELEASE (ENTERIC COATED) ORAL
Refills: 0 | Status: DISCONTINUED | OUTPATIENT
Start: 2024-09-15 | End: 2024-09-16

## 2024-09-14 RX ORDER — ENOXAPARIN SODIUM 100 MG/ML
40 INJECTION SUBCUTANEOUS
Refills: 0 | Status: DISCONTINUED | OUTPATIENT
Start: 2024-09-15 | End: 2024-09-16

## 2024-09-14 RX ORDER — LEVOTHYROXINE SODIUM 100 MCG
50 TABLET ORAL DAILY
Refills: 0 | Status: DISCONTINUED | OUTPATIENT
Start: 2024-09-14 | End: 2024-09-16

## 2024-09-14 RX ADMIN — BRIMONIDINE TARTRATE 1 DROP(S): 2 SOLUTION/ DROPS OPHTHALMIC at 05:39

## 2024-09-14 RX ADMIN — ONDANSETRON 4 MILLIGRAM(S): 2 INJECTION, SOLUTION INTRAMUSCULAR; INTRAVENOUS at 23:28

## 2024-09-14 RX ADMIN — ACETAMINOPHEN 1000 MILLIGRAM(S): 325 TABLET ORAL at 05:33

## 2024-09-14 RX ADMIN — Medication 1 SPRAY(S): at 23:28

## 2024-09-14 RX ADMIN — Medication 1 SPRAY(S): at 12:11

## 2024-09-14 RX ADMIN — Medication 4: at 07:10

## 2024-09-14 RX ADMIN — Medication 2: at 12:50

## 2024-09-14 RX ADMIN — Medication 300 MILLIGRAM(S): at 22:54

## 2024-09-14 RX ADMIN — Medication 20 MILLIEQUIVALENT(S): at 07:01

## 2024-09-14 RX ADMIN — ONDANSETRON 4 MILLIGRAM(S): 2 INJECTION, SOLUTION INTRAMUSCULAR; INTRAVENOUS at 12:12

## 2024-09-14 RX ADMIN — ONDANSETRON 4 MILLIGRAM(S): 2 INJECTION, SOLUTION INTRAMUSCULAR; INTRAVENOUS at 00:07

## 2024-09-14 RX ADMIN — Medication 1 SPRAY(S): at 05:32

## 2024-09-14 RX ADMIN — ACETAMINOPHEN 1000 MILLIGRAM(S): 325 TABLET ORAL at 22:54

## 2024-09-14 RX ADMIN — FAMOTIDINE 40 MILLIGRAM(S): 10 INJECTION INTRAVENOUS at 18:19

## 2024-09-14 RX ADMIN — Medication 2: at 23:28

## 2024-09-14 RX ADMIN — BRIMONIDINE TARTRATE 1 DROP(S): 2 SOLUTION/ DROPS OPHTHALMIC at 18:20

## 2024-09-14 RX ADMIN — METOPROLOL TARTRATE 50 MILLIGRAM(S): 100 TABLET ORAL at 05:33

## 2024-09-14 RX ADMIN — FAMOTIDINE 40 MILLIGRAM(S): 10 INJECTION INTRAVENOUS at 05:33

## 2024-09-14 RX ADMIN — POLYETHYLENE GLYCOL 3350 17 GRAM(S): 17 POWDER, FOR SOLUTION ORAL at 12:11

## 2024-09-14 RX ADMIN — Medication 20 MILLIGRAM(S): at 22:54

## 2024-09-14 RX ADMIN — LOSARTAN POTASSIUM 50 MILLIGRAM(S): 50 TABLET ORAL at 05:33

## 2024-09-14 RX ADMIN — ONDANSETRON 4 MILLIGRAM(S): 2 INJECTION, SOLUTION INTRAMUSCULAR; INTRAVENOUS at 18:19

## 2024-09-14 RX ADMIN — Medication 100 MILLIGRAM(S): at 18:18

## 2024-09-14 RX ADMIN — ACETAMINOPHEN 1000 MILLIGRAM(S): 325 TABLET ORAL at 14:45

## 2024-09-14 RX ADMIN — CHLORHEXIDINE GLUCONATE 1 APPLICATION(S): 40 SOLUTION TOPICAL at 05:33

## 2024-09-14 RX ADMIN — ONDANSETRON 4 MILLIGRAM(S): 2 INJECTION, SOLUTION INTRAMUSCULAR; INTRAVENOUS at 05:33

## 2024-09-14 RX ADMIN — Medication 1 SPRAY(S): at 18:19

## 2024-09-14 RX ADMIN — Medication 40 MILLIGRAM(S): at 12:12

## 2024-09-14 RX ADMIN — ACETAMINOPHEN 1000 MILLIGRAM(S): 325 TABLET ORAL at 06:20

## 2024-09-14 RX ADMIN — ACETAMINOPHEN 1000 MILLIGRAM(S): 325 TABLET ORAL at 14:15

## 2024-09-14 RX ADMIN — Medication 2 TABLET(S): at 22:54

## 2024-09-14 RX ADMIN — ACETAMINOPHEN 1000 MILLIGRAM(S): 325 TABLET ORAL at 23:54

## 2024-09-14 RX ADMIN — INSULIN GLARGINE 8 UNIT(S): 100 INJECTION, SOLUTION SUBCUTANEOUS at 23:24

## 2024-09-14 RX ADMIN — Medication 300 MILLIGRAM(S): at 14:15

## 2024-09-14 NOTE — PROGRESS NOTE ADULT - SUBJECTIVE AND OBJECTIVE BOX
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 He has a history of diabetes, hypertension, peripheral arterial disease, colon cancer (status post-colectomy in 2011), left retinal detachment with residual vision loss, glaucoma, and bilateral cataract surgery. Last fall, he was hospitalized for toe amputations, during which brain imaging revealed a pituitary mass. An MRI on September 29, 2023, confirmed an enlarged pituitary gland with suprasellar extension and mass effect on the optic chiasm, consistent with a pituitary macroadenoma ( 1.6 x 1.6 x 1.4 cm (AP by transverse by craniocaudal)). Additionally, the imaging revealed chronic microvascular ischemic disease and bilateral exophthalmos.  Mr. Vanegas reports no worsening of vision since his retinal detachment but continues to experience left vision loss. He denies any new symptoms such as headaches or dizziness. His recent endocrinology labs showed a low Free T4 level, though other values were normal.  Physical examination and recent imaging suggest the pituitary mass is stable or has minimally decreased in size, with some atrophy of the optic nerves and optic chiasm. The suprasellar mass component elevates and bows the optic chiasm, with some volume loss possibly related to his severe bilateral glaucoma.  After discussing surgical intervention with Dr. D'Amico, including risks, benefits, and alternatives, Mr. Vanegas agreed to proceed with surgery. The patient was informed of the treatment plan and next steps, with all questions addressed. He demonstrated a clear understanding of the plan and agreed to proceed with the recommended interventions.    Presented in OR for TSP pituitary macroadenoma removal.   (13 Sep 2024 07:28)    OVERNIGHT EVENTS: ANAND meeks w/ strict I&O's    [facility] Course:   9/13: POD 0 TSP.   9/14: POD 1 TSP. LEANDER ovn.     Vital Signs Last 24 Hrs  T(C): 36.8 (13 Sep 2024 22:17), Max: 36.8 (13 Sep 2024 22:17)  T(F): 98.2 (13 Sep 2024 22:17), Max: 98.2 (13 Sep 2024 22:17)  HR: 64 (14 Sep 2024 00:00) (48 - 79)  BP: 142/69 (13 Sep 2024 15:15) (134/64 - 195/75)  BP(mean): 99 (13 Sep 2024 15:15) (91 - 124)  RR: 16 (14 Sep 2024 00:00) (12 - 22)  SpO2: 97% (14 Sep 2024 00:00) (95% - 100%)    Parameters below as of 14 Sep 2024 00:00  Patient On (Oxygen Delivery Method): room air        I&O's Summary    13 Sep 2024 07:01  -  14 Sep 2024 00:31  --------------------------------------------------------  IN: 1575 mL / OUT: 3055 mL / NET: -1480 mL        PHYSICAL EXAM:  General: patient seen laying supine in bed in NAD on RA  Neuro: AAOx3, FC, OE spontaneously, speech clear and fluent, CNII-XI grossly intact, face symmetric, no pronator drift,    strength 5/5 b/l UE and LE, sensation intact to light touch throughout  HEENT: PERRL, EOMI (L visual deficits at baseline)   Neck: supple  Cardiac: RRR, S1S2  Pulmonary: chest rise symmetric  Abdomen: soft, nontender, nondistended  Ext: perfusing well  Skin: warm, dry        LABS:                        11.6   5.00  )-----------( 152      ( 13 Sep 2024 10:39 )             35.5     09-13    138  |  104  |  11  ----------------------------<  187<H>  3.8   |  25  |  0.89    Ca    8.2<L>      13 Sep 2024 10:39  Phos  2.7     09-13  Mg     1.7     09-13      PT/INR - ( 13 Sep 2024 10:39 )   PT: 12.1 sec;   INR: 1.06          PTT - ( 13 Sep 2024 10:39 )  PTT:21.3 sec  Urinalysis Basic - ( 13 Sep 2024 10:39 )    Color: x / Appearance: x / SG: x / pH: x  Gluc: 187 mg/dL / Ketone: x  / Bili: x / Urobili: x   Blood: x / Protein: x / Nitrite: x   Leuk Esterase: x / RBC: x / WBC x   Sq Epi: x / Non Sq Epi: x / Bacteria: x      CARDIAC MARKERS ( 13 Sep 2024 10:39 )  x     / x     / x     / x     / 3.6 ng/mL      CAPILLARY BLOOD GLUCOSE      POCT Blood Glucose.: 191 mg/dL (13 Sep 2024 22:05)  POCT Blood Glucose.: 215 mg/dL (13 Sep 2024 15:53)  POCT Blood Glucose.: 174 mg/dL (13 Sep 2024 12:01)  POCT Blood Glucose.: 218 mg/dL (13 Sep 2024 10:25)      Drug Levels: [] N/A    CSF Analysis: [] N/A      Allergies    Percocet 5/325 (Other)  Polyester (Hives; Rash)    Intolerances      MEDICATIONS:  Antibiotics:  cefTRIAXone   IVPB 1000 milliGRAM(s) IV Intermittent every 24 hours    Neuro:  acetaminophen     Tablet .. 1000 milliGRAM(s) Oral every 8 hours  ondansetron   Disintegrating Tablet 4 milliGRAM(s) Oral every 6 hours    Anticoagulation:    OTHER:  atorvastatin 20 milliGRAM(s) Oral at bedtime  brimonidine 0.2% Ophthalmic Solution 1 Drop(s) Left EYE two times a day  chlorhexidine 2% Cloths 1 Application(s) Topical every 12 hours  famotidine    Tablet 40 milliGRAM(s) Oral two times a day  influenza  Vaccine (HIGH DOSE) 0.5 milliLiter(s) IntraMuscular once  insulin glargine Injectable (LANTUS) 45 Unit(s) SubCutaneous every morning  insulin lispro (ADMELOG) corrective regimen sliding scale   SubCutaneous Before meals and at bedtime  losartan 50 milliGRAM(s) Oral daily  metoprolol succinate ER 50 milliGRAM(s) Oral daily  pantoprazole  Injectable 40 milliGRAM(s) IV Push daily  polyethylene glycol 3350 17 Gram(s) Oral daily  senna 2 Tablet(s) Oral at bedtime  sodium chloride 0.65% Nasal 1 Spray(s) Both Nostrils every 6 hours    IVF:    CULTURES:    RADIOLOGY & ADDITIONAL TESTS:      ASSESSMENT:  72 yo M PMH DM, HTN, PAD, colon CA s/p colectomy 2011, left retinal detachment with residual vision loss, glaucoma, b/l cataract surgery, pituitary macroadenoma with 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 Now s/p TSP for pituitary macroadenoma 9/13/24.    D35.2    No pertinent family history in first degree relatives    Handoff    DM (diabetes mellitus)    Colon cancer    HTN (hypertension)    Hyperlipidemia    Peptic ulcer    S/P bunionectomy    No pertinent past medical history    DM (diabetes mellitus)    Pituitary adenoma    Non-functioning pituitary adenoma    Non-functioning pituitary adenoma    Pituitary adenoma    Endoscopic transphenoidal or transnasal resection of pituitary tumor    No significant past surgical history    S/P inguinal hernia repair    History of colectomy    S/P arthroscopic knee surgery    History of repair of hiatal hernia    No significant past surgical history    SysAdmin_VstLnk        PLAN:  Neuro:   - neuro/vitals q1h  - pain control: cranial ERAS, acetaminophen 1g q8  - pending post op MRI sella  - skull base precautions: no straws, sneeze/cough with mouth open, no incentive spirometry, no bending over, no nasal cannula    ENT:  - saline sprays q6h to start 9/14  - shen splints b/l, ENT to follow.    Cardio:   - SBP <160  - HLD: Atorvastatin 20 mg qd at bedtime  - HTN: losartan 50 mg PO qd, metoprolol tartrate 25 mg PO qd   - TTE: 9/13: EF 55-60% aortic sclerosis w/o stenosis    Pulm:   - RA  - no IS due to skull base precautions    GI:  - CCD  - bowel regimen  - GERD: Pepcid 40 mg PO BID, Protonix 40 daily    Endo:  - ISS, f/u A1C  - DI watch: monitor urine output, I&Os  - Lantus 45u qd in morning, but will hold Humalog and Metformin at this time and monitor.    Renal:   - IVL  - Courtney until POD1 (unless concern for DI)    Heme:  - SCDs for DVT ppx   - no SQL at this time    ID:  - afebrile   - Cetriaxone while inpatient per ENT    Dispo:  - NSICU, full code, pending PT/OT  - Discussed w/ Dr. D'Amico and Dr. Osman    Assessment:  Present when checked    []  GCS  E   V  M     Heart Failure: []Acute, [] acute on chronic , []chronic  Heart Failure:  [] Diastolic (HFpEF), [] Systolic (HFrEF), []Combined (HFpEF and HFrEF), [] RHF, [] Pulm HTN, [] Other    [] FINA, [] ATN, [] AIN, [] other  [] CKD1, [] CKD2, [] CKD 3, [] CKD 4, [] CKD 5, []ESRD    Encephalopathy: [] Metabolic, [] Hepatic, [] toxic, [] Neurological, [] Other    Abnormal Nurtitional Status: [] malnurtition (see nutrition note), [ ]underweight: BMI < 19, [] morbid obesity: BMI >40, [] Cachexia    [] Sepsis  [] hypovolemic shock,[] cardiogenic shock, [] hemorrhagic shock, [] neuogenic shock  [] Acute Respiratory Failure  []Cerebral edema, [] Brain compression/ herniation,   [] Functional quadriplegia  [] Acute blood loss anemia

## 2024-09-14 NOTE — PROGRESS NOTE ADULT - SUBJECTIVE AND OBJECTIVE BOX
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 He has a history of diabetes, hypertension, peripheral arterial disease, colon cancer (status post-colectomy in 2011), left retinal detachment with residual vision loss, glaucoma, and bilateral cataract surgery. Last fall, he was hospitalized for toe amputations, during which brain imaging revealed a pituitary mass. An MRI on September 29, 2023, confirmed an enlarged pituitary gland with suprasellar extension and mass effect on the optic chiasm, consistent with a pituitary macroadenoma ( 1.6 x 1.6 x 1.4 cm (AP by transverse by craniocaudal)). Additionally, the imaging revealed chronic microvascular ischemic disease and bilateral exophthalmos.  Mr. Vanegas reports no worsening of vision since his retinal detachment but continues to experience left vision loss. He denies any new symptoms such as headaches or dizziness. His recent endocrinology labs showed a low Free T4 level, though other values were normal.  Physical examination and recent imaging suggest the pituitary mass is stable or has minimally decreased in size, with some atrophy of the optic nerves and optic chiasm. The suprasellar mass component elevates and bows the optic chiasm, with some volume loss possibly related to his severe bilateral glaucoma.  After discussing surgical intervention with Dr. D'Amico, including risks, benefits, and alternatives, Mr. Vanegas agreed to proceed with surgery. The patient was informed of the treatment plan and next steps, with all questions addressed. He demonstrated a clear understanding of the plan and agreed to proceed with the recommended interventions.    Presented in OR for TSP pituitary macroadenoma removal.   (13 Sep 2024 07:28)    Drug Dosing Weight  Height (cm): 188 (13 Sep 2024 07:38)  Weight (kg): 108.6 (13 Sep 2024 07:38)  BMI (kg/m2): 30.7 (13 Sep 2024 07:38)  BSA (m2): 2.35 (13 Sep 2024 07:38)    PAST MEDICAL & SURGICAL HISTORY:  DM (diabetes mellitus)  Colon cancer  HTN (hypertnsion)  Hyperlipidemia  Peptic ulcer  S/P bunionectomy  S/P inguinal hernia repair  History of colectomy  S/P arthroscopic knee surgery  History of repair of hiatal hernia      REVIEW OF SYSTEMS: [ ] Unable to Assess due to neurologic exam   [ ] All ROS addressed below are non-contributory, except:  Neuro: [ ] Headache [ ] Back pain [ ] Numbness [ ] Weakness [ ] Ataxia [ ] Dizziness [ ] Aphasia [ ] Dysarthria [ ] Visual disturbance  Resp: [ ] Shortness of breath/dyspnea, [ ] Orthopnea [ ] Cough  CV: [ ] Chest pain [ ] Palpitation [ ] Lightheadedness [ ] Syncope  Renal: [ ] Thirst [ ] Edema  GI: [ ] Nausea [ ] Emesis [ ] Abdominal pain [ ] Constipation [ ] Diarrhea  Hem: [ ] Hematemesis [ ] bright red blood per rectum  ID: [ ] Fever [ ] Chills [ ] Dysuria  ENT: [ ] Rhinorrhea    PHYSICAL EXAM:    General: No Acute Distress   Neurological: Awake, alert oriented to person, place and time, Following Commands, PERRL, EOMI, Face Symmetrical, Speech Fluent, Moving all extremities, Muscle Strength normal in all four extremities, No Drift, Sensation to Light Touch Intact, left eye reduced visual acuity   Pulmonary: Clear to Auscultation, No Rales, No Rhonchi, No Wheezes   Cardiovascular: S1, S2, Regular Rate and Rhythm  Gastrointestinal: Soft, Nontender, Nondistended   Extremities: No calf tenderness               ICU Vital Signs Last 24 Hrs  T(C): 36.5 (14 Sep 2024 05:14), Max: 36.8 (13 Sep 2024 22:17)  T(F): 97.7 (14 Sep 2024 05:14), Max: 98.2 (13 Sep 2024 22:17)  HR: 64 (14 Sep 2024 09:05) (51 - 79)  BP: 122/58 (14 Sep 2024 09:05) (122/58 - 154/74)  BP(mean): 84 (14 Sep 2024 09:05) (84 - 107)  ABP: 146/63 (14 Sep 2024 09:05) (119/46 - 184/62)  ABP(mean): 91 (14 Sep 2024 09:05) (70 - 100)  RR: 24 (14 Sep 2024 09:05) (12 - 24)  SpO2: 100% (14 Sep 2024 09:05) (95% - 100%)      09-13-24 @ 07:01  -  09-14-24 @ 07:00  --------------------------------------------------------  IN: 1575 mL / OUT: 3630 mL / NET: -2055 mL            acetaminophen     Tablet .. 1000 milliGRAM(s) Oral every 8 hours  atorvastatin 20 milliGRAM(s) Oral at bedtime  brimonidine 0.2% Ophthalmic Solution 1 Drop(s) Left EYE two times a day  cefTRIAXone   IVPB 2000 milliGRAM(s) IV Intermittent every 12 hours  famotidine    Tablet 40 milliGRAM(s) Oral two times a day  gabapentin 300 milliGRAM(s) Oral three times a day  influenza  Vaccine (HIGH DOSE) 0.5 milliLiter(s) IntraMuscular once  insulin glargine Injectable (LANTUS) 10 Unit(s) SubCutaneous at bedtime  insulin lispro (ADMELOG) corrective regimen sliding scale   SubCutaneous Before meals and at bedtime  levothyroxine 50 MICROGram(s) Oral daily  losartan 50 milliGRAM(s) Oral daily  metoprolol succinate ER 50 milliGRAM(s) Oral daily  ondansetron   Disintegrating Tablet 4 milliGRAM(s) Oral every 6 hours  pantoprazole  Injectable 40 milliGRAM(s) IV Push daily  polyethylene glycol 3350 17 Gram(s) Oral daily  senna 2 Tablet(s) Oral at bedtime  sodium chloride 0.65% Nasal 1 Spray(s) Both Nostrils every 6 hours      LABS:  Na: 134 (09-14 @ 02:20), 138 (09-13 @ 10:39)  K: 4.1 (09-14 @ 02:20), 3.8 (09-13 @ 10:39)  Cl: 103 (09-14 @ 02:20), 104 (09-13 @ 10:39)  CO2: 24 (09-14 @ 02:20), 25 (09-13 @ 10:39)  BUN: 11 (09-14 @ 02:20), 11 (09-13 @ 10:39)  Cr: 0.94 (09-14 @ 02:20), 0.89 (09-13 @ 10:39)  Glu: 167(09-14 @ 02:20), 187(09-13 @ 10:39)    Hgb: 12.3 (09-14 @ 02:20), 11.6 (09-13 @ 10:39)  Hct: 37.0 (09-14 @ 02:20), 35.5 (09-13 @ 10:39)  WBC: 9.32 (09-14 @ 02:20), 5.00 (09-13 @ 10:39)  Plt: 177 (09-14 @ 02:20), 152 (09-13 @ 10:39)    INR: 1.06 09-13-24 @ 10:39  PTT: 21.3 09-13-24 @ 10:39

## 2024-09-14 NOTE — PROGRESS NOTE ADULT - SUBJECTIVE AND OBJECTIVE BOX
OTOLARYNGOLOGY (ENT) PROGRESS NOTE    PATIENT: SYDNI REBOLLEDO  MRN: [id number]  : [birth year]  [id number]  DATE OF SERVICE:  24    Subjective/ Interval:   : Patient seen and examined at bedside. AVSS, emesis x1 overnight. Feeling well this morning. Pain well controlled with medication. Has not started diet yet. Minimal drainage from nares.    ALLERGIES:  Percocet 5/325 (Other)  Polyester (Hives; Rash)      MEDICATIONS:  Antiinfectives:   cefTRIAXone   IVPB 2000 milliGRAM(s) IV Intermittent every 12 hours    IV fluids:    Hematologic/Anticoagulation:    Pain medications/Neuro:  acetaminophen     Tablet .. 1000 milliGRAM(s) Oral every 8 hours  gabapentin 300 milliGRAM(s) Oral three times a day  ondansetron   Disintegrating Tablet 4 milliGRAM(s) Oral every 6 hours    Endocrine Medications:   atorvastatin 20 milliGRAM(s) Oral at bedtime  insulin lispro (ADMELOG) corrective regimen sliding scale   SubCutaneous Before meals and at bedtime    All other standing medications:   brimonidine 0.2% Ophthalmic Solution 1 Drop(s) Left EYE two times a day  famotidine    Tablet 40 milliGRAM(s) Oral two times a day  influenza  Vaccine (HIGH DOSE) 0.5 milliLiter(s) IntraMuscular once  losartan 50 milliGRAM(s) Oral daily  metoprolol succinate ER 50 milliGRAM(s) Oral daily  pantoprazole  Injectable 40 milliGRAM(s) IV Push daily  polyethylene glycol 3350 17 Gram(s) Oral daily  senna 2 Tablet(s) Oral at bedtime  sodium chloride 0.65% Nasal 1 Spray(s) Both Nostrils every 6 hours    All other PRN medications:    Vital Signs Last 24 Hrs  T(C): 36.5 (14 Sep 2024 05:14), Max: 36.8 (13 Sep 2024 22:17)  T(F): 97.7 (14 Sep 2024 05:14), Max: 98.2 (13 Sep 2024 22:17)  HR: 51 (14 Sep 2024 08:00) (51 - 79)  BP: 142/69 (13 Sep 2024 15:15) (134/64 - 185/89)  BP(mean): 99 (13 Sep 2024 15:15) (91 - 124)  RR: 16 (14 Sep 2024 08:00) (12 - 22)  SpO2: 99% (14 Sep 2024 08:00) (95% - 100%)    Parameters below as of 14 Sep 2024 08:00  Patient On (Oxygen Delivery Method): room air           @ 07:01  -   @ 07:00  --------------------------------------------------------  IN:    IV PiggyBack: 100 mL    NiCARdipine: 425 mL    sodium chloride 0.9%: 1050 mL  Total IN: 1575 mL    OUT:    Indwelling Catheter - Urethral (mL): 3630 mL  Total OUT: 3630 mL    Total NET: -2055 mL                  PHYSICAL EXAM:  General: NAD, A+Ox3  Respiratory: No respiratory distress, stridor, or stertor  OU: EOMI  Right: Pinna wnl  Left: Pinna wnl  Nose: nasal cavity clear bilaterally, minimal drainage, doyles in place  OC/OP: tongue normal, floor of mouth WNL, no masses or lesions, OP clear  Neck: soft/flat, no LAD  Neuro: CNII-XII intact       LABS                       12.3   9.32  )-----------( 177      ( 14 Sep 2024 02:20 )             37.0    09-14    134<L>  |  103  |  11  ----------------------------<  167<H>  4.1   |  24  |  0.94    Ca    8.2<L>      14 Sep 2024 02:20  Phos  2.7     -14  Mg     2.1     09-14           Coagulation Studies-   PT/INR - ( 13 Sep 2024 10:39 )   PT: 12.1 sec;   INR: 1.06          PTT - ( 13 Sep 2024 10:39 )  PTT:21.3 sec  Urinalysis Basic - ( 14 Sep 2024 02:20 )    Color: x / Appearance: x / SG: x / pH: x  Gluc: 167 mg/dL / Ketone: x  / Bili: x / Urobili: x   Blood: x / Protein: x / Nitrite: x   Leuk Esterase: x / RBC: x / WBC x   Sq Epi: x / Non Sq Epi: x / Bacteria: x      Endocrine Panel-  Calcium: 8.2 mg/dL ( @ 02:20)  Calcium: 8.2 mg/dL ( @ 10:39)

## 2024-09-15 DIAGNOSIS — E03.9 HYPOTHYROIDISM, UNSPECIFIED: ICD-10-CM

## 2024-09-15 DIAGNOSIS — E11.9 TYPE 2 DIABETES MELLITUS WITHOUT COMPLICATIONS: ICD-10-CM

## 2024-09-15 LAB
ALBUMIN SERPL ELPH-MCNC: 3.7 G/DL — SIGNIFICANT CHANGE UP (ref 3.3–5)
ALP SERPL-CCNC: 77 U/L — SIGNIFICANT CHANGE UP (ref 40–120)
ALT FLD-CCNC: 14 U/L — SIGNIFICANT CHANGE UP (ref 10–45)
ANION GAP SERPL CALC-SCNC: 5 MMOL/L — SIGNIFICANT CHANGE UP (ref 5–17)
AST SERPL-CCNC: 15 U/L — SIGNIFICANT CHANGE UP (ref 10–40)
BASOPHILS # BLD AUTO: 0.02 K/UL — SIGNIFICANT CHANGE UP (ref 0–0.2)
BASOPHILS NFR BLD AUTO: 0.3 % — SIGNIFICANT CHANGE UP (ref 0–2)
BILIRUB SERPL-MCNC: 0.3 MG/DL — SIGNIFICANT CHANGE UP (ref 0.2–1.2)
BUN SERPL-MCNC: 16 MG/DL — SIGNIFICANT CHANGE UP (ref 7–23)
CALCIUM SERPL-MCNC: 8.7 MG/DL — SIGNIFICANT CHANGE UP (ref 8.4–10.5)
CHLORIDE SERPL-SCNC: 106 MMOL/L — SIGNIFICANT CHANGE UP (ref 96–108)
CO2 SERPL-SCNC: 28 MMOL/L — SIGNIFICANT CHANGE UP (ref 22–31)
CORTIS AM PEAK SERPL-MCNC: 11.54 UG/DL — SIGNIFICANT CHANGE UP (ref 6.02–18.4)
CREAT SERPL-MCNC: 1.15 MG/DL — SIGNIFICANT CHANGE UP (ref 0.5–1.3)
EGFR: 68 ML/MIN/1.73M2 — SIGNIFICANT CHANGE UP
EOSINOPHIL # BLD AUTO: 0.05 K/UL — SIGNIFICANT CHANGE UP (ref 0–0.5)
EOSINOPHIL NFR BLD AUTO: 0.7 % — SIGNIFICANT CHANGE UP (ref 0–6)
GLUCOSE BLDC GLUCOMTR-MCNC: 126 MG/DL — HIGH (ref 70–99)
GLUCOSE BLDC GLUCOMTR-MCNC: 129 MG/DL — HIGH (ref 70–99)
GLUCOSE BLDC GLUCOMTR-MCNC: 145 MG/DL — HIGH (ref 70–99)
GLUCOSE BLDC GLUCOMTR-MCNC: 202 MG/DL — HIGH (ref 70–99)
GLUCOSE SERPL-MCNC: 123 MG/DL — HIGH (ref 70–99)
HCT VFR BLD CALC: 37.3 % — LOW (ref 39–50)
HGB BLD-MCNC: 11.8 G/DL — LOW (ref 13–17)
IMM GRANULOCYTES NFR BLD AUTO: 0.3 % — SIGNIFICANT CHANGE UP (ref 0–0.9)
LYMPHOCYTES # BLD AUTO: 2.17 K/UL — SIGNIFICANT CHANGE UP (ref 1–3.3)
LYMPHOCYTES # BLD AUTO: 28.3 % — SIGNIFICANT CHANGE UP (ref 13–44)
MAGNESIUM SERPL-MCNC: 2.1 MG/DL — SIGNIFICANT CHANGE UP (ref 1.6–2.6)
MCHC RBC-ENTMCNC: 26.6 PG — LOW (ref 27–34)
MCHC RBC-ENTMCNC: 31.6 GM/DL — LOW (ref 32–36)
MCV RBC AUTO: 84 FL — SIGNIFICANT CHANGE UP (ref 80–100)
MONOCYTES # BLD AUTO: 0.74 K/UL — SIGNIFICANT CHANGE UP (ref 0–0.9)
MONOCYTES NFR BLD AUTO: 9.6 % — SIGNIFICANT CHANGE UP (ref 2–14)
NEUTROPHILS # BLD AUTO: 4.68 K/UL — SIGNIFICANT CHANGE UP (ref 1.8–7.4)
NEUTROPHILS NFR BLD AUTO: 60.8 % — SIGNIFICANT CHANGE UP (ref 43–77)
NRBC # BLD: 0 /100 WBCS — SIGNIFICANT CHANGE UP (ref 0–0)
OSMOLALITY SERPL: 294 MOSMOL/KG — SIGNIFICANT CHANGE UP (ref 280–301)
PHOSPHATE SERPL-MCNC: 2.3 MG/DL — LOW (ref 2.5–4.5)
PLATELET # BLD AUTO: 183 K/UL — SIGNIFICANT CHANGE UP (ref 150–400)
POTASSIUM SERPL-MCNC: 4.8 MMOL/L — SIGNIFICANT CHANGE UP (ref 3.5–5.3)
POTASSIUM SERPL-SCNC: 4.8 MMOL/L — SIGNIFICANT CHANGE UP (ref 3.5–5.3)
PROT SERPL-MCNC: 7.1 G/DL — SIGNIFICANT CHANGE UP (ref 6–8.3)
RBC # BLD: 4.44 M/UL — SIGNIFICANT CHANGE UP (ref 4.2–5.8)
RBC # FLD: 13.4 % — SIGNIFICANT CHANGE UP (ref 10.3–14.5)
SODIUM SERPL-SCNC: 139 MMOL/L — SIGNIFICANT CHANGE UP (ref 135–145)
T4 FREE SERPL-MCNC: 0.86 NG/DL — LOW (ref 0.93–1.7)
WBC # BLD: 7.68 K/UL — SIGNIFICANT CHANGE UP (ref 3.8–10.5)
WBC # FLD AUTO: 7.68 K/UL — SIGNIFICANT CHANGE UP (ref 3.8–10.5)

## 2024-09-15 PROCEDURE — 99223 1ST HOSP IP/OBS HIGH 75: CPT

## 2024-09-15 PROCEDURE — 99024 POSTOP FOLLOW-UP VISIT: CPT

## 2024-09-15 PROCEDURE — 99222 1ST HOSP IP/OBS MODERATE 55: CPT

## 2024-09-15 RX ORDER — HYDRALAZINE HCL 50 MG
10 TABLET ORAL ONCE
Refills: 0 | Status: COMPLETED | OUTPATIENT
Start: 2024-09-15 | End: 2024-09-15

## 2024-09-15 RX ORDER — LEVOTHYROXINE SODIUM 100 MCG
1 TABLET ORAL
Qty: 0 | Refills: 0 | DISCHARGE
Start: 2024-09-15

## 2024-09-15 RX ORDER — POLYETHYLENE GLYCOL 3350 17 G/17G
17 POWDER, FOR SOLUTION ORAL
Qty: 0 | Refills: 0 | DISCHARGE
Start: 2024-09-15

## 2024-09-15 RX ORDER — SODIUM PHOSPHATE, DIBASIC, ANHYDROUS, POTASSIUM PHOSPHATE, MONOBASIC, AND SODIUM PHOSPHATE, MONOBASIC, MONOHYDRATE 852; 155; 130 MG/1; MG/1; MG/1
1 TABLET, COATED ORAL ONCE
Refills: 0 | Status: COMPLETED | OUTPATIENT
Start: 2024-09-15 | End: 2024-09-15

## 2024-09-15 RX ORDER — SODIUM CHLORIDE 0.65 %
1 AEROSOL, SPRAY (ML) NASAL
Qty: 0 | Refills: 0 | DISCHARGE
Start: 2024-09-15

## 2024-09-15 RX ORDER — LOSARTAN POTASSIUM 50 MG/1
25 TABLET ORAL ONCE
Refills: 0 | Status: COMPLETED | OUTPATIENT
Start: 2024-09-15 | End: 2024-09-15

## 2024-09-15 RX ORDER — ACETAMINOPHEN 325 MG/1
2 TABLET ORAL
Qty: 0 | Refills: 0 | DISCHARGE
Start: 2024-09-15

## 2024-09-15 RX ORDER — HYDRALAZINE HCL 50 MG
10 TABLET ORAL EVERY 4 HOURS
Refills: 0 | Status: DISCONTINUED | OUTPATIENT
Start: 2024-09-15 | End: 2024-09-16

## 2024-09-15 RX ADMIN — Medication 300 MILLIGRAM(S): at 05:54

## 2024-09-15 RX ADMIN — Medication 10 MILLIGRAM(S): at 23:57

## 2024-09-15 RX ADMIN — Medication 4: at 11:40

## 2024-09-15 RX ADMIN — Medication 2 TABLET(S): at 22:12

## 2024-09-15 RX ADMIN — ACETAMINOPHEN 1000 MILLIGRAM(S): 325 TABLET ORAL at 05:54

## 2024-09-15 RX ADMIN — Medication 300 MILLIGRAM(S): at 15:38

## 2024-09-15 RX ADMIN — Medication 100 MILLIGRAM(S): at 18:43

## 2024-09-15 RX ADMIN — Medication 1 SPRAY(S): at 17:36

## 2024-09-15 RX ADMIN — Medication 1 SPRAY(S): at 11:50

## 2024-09-15 RX ADMIN — Medication 1 SPRAY(S): at 05:55

## 2024-09-15 RX ADMIN — Medication 20 MILLIGRAM(S): at 22:11

## 2024-09-15 RX ADMIN — ACETAMINOPHEN 1000 MILLIGRAM(S): 325 TABLET ORAL at 22:12

## 2024-09-15 RX ADMIN — Medication 10 MILLIGRAM(S): at 17:36

## 2024-09-15 RX ADMIN — Medication 10 MILLIGRAM(S): at 07:49

## 2024-09-15 RX ADMIN — ENOXAPARIN SODIUM 40 MILLIGRAM(S): 100 INJECTION SUBCUTANEOUS at 22:11

## 2024-09-15 RX ADMIN — ONDANSETRON 4 MILLIGRAM(S): 2 INJECTION, SOLUTION INTRAMUSCULAR; INTRAVENOUS at 05:56

## 2024-09-15 RX ADMIN — FAMOTIDINE 40 MILLIGRAM(S): 10 INJECTION INTRAVENOUS at 17:34

## 2024-09-15 RX ADMIN — LOSARTAN POTASSIUM 50 MILLIGRAM(S): 50 TABLET ORAL at 05:54

## 2024-09-15 RX ADMIN — POLYETHYLENE GLYCOL 3350 17 GRAM(S): 17 POWDER, FOR SOLUTION ORAL at 11:51

## 2024-09-15 RX ADMIN — ACETAMINOPHEN 1000 MILLIGRAM(S): 325 TABLET ORAL at 23:00

## 2024-09-15 RX ADMIN — BRIMONIDINE TARTRATE 1 DROP(S): 2 SOLUTION/ DROPS OPHTHALMIC at 17:37

## 2024-09-15 RX ADMIN — Medication 300 MILLIGRAM(S): at 22:11

## 2024-09-15 RX ADMIN — Medication 40 MILLIGRAM(S): at 07:04

## 2024-09-15 RX ADMIN — LOSARTAN POTASSIUM 25 MILLIGRAM(S): 50 TABLET ORAL at 11:56

## 2024-09-15 RX ADMIN — SODIUM PHOSPHATE, DIBASIC, ANHYDROUS, POTASSIUM PHOSPHATE, MONOBASIC, AND SODIUM PHOSPHATE, MONOBASIC, MONOHYDRATE 1 PACKET(S): 852; 155; 130 TABLET, COATED ORAL at 07:49

## 2024-09-15 RX ADMIN — Medication 100 MILLIGRAM(S): at 05:55

## 2024-09-15 RX ADMIN — FAMOTIDINE 40 MILLIGRAM(S): 10 INJECTION INTRAVENOUS at 05:54

## 2024-09-15 RX ADMIN — Medication 10 MILLIGRAM(S): at 21:40

## 2024-09-15 RX ADMIN — Medication 1 SPRAY(S): at 23:12

## 2024-09-15 RX ADMIN — Medication 50 MICROGRAM(S): at 05:55

## 2024-09-15 RX ADMIN — BRIMONIDINE TARTRATE 1 DROP(S): 2 SOLUTION/ DROPS OPHTHALMIC at 05:54

## 2024-09-15 RX ADMIN — ACETAMINOPHEN 1000 MILLIGRAM(S): 325 TABLET ORAL at 06:54

## 2024-09-15 RX ADMIN — INSULIN GLARGINE 8 UNIT(S): 100 INJECTION, SOLUTION SUBCUTANEOUS at 21:40

## 2024-09-15 NOTE — CONSULT NOTE ADULT - PROBLEM SELECTOR RECOMMENDATION 3
-he has some sort of thyroid history details of which are unknown. On exam, he does appear to have exophthalmos and that was seen on imaging  -right now, he has central hypothyroidism  -recommend further work up including thyroid US in the outpatient setting  -repeat free T4 prior to discharge and if needed, adjust dose of Lt4    The patient says he has an endocrinologist at [facility] but cannot recall name.  He requires follow up and coordination of care. My recommendations would be to consider endocrine consult/care with [facility] so that all records/history can be reviewed

## 2024-09-15 NOTE — CONSULT NOTE ADULT - SUBJECTIVE AND OBJECTIVE BOX
Patient is a 72 yo man s/p TSR of a pituitary macroadenoma.  He has a history of glaucoma and retinal detachment with visual loss.  In 2023, he was hospitalized for toe amputations which he reports was NOT due to diabetes?.  He had brain imaging done because he had a fall. Imaging revealed a pituitary macroadenoma with suprasellar extension and mass effect.  He was told to see neurosurgeon but had difficulty with coordinating care-specifically he saw a neurologist as opposed to a neurosurgeon.  Then he had to wait for neurosurgery referral/appointments, etc. On imaging, there was also ischemic disease and exophthalmos. The patient established care with Dr. Damico and is s/p TSR.  He denies any symptoms of prolactinoma, no galactorrhea.  Endocrinology was consulted in 2023 and hormonal work up was normal. He had normal IGF1/cortisol/TSH/FH/prolactin levels (all of these results are in [place] dated April 2023).    Type 2 diabetes diagnosed in 2013 when he presented to the hospital with pneumonia. During that admission, he required basal/bolus insulin and was on metformin. He was managed by PCP on insulin regiment he states was "Novolog 20 units in the morning and 40 units in the evening with levemir 25 un AM and 15 in PM."  On this reported regimen, the patient states glucose became very uncontrolledn with lows in the 70s and highs in the 300s.  He has an endocrinologist at [facility] but does not recall her name. She is located in [facility].  Beginning January 2024, he is NOT on insulin and denies taking any oral diabetes medicines.  He has a CHARLIE and checks sugars 12 times a day. Values range from the 90s to rare 240s post prandially. Denies significant hypoglycemia. He took his CGM off for MRI yesterday  He works midnight to 8 AM   Breakfast: egg whites and a piece of toast  Lunch: spaghetti with sauce, corn muffin, salad  Snacks: corn muffin, grapes, blackberries  Hx of retinal detachment and glaucoma    Thyroid  The patient had incidental finding of bilateral exophthalmos.  He states that when he was a child he was told of having some sort of thyroid issue in the setting of a "touch of diptheria and polio."  He did not require any thyroid medicine.  His TSH April 2023 as 2.19  Not on thyroid medicines  No known history of thyroid nodules    PAST MEDICAL & SURGICAL HISTORY:  DM (diabetes mellitus)  Colon cancer  HTN (hypertension)  Hyperlipidemia  Peptic ulcer  S/P bunionectomy  S/P inguinal hernia repair  History of colectomy  S/P arthroscopic knee surgery  History of repair of hiatal hernia  Toe amputations    FAMILY HISTORY:  Parents: diabetes    Social History:  Non smoker  Used to drink socially  No recreational drugs    Outpatient Medications:  Denies any diabetes medicines at this time    MEDICATIONS  (STANDING):  acetaminophen     Tablet .. 1000 milliGRAM(s) Oral every 8 hours  atorvastatin 20 milliGRAM(s) Oral at bedtime  brimonidine 0.2% Ophthalmic Solution 1 Drop(s) Left EYE two times a day  cefTRIAXone   IVPB 2000 milliGRAM(s) IV Intermittent every 12 hours  enoxaparin Injectable 40 milliGRAM(s) SubCutaneous <User Schedule>  famotidine    Tablet 40 milliGRAM(s) Oral two times a day  gabapentin 300 milliGRAM(s) Oral three times a day  influenza  Vaccine (HIGH DOSE) 0.5 milliLiter(s) IntraMuscular once  insulin glargine Injectable (LANTUS) 8 Unit(s) SubCutaneous at bedtime  insulin lispro (ADMELOG) corrective regimen sliding scale   SubCutaneous Before meals and at bedtime  levothyroxine 50 MICROGram(s) Oral daily  losartan 50 milliGRAM(s) Oral daily  metoprolol succinate ER 50 milliGRAM(s) Oral daily  pantoprazole    Tablet 40 milliGRAM(s) Oral before breakfast  polyethylene glycol 3350 17 Gram(s) Oral daily  senna 2 Tablet(s) Oral at bedtime  sodium chloride 0.65% Nasal 1 Spray(s) Both Nostrils every 6 hours      Allergies  Percocet 5/325 (Other)  Polyester (Hives; Rash)    Review of Systems:  Constitutional: No fever  Cardiovascular: No chest pain, palpitations  Respiratory: No SOB, no cough  GI: No nausea, vomiting, abdominal pain    PHYSICAL EXAM:  VITALS: T(C): 36.6 (09-15-24 @ 04:41)  T(F): 97.8 (09-15-24 @ 04:41), Max: 97.8 (09-15-24 @ 04:41)  HR: 71 (09-15-24 @ 08:43) (44 - 71)  BP: 179/73 (09-15-24 @ 08:43) (144/68 - 196/89)  RR:  (15 - 20)  SpO2:  (95% - 100%)  Wt(kg): --  GENERAL: NAD, well-groomed, well-developed  EYES: bilateral exophthalmos  HEENT:  Atraumatic, Normocephalic, moist mucous membranes  THYROID: No appreciable nodules  RESPIRATORY: Respirations even and unlabored  CARDIOVASCULAR: Regular rate   GI: Soft  MUSCULOSKELETAL: LEFT toe #4 and #5 amputation, small callous on #3  PSYCH: Alert and oriented x 3, reactive affect, normal mood    POCT Blood Glucose.: 129 mg/dL (09-15-24 @ 07:01)  POCT Blood Glucose.: 189 mg/dL (09-14-24 @ 22:37)  POCT Blood Glucose.: 148 mg/dL (09-14-24 @ 18:31)  POCT Blood Glucose.: 153 mg/dL (09-14-24 @ 12:05)  POCT Blood Glucose.: 201 mg/dL (09-14-24 @ 07:05)  POCT Blood Glucose.: 191 mg/dL (09-13-24 @ 22:05)  POCT Blood Glucose.: 215 mg/dL (09-13-24 @ 15:53)  POCT Blood Glucose.: 174 mg/dL (09-13-24 @ 12:01)  POCT Blood Glucose.: 218 mg/dL (09-13-24 @ 10:25)                         11.8   7.68  )-----------( 183      ( 15 Sep 2024 05:30 )             37.3       09-15    139  |  106  |  16  ----------------------------<  123<H>  4.8   |  28  |  1.15    eGFR: 68    Ca    8.7      09-15  Mg     2.1     09-15  Phos  2.3     09-15    TPro  7.1  /  Alb  3.7  /  TBili  0.3  /  DBili  x   /  AST  15  /  ALT  14  /  AlkPhos  77  09-15      Thyroid Function Tests:  09-15 @ 05:30 TSH -- FreeT4 0.856   09-14 @ 02:20 TSH -- FreeT4 0.734                   
  Pt seen and evaluated at bedside. In summary the pt is a 72 yo M PMH DM, HTN, PAD, colon CA s/p colectomy 2011, left retinal detachment with residual vision loss, glaucoma, b/l cataract surgery, pituitary macroadenoma with 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 Now s/p TSP for pituitary macroadenoma 9/13/24.      PAST MEDICAL & SURGICAL HISTORY:  DM (diabetes mellitus)  Colon cancer  HTN   Hyperlipidemia  Peptic ulcer  s/p bunionectomy  s/p inguinal hernia repair  History of colectomy  s/p arthroscopic knee surgery  History of repair of hiatal hernia    Home Medications:  acetaminophen 500 mg oral tablet: 2 tab(s) orally every 8 hours (15 Sep 2024 11:33)  ALPHAGAN P 0.1% DROPS: to each affected eye 2 times a day PLACE 1 DROP INTO THE LEFT EYE 2 TIMES A DAY. (13 Sep 2024 07:01)  FAMOTIDINE 40 MG TABLET: orally 2 times a day TAKE 1 TABLET (40 MG TOTAL) BY MOUTH 2 (TWO) TIMES A DAY MAX DAILY AMOUNT: 80 MG (13 Sep 2024 07:01)  GABAPENTIN 300 MG CAPSULE: TAKE 1 CAPSULE BY MOUTH THREE TIMES A DAY (13 Sep 2024 07:01)  HUMALOG 100 UNIT/ML KWIKPEN: INJECT 20 UNITS BEFORE BREAKFAST AND 40 UNITS BEFORE DINNER (13 Sep 2024 07:01)  LANTUS SOLOSTAR 100 UNIT/ML: INJECT 45 UNITS SUBCUTANEOUSLY EVERY DAY IN THE MORNING (13 Sep 2024 07:01)  levothyroxine 50 mcg (0.05 mg) oral tablet: 1 tab(s) orally once a day (15 Sep 2024 11:33)  losartan 50 mg oral tablet: 1 tab(s) orally (13 Sep 2024 07:00)  METFORMIN HCL  MG TABLET: TAKE 2 TABLETS BY MOUTH TWICE A DAY WITH MEALS (13 Sep 2024 07:01)  metoprolol tartrate 25 mg oral tablet: 1 tab(s) orally (13 Sep 2024 07:02)  polyethylene glycol 3350 oral powder for reconstitution: 17 gram(s) orally once a day (15 Sep 2024 11:33)  simvastatin 40 mg oral tablet: 1 tab(s) orally once a day (13 Sep 2024 07:01)  sodium chloride 0.65% nasal spray: 1 spray(s) in each nostril every 12 hours (15 Sep 2024 11:33)      Allergies: Percocet 5/325 (Other)  Polyester (Hives; Rash)      VITAL SIGNS, INS/OUTS (last 24 hours):  Vital Signs Last 24 Hrs  T(C): 36.7 (15 Sep 2024 10:28), Max: 36.7 (15 Sep 2024 10:28)  T(F): 98 (15 Sep 2024 10:28), Max: 98 (15 Sep 2024 10:28)  HR: 60 (15 Sep 2024 13:12) (44 - 72)  BP: 158/70 (15 Sep 2024 13:12) (144/68 - 196/89)  BP(mean): 100 (15 Sep 2024 13:12) (98 - 128)  RR: 16 (15 Sep 2024 13:12) (15 - 18)  SpO2: 100% (15 Sep 2024 13:12) (95% - 100%)    Parameters below as of 15 Sep 2024 13:12  Patient On (Oxygen Delivery Method): room air      PHYSICAL EXAM:  NAD laying in bed  CTA b/l no wheezing or crackles  NL S1,S2 no mumurs   soft NT/ND + BS no rebound or guarding   Ext: Left foot toe amputation   AAOx3; sensation intact; strength 5/5 b/l UE and LE; follows commands     BASIC LABS:                        11.8   7.68  )-----------( 183      ( 15 Sep 2024 05:30 )             37.3     139  |  106  |  16  ----------------------------<  123<H>  4.8   |  28  |  1.15    Ca    8.7      15 Sep 2024 05:30  Phos  2.3     09-15  Mg     2.1     09-15    TPro  7.1  /  Alb  3.7  /  TBili  0.3  /  DBili  x   /  AST  15  /  ALT  14  /  AlkPhos  77  09-15    CAPILLARY BLOOD GLUCOSE  POCT Blood Glucose.: 202 mg/dL (15 Sep 2024 11:27)  POCT Blood Glucose.: 129 mg/dL (15 Sep 2024 07:01)  POCT Blood Glucose.: 189 mg/dL (14 Sep 2024 22:37)  POCT Blood Glucose.: 148 mg/dL (14 Sep 2024 18:31)    Free T4: 0.856   Phos: 2.3     9/14 MR Sella w/wo : New postoperative changes compatible transphenoidal surgery is seen.   There is evidence of resection of a portion of the previously noted   pituitary lesion. There is now evidence of a well-defined area of   decreased signal seen involving the postop region. There is residual   enhancing soft tissue identified bilaterally. While this could be   compatible with residual pituitary gland, the possibility of residual   tumor cannot be entirely excluded. Clinical correlation and continued   close interval follow-up is recommended. Pituitary stalk is again deviated to left side.  Evaluation of the surrounding sella structures including the optic chiasm   and internal carotid arteries. Normal. Postop material involving the sphenoid sinus and nasal cavity region is   identified. Parenchymal volume loss and chronic microvessel ischemic changes are   identified.    CURRENT MEDICATIONS:   acetaminophen     Tablet .. 1000 milliGRAM(s) Oral every 8 hours  atorvastatin 20 milliGRAM(s) Oral at bedtime  brimonidine 0.2% Ophthalmic Solution 1 Drop(s) Left EYE two times a day  cefTRIAXone   IVPB 2000 milliGRAM(s) IV Intermittent every 12 hours  enoxaparin Injectable 40 milliGRAM(s) SubCutaneous <User Schedule>  famotidine    Tablet 40 milliGRAM(s) Oral two times a day  gabapentin 300 milliGRAM(s) Oral three times a day  influenza  Vaccine (HIGH DOSE) 0.5 milliLiter(s) IntraMuscular once  insulin glargine Injectable (LANTUS) 8 Unit(s) SubCutaneous at bedtime  insulin lispro (ADMELOG) corrective regimen sliding scale   SubCutaneous Before meals and at bedtime  levothyroxine 50 MICROGram(s) Oral daily  losartan 50 milliGRAM(s) Oral daily  metoprolol succinate ER 50 milliGRAM(s) Oral daily  pantoprazole    Tablet 40 milliGRAM(s) Oral before breakfast  polyethylene glycol 3350 17 Gram(s) Oral daily  senna 2 Tablet(s) Oral at bedtime  sodium chloride 0.65% Nasal 1 Spray(s) Both Nostrils every 6 hours

## 2024-09-15 NOTE — DISCHARGE NOTE PROVIDER - NSDCFUSCHEDAPPT_GEN_ALL_CORE_FT
[facility]  VASCULAR [address]  Scheduled Appointment: 10/28/2024    Agusto Gandara  [facility]  VASCULAR [address]  Scheduled Appointment: 10/28/2024     Maritza Triplett  [facility]  [address]  Scheduled Appointment: 09/23/2024    [facility]  VASCULAR [address]  Scheduled Appointment: 10/28/2024    Agusto Gandara  [facility]  VASCULAR [address]  Scheduled Appointment: 10/28/2024

## 2024-09-15 NOTE — DISCHARGE NOTE PROVIDER - NSDCFUADDAPPT_GEN_ALL_CORE_FT
Please follow up with Dr. D'Amico, call office to schedule/confirm appointment at [phone].    Please follow up with Dr. Triplett from ENT.     Please follow up with your primary care provider. Please follow up with Dr. D'Amico, call office to schedule/confirm appointment at [phone].    Please follow up with Dr. Triplett from ENT.     Please follow up with your outpatient Endocrinologist at [facility]     Please follow up with your primary care provider, Dr. Roberto Walker at [facility]. The office will be calling you to schedule an appointment.

## 2024-09-15 NOTE — CONSULT NOTE ADULT - TIME BILLING
Significant time spent obtaining history, reviewing previous records imaging. Management of post-op central hypothyroidism, diabetes control.  Patient educated about coordinating care and importance of endocrine follow up

## 2024-09-15 NOTE — DISCHARGE NOTE PROVIDER - HOSPITAL COURSE
HPI:  73-year-old male presenting for 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 He has a history of diabetes, hypertension, peripheral arterial disease, colon cancer (status post-colectomy in 2011), left retinal detachment with residual vision loss, glaucoma, and bilateral cataract surgery. Last fall, he was hospitalized for toe amputations, during which brain imaging revealed a pituitary mass. An MRI on September 29, 2023, confirmed an enlarged pituitary gland with suprasellar extension and mass effect on the optic chiasm, consistent with a pituitary macroadenoma ( 1.6 x 1.6 x 1.4 cm (AP by transverse by craniocaudal)). Additionally, the imaging revealed chronic microvascular ischemic disease and bilateral exophthalmos.  Mr. Vanegas reports no worsening of vision since his retinal detachment but continues to experience left vision loss. He denies any new symptoms such as headaches or dizziness. His recent endocrinology labs showed a low Free T4 level, though other values were normal.  Physical examination and recent imaging suggest the pituitary mass is stable or has minimally decreased in size, with some atrophy of the optic nerves and optic chiasm. The suprasellar mass component elevates and bows the optic chiasm, with some volume loss possibly related to his severe bilateral glaucoma.  After discussing surgical intervention with Dr. D'Amico, including risks, benefits, and alternatives, Mr. Vanegas agreed to proceed with surgery. The patient was informed of the treatment plan and next steps, with all questions addressed. He demonstrated a clear understanding of the plan and agreed to proceed with the recommended interventions.    Presented in OR for TSP pituitary macroadenoma removal.    Hospital Course:  9/13: POD 0 TSP.   9/14: POD 1 TSP. LEANDER ovn. protonix added for emesis x1. Endocrine consulted. MRI. Tx SDU  9/15: POD 2. LEANDER overnight. Neuro stable.     Patient evaluated by PT/OT who recommended:  Patient is going home? rehab? hospice? Facility Name:     Hospital course uncomplicated.     Exam on day of discharge:  General: patient seen laying supine in bed in NAD on RA  Neck: supple  Cardiac: RRR, S1S2  Pulmonary: chest rise symmetric  Abdomen: soft, nontender, nondistended  Ext: perfusing well  Skin: warm, dry  Neurological:  AAOX3. Opens eyes spontaneously. Following commands.   Cranial Nerves: pupils 3mm equal, round and reactive to light, EOMI, facial movements symmetric, tongue midline, speech clear. +L visual deficits at baseline 2/2 retinal detachment hx  Motor: 5/5 power in b/l UE and LE  Sensation: intact to light touch in all extremities  Pronator Drift: none     Patient is neurologically stable with stable vital signs, pain is adequately controlled, medically ready for discharge home.     Checklist:   - Obtained follow up appointment from NP  - Reviewed final recommendations of inpatient consults     HPI:  73-year-old male presenting for 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 He has a history of diabetes, hypertension, peripheral arterial disease, colon cancer (status post-colectomy in 2011), left retinal detachment with residual vision loss, glaucoma, and bilateral cataract surgery. Last fall, he was hospitalized for toe amputations, during which brain imaging revealed a pituitary mass. An MRI on September 29, 2023, confirmed an enlarged pituitary gland with suprasellar extension and mass effect on the optic chiasm, consistent with a pituitary macroadenoma ( 1.6 x 1.6 x 1.4 cm (AP by transverse by craniocaudal)). Additionally, the imaging revealed chronic microvascular ischemic disease and bilateral exophthalmos.  Mr. Vanegas reports no worsening of vision since his retinal detachment but continues to experience left vision loss. He denies any new symptoms such as headaches or dizziness. His recent endocrinology labs showed a low Free T4 level, though other values were normal.  Physical examination and recent imaging suggest the pituitary mass is stable or has minimally decreased in size, with some atrophy of the optic nerves and optic chiasm. The suprasellar mass component elevates and bows the optic chiasm, with some volume loss possibly related to his severe bilateral glaucoma.  After discussing surgical intervention with Dr. D'Amico, including risks, benefits, and alternatives, Mr. Vanegas agreed to proceed with surgery. The patient was informed of the treatment plan and next steps, with all questions addressed. He demonstrated a clear understanding of the plan and agreed to proceed with the recommended interventions.    Presented in OR for TSP pituitary macroadenoma removal.    Hospital Course:  9/13: POD 0 TSP.   9/14: POD 1 TSP. LEANDER ovn. protonix added for emesis x1. Endocrine consulted. MRI. Tx SDU  9/15: POD 2. LEANDER overnight. Neuro stable.     Patient evaluated by PT/OT who recommended:  Patient is going home? rehab? hospice? Facility Name:     Hospital course uncomplicated.     Exam on day of discharge:  Neurological:  AAOX3. Opens eyes spontaneously. Following commands.   Cranial Nerves: pupils 3mm equal, round and reactive to light, EOMI, facial movements symmetric, tongue midline, speech clear. +L visual deficits at baseline 2/2 retinal detachment hx  Motor: 5/5 power in b/l UE and LE  Sensation: intact to light touch in all extremities  Pronator Drift: none     Patient is neurologically stable with stable vital signs, pain is adequately controlled, medically ready for discharge home.     Checklist:   - Obtained follow up appointment from NP  - Reviewed final recommendations of inpatient consults     HPI:  73-year-old male presenting for 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 He has a history of diabetes, hypertension, peripheral arterial disease, colon cancer (status post-colectomy in 2011), left retinal detachment with residual vision loss, glaucoma, and bilateral cataract surgery. Last fall, he was hospitalized for toe amputations, during which brain imaging revealed a pituitary mass. An MRI on September 29, 2023, confirmed an enlarged pituitary gland with suprasellar extension and mass effect on the optic chiasm, consistent with a pituitary macroadenoma ( 1.6 x 1.6 x 1.4 cm (AP by transverse by craniocaudal)). Additionally, the imaging revealed chronic microvascular ischemic disease and bilateral exophthalmos.  Mr. Vanegas reports no worsening of vision since his retinal detachment but continues to experience left vision loss. He denies any new symptoms such as headaches or dizziness. His recent endocrinology labs showed a low Free T4 level, though other values were normal.  Physical examination and recent imaging suggest the pituitary mass is stable or has minimally decreased in size, with some atrophy of the optic nerves and optic chiasm. The suprasellar mass component elevates and bows the optic chiasm, with some volume loss possibly related to his severe bilateral glaucoma.  After discussing surgical intervention with Dr. D'Amico, including risks, benefits, and alternatives, Mr. Vanegas agreed to proceed with surgery. The patient was informed of the treatment plan and next steps, with all questions addressed. He demonstrated a clear understanding of the plan and agreed to proceed with the recommended interventions.    Presented in OR for TSP pituitary macroadenoma removal.    Hospital Course:  9/13: POD 0 TSP.   9/14: POD 1 TSP. LEANDER ovn. protonix added for emesis x1. Endocrine consulted. MRI. Tx SDU  9/15: POD 2. LEANDER overnight. Neuro stable.     Patient evaluated by PT/OT who recommended: outpatient PT  Patient is going home     Hospital course c/b asymptomatic hypertension (increased Losartan for discharge)     Exam on day of discharge:  Neurological:  AAOX3. Opens eyes spontaneously. Following commands.   Cranial Nerves: pupils 3mm equal, round and reactive to light, EOMI, facial movements symmetric, tongue midline, speech clear. +L visual deficits at baseline 2/2 retinal detachment hx  Motor: 5/5 power in b/l UE and LE  Sensation: intact to light touch in all extremities  Pronator Drift: none     Patient is neurologically stable with stable vital signs, pain is adequately controlled, medically ready for discharge home.   Patient with episodes of asymptomatic hypertension, cleared for discharge by hospitalist

## 2024-09-15 NOTE — PROGRESS NOTE ADULT - SUBJECTIVE AND OBJECTIVE BOX
OTOLARYNGOLOGY (ENT) PROGRESS NOTE    PATIENT: SYDNI REBOLLEDO  MRN: [id number]  : [birth year]  [id number]  DATE OF SERVICE:  09-15-24  			         Subjective/ Interval:   : Patient seen and examined at bedside. AVSS, emesis x1 overnight. Feeling well this morning. Pain well controlled with medication. Has not started diet yet. Minimal drainage from nares.  9/15: Patient seen and examined at bedside. AVSS, NAEON. MRI completed. Doing well this morning, pain controlled with medication. Tolerating diet. Minimal drainage from nares.    ALLERGIES:  Percocet 5/325 (Other)  Polyester (Hives; Rash)      MEDICATIONS:  Antiinfectives:   cefTRIAXone   IVPB 2000 milliGRAM(s) IV Intermittent every 12 hours    IV fluids:    Hematologic/Anticoagulation:  enoxaparin Injectable 40 milliGRAM(s) SubCutaneous <User Schedule>    Pain medications/Neuro:  acetaminophen     Tablet .. 1000 milliGRAM(s) Oral every 8 hours  gabapentin 300 milliGRAM(s) Oral three times a day    Endocrine Medications:   atorvastatin 20 milliGRAM(s) Oral at bedtime  insulin glargine Injectable (LANTUS) 8 Unit(s) SubCutaneous at bedtime  insulin lispro (ADMELOG) corrective regimen sliding scale   SubCutaneous Before meals and at bedtime  levothyroxine 50 MICROGram(s) Oral daily    All other standing medications:   brimonidine 0.2% Ophthalmic Solution 1 Drop(s) Left EYE two times a day  famotidine    Tablet 40 milliGRAM(s) Oral two times a day  influenza  Vaccine (HIGH DOSE) 0.5 milliLiter(s) IntraMuscular once  losartan 50 milliGRAM(s) Oral daily  metoprolol succinate ER 50 milliGRAM(s) Oral daily  pantoprazole    Tablet 40 milliGRAM(s) Oral before breakfast  polyethylene glycol 3350 17 Gram(s) Oral daily  senna 2 Tablet(s) Oral at bedtime  sodium chloride 0.65% Nasal 1 Spray(s) Both Nostrils every 6 hours    All other PRN medications:    Vital Signs Last 24 Hrs  T(C): 36.6 (15 Sep 2024 04:41), Max: 36.6 (14 Sep 2024 09:10)  T(F): 97.8 (15 Sep 2024 04:41), Max: 97.9 (14 Sep 2024 09:10)  HR: 71 (15 Sep 2024 08:43) (44 - 71)  BP: 179/73 (15 Sep 2024 08:43) (122/58 - 196/89)  BP(mean): 118 (15 Sep 2024 08:43) (84 - 128)  RR: 16 (15 Sep 2024 08:43) (15 - 24)  SpO2: 100% (15 Sep 2024 08:43) (95% - 100%)    Parameters below as of 15 Sep 2024 07:15  Patient On (Oxygen Delivery Method): room air           @ 07:01  -  09-15 @ 07:00  --------------------------------------------------------  IN:    Oral Fluid: 240 mL  Total IN: 240 mL    OUT:    Voided (mL): 600 mL  Total OUT: 600 mL    Total NET: -360 mL        PHYSICAL EXAM:  General: NAD, A+Ox3  Respiratory: No respiratory distress, stridor, or stertor  OU: EOMI  Right: Pinna wnl  Left: Pinna wnl  Nose: nasal cavity clear bilaterally, minimal drainage, doyles in place  OC/OP: tongue normal, floor of mouth WNL, no masses or lesions, OP clear  Neck: soft/flat, no LAD  Neuro: CNII-XII intact       LABS                       11.8   7.68  )-----------( 183      ( 15 Sep 2024 05:30 )             37.3    -15    139  |  106  |  16  ----------------------------<  123<H>  4.8   |  28  |  1.15    Ca    8.7      15 Sep 2024 05:30  Phos  2.3     -15  Mg     2.1     09-15    TPro  7.1  /  Alb  3.7  /  TBili  0.3  /  DBili  x   /  AST  15  /  ALT  14  /  AlkPhos  77  09-15         Coagulation Studies-   PT/INR - ( 13 Sep 2024 10:39 )   PT: 12.1 sec;   INR: 1.06          PTT - ( 13 Sep 2024 10:39 )  PTT:21.3 sec  Urinalysis Basic - ( 15 Sep 2024 05:30 )    Color: x / Appearance: x / SG: x / pH: x  Gluc: 123 mg/dL / Ketone: x  / Bili: x / Urobili: x   Blood: x / Protein: x / Nitrite: x   Leuk Esterase: x / RBC: x / WBC x   Sq Epi: x / Non Sq Epi: x / Bacteria: x      Endocrine Panel-  Calcium: 8.7 mg/dL (09-15 @ 05:30)  Calcium: 8.4 mg/dL ( @ 11:26)

## 2024-09-15 NOTE — CONSULT NOTE ADULT - PROBLEM SELECTOR RECOMMENDATION 2
-patient states his amputations were not due to diabetes. However, on previous history, he had gangrene and A1c at the time was 12.2%   -he is currently managed with diet and reports not taking any diabetes medicines  -serum A1c is 7.4% and at goal for his age  -continue with SMBG  -continue with lantus 8 units, sliding scale and consistent carbohydrate diet  -glucose levels are at goal  -on food recall, diet is high in starch. He is aware.  Consider nutritional consult to optimize education and patient teaching  -hypoglycemia protocol

## 2024-09-15 NOTE — DISCHARGE NOTE PROVIDER - NSDCFUADDINST_GEN_ALL_CORE_FT
Neurosurgery follow up appointment date/time:  Please follow up with Dr. D'Amico in 1-2 weeks, call office to schedule/confirm appointment.     Wound Care:  - You can shower.     Devices:  - does patient need collar or brace or helmet?   - does collar/brace need to be worn at all times or just when OOB?  - RW or cane for ambulation?    Drains/Lines:  - PICC in place? ID follow up? (Paper Rx for: antibiotics, heparin flush, weekly lab draws)  - IGOR in place? Management?  - eason in place? Management/Urology follow up?  - Tracheostomy?  - PEG tube?      Activity:  - fatigue is common after surgery, rest if you feel tired   - no bending, lifting, twisting or heavy lifting   - walking is recommended, ambulate as tolerated  - you may shower when you get home, keep your incision dry  - no soaking in a tub/pool/hot tub   - no driving within 24 hours of anesthesia or while taking prescription pain medications   - keep hydrated, drink plenty of water   - skullbase precautions: no nose blowing, sneeze with mouth open, no drinking out of a straw, no straining      Inpatient consults:  - final recommendations  - you will need follow up with....    Please also follow up with your primary care doctor.     Pain Expectations:  - pain after surgery is expected  - please take pain meds as prescribed     Medications:  - changes to home meds (ex. AED's)?  - new meds?  - pain meds?  - when can antiplatelets or anticoagulants be restarted?  - were adverse affects of meds discussed with patients?   - pain medications can cause constipation, you should eat a high fiber diet and may take a stool softener while on pain meds   - Avoid taking Advil (ibuprofen), Motrin (naproxen), or Aspirin for pain as they can cause bleeding     Call the office or come to ED if:  - wound has drainage or bleeding, increased redness or pain at incision site, neurological change, fever (>101), chills, night sweats, syncope, nausea/vomiting, chest pain, shortness of breath      Playback:  - are discharge instruction on playback?  - is a picture of the incision on playback?     WITHIN 24 HOURS OF DISCHARGE, PLEASE CONTACT NEURO PA  WITH ANY QUESTIONS OR CONCERNS: [phone]   OTHERWISE, PLEASE CALL THE OFFICE WITH ANY QUESTIONS OR CONCERNS:  Neurosurgery follow up appointment date/time:  Please follow up with Dr. D'Amico in 1-2 weeks, call office to schedule/confirm appointment.     Wound Care:  - You can shower.     Devices:  - RW or cane for ambulation?    Activity:  - fatigue is common after surgery, rest if you feel tired   - no bending, lifting, twisting or heavy lifting   - walking is recommended, ambulate as tolerated  - you may shower when you get home, keep your incision dry  - no soaking in a tub/pool/hot tub   - no driving within 24 hours of anesthesia or while taking prescription pain medications   - keep hydrated, drink plenty of water   - You are on skullbase precautions: no nose blowing, sneeze with mouth open, no drinking out of a straw, no straining      Inpatient consults:  - final recommendations  - you will need follow up with....    Please also follow up with your primary care doctor.     Pain Expectations:  - pain after surgery is expected  - please take pain meds as prescribed     Medications:  - changes to home meds (ex. AED's)?  - new meds?  - pain meds?  - when can antiplatelets or anticoagulants be restarted?  - were adverse affects of meds discussed with patients?   - pain medications can cause constipation, you should eat a high fiber diet and may take a stool softener while on pain meds   - Avoid taking Advil (ibuprofen), Motrin (naproxen), or Aspirin for pain as they can cause bleeding     Call the office or come to ED if:  - wound has drainage or bleeding, increased redness or pain at incision site, neurological change, fever (>101), chills, night sweats, syncope, nausea/vomiting, chest pain, shortness of breath      Playback:  - There is a copy of your discharge instructions uploaded on the [facility]    WITHIN 24 HOURS OF DISCHARGE, PLEASE CONTACT NEURO PA  WITH ANY QUESTIONS OR CONCERNS: [phone]   OTHERWISE, PLEASE CALL THE OFFICE WITH ANY QUESTIONS OR CONCERNS: [phone] Neurosurgery follow up appointment date/time:  - Please follow up with Dr. D'Amico in 1-2 weeks, call office to schedule/confirm appointment.     Wound Care:  - You can shower.   - skull base precautions    Devices:  - RW or cane for ambulation?    Activity:  - fatigue is common after surgery, rest if you feel tired   - no bending, lifting, twisting or heavy lifting   - walking is recommended, ambulate as tolerated  - you may shower when you get home, keep your incision dry  - no soaking in a tub/pool/hot tub   - no driving within 24 hours of anesthesia or while taking prescription pain medications   - keep hydrated, drink plenty of water   - You are on skull base precautions: no nose blowing, sneeze with mouth open, no drinking out of a straw, no straining      Inpatient consults:  - ENT:     Please also follow up with your primary care doctor.     Pain Expectations:  - pain after surgery is expected  - please take pain meds as prescribed     Medications:  - changes to home meds (ex. AED's)?  - new meds?  - pain meds?  - when can antiplatelets or anticoagulants be restarted?  - were adverse affects of meds discussed with patients?   - pain medications can cause constipation, you should eat a high fiber diet and may take a stool softener while on pain meds   - Avoid taking Advil (ibuprofen), Motrin (naproxen), or Aspirin for pain as they can cause bleeding     Call the office or come to ED if:  - wound has drainage or bleeding, increased redness or pain at incision site, neurological change, fever (>101), chills, night sweats, syncope, nausea/vomiting, chest pain, shortness of breath      Playback:  - There is a copy of your discharge instructions uploaded on the [facility]    WITHIN 24 HOURS OF DISCHARGE, PLEASE CONTACT NEURO PA  WITH ANY QUESTIONS OR CONCERNS: [phone]   OTHERWISE, PLEASE CALL THE OFFICE WITH ANY QUESTIONS OR CONCERNS: [phone] Neurosurgery follow up appointment date/time:  - Please follow up with Dr. D'Amico in 1-2 weeks, call office to schedule/confirm appointment.     Wound Care:  - You can shower  - skull base precautions    Activity:  - fatigue is common after surgery, rest if you feel tired   - no bending, lifting, twisting or heavy lifting   - walking is recommended, ambulate as tolerated  - you may shower when you get home, keep your incision dry  - no soaking in a tub/pool/hot tub   - no driving within 24 hours of anesthesia or while taking prescription pain medications   - keep hydrated, drink plenty of water   - You are on skull base precautions: no nose blowing, sneeze with mouth open, no drinking out of a straw, no straining      Inpatient consults:  - ENT: Follow up with Dr. Triplett  - Endocrinology: continue Synthroid, continue lifestyle modification for diabetes management, follow up outpatient     Please also follow up with your primary care doctor, especially to closely monitor your blood pressure.     Pain Expectations:  - pain after surgery is expected  - please take pain meds as prescribed     Medications:  - continue home medications as prescribed: Alphagan, Famotidine, Gabapentin, Metoprolol, Simvastatin   - home Losartan increased to 100mg daily   - new meds:   Synthroid for hypothyroidism  Augmentin for prophylaxis after surgery (can cause GI upset)  - pain meds: Tylenol (over the counter) as needed  - adverse affects of meds discussed with patient   - pain medications can cause constipation, you should eat a high fiber diet and may take a stool softener while on pain meds   - Avoid taking Advil (ibuprofen), Motrin (naproxen), or Aspirin for pain as they can cause bleeding     Call the office or come to ED if:  - wound has drainage or bleeding, increased redness or pain at incision site, neurological change, fever (>101), chills, night sweats, syncope, nausea/vomiting, chest pain, shortness of breath      Playback:  - There is a copy of your discharge instructions uploaded on the [facility]    WITHIN 24 HOURS OF DISCHARGE, PLEASE CONTACT NEURO PA  WITH ANY QUESTIONS OR CONCERNS: [phone]   OTHERWISE, PLEASE CALL THE OFFICE WITH ANY QUESTIONS OR CONCERNS: [phone]

## 2024-09-15 NOTE — CONSULT NOTE ADULT - GASTROINTESTINAL
details… Dermal Autograft Text: The defect edges were debeveled with a #15 scalpel blade.  Given the location of the defect, shape of the defect and the proximity to free margins a dermal autograft was deemed most appropriate.  Using a sterile surgical marker, the primary defect shape was transferred to the donor site. The area thus outlined was incised deep to adipose tissue with a #15 scalpel blade.  The harvested graft was then trimmed of adipose and epidermal tissue until only dermis was left.  The skin graft was then placed in the primary defect and oriented appropriately.

## 2024-09-15 NOTE — PROVIDER CONTACT NOTE (OTHER) - REASON
pt UO has been more than 200 in past two hours since admission to 8E
BP elevated after morning losartan dose.

## 2024-09-15 NOTE — CONSULT NOTE ADULT - PROBLEM SELECTOR RECOMMENDATION 9
-patient is s/p TSR of a pituitary  macroadenoma  -his records from previous hospitalization in 2023 were reviewed the adenoma was not hypersecreting  -post-operatively he is doing well. Cortisol is robust with no current evidence of adrenal insufficiency  -on lab work free T4 was low.  Levothyroxine 50 mcg was started just yesterady and free T4 improved. Recommend repeating another value tomorrow. If level remains low, he will require dose titration. Please see below regarding additional thyroid history  -follow up on pituitary pathology -patient is s/p TSR of a pituitary  macroadenoma  -his records from previous hospitalization in 2023 were reviewed the adenoma was not hypersecreting  -post-operatively he is doing well. Cortisol is robust with no current evidence of adrenal insufficiency  -on lab work free T4 was low.  Levothyroxine 50 mcg was started just yesterday and free T4 improved. Recommend repeating another value tomorrow. If level remains low, he will require dose titration. Please see below regarding additional thyroid history  -follow up on pituitary pathology  -sodium levels are good, no increased urinary output. No current symptoms of DI

## 2024-09-15 NOTE — DISCHARGE NOTE PROVIDER - CARE PROVIDER_API CALL
D'Amico, Randy Scott  Neurosurgery  [address] [address]  Phone: [phone]  Fax: [phone]  Follow Up Time:     Maritza Triplett  Otolaryngology  [address], [address]  [place] [address]  Phone: [phone]  Fax: [phone]  Follow Up Time:

## 2024-09-15 NOTE — PROVIDER CONTACT NOTE (OTHER) - BACKGROUND
BP elevated this morning around 5 am to 181. Pt was due for Losartan 50mg at that time. LALA Woods was notified and stated to give morning losartan and recheck BP.
pt s/p endoscopic transphenoidal tumor resection of pituitary tumor

## 2024-09-15 NOTE — DISCHARGE NOTE PROVIDER - NSDCCPTREATMENT_GEN_ALL_CORE_FT
PRINCIPAL PROCEDURE  Procedure: Endoscopic transphenoidal or transnasal resection of pituitary tumor  Findings and Treatment:

## 2024-09-15 NOTE — CONSULT NOTE ADULT - ASSESSMENT
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 Now s/p TSP for pituitary macroadenoma 9/13/24.    #s/p TSP for pituitary macroadenoma 9/13/24  -Will continue management as per NSGY and ENT   -Continue pain control as per NSGY team   - Continue saline sprays q6h   - Continue shen splints b/l, ENT to follow  -Will continue CTX while inpatient   -Will continue DI watch; Will continue to monitor I/O's  -Pt was started on levothyroxine as per Endocrine  -Will repeat free T4 prior to discharge as per Endocrine and if needed then will adjust dose of levothyroxine  -Pt for outpt thyroid US as outpatient     #Diabetes   -Home medications: Metformin 100mg BID,  Lantus 45 units qAM and Levothyroxine 50mcg daily   -HgbA1c 7.4   -Endocrine following ; Will continue insulin as per Endocrine recs pt is currently one  Lantus 8 units qhs and ISS     #HTN/HLD   - Home medication: Atorvastatin 20 mg qhs, Losartan 50mg daily and Metoprolol 25mg daily   -9/13 TTE: EF 55-60%; aortic sclerosis w/o stenosis  -Will continue Metoprolol 50mg daily and Losartan with holding parameters  -Continue Atorvastatin 20mg daily     #HX of Peptic Ulcer Disea  -Will continue PPI     #DVT prop  -Continue Enoxaparin     #DISPO  -As per NSGY team      
Patient is a 72 yo man with type 2 diabetes with history of amputations, retinal detachment, glaucoma, pituitary macroadenoma, additional comorbidities including HTN and HLD admitted for TSR

## 2024-09-15 NOTE — PROGRESS NOTE ADULT - SUBJECTIVE AND OBJECTIVE BOX
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 He has a history of diabetes, hypertension, peripheral arterial disease, colon cancer (status post-colectomy in 2011), left retinal detachment with residual vision loss, glaucoma, and bilateral cataract surgery. Last fall, he was hospitalized for toe amputations, during which brain imaging revealed a pituitary mass. An MRI on September 29, 2023, confirmed an enlarged pituitary gland with suprasellar extension and mass effect on the optic chiasm, consistent with a pituitary macroadenoma ( 1.6 x 1.6 x 1.4 cm (AP by transverse by craniocaudal)). Additionally, the imaging revealed chronic microvascular ischemic disease and bilateral exophthalmos.  Mr. Vanegas reports no worsening of vision since his retinal detachment but continues to experience left vision loss. He denies any new symptoms such as headaches or dizziness. His recent endocrinology labs showed a low Free T4 level, though other values were normal.  Physical examination and recent imaging suggest the pituitary mass is stable or has minimally decreased in size, with some atrophy of the optic nerves and optic chiasm. The suprasellar mass component elevates and bows the optic chiasm, with some volume loss possibly related to his severe bilateral glaucoma.  After discussing surgical intervention with Dr. D'Amico, including risks, benefits, and alternatives, Mr. Vanegas agreed to proceed with surgery. The patient was informed of the treatment plan and next steps, with all questions addressed. He demonstrated a clear understanding of the plan and agreed to proceed with the recommended interventions.    Presented in OR for TSP pituitary macroadenoma removal.   (13 Sep 2024 07:28)    OVERNIGHT EVENTS: POD 2. LEANDER overnight. Neuro stable.     Hospital Course:  9/13: POD 0 TSP.   9/14: POD 1 TSP. LEANDER ovn. protonix added for emesis x1. Endocrine consulted. MRI. Tx SDU  9/15: POD 2. LEANDER overnight. Neuro stable.     Vital Signs Last 24 Hrs  T(C): 36.3 (14 Sep 2024 21:09), Max: 36.6 (14 Sep 2024 01:55)  T(F): 97.4 (14 Sep 2024 21:09), Max: 97.9 (14 Sep 2024 09:10)  HR: 50 (15 Sep 2024 00:05) (46 - 73)  BP: 166/74 (15 Sep 2024 00:05) (122/58 - 166/74)  BP(mean): 106 (15 Sep 2024 00:05) (84 - 113)  RR: 17 (15 Sep 2024 00:05) (14 - 24)  SpO2: 97% (15 Sep 2024 00:05) (95% - 100%)    Parameters below as of 15 Sep 2024 00:05  Patient On (Oxygen Delivery Method): room air        I&O's Summary    13 Sep 2024 07:01  -  14 Sep 2024 07:00  --------------------------------------------------------  IN: 1575 mL / OUT: 3630 mL / NET: -2055 mL    14 Sep 2024 07:01  -  15 Sep 2024 00:58  --------------------------------------------------------  IN: 240 mL / OUT: 600 mL / NET: -360 mL        PHYSICAL EXAM:  General: patient seen laying supine in bed in NAD on RA  Neck: supple  Cardiac: RRR, S1S2  Pulmonary: chest rise symmetric  Abdomen: soft, nontender, nondistended  Ext: perfusing well  Skin: warm, dry  Neurological:  AAOX3. Opens eyes spontaneously. Following commands.   Cranial Nerves: pupils 3mm equal, round and reactive to light, EOMI, facial movements symmetric, tongue midline, speech clear. +L visual deficits at baseline 2/2 retinal detachment hx  Motor: 5/5 power in b/l UE and LE  Sensation: intact to light touch in all extremities  Pronator Drift: none                 LABS:                        12.3   9.32  )-----------( 177      ( 14 Sep 2024 02:20 )             37.0     09-14    137  |  106  |  14  ----------------------------<  156<H>  4.2   |  23  |  0.98    Ca    8.4      14 Sep 2024 11:26  Phos  2.7     09-14  Mg     2.1     09-14      PT/INR - ( 13 Sep 2024 10:39 )   PT: 12.1 sec;   INR: 1.06          PTT - ( 13 Sep 2024 10:39 )  PTT:21.3 sec  Urinalysis Basic - ( 14 Sep 2024 11:26 )    Color: x / Appearance: x / SG: x / pH: x  Gluc: 156 mg/dL / Ketone: x  / Bili: x / Urobili: x   Blood: x / Protein: x / Nitrite: x   Leuk Esterase: x / RBC: x / WBC x   Sq Epi: x / Non Sq Epi: x / Bacteria: x      CARDIAC MARKERS ( 13 Sep 2024 10:39 )  x     / x     / x     / x     / 3.6 ng/mL      CAPILLARY BLOOD GLUCOSE      POCT Blood Glucose.: 189 mg/dL (14 Sep 2024 22:37)  POCT Blood Glucose.: 148 mg/dL (14 Sep 2024 18:31)  POCT Blood Glucose.: 153 mg/dL (14 Sep 2024 12:05)  POCT Blood Glucose.: 201 mg/dL (14 Sep 2024 07:05)      Drug Levels: [] N/A    CSF Analysis: [] N/A      Allergies    Percocet 5/325 (Other)  Polyester (Hives; Rash)    Intolerances      MEDICATIONS:  Antibiotics:  cefTRIAXone   IVPB 2000 milliGRAM(s) IV Intermittent every 12 hours    Neuro:  acetaminophen     Tablet .. 1000 milliGRAM(s) Oral every 8 hours  gabapentin 300 milliGRAM(s) Oral three times a day  ondansetron   Disintegrating Tablet 4 milliGRAM(s) Oral every 6 hours    Anticoagulation:  enoxaparin Injectable 40 milliGRAM(s) SubCutaneous <User Schedule>    OTHER:  atorvastatin 20 milliGRAM(s) Oral at bedtime  brimonidine 0.2% Ophthalmic Solution 1 Drop(s) Left EYE two times a day  famotidine    Tablet 40 milliGRAM(s) Oral two times a day  influenza  Vaccine (HIGH DOSE) 0.5 milliLiter(s) IntraMuscular once  insulin glargine Injectable (LANTUS) 8 Unit(s) SubCutaneous at bedtime  insulin lispro (ADMELOG) corrective regimen sliding scale   SubCutaneous Before meals and at bedtime  levothyroxine 50 MICROGram(s) Oral daily  losartan 50 milliGRAM(s) Oral daily  metoprolol succinate ER 50 milliGRAM(s) Oral daily  pantoprazole    Tablet 40 milliGRAM(s) Oral before breakfast  polyethylene glycol 3350 17 Gram(s) Oral daily  senna 2 Tablet(s) Oral at bedtime  sodium chloride 0.65% Nasal 1 Spray(s) Both Nostrils every 6 hours    IVF:    CULTURES:    RADIOLOGY & ADDITIONAL TESTS:      ASSESSMENT:  74 yo M PMH DM, HTN, PAD, colon CA s/p colectomy 2011, left retinal detachment with residual vision loss, glaucoma, b/l cataract surgery, pituitary macroadenoma with 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 Now s/p TSP for pituitary macroadenoma 9/13/24.    Plan:   Neuro:   - neuro/vitals q4h  - pain control: cranial ERAS, acetaminophen 1g q8  - post op MRI sella completed 9/14  - skull base precautions: no straws, sneeze/cough with mouth open, no incentive spirometry, no bending over, no nasal cannula    ENT:  - saline sprays q6h   - shen splints b/l, ENT to follow    Cardio:   - SBP <160  - HLD: Atorvastatin 20 mg qd at bedtime  - HTN: losartan 50 mg PO qd, metoprolol tartrate 25 mg PO qd   - TTE: 9/13: EF 55-60% aortic sclerosis w/o stenosis    Pulm:   - RA  - no IS due to skull base precautions    GI:  - CCD  - bowel regimen, last BM 9/14  - GERD: Pepcid 40 mg PO BID, Protonix 40 daily    Endo:  - DI watch: monitor urine output, I&Os  - DM: Lantus 8u qhs, ISS  - Endocrinology consulted, start synthroid 50mcg qd    Renal:   - IVL  - voiding    Heme:  - SCDs, SQL 9/15 for DVT ppx     ID:  - afebrile   - Cetriaxone while inpatient per ENT    Dispo:  - SD status, full code, pending PT/OT    Discussed w/ Dr. D'Amico

## 2024-09-15 NOTE — CONSULT NOTE ADULT - CONSULT REASON
Post op s/p TSP for pituitary macroadenoma and medicine co management
Pituitary macroadenoma resection

## 2024-09-15 NOTE — DISCHARGE NOTE PROVIDER - NSDCMRMEDTOKEN_GEN_ALL_CORE_FT
ALPHAGAN P 0.1% DROPS: to each affected eye 2 times a day PLACE 1 DROP INTO THE LEFT EYE 2 TIMES A DAY.  FAMOTIDINE 40 MG TABLET: orally 2 times a day TAKE 1 TABLET (40 MG TOTAL) BY MOUTH 2 (TWO) TIMES A DAY MAX DAILY AMOUNT: 80 MG  GABAPENTIN 300 MG CAPSULE: TAKE 1 CAPSULE BY MOUTH THREE TIMES A DAY  HUMALOG 100 UNIT/ML KWIKPEN: INJECT 20 UNITS BEFORE BREAKFAST AND 40 UNITS BEFORE DINNER  LANTUS SOLOSTAR 100 UNIT/ML: INJECT 45 UNITS SUBCUTANEOUSLY EVERY DAY IN THE MORNING  losartan 50 mg oral tablet: 1 tab(s) orally  METFORMIN HCL  MG TABLET: TAKE 2 TABLETS BY MOUTH TWICE A DAY WITH MEALS  metoprolol tartrate 25 mg oral tablet: 1 tab(s) orally  simvastatin 40 mg oral tablet: 1 tab(s) orally once a day   acetaminophen 500 mg oral tablet: 2 tab(s) orally every 8 hours  ALPHAGAN P 0.1% DROPS: to each affected eye 2 times a day PLACE 1 DROP INTO THE LEFT EYE 2 TIMES A DAY.  FAMOTIDINE 40 MG TABLET: orally 2 times a day TAKE 1 TABLET (40 MG TOTAL) BY MOUTH 2 (TWO) TIMES A DAY MAX DAILY AMOUNT: 80 MG  GABAPENTIN 300 MG CAPSULE: TAKE 1 CAPSULE BY MOUTH THREE TIMES A DAY  HUMALOG 100 UNIT/ML KWIKPEN: INJECT 20 UNITS BEFORE BREAKFAST AND 40 UNITS BEFORE DINNER  LANTUS SOLOSTAR 100 UNIT/ML: INJECT 45 UNITS SUBCUTANEOUSLY EVERY DAY IN THE MORNING  levothyroxine 50 mcg (0.05 mg) oral tablet: 1 tab(s) orally once a day  losartan 50 mg oral tablet: 1 tab(s) orally  METFORMIN HCL  MG TABLET: TAKE 2 TABLETS BY MOUTH TWICE A DAY WITH MEALS  metoprolol tartrate 25 mg oral tablet: 1 tab(s) orally  polyethylene glycol 3350 oral powder for reconstitution: 17 gram(s) orally once a day  simvastatin 40 mg oral tablet: 1 tab(s) orally once a day  sodium chloride 0.65% nasal spray: 1 spray(s) in each nostril every 12 hours   acetaminophen 500 mg oral tablet: 2 tab(s) orally every 8 hours as needed for mild to moderate pain  ALPHAGAN P 0.1% DROPS: to each affected eye 2 times a day PLACE 1 DROP INTO THE LEFT EYE 2 TIMES A DAY.  FAMOTIDINE 40 MG TABLET: orally 2 times a day TAKE 1 TABLET (40 MG TOTAL) BY MOUTH 2 (TWO) TIMES A DAY MAX DAILY AMOUNT: 80 MG  GABAPENTIN 300 MG CAPSULE: TAKE 1 CAPSULE BY MOUTH THREE TIMES A DAY  HUMALOG 100 UNIT/ML KWIKPEN: INJECT 20 UNITS BEFORE BREAKFAST AND 40 UNITS BEFORE DINNER  LANTUS SOLOSTAR 100 UNIT/ML: INJECT 45 UNITS SUBCUTANEOUSLY EVERY DAY IN THE MORNING  levothyroxine 50 mcg (0.05 mg) oral tablet: 1 tab(s) orally once a day  losartan 50 mg oral tablet: 1 tab(s) orally  METFORMIN HCL  MG TABLET: TAKE 2 TABLETS BY MOUTH TWICE A DAY WITH MEALS  metoprolol tartrate 25 mg oral tablet: 1 tab(s) orally  polyethylene glycol 3350 oral powder for reconstitution: 17 gram(s) orally once a day  simvastatin 40 mg oral tablet: 1 tab(s) orally once a day  sodium chloride 0.65% nasal spray: 1 spray(s) in each nostril every 12 hours   acetaminophen 500 mg oral tablet: 2 tab(s) orally every 8 hours as needed for mild to moderate pain  ALPHAGAN P 0.1% DROPS: to each affected eye 2 times a day PLACE 1 DROP INTO THE LEFT EYE 2 TIMES A DAY.  amoxicillin-clavulanate 875 mg-125 mg oral tablet: 875 milligram(s) orally 2 times a day  FAMOTIDINE 40 MG TABLET: orally 2 times a day TAKE 1 TABLET (40 MG TOTAL) BY MOUTH 2 (TWO) TIMES A DAY MAX DAILY AMOUNT: 80 MG  GABAPENTIN 300 MG CAPSULE: TAKE 1 CAPSULE BY MOUTH THREE TIMES A DAY  levothyroxine 50 mcg (0.05 mg) oral tablet: 1 tab(s) orally once a day  losartan 100 mg oral tablet: 1 tab(s) orally once a day  metoprolol tartrate 25 mg oral tablet: 1 tab(s) orally  polyethylene glycol 3350 oral powder for reconstitution: 17 gram(s) orally once a day  simvastatin 40 mg oral tablet: 1 tab(s) orally once a day  sodium chloride 0.65% nasal spray: 1 spray(s) in each nostril every 12 hours

## 2024-09-15 NOTE — PROVIDER CONTACT NOTE (OTHER) - ACTION/TREATMENT ORDERED:
PA aware will continue to monitor
Hydralazine 10mg IV push ordered and given. Will endorse to oncoming shift for f/u.

## 2024-09-15 NOTE — PROVIDER CONTACT NOTE (OTHER) - ASSESSMENT
/89 HR 45. Pt asymptomatic.
bp elevated above parameters of 160. in SBP in 170s. HR 70s on RA   PO Lopressor given and Cardene drip also increased

## 2024-09-16 ENCOUNTER — TRANSCRIPTION ENCOUNTER (OUTPATIENT)
Age: 74
End: 2024-09-16

## 2024-09-16 ENCOUNTER — NON-APPOINTMENT (OUTPATIENT)
Age: 74
End: 2024-09-16

## 2024-09-16 VITALS — SYSTOLIC BLOOD PRESSURE: 162 MMHG | DIASTOLIC BLOOD PRESSURE: 83 MMHG | HEART RATE: 64 BPM

## 2024-09-16 DIAGNOSIS — E11.9 TYPE 2 DIABETES MELLITUS WITHOUT COMPLICATIONS: ICD-10-CM

## 2024-09-16 DIAGNOSIS — I10 ESSENTIAL (PRIMARY) HYPERTENSION: ICD-10-CM

## 2024-09-16 PROBLEM — Z98.890 OTHER SPECIFIED POSTPROCEDURAL STATES: Chronic | Status: ACTIVE | Noted: 2024-09-13

## 2024-09-16 PROBLEM — K27.9 PEPTIC ULCER, SITE UNSPECIFIED, UNSPECIFIED AS ACUTE OR CHRONIC, WITHOUT HEMORRHAGE OR PERFORATION: Chronic | Status: ACTIVE | Noted: 2024-09-13

## 2024-09-16 LAB
ANION GAP SERPL CALC-SCNC: 10 MMOL/L — SIGNIFICANT CHANGE UP (ref 5–17)
BUN SERPL-MCNC: 15 MG/DL — SIGNIFICANT CHANGE UP (ref 7–23)
CALCIUM SERPL-MCNC: 9.1 MG/DL — SIGNIFICANT CHANGE UP (ref 8.4–10.5)
CHLORIDE SERPL-SCNC: 104 MMOL/L — SIGNIFICANT CHANGE UP (ref 96–108)
CO2 SERPL-SCNC: 24 MMOL/L — SIGNIFICANT CHANGE UP (ref 22–31)
CREAT SERPL-MCNC: 1.06 MG/DL — SIGNIFICANT CHANGE UP (ref 0.5–1.3)
EGFR: 75 ML/MIN/1.73M2 — SIGNIFICANT CHANGE UP
GLUCOSE BLDC GLUCOMTR-MCNC: 126 MG/DL — HIGH (ref 70–99)
GLUCOSE BLDC GLUCOMTR-MCNC: 169 MG/DL — HIGH (ref 70–99)
GLUCOSE SERPL-MCNC: 132 MG/DL — HIGH (ref 70–99)
MAGNESIUM SERPL-MCNC: 2.1 MG/DL — SIGNIFICANT CHANGE UP (ref 1.6–2.6)
PHOSPHATE SERPL-MCNC: 2.7 MG/DL — SIGNIFICANT CHANGE UP (ref 2.5–4.5)
POTASSIUM SERPL-MCNC: 4 MMOL/L — SIGNIFICANT CHANGE UP (ref 3.5–5.3)
POTASSIUM SERPL-SCNC: 4 MMOL/L — SIGNIFICANT CHANGE UP (ref 3.5–5.3)
SODIUM SERPL-SCNC: 138 MMOL/L — SIGNIFICANT CHANGE UP (ref 135–145)
T4 FREE SERPL-MCNC: 1.16 NG/DL — SIGNIFICANT CHANGE UP (ref 0.93–1.7)

## 2024-09-16 PROCEDURE — 86850 RBC ANTIBODY SCREEN: CPT

## 2024-09-16 PROCEDURE — 70553 MRI BRAIN STEM W/O & W/DYE: CPT | Mod: MC

## 2024-09-16 PROCEDURE — 99233 SBSQ HOSP IP/OBS HIGH 50: CPT

## 2024-09-16 PROCEDURE — 84305 ASSAY OF SOMATOMEDIN: CPT

## 2024-09-16 PROCEDURE — 82570 ASSAY OF URINE CREATININE: CPT

## 2024-09-16 PROCEDURE — 85025 COMPLETE CBC W/AUTO DIFF WBC: CPT

## 2024-09-16 PROCEDURE — 83001 ASSAY OF GONADOTROPIN (FSH): CPT

## 2024-09-16 PROCEDURE — 84100 ASSAY OF PHOSPHORUS: CPT

## 2024-09-16 PROCEDURE — 71045 X-RAY EXAM CHEST 1 VIEW: CPT

## 2024-09-16 PROCEDURE — 85027 COMPLETE CBC AUTOMATED: CPT

## 2024-09-16 PROCEDURE — 82533 TOTAL CORTISOL: CPT

## 2024-09-16 PROCEDURE — 84300 ASSAY OF URINE SODIUM: CPT

## 2024-09-16 PROCEDURE — 86900 BLOOD TYPING SEROLOGIC ABO: CPT

## 2024-09-16 PROCEDURE — 85610 PROTHROMBIN TIME: CPT

## 2024-09-16 PROCEDURE — 83002 ASSAY OF GONADOTROPIN (LH): CPT

## 2024-09-16 PROCEDURE — 99024 POSTOP FOLLOW-UP VISIT: CPT

## 2024-09-16 PROCEDURE — 82553 CREATINE MB FRACTION: CPT

## 2024-09-16 PROCEDURE — 84443 ASSAY THYROID STIM HORMONE: CPT

## 2024-09-16 PROCEDURE — 88342 IMHCHEM/IMCYTCHM 1ST ANTB: CPT

## 2024-09-16 PROCEDURE — 88360 TUMOR IMMUNOHISTOCHEM/MANUAL: CPT

## 2024-09-16 PROCEDURE — 36415 COLL VENOUS BLD VENIPUNCTURE: CPT

## 2024-09-16 PROCEDURE — 82962 GLUCOSE BLOOD TEST: CPT

## 2024-09-16 PROCEDURE — A9585: CPT

## 2024-09-16 PROCEDURE — 93306 TTE W/DOPPLER COMPLETE: CPT

## 2024-09-16 PROCEDURE — 84439 ASSAY OF FREE THYROXINE: CPT

## 2024-09-16 PROCEDURE — 84484 ASSAY OF TROPONIN QUANT: CPT

## 2024-09-16 PROCEDURE — 82550 ASSAY OF CK (CPK): CPT

## 2024-09-16 PROCEDURE — 80053 COMPREHEN METABOLIC PANEL: CPT

## 2024-09-16 PROCEDURE — 88341 IMHCHEM/IMCYTCHM EA ADD ANTB: CPT

## 2024-09-16 PROCEDURE — C1889: CPT

## 2024-09-16 PROCEDURE — 86901 BLOOD TYPING SEROLOGIC RH(D): CPT

## 2024-09-16 PROCEDURE — 83930 ASSAY OF BLOOD OSMOLALITY: CPT

## 2024-09-16 PROCEDURE — 88331 PATH CONSLTJ SURG 1 BLK 1SPC: CPT

## 2024-09-16 PROCEDURE — 83735 ASSAY OF MAGNESIUM: CPT

## 2024-09-16 PROCEDURE — 88305 TISSUE EXAM BY PATHOLOGIST: CPT

## 2024-09-16 PROCEDURE — 80048 BASIC METABOLIC PNL TOTAL CA: CPT

## 2024-09-16 PROCEDURE — 83935 ASSAY OF URINE OSMOLALITY: CPT

## 2024-09-16 PROCEDURE — 83036 HEMOGLOBIN GLYCOSYLATED A1C: CPT

## 2024-09-16 PROCEDURE — 84436 ASSAY OF TOTAL THYROXINE: CPT

## 2024-09-16 PROCEDURE — 88333 PATH CONSLTJ SURG CYTO XM 1: CPT

## 2024-09-16 PROCEDURE — 84146 ASSAY OF PROLACTIN: CPT

## 2024-09-16 PROCEDURE — 85730 THROMBOPLASTIN TIME PARTIAL: CPT

## 2024-09-16 PROCEDURE — 97161 PT EVAL LOW COMPLEX 20 MIN: CPT

## 2024-09-16 RX ORDER — LOSARTAN POTASSIUM 50 MG/1
1 TABLET ORAL
Qty: 30 | Refills: 0
Start: 2024-09-16 | End: 2024-10-15

## 2024-09-16 RX ORDER — LEVOTHYROXINE SODIUM 100 MCG
1 TABLET ORAL
Qty: 30 | Refills: 0
Start: 2024-09-16 | End: 2024-10-15

## 2024-09-16 RX ORDER — LOSARTAN POTASSIUM 50 MG/1
1 TABLET ORAL
Refills: 0 | DISCHARGE

## 2024-09-16 RX ORDER — AMOXICILLIN AND CLAVULANATE POTASSIUM 250; 125 MG/1; MG/1
875 TABLET, FILM COATED ORAL
Qty: 14 | Refills: 0
Start: 2024-09-16 | End: 2024-09-22

## 2024-09-16 RX ORDER — SODIUM PHOSPHATE, DIBASIC, ANHYDROUS, POTASSIUM PHOSPHATE, MONOBASIC, AND SODIUM PHOSPHATE, MONOBASIC, MONOHYDRATE 852; 155; 130 MG/1; MG/1; MG/1
1 TABLET, COATED ORAL ONCE
Refills: 0 | Status: COMPLETED | OUTPATIENT
Start: 2024-09-16 | End: 2024-09-16

## 2024-09-16 RX ADMIN — POLYETHYLENE GLYCOL 3350 17 GRAM(S): 17 POWDER, FOR SOLUTION ORAL at 12:07

## 2024-09-16 RX ADMIN — Medication 300 MILLIGRAM(S): at 06:59

## 2024-09-16 RX ADMIN — SODIUM PHOSPHATE, DIBASIC, ANHYDROUS, POTASSIUM PHOSPHATE, MONOBASIC, AND SODIUM PHOSPHATE, MONOBASIC, MONOHYDRATE 1 PACKET(S): 852; 155; 130 TABLET, COATED ORAL at 10:58

## 2024-09-16 RX ADMIN — Medication 40 MILLIGRAM(S): at 07:00

## 2024-09-16 RX ADMIN — Medication 2: at 12:07

## 2024-09-16 RX ADMIN — Medication 300 MILLIGRAM(S): at 14:57

## 2024-09-16 RX ADMIN — Medication 1 SPRAY(S): at 07:01

## 2024-09-16 RX ADMIN — Medication 100 MILLIGRAM(S): at 06:59

## 2024-09-16 RX ADMIN — BRIMONIDINE TARTRATE 1 DROP(S): 2 SOLUTION/ DROPS OPHTHALMIC at 07:02

## 2024-09-16 RX ADMIN — Medication 1 SPRAY(S): at 12:06

## 2024-09-16 RX ADMIN — METOPROLOL TARTRATE 50 MILLIGRAM(S): 100 TABLET ORAL at 06:59

## 2024-09-16 RX ADMIN — ACETAMINOPHEN 1000 MILLIGRAM(S): 325 TABLET ORAL at 14:57

## 2024-09-16 RX ADMIN — LOSARTAN POTASSIUM 50 MILLIGRAM(S): 50 TABLET ORAL at 07:00

## 2024-09-16 RX ADMIN — ACETAMINOPHEN 1000 MILLIGRAM(S): 325 TABLET ORAL at 06:59

## 2024-09-16 RX ADMIN — Medication 50 MICROGRAM(S): at 07:00

## 2024-09-16 RX ADMIN — FAMOTIDINE 40 MILLIGRAM(S): 10 INJECTION INTRAVENOUS at 07:00

## 2024-09-16 NOTE — DISCHARGE NOTE NURSING/CASE MANAGEMENT/SOCIAL WORK - NSDCFUADDAPPT_GEN_ALL_CORE_FT
Please follow up with Dr. D'Amico, call office to schedule/confirm appointment at [phone].    Please follow up with Dr. Triplett from ENT.     Please follow up with your outpatient Endocrinologist at [facility]     Please follow up with your primary care provider, Dr. Roberto Walker at [facility]. The office will be calling you to schedule an appointment.

## 2024-09-16 NOTE — PHYSICAL THERAPY INITIAL EVALUATION ADULT - GENERAL OBSERVATIONS, REHAB EVAL
Pt received OOB sitting in the chair, +EKG, +hep-lock, NAD. Pt left as found, NAD, call bell in reach, +chair alarm, ELIZABETH valente.

## 2024-09-16 NOTE — PROGRESS NOTE ADULT - NSPROGADDITIONALINFOA_GEN_ALL_CORE
-    Dispo: anticipate home    Recommendations and plan discussed with patient and primary team - all questions answered.

## 2024-09-16 NOTE — PHYSICAL THERAPY INITIAL EVALUATION ADULT - ADDITIONAL COMMENTS
Pt lives with spouse in an apt with elevator, denies stairs. Prior to admission, pt ambulated independently without AD. Pt has a cane at home and he does not need to use it. Pt is R hand dominant, wear glasses.

## 2024-09-16 NOTE — PROGRESS NOTE ADULT - SUBJECTIVE AND OBJECTIVE BOX
SUBJECTIVE / INTERVAL HPI: Patient was seen and examined this morning.     Overnight events:    CAPILLARY BLOOD GLUCOSE & INSULIN RECEIVED  129 mg/dL (09-15 @ 07:01)  202 mg/dL (09-15 @ 11:27)  126 mg/dL (09-15 @ 17:40)  145 mg/dL (09-15 @ 21:25)  126 mg/dL (09-16 @ 07:09)  169 mg/dL (09-16 @ 11:20)    Patient is a 74 yo man s/p TSR of a pituitary macroadenoma.  He has a history of glaucoma and retinal detachment with visual loss.  In 2023, he was hospitalized for toe amputations which he reports was NOT due to diabetes?.  He had brain imaging done because he had a fall. Imaging revealed a pituitary macroadenoma with suprasellar extension and mass effect.  He was told to see neurosurgeon but had difficulty with coordinating care-specifically he saw a neurologist as opposed to a neurosurgeon.  Then he had to wait for neurosurgery referral/appointments, etc. On imaging, there was also ischemic disease and exophthalmos. The patient established care with Dr. Damico and is s/p TSR.  He denies any symptoms of prolactinoma, no galactorrhea.  Endocrinology was consulted in 2023 and hormonal work up was normal. He had normal IGF1/cortisol/TSH/FH/prolactin levels (all of these results are in [place] dated April 2023).    Type 2 diabetes diagnosed in 2013 when he presented to the hospital with pneumonia. During that admission, he required basal/bolus insulin and was on metformin. He was managed by PCP on insulin regiment he states was "Novolog 20 units in the morning and 40 units in the evening with levemir 25 un AM and 15 in PM."  On this reported regimen, the patient states glucose became very uncontrolledn with lows in the 70s and highs in the 300s.  He has an endocrinologist at [facility] but does not recall her name. She is located in [facility].  Beginning January 2024, he is NOT on insulin and denies taking any oral diabetes medicines.  He has a CHARLIE and checks sugars 12 times a day. Values range from the 90s to rare 240s post prandially. Denies significant hypoglycemia. He took his CGM off for MRI yesterday  He works midnight to 8 AM   Breakfast: egg whites and a piece of toast  Lunch: spaghetti with sauce, corn muffin, salad  Snacks: corn muffin, grapes, blackberries  Hx of retinal detachment and glaucoma    Thyroid  The patient had incidental finding of bilateral exophthalmos.  He states that when he was a child he was told of having some sort of thyroid issue in the setting of a "touch of diptheria and polio."  He did not require any thyroid medicine.  His TSH April 2023 as 2.19  Not on thyroid medicines  No known history of thyroid nodules      REVIEW OF SYSTEMS  Constitutional:  Negative fever, chills   Cardiovascular:  Negative for chest pain or palpitations.  Respiratory:  Negative for cough, wheezing, or shortness of breath.    Gastrointestinal:  Negative for nausea, vomiting, diarrhea, constipation, or abdominal pain.  Genitourinary:  Negative frequency, urgency or dysuria.  Neurologic:  No headache, confusion, dizziness, lightheadedness.    PHYSICAL EXAM  Vital Signs Last 24 Hrs  T(C): 36.6 (16 Sep 2024 08:54), Max: 36.6 (15 Sep 2024 14:40)  T(F): 97.9 (16 Sep 2024 08:54), Max: 97.9 (15 Sep 2024 22:00)  HR: 70 (16 Sep 2024 10:58) (50 - 78)  BP: 146/76 (16 Sep 2024 10:58) (137/72 - 202/84)  BP(mean): 103 (16 Sep 2024 10:58) (91 - 128)  RR: 18 (16 Sep 2024 10:58) (16 - 19)  SpO2: 98% (16 Sep 2024 10:58) (95% - 100%)    Parameters below as of 16 Sep 2024 10:58  Patient On (Oxygen Delivery Method): room air        Constitutional: Awake, alert, in no acute distress.   HEENT: Normocephalic, atraumatic, ALIREZA.  Respiratory: Lungs clear to ausculation bilaterally.   Cardiovascular: regular rhythm, normal S1 and S2, no audible murmurs.   GI: soft, non-tender, non-distended, bowel sounds present.  Extremities: No lower extremity edema.     LABS  CBC - WBC/HGB/HTC/PLT: 7.68/11.8/37.3/183 (09-15-24)  BMP - Na/K/Cl/Bicarb/BUN/Cr/Gluc/AG/eGFR: 138/4.0/104/24/15/1.06/132/10/75 (09-16-24)  Ca - 9.1 (09-16-24)  Phos - 2.7 (09-16-24)  Mg - 2.1 (09-16-24)  LFT - Alb/Tprot/Tbili/Dbili/AlkPhos/ALT/AST: 3.7/--/0.3/--/77/14/15 (09-15-24)  PT/aPTT/INR: 12.1/21.3/1.06 (09-13-24)   Thyroid Stimulating Hormone, Serum: 7.190 (09-13-24)  Total T4/Free T4: --/1.160 (09-16-24)      09-15-24 @ 07:01  -  09-16-24 @ 07:00  --------------------------------------------------------  IN: 0 mL / OUT: 200 mL / NET: -200 mL    09-16-24 @ 07:01  -  09-16-24 @ 12:06  --------------------------------------------------------  IN: 240 mL / OUT: 250 mL / NET: -10 mL        MEDICATIONS  MEDICATIONS  (STANDING):  acetaminophen     Tablet .. 1000 milliGRAM(s) Oral every 8 hours  atorvastatin 20 milliGRAM(s) Oral at bedtime  brimonidine 0.2% Ophthalmic Solution 1 Drop(s) Left EYE two times a day  cefTRIAXone   IVPB 2000 milliGRAM(s) IV Intermittent every 12 hours  enoxaparin Injectable 40 milliGRAM(s) SubCutaneous <User Schedule>  famotidine    Tablet 40 milliGRAM(s) Oral two times a day  gabapentin 300 milliGRAM(s) Oral three times a day  influenza  Vaccine (HIGH DOSE) 0.5 milliLiter(s) IntraMuscular once  insulin glargine Injectable (LANTUS) 8 Unit(s) SubCutaneous at bedtime  insulin lispro (ADMELOG) corrective regimen sliding scale   SubCutaneous Before meals and at bedtime  levothyroxine 50 MICROGram(s) Oral daily  losartan 50 milliGRAM(s) Oral daily  metoprolol succinate ER 50 milliGRAM(s) Oral daily  pantoprazole    Tablet 40 milliGRAM(s) Oral before breakfast  polyethylene glycol 3350 17 Gram(s) Oral daily  senna 2 Tablet(s) Oral at bedtime  sodium chloride 0.65% Nasal 1 Spray(s) Both Nostrils every 6 hours    MEDICATIONS  (PRN):  hydrALAZINE Injectable 10 milliGRAM(s) IV Push every 4 hours PRN SBP greater than 160    ASSESSMENT / RECOMMENDATIONS    Pt is a 74 yo M PMH DM, HTN, PAD, colon CA s/p colectomy 2011, left retinal detachment with residual vision loss, glaucoma, b/l cataract surgery, pituitary macroadenoma with 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 Now s/p TSP for pituitary macroadenoma 9/13/24.    #Pituitary macroadenoma s/p Resection  -his records from previous hospitalization in 2023 were reviewed the adenoma was not hypersecreting  -post-operatively he is doing well. Cortisol is robust with no current evidence of adrenal insufficiency  -on lab work free T4 was low.  Levothyroxine 50 mcg was started just yesterday and free T4 improved. Recommend repeating another value tomorrow. If level remains low, he will require dose titration. Please see below regarding additional thyroid history  -follow up on pituitary pathology  -sodium levels are good, no increased urinary output. No current symptoms of DI.    #Hypothyroidism.   Recommendation: -he has some sort of thyroid history details of which are unknown. On exam, he does appear to have exophthalmos and that was seen on imaging  -right now, he has central hypothyroidism  - on levothyroxine 50mcg   -recommend further work up including thyroid US in the outpatient setting  -repeat free T4 prior to discharge and if needed, adjust dose of Lt4    The patient says he has an endocrinologist at [facility] but cannot recall name.  He requires follow up and coordination of care. My recommendations would be to consider endocrine consult/care with [facility] so that all records/history can be reviewed.      #Type 2 Diabetes   patient states his amputations were not due to diabetes. However, on previous history, he had gangrene and A1c at the time was 12.2%   -he is currently managed with diet and reports not taking any diabetes medicines  -serum A1c is 7.4% and at goal for his age  -continue with SMBG  -continue with lantus 8 units, sliding scale and consistent carbohydrate diet  -glucose levels are at goal  -on food recall, diet is high in starch. He is aware.  Consider nutritional consult to optimize education and patient teaching  -hypoglycemia protocol. SUBJECTIVE / INTERVAL HPI: Patient was seen and examined this morning. He denies any headaches or vision changes. Denies excessive thirst or urination.     Overnight events: No events overnight     CAPILLARY BLOOD GLUCOSE & INSULIN RECEIVED  129 mg/dL (09-15 @ 07:01)  202 mg/dL (09-15 @ 11:27)  126 mg/dL (09-15 @ 17:40)  145 mg/dL (09-15 @ 21:25)  126 mg/dL (09-16 @ 07:09)  169 mg/dL (09-16 @ 11:20)    Endocrine History:   Patient is a 74 yo man s/p TSR of a pituitary macroadenoma.  He has a history of glaucoma and retinal detachment with visual loss.  In 2023, he was hospitalized for toe amputations which he reports was NOT due to diabetes?.  He had brain imaging done because he had a fall. Imaging revealed a pituitary macroadenoma with suprasellar extension and mass effect.  He was told to see neurosurgeon but had difficulty with coordinating care-specifically he saw a neurologist as opposed to a neurosurgeon.  Then he had to wait for neurosurgery referral/appointments, etc. On imaging, there was also ischemic disease and exophthalmos. The patient established care with Dr. Damico and is s/p TSR.  He denies any symptoms of prolactinoma, no galactorrhea.  Endocrinology was consulted in 2023 and hormonal work up was normal. He had normal IGF1/cortisol/TSH/FH/prolactin levels (all of these results are in [place] dated April 2023).    Type 2 diabetes diagnosed in 2013 when he presented to the hospital with pneumonia. During that admission, he required basal/bolus insulin and was on metformin. He was managed by PCP on insulin regiment he states was "Novolog 20 units in the morning and 40 units in the evening with levemir 25 un AM and 15 in PM."  On this reported regimen, the patient states glucose became very uncontrolledn with lows in the 70s and highs in the 300s.  He has an endocrinologist at [facility] but does not recall her name. She is located in [facility].  Beginning January 2024, he is NOT on insulin and denies taking any oral diabetes medicines.  He has a CHARLIE and checks sugars 12 times a day. Values range from the 90s to rare 240s post prandially. Denies significant hypoglycemia. He took his CGM off for MRI yesterday  He works midnight to 8 AM   Breakfast: egg whites and a piece of toast  Lunch: spaghetti with sauce, corn muffin, salad  Snacks: corn muffin, grapes, blackberries  Hx of retinal detachment and glaucoma    Thyroid  The patient had incidental finding of bilateral exophthalmos.  He states that when he was a child he was told of having some sort of thyroid issue in the setting of a "touch of diptheria and polio."  He did not require any thyroid medicine.  His TSH April 2023 as 2.19  Not on thyroid medicines  No known history of thyroid nodules    REVIEW OF SYSTEMS  Constitutional:  Negative fever, chills   Cardiovascular:  Negative for chest pain or palpitations.  Respiratory:  Negative for cough, wheezing, or shortness of breath.    Gastrointestinal:  Negative for nausea, vomiting, diarrhea, constipation, or abdominal pain.  Genitourinary:  Negative frequency, urgency or dysuria.  Neurologic:  No headache, confusion, dizziness, lightheadedness.    PHYSICAL EXAM  Vital Signs Last 24 Hrs  T(C): 36.6 (16 Sep 2024 08:54), Max: 36.6 (15 Sep 2024 14:40)  T(F): 97.9 (16 Sep 2024 08:54), Max: 97.9 (15 Sep 2024 22:00)  HR: 70 (16 Sep 2024 10:58) (50 - 78)  BP: 146/76 (16 Sep 2024 10:58) (137/72 - 202/84)  BP(mean): 103 (16 Sep 2024 10:58) (91 - 128)  RR: 18 (16 Sep 2024 10:58) (16 - 19)  SpO2: 98% (16 Sep 2024 10:58) (95% - 100%)    Parameters below as of 16 Sep 2024 10:58  Patient On (Oxygen Delivery Method): room air    Constitutional: Awake, alert, in no acute distress.   HEENT: Normocephalic, atraumatic,  Respiratory: Lungs clear to ausculation bilaterally.   Cardiovascular: regular rhythm, normal S1 and S2, no audible murmurs.   GI: soft, non-tender, non-distended, bowel sounds present.  Extremities: No lower extremity edema.     LABS  CBC - WBC/HGB/HTC/PLT: 7.68/11.8/37.3/183 (09-15-24)  BMP - Na/K/Cl/Bicarb/BUN/Cr/Gluc/AG/eGFR: 138/4.0/104/24/15/1.06/132/10/75 (09-16-24)  Ca - 9.1 (09-16-24)  Phos - 2.7 (09-16-24)  Mg - 2.1 (09-16-24)  LFT - Alb/Tprot/Tbili/Dbili/AlkPhos/ALT/AST: 3.7/--/0.3/--/77/14/15 (09-15-24)  PT/aPTT/INR: 12.1/21.3/1.06 (09-13-24)   Thyroid Stimulating Hormone, Serum: 7.190 (09-13-24)  Total T4/Free T4: --/1.160 (09-16-24)      09-15-24 @ 07:01  -  09-16-24 @ 07:00  --------------------------------------------------------  IN: 0 mL / OUT: 200 mL / NET: -200 mL    09-16-24 @ 07:01  -  09-16-24 @ 12:06  --------------------------------------------------------  IN: 240 mL / OUT: 250 mL / NET: -10 mL        MEDICATIONS  MEDICATIONS  (STANDING):  acetaminophen     Tablet .. 1000 milliGRAM(s) Oral every 8 hours  atorvastatin 20 milliGRAM(s) Oral at bedtime  brimonidine 0.2% Ophthalmic Solution 1 Drop(s) Left EYE two times a day  cefTRIAXone   IVPB 2000 milliGRAM(s) IV Intermittent every 12 hours  enoxaparin Injectable 40 milliGRAM(s) SubCutaneous <User Schedule>  famotidine    Tablet 40 milliGRAM(s) Oral two times a day  gabapentin 300 milliGRAM(s) Oral three times a day  influenza  Vaccine (HIGH DOSE) 0.5 milliLiter(s) IntraMuscular once  insulin glargine Injectable (LANTUS) 8 Unit(s) SubCutaneous at bedtime  insulin lispro (ADMELOG) corrective regimen sliding scale   SubCutaneous Before meals and at bedtime  levothyroxine 50 MICROGram(s) Oral daily  losartan 50 milliGRAM(s) Oral daily  metoprolol succinate ER 50 milliGRAM(s) Oral daily  pantoprazole    Tablet 40 milliGRAM(s) Oral before breakfast  polyethylene glycol 3350 17 Gram(s) Oral daily  senna 2 Tablet(s) Oral at bedtime  sodium chloride 0.65% Nasal 1 Spray(s) Both Nostrils every 6 hours    MEDICATIONS  (PRN):  hydrALAZINE Injectable 10 milliGRAM(s) IV Push every 4 hours PRN SBP greater than 160    ASSESSMENT / RECOMMENDATIONS  Pt is a 74 yo M PMH DM, HTN, PAD, colon CA s/p colectomy 2011, left retinal detachment with residual vision loss, glaucoma, b/l cataract surgery, pituitary macroadenoma with 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 Now s/p TSP for pituitary macroadenoma 9/13/24.    #Pituitary macroadenoma s/p Resection  -His records from previous hospitalization in 2023 were reviewed the adenoma was not hypersecreting  -post-operatively he is doing well. Cortisol is robust with no current evidence of adrenal insufficiency  -on lab work free T4 was low.  Levothyroxine 50 mcg was started just yesterday and free T4 improved. Repeat T4 1.160.   -c/w levothyroxine 50mcg on discharge.   -follow up on pituitary pathology  -sodium levels are good, no increased urinary output. No current symptoms of DI.    #Hypothyroidism   - He has some sort of thyroid history details of which are unknown. On exam, he does appear to have exophthalmos and that was seen on imaging  - right now, he has central hypothyroidism  - on levothyroxine 50mcg   - recommend further work up including thyroid US in the outpatient setting  - The patient says he has an endocrinologist at [facility] but cannot recall name.  He requires follow up and coordination of care.   - He would like to continue to follow with his own endocrinologist. Recommended to repeat TFTs with his endocrinologist in 2 weeks.     #Type 2 Diabetes   - Patient states his amputations were not due to diabetes. However, on previous history, he had gangrene and A1c at the time was 12.2%   -he is currently managed with diet and reports not taking any diabetes medicines  -serum A1c is 7.4% and at goal for his age  -continue with SMBG  -continue with lantus 8 units, sliding scale and consistent carbohydrate diet while inpatient.   -glucose levels are at goal  - On Discharge: No new medications. His outpatient endocrinologist plans to start him on GLP1 next week which will help with appetite control and weight loss.     Discussed with Dr. Zamora. Discussed with Primary Team.     Jory Colón  Endocrinology Fellow

## 2024-09-16 NOTE — PROGRESS NOTE ADULT - PROBLEM SELECTOR PLAN 3
Chronic. Home: losartan 50mg, metoprolol 25mg  - hypertensive overnight (asymptomatic), received IVP of hydralazine  - increase losartan to 100mg qd - d/w patient  - provide with BP cuff so patient can keep BP log  - PCP f/u within one week (Dr. Roberto Walker)

## 2024-09-16 NOTE — PHYSICAL THERAPY INITIAL EVALUATION ADULT - PERTINENT HX OF CURRENT PROBLEM, REHAB EVAL
Pt is a 74 yo male presenting for 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 He has a history of diabetes, hypertension, peripheral arterial disease, colon cancer (status post-colectomy in 2011), left retinal detachment with residual vision loss, glaucoma, and bilateral cataract surgery. An MRI on September 29, 2023, confirmed an enlarged pituitary gland with suprasellar extension and mass effect on the optic chiasm, consistent with a pituitary macroadenoma.; /p transsphenoidal resection of pituitary adenoma on 9/13/24.

## 2024-09-16 NOTE — PROGRESS NOTE ADULT - PROVIDER SPECIALTY LIST ADULT
ENT
Hospitalist
NSICU
Neurosurgery
ENT
ENT
Endocrinology
NSICU
Neurosurgery
NSICU

## 2024-09-16 NOTE — PHYSICAL THERAPY INITIAL EVALUATION ADULT - MODALITIES TREATMENT COMMENTS
CN Testing: B/L Frontalis intact; unable to puff the cheek bilaterally, smile symmetrical; tongue protrusion at midline; B/L eyes open/close intact; Shoulder elevation: intact bilaterally; Vision H-Test: bilateral tracking and smooth pursuit intact; Convergence/Divergence: intact; Vision Quadrant Test: intact except L eye temporal field due to eye impairment.

## 2024-09-16 NOTE — PROGRESS NOTE ADULT - SUBJECTIVE AND OBJECTIVE BOX
OTOLARYNGOLOGY (ENT) PROGRESS NOTE    PATIENT: SYDNI REBOLLEDO  MRN: [id number]  : [birth year]  [id number]  DATE OF SERVICE:  24  			         ID:SYDNI REBOLLEDO    Subjective/ Interval:   : Patient seen and examined at bedside. AVSS, emesis x1 overnight. Feeling well this morning. Pain well controlled with medication. Has not started diet yet. Minimal drainage from nares.  9/15: Patient seen and examined at bedside. AVSS, NAEON. MRI completed. Doing well this morning, pain controlled with medication. Tolerating diet. Minimal drainage from nares.  : Patient seen and examined at bedside. HTN to 190s/80s, given hydral x1. Doing well this morning, pain controlled with medication. Tolerating diet. Minimal drainage from nares.    ALLERGIES:  Percocet 5/325 (Other)  Polyester (Hives; Rash)      MEDICATIONS:  Antiinfectives:   cefTRIAXone   IVPB 2000 milliGRAM(s) IV Intermittent every 12 hours    IV fluids:    Hematologic/Anticoagulation:  enoxaparin Injectable 40 milliGRAM(s) SubCutaneous <User Schedule>    Pain medications/Neuro:  acetaminophen     Tablet .. 1000 milliGRAM(s) Oral every 8 hours  gabapentin 300 milliGRAM(s) Oral three times a day    Endocrine Medications:   atorvastatin 20 milliGRAM(s) Oral at bedtime  insulin glargine Injectable (LANTUS) 8 Unit(s) SubCutaneous at bedtime  insulin lispro (ADMELOG) corrective regimen sliding scale   SubCutaneous Before meals and at bedtime  levothyroxine 50 MICROGram(s) Oral daily    All other standing medications:   brimonidine 0.2% Ophthalmic Solution 1 Drop(s) Left EYE two times a day  famotidine    Tablet 40 milliGRAM(s) Oral two times a day  influenza  Vaccine (HIGH DOSE) 0.5 milliLiter(s) IntraMuscular once  losartan 50 milliGRAM(s) Oral daily  metoprolol succinate ER 50 milliGRAM(s) Oral daily  pantoprazole    Tablet 40 milliGRAM(s) Oral before breakfast  polyethylene glycol 3350 17 Gram(s) Oral daily  senna 2 Tablet(s) Oral at bedtime  sodium chloride 0.65% Nasal 1 Spray(s) Both Nostrils every 6 hours    All other PRN medications:  hydrALAZINE Injectable 10 milliGRAM(s) IV Push every 4 hours PRN    Vital Signs Last 24 Hrs  T(C): 36.6 (16 Sep 2024 05:04), Max: 36.7 (15 Sep 2024 10:28)  T(F): 97.8 (16 Sep 2024 05:04), Max: 98 (15 Sep 2024 10:28)  HR: 70 (16 Sep 2024 04:30) (46 - 76)  BP: 138/65 (16 Sep 2024 04:30) (137/72 - 198/88)  BP(mean): 94 (16 Sep 2024 04:30) (91 - 128)  RR: 17 (16 Sep 2024 04:30) (16 - 18)  SpO2: 95% (16 Sep 2024 04:30) (95% - 100%)    Parameters below as of 16 Sep 2024 04:30  Patient On (Oxygen Delivery Method): room air          09-15 @ 07:01  -   @ 07:00  --------------------------------------------------------  IN:  Total IN: 0 mL    OUT:    Voided (mL): 200 mL  Total OUT: 200 mL    Total NET: -200 mL                  PHYSICAL EXAM:  General: NAD, A+Ox3  Respiratory: No respiratory distress, stridor, or stertor  OU: EOMI  Right: Pinna wnl  Left: Pinna wnl  Nose: nasal cavity clear bilaterally, minimal drainage, doyles in place  OC/OP: tongue normal, floor of mouth WNL, no masses or lesions, OP clear  Neck: soft/flat, no LAD  Neuro: CNII-XII intact       LABS                       11.8   7.68  )-----------( 183      ( 15 Sep 2024 05:30 )             37.3    -15    139  |  106  |  16  ----------------------------<  123<H>  4.8   |  28  |  1.15    Ca    8.7      15 Sep 2024 05:30  Phos  2.3     -15  Mg     2.1     -15    TPro  7.1  /  Alb  3.7  /  TBili  0.3  /  DBili  x   /  AST  15  /  ALT  14  /  AlkPhos  77  09-15         Coagulation Studies-     Urinalysis Basic - ( 15 Sep 2024 05:30 )    Color: x / Appearance: x / SG: x / pH: x  Gluc: 123 mg/dL / Ketone: x  / Bili: x / Urobili: x   Blood: x / Protein: x / Nitrite: x   Leuk Esterase: x / RBC: x / WBC x   Sq Epi: x / Non Sq Epi: x / Bacteria: x      Endocrine Panel-                MICROBIOLOGY:        RADIOLOGY & ADDITIONAL STUDIES:    Assessment and Plan:  SYDNI REBOLLEDO is a  73yMale PMH DM, HTN, PVD, glaucoma, retinal detachment on L, s/p TSPR for macroadenoma, closed with free mucosal graft and surgicel . Doing well this morning.    PLAN:  - nasal saline sprays  - ceftriaxone, transition to cefuroxime or augmentin on d/c  - f/u endocrine recs  - skull base precautions  - Doyles to stay in until removed at f/u appt with Dr. Triplett, pt may call office to confirm appt time  - rest of care per NSGY    Disposition:   [facility] at [phone] with any questions/concerns.

## 2024-09-16 NOTE — DISCHARGE NOTE NURSING/CASE MANAGEMENT/SOCIAL WORK - NSDCVIVACCINE_GEN_ALL_CORE_FT
Tdap; 29-Apr-2023 15:39; Mackenzie Russo (RN); [employer]; [id number] (Exp. Date: 15-Feb-2025); IntraMuscular; Deltoid Right.; 0.5 milliLiter(s); VIS (VIS Published: 09-May-2013, VIS Presented: 29-Apr-2023);

## 2024-09-16 NOTE — PROGRESS NOTE ADULT - PROBLEM SELECTOR PLAN 4
HbA1C 7.4 Home: NONE - patient states has been off meds for months.   - FSG goal 140-180s  - inpatient: glargine 8u qhs + ISS with FSG ACHS  - endocrine following, f/u rec re: discharge regimen  - diabetic diet

## 2024-09-16 NOTE — PROGRESS NOTE ADULT - ATTENDING COMMENTS
THe patient is beind seen fort follow-up of surgery on nonfuncvtional  pituitary macroadenoma.Post-operatve cortisol is 11.5,Prolactin 17, LH 5.11 ,FSH 58. Hgb A1C is 7.4%. The patient is on thyroid replacemen-t therapy 50 mcg daily with Free T4 1.6. I agree with [id number] with Lantus 10 units and 3 units pre-meal Lispro Insulin. IGF-1 of 62 needs follow-up re GH sufficiency.

## 2024-09-16 NOTE — PROGRESS NOTE ADULT - SUBJECTIVE AND OBJECTIVE BOX
SUBJECTIVE: Received hydralazine IVP for elevated BP (asymptomatic). SARKIS. Patient seen this morning sitting up in chair. States he feels well and wondering when he will be able to return home. Notes had vomiting over weekend but none since then, tolerated breakfast w/o nausea and had BM today. Endorses rhinorrhea (using nasal saline spray). Denies fever, vision changes, hearing changes, sore throat, chest pain, palpitations, cough, SOB, abd pain, dysuria, hematuria, rash, numbness/weaknessHA, LOC. STates has been off insulin/ant-glycemic agents for months. Tele with PVC/couplets and occasional NSVT.       DIET:  Diet, Consistent Carbohydrate/No Snacks (09-13-24 @ 11:31) [Active]      MEDICATIONS:  MEDICATIONS  (STANDING):  acetaminophen     Tablet .. 1000 milliGRAM(s) Oral every 8 hours  atorvastatin 20 milliGRAM(s) Oral at bedtime  brimonidine 0.2% Ophthalmic Solution 1 Drop(s) Left EYE two times a day  cefTRIAXone   IVPB 2000 milliGRAM(s) IV Intermittent every 12 hours  enoxaparin Injectable 40 milliGRAM(s) SubCutaneous <User Schedule>  famotidine    Tablet 40 milliGRAM(s) Oral two times a day  gabapentin 300 milliGRAM(s) Oral three times a day  influenza  Vaccine (HIGH DOSE) 0.5 milliLiter(s) IntraMuscular once  insulin glargine Injectable (LANTUS) 8 Unit(s) SubCutaneous at bedtime  insulin lispro (ADMELOG) corrective regimen sliding scale   SubCutaneous Before meals and at bedtime  levothyroxine 50 MICROGram(s) Oral daily  losartan 50 milliGRAM(s) Oral daily  metoprolol succinate ER 50 milliGRAM(s) Oral daily  pantoprazole    Tablet 40 milliGRAM(s) Oral before breakfast  polyethylene glycol 3350 17 Gram(s) Oral daily  senna 2 Tablet(s) Oral at bedtime  sodium chloride 0.65% Nasal 1 Spray(s) Both Nostrils every 6 hours    MEDICATIONS  (PRN):  hydrALAZINE Injectable 10 milliGRAM(s) IV Push every 4 hours PRN SBP greater than 160      Allergies    Percocet 5/325 (Other)  Polyester (Hives; Rash)    Intolerances        OBJECTIVE:  Vital Signs Last 24 Hrs  T(C): 36.6 (16 Sep 2024 08:54), Max: 36.6 (15 Sep 2024 22:00)  T(F): 97.9 (16 Sep 2024 08:54), Max: 97.9 (15 Sep 2024 22:00)  HR: 64 (16 Sep 2024 12:30) (50 - 78)  BP: 162/83 (16 Sep 2024 12:30) (137/72 - 202/84)  BP(mean): 117 (16 Sep 2024 12:30) (91 - 128)  RR: 19 (16 Sep 2024 11:38) (17 - 19)  SpO2: 98% (16 Sep 2024 11:38) (95% - 100%)    Parameters below as of 16 Sep 2024 11:38  Patient On (Oxygen Delivery Method): room air      I&O's Summary    15 Sep 2024 07:01  -  16 Sep 2024 07:00  --------------------------------------------------------  IN: 0 mL / OUT: 200 mL / NET: -200 mL    16 Sep 2024 07:01  -  16 Sep 2024 16:36  --------------------------------------------------------  IN: 240 mL / OUT: 250 mL / NET: -10 mL        PHYSICAL EXAM:  Gen: appears stated age, resting comfortably, NAD  HEENT: NCAT, MMM  Neck: supple  CV: RRR, no m/r/g, peripheral pulses 2+  Pulm: CTAB, no increased work of breathing, no rales/rhonchi  Abd: soft, ND, NT, no rebound or guarding  Skin: warm and dry  Ext: non-tender, no edema  Neuro: AOx3, speaking in full sentences  Psych: affect and behavior appropriate, pleasant at time of interview    LABS:                        11.8   7.68  )-----------( 183      ( 15 Sep 2024 05:30 )             37.3     09-16    138  |  104  |  15  ----------------------------<  132<H>  4.0   |  24  |  1.06    Ca    9.1      16 Sep 2024 05:30  Phos  2.7     09-16  Mg     2.1     09-16    TPro  7.1  /  Alb  3.7  /  TBili  0.3  /  DBili  x   /  AST  15  /  ALT  14  /  AlkPhos  77  09-15    LIVER FUNCTIONS - ( 15 Sep 2024 05:30 )  Alb: 3.7 g/dL / Pro: 7.1 g/dL / ALK PHOS: 77 U/L / ALT: 14 U/L / AST: 15 U/L / GGT: x             CAPILLARY BLOOD GLUCOSE      POCT Blood Glucose.: 169 mg/dL (16 Sep 2024 11:20)  POCT Blood Glucose.: 126 mg/dL (16 Sep 2024 07:09)  POCT Blood Glucose.: 145 mg/dL (15 Sep 2024 21:25)  POCT Blood Glucose.: 126 mg/dL (15 Sep 2024 17:40)    Urinalysis Basic - ( 16 Sep 2024 05:30 )    Color: x / Appearance: x / SG: x / pH: x  Gluc: 132 mg/dL / Ketone: x  / Bili: x / Urobili: x   Blood: x / Protein: x / Nitrite: x   Leuk Esterase: x / RBC: x / WBC x   Sq Epi: x / Non Sq Epi: x / Bacteria: x        MICRODATA:      RADIOLOGY/OTHER STUDIES:  Reviewed

## 2024-09-16 NOTE — PHYSICAL THERAPY INITIAL EVALUATION ADULT - GAIT DEVIATIONS NOTED, PT EVAL
fairly steady gait,  slightly increased lateral sway, no lose of balance, decreased heel strike and hip flexion bilaterally./decreased lou/increased time in double stance/decreased step length

## 2024-09-16 NOTE — PROGRESS NOTE ADULT - SUBJECTIVE AND OBJECTIVE BOX
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 He has a history of diabetes, hypertension, peripheral arterial disease, colon cancer (status post-colectomy in 2011), left retinal detachment with residual vision loss, glaucoma, and bilateral cataract surgery. Last fall, he was hospitalized for toe amputations, during which brain imaging revealed a pituitary mass. An MRI on September 29, 2023, confirmed an enlarged pituitary gland with suprasellar extension and mass effect on the optic chiasm, consistent with a pituitary macroadenoma ( 1.6 x 1.6 x 1.4 cm (AP by transverse by craniocaudal)). Additionally, the imaging revealed chronic microvascular ischemic disease and bilateral exophthalmos.  Mr. Vanegas reports no worsening of vision since his retinal detachment but continues to experience left vision loss. He denies any new symptoms such as headaches or dizziness. His recent endocrinology labs showed a low Free T4 level, though other values were normal.  Physical examination and recent imaging suggest the pituitary mass is stable or has minimally decreased in size, with some atrophy of the optic nerves and optic chiasm. The suprasellar mass component elevates and bows the optic chiasm, with some volume loss possibly related to his severe bilateral glaucoma.  After discussing surgical intervention with Dr. D'Amico, including risks, benefits, and alternatives, Mr. Vanegas agreed to proceed with surgery. The patient was informed of the treatment plan and next steps, with all questions addressed. He demonstrated a clear understanding of the plan and agreed to proceed with the recommended interventions.    Presented in OR for TSP pituitary macroadenoma removal.   (13 Sep 2024 07:28)    OVERNIGHT EVENTS: POD 3. LEANDER overnight. Neuro stable.     Hospital Course:  9/13: POD 0 TSP.   9/14: POD 1 TSP. LEANDER ovn. protonix added for emesis x1. Endocrine consulted. MRI. Tx SDU  9/15: POD 2. LEANDER overnight. Neuro stable. SBP 190s, given hydralazine x1, given cozaar 25mg x1. SQL tonight. Hydral x 1 for SBP 180s. 5 peats of afib on tele. Pt asymptomatic. EKG obtained.   9/16: POD 3. LEANDER overnight. Neuro stable.     Vital Signs Last 24 Hrs  T(C): 36.6 (15 Sep 2024 22:00), Max: 36.7 (15 Sep 2024 10:28)  T(F): 97.9 (15 Sep 2024 22:00), Max: 98 (15 Sep 2024 10:28)  HR: 72 (16 Sep 2024 00:20) (44 - 76)  BP: 148/63 (16 Sep 2024 00:20) (137/72 - 198/88)  BP(mean): 91 (16 Sep 2024 00:20) (91 - 128)  RR: 18 (16 Sep 2024 00:20) (16 - 18)  SpO2: 96% (16 Sep 2024 00:20) (96% - 100%)    Parameters below as of 16 Sep 2024 00:20  Patient On (Oxygen Delivery Method): room air        I&O's Summary    14 Sep 2024 07:01  -  15 Sep 2024 07:00  --------------------------------------------------------  IN: 240 mL / OUT: 600 mL / NET: -360 mL    15 Sep 2024 07:01  -  16 Sep 2024 01:33  --------------------------------------------------------  IN: 0 mL / OUT: 200 mL / NET: -200 mL        PHYSICAL EXAM:  General: patient seen laying supine in bed in NAD on RA  Neck: supple  Cardiac: RRR, S1S2  Pulmonary: chest rise symmetric  Abdomen: soft, nontender, nondistended  Ext: perfusing well  Skin: warm, dry  Neurological:  AAOX3. Opens eyes spontaneously. Following commands.   Cranial Nerves: pupils 3mm equal, round and reactive to light, EOMI, facial movements symmetric, tongue midline, speech clear. +L visual deficits at baseline 2/2 retinal detachment hx  Motor: 5/5 power in b/l UE and LE  Sensation: intact to light touch in all extremities  Pronator Drift: none               LABS:                        11.8   7.68  )-----------( 183      ( 15 Sep 2024 05:30 )             37.3     09-15    139  |  106  |  16  ----------------------------<  123<H>  4.8   |  28  |  1.15    Ca    8.7      15 Sep 2024 05:30  Phos  2.3     09-15  Mg     2.1     09-15    TPro  7.1  /  Alb  3.7  /  TBili  0.3  /  DBili  x   /  AST  15  /  ALT  14  /  AlkPhos  77  09-15      Urinalysis Basic - ( 15 Sep 2024 05:30 )    Color: x / Appearance: x / SG: x / pH: x  Gluc: 123 mg/dL / Ketone: x  / Bili: x / Urobili: x   Blood: x / Protein: x / Nitrite: x   Leuk Esterase: x / RBC: x / WBC x   Sq Epi: x / Non Sq Epi: x / Bacteria: x          CAPILLARY BLOOD GLUCOSE      POCT Blood Glucose.: 145 mg/dL (15 Sep 2024 21:25)  POCT Blood Glucose.: 126 mg/dL (15 Sep 2024 17:40)  POCT Blood Glucose.: 202 mg/dL (15 Sep 2024 11:27)  POCT Blood Glucose.: 129 mg/dL (15 Sep 2024 07:01)      Drug Levels: [] N/A    CSF Analysis: [] N/A      Allergies    Percocet 5/325 (Other)  Polyester (Hives; Rash)    Intolerances      MEDICATIONS:  Antibiotics:  cefTRIAXone   IVPB 2000 milliGRAM(s) IV Intermittent every 12 hours    Neuro:  acetaminophen     Tablet .. 1000 milliGRAM(s) Oral every 8 hours  gabapentin 300 milliGRAM(s) Oral three times a day    Anticoagulation:  enoxaparin Injectable 40 milliGRAM(s) SubCutaneous <User Schedule>    OTHER:  atorvastatin 20 milliGRAM(s) Oral at bedtime  brimonidine 0.2% Ophthalmic Solution 1 Drop(s) Left EYE two times a day  famotidine    Tablet 40 milliGRAM(s) Oral two times a day  hydrALAZINE Injectable 10 milliGRAM(s) IV Push every 4 hours PRN  influenza  Vaccine (HIGH DOSE) 0.5 milliLiter(s) IntraMuscular once  insulin glargine Injectable (LANTUS) 8 Unit(s) SubCutaneous at bedtime  insulin lispro (ADMELOG) corrective regimen sliding scale   SubCutaneous Before meals and at bedtime  levothyroxine 50 MICROGram(s) Oral daily  losartan 50 milliGRAM(s) Oral daily  metoprolol succinate ER 50 milliGRAM(s) Oral daily  pantoprazole    Tablet 40 milliGRAM(s) Oral before breakfast  polyethylene glycol 3350 17 Gram(s) Oral daily  senna 2 Tablet(s) Oral at bedtime  sodium chloride 0.65% Nasal 1 Spray(s) Both Nostrils every 6 hours    IVF:    CULTURES:    RADIOLOGY & ADDITIONAL TESTS:      ASSESSMENT:  72 yo M PMH DM, HTN, PAD, colon CA s/p colectomy 2011, left retinal detachment with residual vision loss, glaucoma, b/l cataract surgery, pituitary macroadenoma with 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 Now s/p TSP for pituitary macroadenoma 9/13/24.    Plan:   Neuro:   - neuro/vitals q4h  - pain control: cranial ERAS, acetaminophen 1g q8  - post op MRI sella completed 9/14  - skull base precautions: no straws, sneeze/cough with mouth open, no incentive spirometry, no bending over, no nasal cannula    ENT:  - saline sprays q6h   - shen splints b/l, ENT to follow    Cardio:   - SBP <160  - HLD: Atorvastatin 20 mg qd at bedtime  - HTN: losartan 50 mg PO qd, metoprolol tartrate 25 mg PO qd   - TTE: 9/13: EF 55-60% aortic sclerosis w/o stenosis    Pulm:   - RA  - no IS due to skull base precautions    GI:  - CCD  - bowel regimen, last BM 9/14  - GERD: Pepcid 40 mg PO BID, Protonix 40 daily    Endo:  - DI watch: monitor urine output, I&Os  - DM: Lantus 8u qhs, ISS  - hypothyroid: started synthroid 50mcg qd  - Endocrinology following, appreciate recs    Renal:   - IVL  - voiding    Heme:  - SCDs, SQL for DVT ppx     ID:  - afebrile   - Cetriaxone while inpatient per ENT    Dispo:  - SD status, full code, pending PT/OT    Discussed w/ Dr. D'Amico

## 2024-09-16 NOTE — PROGRESS NOTE ADULT - PROBLEM SELECTOR PLAN 1
Diagnosed incidentally but noted to have mass effect on optic chiasm. Now s/p OR with Drs. D'Amico and Uziel on 9/13/24 for endoscopic transphenoidal resection.  - management per NSG and ENT, periop antibiotics for ppx per ENT  - endocrine following  ---levothyroxine 50mcg for central hypothyroidism, close outpatient follow up  - post operative state  ---pain control with bowel regimen  ---strong pulm hygiene with freq IS use, chest PT  ---early mobilization and OOBTC as tolerated with fall precautions  ---chemical DVT ppx with LMWH

## 2024-09-16 NOTE — DISCHARGE NOTE NURSING/CASE MANAGEMENT/SOCIAL WORK - PATIENT PORTAL LINK FT
You can access the FollowMyHealth Patient Portal offered by [facility] by registering at the following website: [email or web address]. By joining [email or web address] FollowMyHealth portal, you will also be able to view your health information using other applications (apps) compatible with our system.

## 2024-09-16 NOTE — PROGRESS NOTE ADULT - REASON FOR ADMISSION
TSP for pituitary macroadenoma

## 2024-09-16 NOTE — PROGRESS NOTE ADULT - ASSESSMENT
Assessment: 73m hx colon CA, DM, HTN, peptic ulcer disease, admit for TSP resection of adenoma       NEURO:  -neuro check q4  -pain management w/ tylenol  -MRI brain pending   -Monitor Drain output   -Pitutary precautions: i.e no straws, no bipap, no nasal swabs,   -PT/OT evaluation     PULMONARY:  saturating well on RA,   -continue to monitor on pulse o2     CARDIOVASCULAR:  monitor on telemetry   vitals q1  sbp goal 100-160 ; c/w losartan 50mg , metoprolol 50mg qd   9/13: TTE ef 55-60; aortic sclerosis     GASTROENTEROLOGY:  CCD diet   ensure BMs w/ Miralax & senna  GI ppx : famotidine 40mg bid + protonix 40mg qd   Daily stool count, LBM9/12    RENAL/:  -check BMP qd  -strict i/o's ; monitor urine outpt for DI   -Na goal 135-145, repeat BMP now     ENDOCRINE:  Diabetes Mellitus  A1c- 7.%  HISS & Lantus   c/w home Synthroid 50mcg    HEME/ONC:  DVT ppx: hold for hematemesis likely start heparin SQ tomorrow    b/l SCDs    INFECTIOUS:   Monitor for fevers   ceftriaxone 2g BID per ENT until discharge ; shen splints in place to be removed outpatient 
SYDNI REBOLLEDO is a  73yMale PMH DM, HTN, PVD, glaucoma, retinal detachment on L, s/p TSPR for macroadenoma, closed with free mucosal graft and surgicel 9/13.    PLAN:  - nasal saline sprays  - ceftriaxone  - f/u MRI  - DI watch  - skull base precautions  - rest of care per NSGY    Disposition:   [facility] at [phone] with any questions/concerns.    
SYDNI REBOLLEDO is a  73yMale PMH DM, HTN, PVD, glaucoma, retinal detachment on L, s/p TSPR for macroadenoma, closed with free mucosal graft and surgicel 9/13.    PLAN:  - nasal saline sprays  - ceftriaxone, transition to cefuroxime or augmentin on d/c  - f/u MRI read  - skull base precautions  - rest of care per NSGY    Disposition:   [facility] at [phone] with any questions/concerns.    
Assessment: 73m hx colon CA, DM, HTN, peptic ulcer disease, admit for TSP resection of adenoma       NEURO:  -neuro check q1  -pain management w/ tylenol  -MRI brain pending   -Monitor Drain output   -Pitutary precautions: i.e no straws, no bipap, no nasal swabs,   -PT/OT evaluation     PULMONARY:  saturating well on RA,   -continue to monitor on pulse o2     CARDIOVASCULAR:  monitor on telemetry   vitals q1  sbp goal 100-160 ; c/w losartan 50mg , metoprolol 50mg qd   9/13: TTE ef 55-60; aortic sclerosis     GASTROENTEROLOGY:  CCD diet   ensure BMs w/ Miralax & senna  GI ppx : famotidine 40mg bid  Daily stool count, LBM9/12    RENAL/:  -check BMP qd  -strict i/o's ; monitor urine outpt for DI   -Na goal 135-145, repeat BMP now     ENDOCRINE:  Diabetes Mellitus  A1c- 7.%  HISS & Lantus   c/w home Synthroid 50mcg    HEME/ONC:  DVT ppx: hold for fresh post op  b/l SCDs    INFECTIOUS:   Monitor for fevers   ceftriaxone 2g BID per ENT until discharge ; shen splints in place to be removed outpatient 
I have personally reviewed the above clinical note and all of its components and:  [ ] agree with the above assessment and plan without modifications.  [x] agree with the above with modifications made to the assessment and/or plan of care.  [ ] disagree with the above with significant modifications as listed below:
73 YOM with PMH of T2DM (off meds), HTN, PAD, colon cancer s/p hemicolectomy, glaucoma, L foot OM c/b bacteremia (4/2023 s/p partial L 5th ray amputation, 5/2023 s/p debridement and graft), pituitary macroadenoma admitted for elective surgery s/p OR with Drs. D'Amico and Uziel on 9/13/24 for endoscopic transphenoidal resection.

## 2024-09-20 DIAGNOSIS — E78.5 HYPERLIPIDEMIA, UNSPECIFIED: ICD-10-CM

## 2024-09-20 DIAGNOSIS — H05.20 UNSPECIFIED EXOPHTHALMOS: ICD-10-CM

## 2024-09-20 DIAGNOSIS — H47.49 DISORDERS OF OPTIC CHIASM IN (DUE TO) OTHER DISORDERS: ICD-10-CM

## 2024-09-20 DIAGNOSIS — Z79.84 LONG TERM (CURRENT) USE OF ORAL HYPOGLYCEMIC DRUGS: ICD-10-CM

## 2024-09-20 DIAGNOSIS — I73.9 PERIPHERAL VASCULAR DISEASE, UNSPECIFIED: ICD-10-CM

## 2024-09-20 DIAGNOSIS — H47.093 OTHER DISORDERS OF OPTIC NERVE, NOT ELSEWHERE CLASSIFIED, BILATERAL: ICD-10-CM

## 2024-09-20 DIAGNOSIS — E11.9 TYPE 2 DIABETES MELLITUS WITHOUT COMPLICATIONS: ICD-10-CM

## 2024-09-20 DIAGNOSIS — Z79.4 LONG TERM (CURRENT) USE OF INSULIN: ICD-10-CM

## 2024-09-20 DIAGNOSIS — D35.2 BENIGN NEOPLASM OF PITUITARY GLAND: ICD-10-CM

## 2024-09-20 DIAGNOSIS — Z90.49 ACQUIRED ABSENCE OF OTHER SPECIFIED PARTS OF DIGESTIVE TRACT: ICD-10-CM

## 2024-09-20 DIAGNOSIS — Z85.038 PERSONAL HISTORY OF OTHER MALIGNANT NEOPLASM OF LARGE INTESTINE: ICD-10-CM

## 2024-09-20 DIAGNOSIS — Z88.8 ALLERGY STATUS TO OTHER DRUGS, MEDICAMENTS AND BIOLOGICAL SUBSTANCES: ICD-10-CM

## 2024-09-20 DIAGNOSIS — H47.299 OTHER OPTIC ATROPHY, UNSPECIFIED EYE: ICD-10-CM

## 2024-09-20 DIAGNOSIS — Z79.899 OTHER LONG TERM (CURRENT) DRUG THERAPY: ICD-10-CM

## 2024-09-20 DIAGNOSIS — Z87.11 PERSONAL HISTORY OF PEPTIC ULCER DISEASE: ICD-10-CM

## 2024-09-20 DIAGNOSIS — Z91.048 OTHER NONMEDICINAL SUBSTANCE ALLERGY STATUS: ICD-10-CM

## 2024-09-20 DIAGNOSIS — H40.9 UNSPECIFIED GLAUCOMA: ICD-10-CM

## 2024-09-20 DIAGNOSIS — I10 ESSENTIAL (PRIMARY) HYPERTENSION: ICD-10-CM

## 2024-09-23 ENCOUNTER — APPOINTMENT (OUTPATIENT)
Dept: NEUROSURGERY | Facility: CLINIC | Age: 74
End: 2024-09-23
Payer: COMMERCIAL

## 2024-09-23 ENCOUNTER — NON-APPOINTMENT (OUTPATIENT)
Age: 74
End: 2024-09-23

## 2024-09-23 ENCOUNTER — APPOINTMENT (OUTPATIENT)
Dept: OTOLARYNGOLOGY | Facility: CLINIC | Age: 74
End: 2024-09-23
Payer: COMMERCIAL

## 2024-09-23 DIAGNOSIS — D35.2 BENIGN NEOPLASM OF PITUITARY GLAND: ICD-10-CM

## 2024-09-23 DIAGNOSIS — E11.9 TYPE 2 DIABETES MELLITUS W/OUT COMPLICATIONS: ICD-10-CM

## 2024-09-23 DIAGNOSIS — J34.2 DEVIATED NASAL SEPTUM: ICD-10-CM

## 2024-09-23 PROBLEM — E89.3 STATUS POST TRANSSPHENOIDAL PITUITARY RESECTION: Status: ACTIVE | Noted: 2024-09-23

## 2024-09-23 PROCEDURE — 31237 NSL/SINS NDSC SURG BX POLYPC: CPT | Mod: 50,58

## 2024-09-23 PROCEDURE — 99441: CPT | Mod: 93

## 2024-09-23 NOTE — REASON FOR VISIT
[Home] : at home, [unfilled] , at the time of the visit. [Medical Office: ([facility])___] : at the medical office located in  [Verbal consent obtained from patient] : the patient, [unfilled] [de-identified] : TSP pituitary macroadenoma removal [de-identified] : 9/13/24 [de-identified] : 10

## 2024-09-23 NOTE — PROCEDURE
[FreeTextEntry3] : HISTORY OF PRESENT ILLNESS   Gallo Vanegas is s/p pituitary excision on 9/13/24. Currently on ocean nasal spray . Complaints: difficulty breathing through his nose   Physical Exam General: AAO x 3, WDWN  Ears: Canals clear, TM;s nrml  Nose: See endoscopy  Throat: uvula midline. No masses or lesions  Eyes: PERRL, EOMI  Neuro: Gross nrml, CN 2-12 intact  Resp: Nrml chest excursion  Skin: Appears intact   Procedure Note   PROCEDURE: Nasal/sinus endoscopy, surgical, with debridement (27587-34)   INDICATION: pituitary lesion   Prior to the procedure, I had a discussion with the patient regarding the risks, benefits, and alternatives of the debridement and they signed a consent.   SURGEON: Dr. King Triplett   PROCEDURE DETAIL: After obtaining adequate decongestion of the nasal mucosa with ephedrine nasal spray, and adequate anesthesia with 2% topical Lidocaine nasal spray, the nasal endoscope was used to examine the nasal passages and paranasal sinuses. Using a combination of suction, grasping instruments and swabs, the maxillary, ethmoid, frontal and sphenoid sinuses were debrided bilaterally of pus, crust, debris, clots and polyps to prevent scar formation, continued sinusitis and mucocele formation. At the end of this procedure, all operated sinuses were patent. The endoscope was withdrawn, and the patient tolerated the procedure well. No complications were encountered.   INSTRUMENTS: rigid 45   ENDOSCOPIC FINDINGS: doyles removed. right worse than left debris removed. no leak. healing well.   Impression: Gallo Vanegas is s/p pituitary excision on 9/13/24   Plan: -begin nasal saline irrigations    The patient was given an opportunity to ask questions, and all questions asked were answered to the best of my ability.     King Triplett MD [facility] Rhinology and Skull Base Surgery Department of Otolaryngology- Head and Neck Surgery [facility]

## 2024-09-23 NOTE — HISTORY OF PRESENT ILLNESS
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  - Pt endorses last fall 2023 he was hospitalized for toe amputation x2 and during hospitalization he had infectious/sepsis workup which included brain imaging revealing pituitary mass. - He followed up outpatient and completed MRI Pituitary 9/29/23 @ [facility] with report of enlarged pituitary gland with suprasellar extension resulting in mass effect on the optic chiasm c/w a pituitary macroadenoma; chronic microvascular ischemic disease; exophthalmos bilaterally.  Presents today for evaluation. He endorses he has seen a few different neurosurgeons but none that manage pituitary adenomas.  - 8/12 MRI showing pituitary macroadenoma  - Recent endo labs from 8/13: WNL except Free T4 low 0.8 (range:0.9-1.8)  He continues to have left vision loss since retinal detachment but denies worsening of vision. Follows with Dr. Dee.  Also with bilateral cataract surgery that he follows with Dr. Fermin for. Endorses his right eye does not have any deficit following cataract surgery.  Pt endorses vision has been stable to his knowledge over the past year or so, denies worsening of vision.  Denies headaches, dizziness.  He is s/p TSP pituitary macroadenoma removal on 9/13/24. Path pending.  Hospital course c/b asymptomatic hypertension (increased Losartan for discharge). He was discharged to home on 9/15/24.  Patient returns today for routine post op phone call. Reports feeling very well, saw Dr. Triplett earlier today. Denies fevers/chills, vision changes.   Opth: Dr. Lisa Dee @ [facility] [phone] [address] [place] [address]    Opth: Dr. Doug Fermin @ [facility] [phone] [address] [place] [id number]  PCP: Dr. Roberto Walker  [phone] [id number] [place] [address]

## 2024-09-23 NOTE — ASSESSMENT
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 The tumor is causing visual disturbances with intermittent blurring of vision. MRI shows the mass elevating the optic chiasm with some volume loss, likely contributing to the patient's bilateral glaucoma. Endocrine labs were mostly normal except for a slightly low T4 level of 0.8 (reference range 0.9-1.8). We discussed that there is no urgent indication for surgery, but that it is possible the tumor will grow during this lifetime and threaten the residual good vision he has in his left and right eye. He agreed to intervention.   He is s/p TSP pituitary macroadenoma removal on 9/13/24.   PLAN: -continue f/u with Dr. Triplett  -return to see Dr. D'amico on 10/9/24 for routine post op f/u  Patient verbalizes understanding of today's discussion and next steps in treatment plan.   A total of 8 minutes was spent reviewing the labs, imaging and physical examination of the patient. We discussed the diagnosis, and the plan. The patient's questions were answered. The patient demonstrated an excellent understanding of the plan.

## 2024-09-23 NOTE — DATA REVIEWED
[de-identified] : EXAM: [id number] - MR SELLA ONLY WAW IC  - ORDERED BY: AILEEN BASS   PROCEDURE DATE:  08/12/2024    INTERPRETATION:  CLINICAL INDICATION: Follow macroadenoma.  TECHNIQUE: Multi-planar multi-sequential MR imaging of the brain with special attention to the pituitary was performed before and after the intravenous administration of 10 ml of Gadavist, including dynamic imaging.  COMPARISON: MR from 9/29/2023.  FINDINGS:  Expanded sella. Sellar and suprasellar hypoenhancing mass pituitary gland measuring about 1.6 x 1.6 x 1.4 cm (AP by transverse by craniocaudal.) Normal pituitary gland appears elevated and compressed. Normal infundibulum is deviated to the left. Optic and prechiasmatic optic nerves appear small suggesting chronic atrophy. The mass approaches the undersurface of the optic chiasm without compression evident at this time. Minimal involvement of the cavernous sinuses and no effect upon the internal carotid artery flow voids.  No acute infarction or intracranial hemorrhage. There is no intraparenchymal mass or abnormal intraparenchymal enhancement.  The ventricles are normal without evidence of hydrocephalus. There are no extra-axial fluid collections.  The imaged portions of the paranasal sinuses are clear. The mastoid air cells are clear. The visualized soft tissues and other osseous structures appear normal.  IMPRESSION:   1. Stability or very minimal decrease in size of pituitary macroadenoma.  2. Atrophy of the optic nerves and optic chiasm which appears similar to the prior study. The suprasellar mass component elevates and bows the optic chiasm, but some of the volume loss may be secondary to this patient's known severe bilateral glaucoma.  --- End of Report ---

## 2024-09-23 NOTE — REASON FOR VISIT
[Home] : at home, [unfilled] , at the time of the visit. [Medical Office: ([facility])___] : at the medical office located in  [Verbal consent obtained from patient] : the patient, [unfilled] [de-identified] : TSP pituitary macroadenoma removal [de-identified] : 9/13/24 [de-identified] : 10

## 2024-09-23 NOTE — HISTORY OF PRESENT ILLNESS
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  - Pt endorses last fall 2023 he was hospitalized for toe amputation x2 and during hospitalization he had infectious/sepsis workup which included brain imaging revealing pituitary mass. - He followed up outpatient and completed MRI Pituitary 9/29/23 @ [facility] with report of enlarged pituitary gland with suprasellar extension resulting in mass effect on the optic chiasm c/w a pituitary macroadenoma; chronic microvascular ischemic disease; exophthalmos bilaterally.  Presents today for evaluation. He endorses he has seen a few different neurosurgeons but none that manage pituitary adenomas.  - 8/12 MRI showing pituitary macroadenoma  - Recent endo labs from 8/13: WNL except Free T4 low 0.8 (range:0.9-1.8)  He continues to have left vision loss since retinal detachment but denies worsening of vision. Follows with Dr. Dee.  Also with bilateral cataract surgery that he follows with Dr. Fermin for. Endorses his right eye does not have any deficit following cataract surgery.  Pt endorses vision has been stable to his knowledge over the past year or so, denies worsening of vision.  Denies headaches, dizziness.  He is s/p TSP pituitary macroadenoma removal on 9/13/24. Path pending.  Hospital course c/b asymptomatic hypertension (increased Losartan for discharge). He was discharged to home on 9/15/24.  Patient returns today for routine post op phone call. Reports feeling very well, saw Dr. Triplett earlier today. Denies fevers/chills, vision changes.   Opth: Dr. Lisa Dee @ [facility] [phone] [address] [place] [address]    Opth: Dr. Doug Fermin @ [facility] [phone] [address] [place] [id number]  PCP: Dr. Roberto Walker  [phone] [id number] [place] [address]

## 2024-09-23 NOTE — DATA REVIEWED
[de-identified] : EXAM: [id number] - MR SELLA ONLY WAW IC  - ORDERED BY: AILEEN BASS   PROCEDURE DATE:  08/12/2024    INTERPRETATION:  CLINICAL INDICATION: Follow macroadenoma.  TECHNIQUE: Multi-planar multi-sequential MR imaging of the brain with special attention to the pituitary was performed before and after the intravenous administration of 10 ml of Gadavist, including dynamic imaging.  COMPARISON: MR from 9/29/2023.  FINDINGS:  Expanded sella. Sellar and suprasellar hypoenhancing mass pituitary gland measuring about 1.6 x 1.6 x 1.4 cm (AP by transverse by craniocaudal.) Normal pituitary gland appears elevated and compressed. Normal infundibulum is deviated to the left. Optic and prechiasmatic optic nerves appear small suggesting chronic atrophy. The mass approaches the undersurface of the optic chiasm without compression evident at this time. Minimal involvement of the cavernous sinuses and no effect upon the internal carotid artery flow voids.  No acute infarction or intracranial hemorrhage. There is no intraparenchymal mass or abnormal intraparenchymal enhancement.  The ventricles are normal without evidence of hydrocephalus. There are no extra-axial fluid collections.  The imaged portions of the paranasal sinuses are clear. The mastoid air cells are clear. The visualized soft tissues and other osseous structures appear normal.  IMPRESSION:   1. Stability or very minimal decrease in size of pituitary macroadenoma.  2. Atrophy of the optic nerves and optic chiasm which appears similar to the prior study. The suprasellar mass component elevates and bows the optic chiasm, but some of the volume loss may be secondary to this patient's known severe bilateral glaucoma.  --- End of Report ---

## 2024-10-03 PROBLEM — Z09 POSTOP CHECK: Status: ACTIVE | Noted: 2024-10-03

## 2024-10-09 ENCOUNTER — APPOINTMENT (OUTPATIENT)
Dept: NEUROSURGERY | Facility: CLINIC | Age: 74
End: 2024-10-09
Payer: COMMERCIAL

## 2024-10-09 VITALS
HEIGHT: 74 IN | WEIGHT: 229 LBS | OXYGEN SATURATION: 97 % | RESPIRATION RATE: 18 BRPM | TEMPERATURE: 97.4 F | BODY MASS INDEX: 29.39 KG/M2 | SYSTOLIC BLOOD PRESSURE: 150 MMHG | DIASTOLIC BLOOD PRESSURE: 83 MMHG | HEART RATE: 62 BPM

## 2024-10-09 DIAGNOSIS — Z09 ENCOUNTER FOR FOLLOW-UP EXAMINATION AFTER COMPLETED TREATMENT FOR CONDITIONS OTHER THAN MALIGNANT NEOPLASM: ICD-10-CM

## 2024-10-09 DIAGNOSIS — E89.3 POSTPROCEDURAL HYPOPITUITARISM: ICD-10-CM

## 2024-10-09 PROCEDURE — 99024 POSTOP FOLLOW-UP VISIT: CPT

## 2024-10-28 ENCOUNTER — APPOINTMENT (OUTPATIENT)
Dept: VASCULAR SURGERY | Facility: CLINIC | Age: 74
End: 2024-10-28
Payer: COMMERCIAL

## 2024-10-28 PROCEDURE — G2211 COMPLEX E/M VISIT ADD ON: CPT | Mod: NC

## 2024-10-28 PROCEDURE — 99214 OFFICE O/P EST MOD 30 MIN: CPT

## 2024-10-28 PROCEDURE — 93926 LOWER EXTREMITY STUDY: CPT | Mod: LT

## 2024-11-06 ENCOUNTER — APPOINTMENT (OUTPATIENT)
Dept: VASCULAR SURGERY | Facility: CLINIC | Age: 74
End: 2024-11-06
Payer: COMMERCIAL

## 2024-11-06 DIAGNOSIS — R09.89 OTHER SPECIFIED SYMPTOMS AND SIGNS INVOLVING THE CIRCULATORY AND RESPIRATORY SYSTEMS: ICD-10-CM

## 2024-11-06 PROCEDURE — G2211 COMPLEX E/M VISIT ADD ON: CPT | Mod: NC

## 2024-11-06 PROCEDURE — 93923 UPR/LXTR ART STDY 3+ LVLS: CPT

## 2024-11-06 PROCEDURE — 99214 OFFICE O/P EST MOD 30 MIN: CPT

## 2024-11-06 RX ORDER — CLOPIDOGREL BISULFATE 75 MG/1
75 TABLET, FILM COATED ORAL DAILY
Qty: 90 | Refills: 3 | Status: ACTIVE | COMMUNITY
Start: 2024-11-06 | End: 1900-01-01

## 2024-11-08 RX ORDER — CIPROFLOXACIN HYDROCHLORIDE 500 MG/1
500 TABLET, FILM COATED ORAL
Qty: 30 | Refills: 0 | Status: ACTIVE | COMMUNITY
Start: 2024-11-08 | End: 1900-01-01

## 2024-11-11 ENCOUNTER — APPOINTMENT (OUTPATIENT)
Dept: ENDOVASCULAR SURGERY | Facility: CLINIC | Age: 74
End: 2024-11-11
Payer: COMMERCIAL

## 2024-11-11 ENCOUNTER — RESULT REVIEW (OUTPATIENT)
Age: 74
End: 2024-11-11

## 2024-11-11 VITALS
BODY MASS INDEX: 29.39 KG/M2 | DIASTOLIC BLOOD PRESSURE: 77 MMHG | WEIGHT: 229 LBS | HEIGHT: 74 IN | OXYGEN SATURATION: 100 % | RESPIRATION RATE: 18 BRPM | TEMPERATURE: 98.4 F | SYSTOLIC BLOOD PRESSURE: 181 MMHG | HEART RATE: 59 BPM

## 2024-11-11 PROCEDURE — 37229Z: CUSTOM | Mod: 59,LT

## 2024-11-11 PROCEDURE — 37225Z: CUSTOM | Mod: LT

## 2024-11-11 PROCEDURE — 75625 CONTRAST EXAM ABDOMINL AORTA: CPT

## 2024-11-11 RX ORDER — GLIPIZIDE 5 MG/1
5 TABLET ORAL
Qty: 90 | Refills: 0 | Status: DISCONTINUED | COMMUNITY
Start: 2024-09-18

## 2024-11-11 RX ORDER — KETOROLAC TROMETHAMINE 5 MG/ML
0.5 SOLUTION OPHTHALMIC
Qty: 10 | Refills: 0 | Status: DISCONTINUED | COMMUNITY
Start: 2024-07-23

## 2024-11-11 RX ORDER — LEVOTHYROXINE SODIUM 0.05 MG/1
50 TABLET ORAL
Qty: 90 | Refills: 0 | Status: DISCONTINUED | COMMUNITY
Start: 2024-10-22

## 2024-11-11 RX ORDER — MECLIZINE HYDROCHLORIDE 25 MG/1
25 TABLET ORAL
Qty: 30 | Refills: 0 | Status: DISCONTINUED | COMMUNITY
Start: 2023-07-14

## 2024-11-20 ENCOUNTER — APPOINTMENT (OUTPATIENT)
Dept: VASCULAR SURGERY | Facility: CLINIC | Age: 74
End: 2024-11-20
Payer: COMMERCIAL

## 2024-11-20 PROCEDURE — 93923 UPR/LXTR ART STDY 3+ LVLS: CPT

## 2024-11-20 PROCEDURE — 99214 OFFICE O/P EST MOD 30 MIN: CPT

## 2024-11-20 PROCEDURE — G2211 COMPLEX E/M VISIT ADD ON: CPT | Mod: NC

## 2024-11-22 NOTE — ED ADULT NURSE NOTE - NS PRO PASSIVE SMOKE EXP
55 y/o M with PMHx of HTN, HLD, DM (not on medications currently), CHF, ESRD on HD ( L UE AVF)  MWF( follows with Dr. Griffiths) , ascites( follows with Dr. Li at [facility])  last drained 10/10/2024 presented  to the with intractable abdominal pain associated with nausea, bilious emesis x 4 . In the ER, VSS, s/p  Zofran Tylenol Pepcid , morphine . Labs s/o Hb 12.1, Na 134, BUN/Cr 31/ 5.13. CTAP c/w large volume ascites. Being admitted to medicine for symptomatic treatment of  intractable abdominal pain. CTX for SBP PPX. IR consulted for therapeutic and diagnostic paracentesis. Blood culture negative. Patient spike fever however isolated.      No

## 2024-12-16 ENCOUNTER — APPOINTMENT (OUTPATIENT)
Dept: VASCULAR SURGERY | Facility: CLINIC | Age: 74
End: 2024-12-16
Payer: COMMERCIAL

## 2024-12-16 PROCEDURE — 93926 LOWER EXTREMITY STUDY: CPT | Mod: LT

## 2024-12-16 PROCEDURE — 99214 OFFICE O/P EST MOD 30 MIN: CPT

## 2024-12-16 PROCEDURE — G2211 COMPLEX E/M VISIT ADD ON: CPT | Mod: NC

## 2024-12-30 ENCOUNTER — RESULT REVIEW (OUTPATIENT)
Age: 74
End: 2024-12-30

## 2024-12-30 ENCOUNTER — OUTPATIENT (OUTPATIENT)
Dept: OUTPATIENT SERVICES | Facility: HOSPITAL | Age: 74
LOS: 1 days | End: 2024-12-30

## 2024-12-30 ENCOUNTER — APPOINTMENT (OUTPATIENT)
Dept: MRI IMAGING | Facility: CLINIC | Age: 74
End: 2024-12-30
Payer: COMMERCIAL

## 2024-12-30 DIAGNOSIS — Z98.890 OTHER SPECIFIED POSTPROCEDURAL STATES: Chronic | ICD-10-CM

## 2024-12-30 PROCEDURE — 70553 MRI BRAIN STEM W/O & W/DYE: CPT | Mod: 26

## 2025-01-05 NOTE — DISCHARGE NOTE PROVIDER - NSDCCPCAREPLAN_GEN_ALL_CORE_FT
Normal Device Function PRINCIPAL DISCHARGE DIAGNOSIS  Diagnosis: Pituitary adenoma  Assessment and Plan of Treatment:       SECONDARY DISCHARGE DIAGNOSES  Diagnosis: Hypertension  Assessment and Plan of Treatment:     Diagnosis: Diabetes mellitus  Assessment and Plan of Treatment:     Diagnosis: H/O retinal detachment  Assessment and Plan of Treatment:     Diagnosis: Peripheral artery disease  Assessment and Plan of Treatment:     Diagnosis: Glaucoma  Assessment and Plan of Treatment:      PRINCIPAL DISCHARGE DIAGNOSIS  Diagnosis: Pituitary adenoma  Assessment and Plan of Treatment: s/p Endoscopic endonasal resection of pituitary tumor on 9/13      SECONDARY DISCHARGE DIAGNOSES  Diagnosis: Hypertension  Assessment and Plan of Treatment:     Diagnosis: Diabetes mellitus  Assessment and Plan of Treatment:     Diagnosis: H/O retinal detachment  Assessment and Plan of Treatment:     Diagnosis: Peripheral artery disease  Assessment and Plan of Treatment:     Diagnosis: Glaucoma  Assessment and Plan of Treatment:      PRINCIPAL DISCHARGE DIAGNOSIS  Diagnosis: Pituitary adenoma  Assessment and Plan of Treatment: s/p Endoscopic endonasal resection of pituitary tumor on 9/13  Continue Augmentin through 9/23  Follow up with Dr. D'Amico from Neurosurgery  Follow up with Dr. Triplett from ENT  Follow up with outpatient Endocrinologist      SECONDARY DISCHARGE DIAGNOSES  Diagnosis: Hypertension  Assessment and Plan of Treatment: please monitor your blood pressure at home  continue home Metoprolol  Home Losartan increased to 100mg daily  follow up with your PCP outpatient    Diagnosis: Diabetes mellitus  Assessment and Plan of Treatment: follow up with Endocrinology outpatient    Diagnosis: H/O retinal detachment  Assessment and Plan of Treatment:     Diagnosis: Peripheral artery disease  Assessment and Plan of Treatment:     Diagnosis: Glaucoma  Assessment and Plan of Treatment: continue home Alphagan,    Diagnosis: HLD (hyperlipidemia)  Assessment and Plan of Treatment: continue home Simvastatin    Diagnosis: History of peptic ulcer disease  Assessment and Plan of Treatment: continue home Pepcid    Diagnosis: Hypothyroidism  Assessment and Plan of Treatment: started on Synthroid  Follow up with Endocrinology outpatient for thyroid ultrasound

## 2025-01-29 ENCOUNTER — APPOINTMENT (OUTPATIENT)
Dept: NEUROSURGERY | Facility: CLINIC | Age: 75
End: 2025-01-29
Payer: COMMERCIAL

## 2025-01-29 DIAGNOSIS — D35.2 BENIGN NEOPLASM OF PITUITARY GLAND: ICD-10-CM

## 2025-01-29 DIAGNOSIS — E89.3 POSTPROCEDURAL HYPOPITUITARISM: ICD-10-CM

## 2025-01-29 PROCEDURE — 99212 OFFICE O/P EST SF 10 MIN: CPT

## 2025-05-23 NOTE — DISCHARGE NOTE PROVIDER - CARE PROVIDERS DIRECT ADDRESSES
[email or web address]
full weight-bearing

## 2025-07-28 ENCOUNTER — APPOINTMENT (OUTPATIENT)
Dept: MRI IMAGING | Facility: CLINIC | Age: 75
End: 2025-07-28
Payer: COMMERCIAL

## 2025-07-28 ENCOUNTER — OUTPATIENT (OUTPATIENT)
Dept: OUTPATIENT SERVICES | Facility: HOSPITAL | Age: 75
LOS: 1 days | End: 2025-07-28

## 2025-07-28 DIAGNOSIS — Z98.890 OTHER SPECIFIED POSTPROCEDURAL STATES: Chronic | ICD-10-CM

## 2025-07-28 PROCEDURE — 70553 MRI BRAIN STEM W/O & W/DYE: CPT | Mod: 26

## 2025-08-06 ENCOUNTER — APPOINTMENT (OUTPATIENT)
Dept: NEUROSURGERY | Facility: CLINIC | Age: 75
End: 2025-08-06
Payer: COMMERCIAL

## 2025-08-06 ENCOUNTER — NON-APPOINTMENT (OUTPATIENT)
Age: 75
End: 2025-08-06

## 2025-08-06 VITALS
OXYGEN SATURATION: 97 % | SYSTOLIC BLOOD PRESSURE: 145 MMHG | RESPIRATION RATE: 18 BRPM | HEIGHT: 74 IN | WEIGHT: 250 LBS | DIASTOLIC BLOOD PRESSURE: 81 MMHG | BODY MASS INDEX: 32.08 KG/M2 | HEART RATE: 87 BPM

## 2025-08-06 DIAGNOSIS — E89.3 POSTPROCEDURAL HYPOPITUITARISM: ICD-10-CM

## 2025-08-06 DIAGNOSIS — D35.2 BENIGN NEOPLASM OF PITUITARY GLAND: ICD-10-CM

## 2025-08-06 PROCEDURE — 99213 OFFICE O/P EST LOW 20 MIN: CPT

## (undated) DEVICE — NDL HYPO SAFE 25G X 1.5" (ORANGE)

## (undated) DEVICE — SOL IRR POUR NS 0.9% 1500ML

## (undated) DEVICE — SYR BULB 2OZ (BLUE)

## (undated) DEVICE — PACK MINOR NO DRAPE

## (undated) DEVICE — MINI DOPPLER PROBE

## (undated) DEVICE — DRSG MASTISOL

## (undated) DEVICE — DRSG GAUZE VASELINE PETROLEUM 3 X 9"

## (undated) DEVICE — TOURNIQUET ESMARK 6"

## (undated) DEVICE — DRAPE C ARM MINI PACK FOR 6800

## (undated) DEVICE — STRYKER INTERPULSE HANDPIECE W IRR SUCTION TUBE

## (undated) DEVICE — SOL IRR BAG NS 0.9% 3000ML

## (undated) DEVICE — DRSG KLING 6"

## (undated) DEVICE — WARMING BLANKET UPPER ADULT

## (undated) DEVICE — POSITIONER FOAM EGG CRATE ULNAR 2PCS (PINK)

## (undated) DEVICE — TUBING SUCTION 20FT

## (undated) DEVICE — SUT PLAIN GUT 4-0 18" SC-1

## (undated) DEVICE — SOL ANTI FOG

## (undated) DEVICE — SYR LUER LOK 50CC

## (undated) DEVICE — POSITIONER FOAM HEAD DONUT 9" (PINK)

## (undated) DEVICE — DRSG CURITY GAUZE SPONGE 4 X 4" 12-PLY

## (undated) DEVICE — NDL ELECTRODE ULTRACLEAN 6

## (undated) DEVICE — DRSG TEGADERM 2.5X3"

## (undated) DEVICE — Device

## (undated) DEVICE — FOLEY TRAY 16FR 5CC LF UMETER CLOSED

## (undated) DEVICE — DRAPE SURGICAL #1010

## (undated) DEVICE — BUR MEDTRONIC ENT TAPERED DIAMOND CHOANAL ATRESIA 15 DEGREE 4MM X 13CM

## (undated) DEVICE — DRAPE 1/2 SHEET 40X57"

## (undated) DEVICE — PREP BETADINE SPONGE STICKS

## (undated) DEVICE — STAPLER COVIDIEN SKIN APPOSE 35

## (undated) DEVICE — GOWN ROYAL SILK XL

## (undated) DEVICE — BLADE SURGICAL #15 CARBON

## (undated) DEVICE — DRAPE EXTREMITY 87" X 128.5"

## (undated) DEVICE — VENODYNE/SCD SLEEVE CALF MEDIUM

## (undated) DEVICE — SUT MONOCRYL 4-0 27" PS-2 UNDYED

## (undated) DEVICE — DRSG STERISTRIPS 0.5 X 4"

## (undated) DEVICE — DRAPE LIGHT HANDLE COVER (BLUE)

## (undated) DEVICE — GOWN XL

## (undated) DEVICE — CLEANING SHEATH ENDO-SCRUB FOR STORZ 7210AA TELESCOPE 4MM 0 DEGREE

## (undated) DEVICE — DRAPE INSTRUMENT POUCH 6.75" X 11"

## (undated) DEVICE — GLV 8 PROTEXIS (WHITE)

## (undated) DEVICE — DRSG TEGADERM 1.75X1.75"

## (undated) DEVICE — MIDAS REX MR8 TELESCOPING MATCH HEAD DIAMOND LG BORE 4.5MM X 12CM

## (undated) DEVICE — DRSG STOCKINETTE TUBULAR COTTON 2PLY 6X60"

## (undated) DEVICE — ELCTR HOLSTER ACCESSORY

## (undated) DEVICE — ELCTR STRYKER NEPTUNE SMOKE EVACUATION PENCIL (GREEN)

## (undated) DEVICE — STORZ DURA MICRO KNIFE INSERT SICKLE-SHAPED

## (undated) DEVICE — DRSG KERLIX ROLL 4.5"

## (undated) DEVICE — S&N VERSAJET II EXACT HANDPIECE 8MM X 45 DEGREE

## (undated) DEVICE — GLV 7.5 PROTEXIS (WHITE)

## (undated) DEVICE — DRAPE LIGHT HANDLE COVER (GREEN)

## (undated) DEVICE — GLV 7 PROTEXIS (WHITE)

## (undated) DEVICE — PACK UPPER BODY

## (undated) DEVICE — TAPE SILK 3"

## (undated) DEVICE — CRANIAL MASK TRACKER

## (undated) DEVICE — BLADE MEDTRONIC ENT QUADCUT ROTATABLE STRAIGHT 4MM X 13CM

## (undated) DEVICE — DRAPE MAGNETIC INSTRUMENT MEDIUM

## (undated) DEVICE — DOPPLER PROBE 280MM 20MHZ BAYONETED STERILE

## (undated) DEVICE — SUT PROLENE 2-0 30" CT-2

## (undated) DEVICE — ELCTR GROUNDING PAD ADULT COVIDIEN

## (undated) DEVICE — SPECIMEN CONTAINER 100ML

## (undated) DEVICE — PREP BETADINE KIT

## (undated) DEVICE — STRYKER INSTRUMENT BATTERY

## (undated) DEVICE — SOL BALANCE SALT 15ML

## (undated) DEVICE — PACKING GAUZE IODOFORM 0.5"

## (undated) DEVICE — DRSG COMBINE 5X9"

## (undated) DEVICE — MARKING PEN W RULER

## (undated) DEVICE — DRAPE U POLY BLUE 60X72"

## (undated) DEVICE — DRAPE TOWEL BLUE 17" X 24"

## (undated) DEVICE — ELCTR BOVIE SUCTION 8FR 6"

## (undated) DEVICE — STAPLER SKIN PROXIMATE

## (undated) DEVICE — MIDAS REX MR7 LUBRICANT DIFFUSER CARTRIDGE

## (undated) DEVICE — DRSG MEROCEL STANDARD W STRING 8CM

## (undated) DEVICE — NEPTUNE II 4-PORT MANIFOLD

## (undated) DEVICE — DRAPE TOWEL BLUE STICKY

## (undated) DEVICE — PACKING NASAL STD 1.5X4.5X2CM

## (undated) DEVICE — BLADE SURGICAL #10 STAINLESS

## (undated) DEVICE — NDL SPINAL 18G X 3.5" (PINK)

## (undated) DEVICE — ENDO SCRUB

## (undated) DEVICE — MIDAS REX MR8 TELESCOPING MATCH HEAD DIAMOND LG BORE 3.5MM X 12CM

## (undated) DEVICE — SUCTION CATH AIRLIFE CONTROL VALVE TRIFLO 14FR

## (undated) DEVICE — FOR-ESU VALLEYLAB T7E15009DX: Type: DURABLE MEDICAL EQUIPMENT

## (undated) DEVICE — SYR ASEPTO

## (undated) DEVICE — GLV 8.5 PROTEXIS (WHITE)